# Patient Record
Sex: FEMALE | Race: WHITE | NOT HISPANIC OR LATINO | Employment: OTHER | URBAN - METROPOLITAN AREA
[De-identification: names, ages, dates, MRNs, and addresses within clinical notes are randomized per-mention and may not be internally consistent; named-entity substitution may affect disease eponyms.]

---

## 2016-11-23 LAB
CHOLEST SERPL-MSCNC: 163 MG/DL (ref 0–200)
HDLC SERPL-MCNC: 73 MG/DL
LDLC SERPL CALC-MCNC: 67 MG/DL (ref 0–160)
TRIGL SERPL-MCNC: 116 MG/DL (ref 40–160)

## 2018-12-14 LAB
CHOLEST SERPL-MSCNC: 150 MG/DL (ref 0–200)
HDLC SERPL-MCNC: 80 MG/DL
LDLC SERPL CALC-MCNC: 50 MG/DL (ref 0–160)
TRIGL SERPL-MCNC: 102 MG/DL

## 2019-01-17 ENCOUNTER — OFFICE VISIT (OUTPATIENT)
Dept: FAMILY MEDICINE | Facility: CLINIC | Age: 74
End: 2019-01-17
Payer: MEDICARE

## 2019-01-17 VITALS
HEART RATE: 88 BPM | BODY MASS INDEX: 26.1 KG/M2 | DIASTOLIC BLOOD PRESSURE: 56 MMHG | SYSTOLIC BLOOD PRESSURE: 120 MMHG | TEMPERATURE: 98 F | HEIGHT: 61 IN | OXYGEN SATURATION: 98 % | WEIGHT: 138.25 LBS | RESPIRATION RATE: 20 BRPM

## 2019-01-17 DIAGNOSIS — K21.9 GASTROESOPHAGEAL REFLUX DISEASE, ESOPHAGITIS PRESENCE NOT SPECIFIED: ICD-10-CM

## 2019-01-17 DIAGNOSIS — R10.9 FLANK PAIN: ICD-10-CM

## 2019-01-17 DIAGNOSIS — S39.012A STRAIN OF LUMBAR REGION, INITIAL ENCOUNTER: Primary | ICD-10-CM

## 2019-01-17 DIAGNOSIS — M54.5 ACUTE MIDLINE LOW BACK PAIN, WITH SCIATICA PRESENCE UNSPECIFIED: ICD-10-CM

## 2019-01-17 LAB
BACTERIA #/AREA URNS HPF: ABNORMAL /HPF
BILIRUB UR QL STRIP: NEGATIVE
CLARITY UR: CLEAR
COLOR UR: YELLOW
GLUCOSE UR QL STRIP: NEGATIVE
HGB UR QL STRIP: ABNORMAL
KETONES UR QL STRIP: NEGATIVE
LEUKOCYTE ESTERASE UR QL STRIP: NEGATIVE
MICROSCOPIC COMMENT: ABNORMAL
NITRITE UR QL STRIP: NEGATIVE
PH UR STRIP: 7 [PH] (ref 5–8)
PROT UR QL STRIP: NEGATIVE
RBC #/AREA URNS HPF: 5 /HPF (ref 0–4)
SP GR UR STRIP: 1.01 (ref 1–1.03)
URN SPEC COLLECT METH UR: ABNORMAL
WBC #/AREA URNS HPF: 2 /HPF (ref 0–5)

## 2019-01-17 PROCEDURE — 99999 PR PBB SHADOW E&M-NEW PATIENT-LVL IV: ICD-10-PCS | Mod: PBBFAC,,, | Performed by: PHYSICIAN ASSISTANT

## 2019-01-17 PROCEDURE — 99203 PR OFFICE/OUTPT VISIT, NEW, LEVL III, 30-44 MIN: ICD-10-PCS | Mod: S$PBB,,, | Performed by: PHYSICIAN ASSISTANT

## 2019-01-17 PROCEDURE — 99203 OFFICE O/P NEW LOW 30 MIN: CPT | Mod: S$PBB,,, | Performed by: PHYSICIAN ASSISTANT

## 2019-01-17 PROCEDURE — 87086 URINE CULTURE/COLONY COUNT: CPT

## 2019-01-17 PROCEDURE — 99204 OFFICE O/P NEW MOD 45 MIN: CPT | Mod: PBBFAC,PO | Performed by: PHYSICIAN ASSISTANT

## 2019-01-17 PROCEDURE — 99999 PR PBB SHADOW E&M-NEW PATIENT-LVL IV: CPT | Mod: PBBFAC,,, | Performed by: PHYSICIAN ASSISTANT

## 2019-01-17 PROCEDURE — 81000 URINALYSIS NONAUTO W/SCOPE: CPT | Mod: PO

## 2019-01-17 RX ORDER — ROSUVASTATIN CALCIUM 40 MG/1
1 TABLET, COATED ORAL NIGHTLY
Refills: 1 | COMMUNITY
Start: 2018-11-14 | End: 2019-05-08 | Stop reason: SDUPTHER

## 2019-01-17 RX ORDER — TIZANIDINE 4 MG/1
4 TABLET ORAL EVERY 6 HOURS PRN
Qty: 20 TABLET | Refills: 0 | Status: SHIPPED | OUTPATIENT
Start: 2019-01-17 | End: 2019-01-27

## 2019-01-17 RX ORDER — OMEPRAZOLE 20 MG/1
20 CAPSULE, DELAYED RELEASE ORAL DAILY
Qty: 30 CAPSULE | Refills: 3 | Status: SHIPPED | OUTPATIENT
Start: 2019-01-17 | End: 2019-05-16 | Stop reason: SDUPTHER

## 2019-01-17 RX ORDER — TOPIRAMATE 50 MG/1
1 TABLET, FILM COATED ORAL 2 TIMES DAILY
Refills: 11 | COMMUNITY
Start: 2018-12-31 | End: 2020-01-07

## 2019-01-17 RX ORDER — NAPROXEN 500 MG/1
500 TABLET ORAL 2 TIMES DAILY PRN
Qty: 30 TABLET | Refills: 0 | OUTPATIENT
Start: 2019-01-17 | End: 2019-01-17 | Stop reason: SDUPTHER

## 2019-01-17 RX ORDER — NAPROXEN 500 MG/1
500 TABLET ORAL 2 TIMES DAILY PRN
Qty: 30 TABLET | Refills: 0 | Status: SHIPPED | OUTPATIENT
Start: 2019-01-17 | End: 2019-01-27

## 2019-01-17 RX ORDER — LOSARTAN POTASSIUM 50 MG/1
50 TABLET ORAL EVERY MORNING
COMMUNITY
Start: 2016-05-04 | End: 2019-03-04 | Stop reason: SDUPTHER

## 2019-01-17 RX ORDER — ALPRAZOLAM 0.25 MG/1
0.25 TABLET ORAL
COMMUNITY
Start: 2017-04-03 | End: 2019-07-15 | Stop reason: SDUPTHER

## 2019-01-17 RX ORDER — FOLIC ACID/MULTIVIT,IRON,MINER 0.4MG-18MG
28 TABLET ORAL DAILY
COMMUNITY
End: 2019-02-05

## 2019-01-17 RX ORDER — TOPIRAMATE 25 MG/1
50 TABLET ORAL 2 TIMES DAILY
COMMUNITY
Start: 2016-04-19 | End: 2019-01-17 | Stop reason: DRUGHIGH

## 2019-01-17 RX ORDER — TIZANIDINE 4 MG/1
4 TABLET ORAL EVERY 6 HOURS PRN
Qty: 20 TABLET | Refills: 0 | OUTPATIENT
Start: 2019-01-17 | End: 2019-01-17 | Stop reason: SDUPTHER

## 2019-01-17 RX ORDER — ESOMEPRAZOLE MAGNESIUM 40 MG/1
1 CAPSULE, DELAYED RELEASE ORAL DAILY
Refills: 10 | COMMUNITY
Start: 2018-12-10 | End: 2019-01-17

## 2019-01-17 RX ORDER — VALACYCLOVIR HYDROCHLORIDE 1 G/1
1 TABLET, FILM COATED ORAL
COMMUNITY
Start: 2017-03-27 | End: 2021-08-25

## 2019-01-17 RX ORDER — ASPIRIN 81 MG/1
81 TABLET ORAL DAILY
COMMUNITY
End: 2019-03-19

## 2019-01-17 RX ORDER — OMEPRAZOLE 20 MG/1
20 CAPSULE, DELAYED RELEASE ORAL DAILY
Qty: 30 CAPSULE | Refills: 3 | OUTPATIENT
Start: 2019-01-17 | End: 2019-01-17 | Stop reason: SDUPTHER

## 2019-01-17 RX ORDER — FLUTICASONE PROPIONATE 50 MCG
2 SPRAY, SUSPENSION (ML) NASAL DAILY
COMMUNITY
Start: 2016-04-18 | End: 2019-02-05

## 2019-01-17 RX ORDER — SERTRALINE HYDROCHLORIDE 50 MG/1
50 TABLET, FILM COATED ORAL NIGHTLY
COMMUNITY
Start: 2017-04-03 | End: 2019-05-08 | Stop reason: SDUPTHER

## 2019-01-17 RX ORDER — HYOSCYAMINE SULFATE 0.125 MG
0.12 TABLET ORAL
COMMUNITY
End: 2019-02-05

## 2019-01-17 NOTE — PROGRESS NOTES
"Subjective:      Patient ID: Veronique Johnson is a 73 y.o. female.    Chief Complaint: Flank Pain    Patient is new to clinic, here today for flank pain  Patient reports she has had h/o pyelo and sepsis, this doesn't feel like infection but just tired with flank pain, "I don't feel right"    Patient looking to establish care, recently moved from [place]  Last wellness exam was in December, no problems at that time      Flank Pain   This is a new problem. The current episode started 1 to 4 weeks ago (1wk). The problem has been gradually worsening since onset. Associated symptoms include headaches. Pertinent negatives include no abdominal pain, dysuria, fever or pelvic pain.     Review of Systems   Constitutional: Positive for diaphoresis (hormonal). Negative for chills and fever.   Gastrointestinal: Positive for diarrhea (common for patient). Negative for abdominal pain, constipation, nausea and vomiting.   Genitourinary: Positive for difficulty urinating ("feel like I have to go but have a hard time going") and flank pain (bilateral). Negative for dysuria, frequency, hematuria, pelvic pain and urgency.        H/o recurrent UTIs  2007- pyelo and sepsis  Microscopic hematuria for "years", patient has been seen by Urology for and work up was unremarkable   Musculoskeletal: Positive for back pain.   Neurological: Positive for light-headedness and headaches. Negative for dizziness.   Psychiatric/Behavioral: Negative for confusion.       Objective:   BP (!) 120/56 (BP Location: Left arm, Patient Position: Sitting, BP Method: Large (Manual))   Pulse 88   Temp 98.1 °F (36.7 °C) (Oral)   Resp 20   Ht 5' 1" (1.549 m)   Wt 62.7 kg (138 lb 3.7 oz)   SpO2 98%   BMI 26.12 kg/m²   Physical Exam   Constitutional: She appears well-developed and well-nourished. She does not appear ill. No distress.   HENT:   Head: Normocephalic and atraumatic.   Cardiovascular: Normal rate, regular rhythm and normal heart sounds.   No murmur " heard.  Pulmonary/Chest: Effort normal and breath sounds normal. No respiratory distress. She has no decreased breath sounds.   Abdominal: Soft. Normal appearance and bowel sounds are normal. There is no tenderness. There is CVA tenderness (bilateral).   Musculoskeletal:        Lumbar back: She exhibits tenderness.   Skin: Skin is warm, dry and intact. No rash noted.   Psychiatric: She has a normal mood and affect. Her speech is normal and behavior is normal. Thought content normal.     Assessment:      1. Strain of lumbar region, initial encounter    2. Flank pain    3. Acute midline low back pain, with sciatica presence unspecified    4. Gastroesophageal reflux disease, esophagitis presence not specified       Plan:   Strain of lumbar region, initial encounter  -     tiZANidine (ZANAFLEX) 4 MG tablet; Take 1 tablet (4 mg total) by mouth every 6 (six) hours as needed (spasm).  Dispense: 20 tablet; Refill: 0    Flank pain  -     Urinalysis  -     Urine culture  -     Cancel: CT Renal Stone Study ABD Pelvis WO; Future; Expected date: 01/17/2019    Acute midline low back pain, with sciatica presence unspecified  -     naproxen (NAPROSYN) 500 MG tablet; Take 1 tablet (500 mg total) by mouth 2 (two) times daily as needed (pain).  Dispense: 30 tablet; Refill: 0    Gastroesophageal reflux disease, esophagitis presence not specified  -     omeprazole (PRILOSEC) 20 MG capsule; Take 1 capsule (20 mg total) by mouth once daily.  Dispense: 30 capsule; Refill: 3    Other orders  -     Urinalysis Microscopic    Discussed worsening signs/symptoms and when to return to clinic or go to ED.   Patient expresses understanding and agrees with treatment plan.

## 2019-01-17 NOTE — PROGRESS NOTES
"Subjective:      Patient ID: Veronique Johnson is a 73 y.o. female.    Chief Complaint: Flank Pain    HPI  Review of Systems    Objective:   BP (!) 120/56 (BP Location: Left arm, Patient Position: Sitting, BP Method: Large (Manual))   Pulse 88   Temp 98.1 °F (36.7 °C) (Oral)   Resp 20   Ht 5' 1" (1.549 m)   Wt 62.7 kg (138 lb 3.7 oz)   SpO2 98%   BMI 26.12 kg/m²   Physical Exam  Assessment:      1. Flank pain       Plan:   Flank pain  -     Urinalysis  -     Urine culture        "

## 2019-01-19 LAB — BACTERIA UR CULT: NORMAL

## 2019-02-04 ENCOUNTER — TELEPHONE (OUTPATIENT)
Dept: ADMINISTRATIVE | Facility: HOSPITAL | Age: 74
End: 2019-02-04

## 2019-02-05 ENCOUNTER — HOSPITAL ENCOUNTER (OUTPATIENT)
Dept: RADIOLOGY | Facility: HOSPITAL | Age: 74
Discharge: HOME | End: 2019-02-05
Attending: FAMILY MEDICINE
Payer: MEDICARE

## 2019-02-05 ENCOUNTER — OFFICE VISIT (OUTPATIENT)
Dept: FAMILY MEDICINE | Facility: CLINIC | Age: 74
End: 2019-02-05
Payer: MEDICARE

## 2019-02-05 ENCOUNTER — IMMUNIZATION (OUTPATIENT)
Dept: PHARMACY | Facility: CLINIC | Age: 74
End: 2019-02-05
Payer: MEDICARE

## 2019-02-05 VITALS
BODY MASS INDEX: 26.51 KG/M2 | DIASTOLIC BLOOD PRESSURE: 62 MMHG | HEIGHT: 61 IN | HEART RATE: 71 BPM | SYSTOLIC BLOOD PRESSURE: 110 MMHG | OXYGEN SATURATION: 97 % | WEIGHT: 140.44 LBS

## 2019-02-05 DIAGNOSIS — H04.129 DRY EYE: ICD-10-CM

## 2019-02-05 DIAGNOSIS — Z11.59 NEED FOR HEPATITIS C SCREENING TEST: ICD-10-CM

## 2019-02-05 DIAGNOSIS — M25.541 ARTHRALGIA OF BOTH HANDS: ICD-10-CM

## 2019-02-05 DIAGNOSIS — Z78.0 POSTMENOPAUSAL: ICD-10-CM

## 2019-02-05 DIAGNOSIS — D50.8 OTHER IRON DEFICIENCY ANEMIA: ICD-10-CM

## 2019-02-05 DIAGNOSIS — M25.542 ARTHRALGIA OF BOTH HANDS: ICD-10-CM

## 2019-02-05 DIAGNOSIS — Z23 NEED FOR VACCINATION AGAINST STREPTOCOCCUS PNEUMONIAE: ICD-10-CM

## 2019-02-05 DIAGNOSIS — F41.9 ANXIETY: ICD-10-CM

## 2019-02-05 DIAGNOSIS — I10 ESSENTIAL HYPERTENSION: Primary | ICD-10-CM

## 2019-02-05 PROCEDURE — 77080 DXA BONE DENSITY AXIAL: CPT | Mod: 26,,, | Performed by: RADIOLOGY

## 2019-02-05 PROCEDURE — 99999 PR PBB SHADOW E&M-EST. PATIENT-LVL IV: ICD-10-PCS | Mod: PBBFAC,,, | Performed by: FAMILY MEDICINE

## 2019-02-05 PROCEDURE — 77080 DEXA BONE DENSITY SPINE HIP: ICD-10-PCS | Mod: 26,,, | Performed by: RADIOLOGY

## 2019-02-05 PROCEDURE — 99999 PR PBB SHADOW E&M-EST. PATIENT-LVL IV: CPT | Mod: PBBFAC,,, | Performed by: FAMILY MEDICINE

## 2019-02-05 PROCEDURE — 99214 PR OFFICE/OUTPT VISIT, EST, LEVL IV, 30-39 MIN: ICD-10-PCS | Mod: S$PBB,,, | Performed by: FAMILY MEDICINE

## 2019-02-05 PROCEDURE — 77080 DXA BONE DENSITY AXIAL: CPT | Mod: TC,PO

## 2019-02-05 PROCEDURE — 99214 OFFICE O/P EST MOD 30 MIN: CPT | Mod: PBBFAC,25,PO | Performed by: FAMILY MEDICINE

## 2019-02-05 PROCEDURE — 99214 OFFICE O/P EST MOD 30 MIN: CPT | Mod: S$PBB,,, | Performed by: FAMILY MEDICINE

## 2019-02-05 PROCEDURE — G0009 ADMIN PNEUMOCOCCAL VACCINE: HCPCS | Mod: PBBFAC,PO

## 2019-02-05 RX ORDER — TIZANIDINE 4 MG/1
4 TABLET ORAL EVERY 6 HOURS PRN
COMMUNITY
End: 2019-03-06

## 2019-02-05 NOTE — PATIENT INSTRUCTIONS
Low-Salt Diet  This diet removes foods that are high in salt. It also limits the amount of salt you use when cooking. It is most often used for people with high blood pressure, edema (fluid retention), and kidney, liver, or heart disease.  Table salt contains the mineral sodium. Your body needs sodium to work normally. But too much sodium can make your health problems worse. Your healthcare provider is recommending a low-salt (also called low-sodium) diet for you. Your total daily allowance of salt is 1,500 to 2,300 milligrams (mg). It is less than 1 teaspoon of table salt. This means you can have only about 500 to 700 mg of sodium at each meal. People with certain health problems should limit salt intake to the lower end of the recommended range.    When you cook, dont add much salt. If you can cook without using salt, even better. Dont add salt to your food at the table.  When shopping, read food labels. Salt is often called sodium on the label. Choose foods that are salt-free, low salt, or very low salt. Note that foods with reduced salt may not lower your salt intake enough.    Beans, potatoes, and pasta  Ok: Dry beans, split peas, lentils, potatoes, rice, macaroni, pasta, spaghetti without added salt  Avoid: Potato chips, tortilla chips, and similar products  Breads and cereals  Ok: Low-sodium breads, rolls, cereals, and cakes; low-salt crackers, matzo crackers  Avoid: Salted crackers, pretzels, popcorn, Danish toast, pancakes, muffins  Dairy  Ok: Milk, chocolate milk, hot chocolate mix, low-salt cheeses, and yogurt  Avoid: Processed cheese and cheese spreads; Roquefort, Camembert, and cottage cheese; buttermilk, instant breakfast drink  Desserts  Ok: Ice cream, frozen yogurt, juice bars, gelatin, cookies and pies, sugar, honey, jelly, hard candy  Avoid: Most pies, cakes and cookies prepared or processed with salt; instant pudding  Drinks  Ok: Tea, coffee, fizzy (carbonated) drinks, juices  Avoid: Flavored  coffees, electrolyte replacement drinks, sports drinks  Meats  Ok: All fresh meat, fish, poultry, low-salt tuna, eggs, egg substitute  Avoid: Smoked, pickled, brine-cured, or salted meats and fish. This includes hays, chipped beef, corned beef, hot dogs, deli meats, ham, kosher meats, salt pork, sausage, canned tuna, salted codfish, smoked salmon, herring, sardines, or anchovies.  Seasonings and spices  Ok: Most seasonings are okay. Good substitutes for salt include: fresh herb blends, hot sauce, lemon, garlic, garrison, vinegar, dry mustard, parsley, cilantro, horseradish, tomato paste, regular margarine, mayonnaise, unsalted butter, cream cheese, vegetable oil, cream, low-salt salad dressing and gravy.  Avoid: Regular ketchup, relishes, pickles, soy sauce, teriyaki sauce, Worcestershire sauce, BBQ sauce, tartar sauce, meat tenderizer, chili sauce, regular gravy, regular salad dressing, salted butter  Soups  Ok: Low-salt soups and broths made with allowed foods  Avoid: Bouillon cubes, soups with smoked or salted meats, regular soup and broth  Vegetables  Ok: Most vegetables are okay; also low-salt tomato and vegetable juices  Avoid: Sauerkraut and other brine-soaked vegetables; pickles and other pickled vegetables; tomato juice, olives  Date Last Reviewed: 8/1/2016 © 2000-2017 [employer]. [address] [place] [id number]. All rights reserved. This information is not intended as a substitute for professional medical care. Always follow your healthcare professional's instructions.        Eating Heart-Healthy Foods  Eating has a big impact on your heart health. In fact, eating healthier can improve several of your heart risks at once. For instance, it helps you manage weight, cholesterol, and blood pressure. Here are ideas to help you make heart-healthy changes without giving up all the foods and flavors you love.  Getting started  · Talk with your health care provider about eating plans, such  as the DASH or Mediterranean diet. You may also be referred to a dietitian.  · Change a few things at a time. Give yourself time to get used to a few eating changes before adding more.  · Work to create a tasty, healthy eating plan that you can stick to for the rest of your life.    Goals for healthy eating  Below are some tips to improve your eating habits:  · Limit saturated fats and trans fats. Saturated fats raise your levels of cholesterol, so keep these fats to a minimum. They are found in foods such as fatty meats, whole milk, cheese, and palm and coconut oils. Avoid trans fats because they lower good cholesterol as well as raise bad cholesterol. Trans fats are most often found in processed foods.  · Reduce sodium (salt) intake. Eating too much salt may increase your blood pressure. Limit your sodium intake to 2,300 milligrams (mg) per day, or less if your health care provider recommends it. Dining out less often and eating fewer processed foods are two great ways to decrease the amount of salt you consume.  · Managing calories. A calorie is a unit of energy. Your body burns calories for fuel, but if you eat more calories than your body burns, the extras are stored as fat. Your health care provider can help you create a diet plan to manage your calories. This will likely include eating healthier foods as well as exercising regularly. To help you track your progress, keep a diary to record what you eat and how often you exercise.  Choose the right foods  Aim to make these foods staples of your diet. If you have diabetes, you may have different recommendations than what is listed here:  · Fruits and vegetable provide plenty of nutrients without a lot of calories. At meals, fill half your plate with these foods. Split the other half of your plate between whole grains and lean protein.  · Whole grains are high in fiber and rich in vitamins and nutrients. Good choices include whole-wheat bread, pasta, and brown  rice.  · Lean proteins give you nutrition with less fat. Good choices include fish, skinless chicken, and beans.  · Low-fat or nonfat dairy provides nutrients without a lot of fat. Try low-fat or nonfat milk, cheese, or yogurt.  · Healthy fats can be good for you in small amounts. These are unsaturated fats, such as olive oil, nuts, and fish. Try to have at least 2 servings per week of fatty fish such as salmon, sardines, mackerel, rainbow trout, and albacore tuna. These contain omega-3 fatty acids, which are good for your heart. Flaxseed is another source of a heart-healthy fat.  More on heart healthy eating    Read food labels  Healthy eating starts at the grocery store. Be sure to pay attention to food labels on packaged foods. Look for products that are high in fiber and protein, and low in saturated fat, cholesterol, and sodium. Avoid products that contain trans fat. And pay close attention to serving size. For instance, if you plan to eat two servings, double all the numbers on the label.  Prepare food right  A key part of healthy cooking is cutting down on added fat and salt. Look on the internet for lower-fat, lower-sodium recipes. Also, try these tips:  · Remove fat from meat and skin from poultry before cooking.  · Skim fat from the surface of soups and sauces.  · Broil, boil, bake, steam, grill, and microwave food without added fats.  · Choose ingredients that spice up your food without adding calories, fat, or sodium. Try these items: horseradish, hot sauce, lemon, mustard, nonfat salad dressings, and vinegar. For salt-free herbs and spices, try basil, cilantro, cinnamon, pepper, and rosemary.  Date Last Reviewed: 6/25/2015  © 6342-1058 [employer]. [address] [place] [address], [place] [address]. All rights reserved. This information is not intended as a substitute for professional medical care. Always follow your healthcare professional's instructions.

## 2019-02-06 ENCOUNTER — PATIENT MESSAGE (OUTPATIENT)
Dept: FAMILY MEDICINE | Facility: CLINIC | Age: 74
End: 2019-02-06

## 2019-02-07 ENCOUNTER — TELEPHONE (OUTPATIENT)
Dept: ADMINISTRATIVE | Facility: HOSPITAL | Age: 74
End: 2019-02-07

## 2019-02-07 ENCOUNTER — PATIENT MESSAGE (OUTPATIENT)
Dept: FAMILY MEDICINE | Facility: CLINIC | Age: 74
End: 2019-02-07

## 2019-02-07 DIAGNOSIS — R70.0 ESR RAISED: ICD-10-CM

## 2019-02-07 DIAGNOSIS — D50.8 OTHER IRON DEFICIENCY ANEMIA: Primary | ICD-10-CM

## 2019-02-07 RX ORDER — FERROUS SULFATE 325(65) MG
325 TABLET ORAL 2 TIMES DAILY
Qty: 60 TABLET | Refills: 0 | Status: SHIPPED | OUTPATIENT
Start: 2019-02-07 | End: 2019-03-12

## 2019-02-08 DIAGNOSIS — Z12.11 COLON CANCER SCREENING: ICD-10-CM

## 2019-02-08 PROBLEM — M25.541 ARTHRALGIA OF BOTH HANDS: Status: ACTIVE | Noted: 2019-02-08

## 2019-02-08 PROBLEM — F41.9 ANXIETY: Status: ACTIVE | Noted: 2019-02-08

## 2019-02-08 PROBLEM — M25.542 ARTHRALGIA OF BOTH HANDS: Status: ACTIVE | Noted: 2019-02-08

## 2019-02-08 PROBLEM — I10 ESSENTIAL HYPERTENSION: Status: ACTIVE | Noted: 2019-02-08

## 2019-02-08 PROBLEM — D50.9 IRON DEFICIENCY ANEMIA: Status: ACTIVE | Noted: 2019-02-08

## 2019-02-08 PROBLEM — H04.129 DRY EYE: Status: ACTIVE | Noted: 2019-02-08

## 2019-03-02 PROCEDURE — 82274 ASSAY TEST FOR BLOOD FECAL: CPT

## 2019-03-03 ENCOUNTER — PATIENT MESSAGE (OUTPATIENT)
Dept: FAMILY MEDICINE | Facility: CLINIC | Age: 74
End: 2019-03-03

## 2019-03-03 DIAGNOSIS — I10 ESSENTIAL HYPERTENSION: Primary | ICD-10-CM

## 2019-03-04 ENCOUNTER — PATIENT MESSAGE (OUTPATIENT)
Dept: FAMILY MEDICINE | Facility: CLINIC | Age: 74
End: 2019-03-04

## 2019-03-04 RX ORDER — LOSARTAN POTASSIUM 50 MG/1
50 TABLET ORAL EVERY MORNING
Qty: 90 TABLET | Refills: 0 | Status: SHIPPED | OUTPATIENT
Start: 2019-03-04 | End: 2019-05-24 | Stop reason: SDUPTHER

## 2019-03-06 ENCOUNTER — LAB VISIT (OUTPATIENT)
Dept: LAB | Facility: HOSPITAL | Age: 74
End: 2019-03-06
Attending: FAMILY MEDICINE
Payer: MEDICARE

## 2019-03-06 ENCOUNTER — OFFICE VISIT (OUTPATIENT)
Dept: PRIMARY CARE CLINIC | Facility: CLINIC | Age: 74
End: 2019-03-06
Payer: MEDICARE

## 2019-03-06 VITALS
HEIGHT: 61 IN | WEIGHT: 144.19 LBS | BODY MASS INDEX: 27.22 KG/M2 | HEART RATE: 70 BPM | OXYGEN SATURATION: 100 % | SYSTOLIC BLOOD PRESSURE: 130 MMHG | DIASTOLIC BLOOD PRESSURE: 72 MMHG

## 2019-03-06 DIAGNOSIS — R30.9 PAIN WITH URINATION: Primary | ICD-10-CM

## 2019-03-06 DIAGNOSIS — R10.2 SUPRAPUBIC PRESSURE: ICD-10-CM

## 2019-03-06 DIAGNOSIS — Z12.11 COLON CANCER SCREENING: ICD-10-CM

## 2019-03-06 LAB
BACTERIA #/AREA URNS HPF: NORMAL /HPF
BILIRUB UR QL STRIP: NEGATIVE
CLARITY UR: CLEAR
COLOR UR: YELLOW
GLUCOSE UR QL STRIP: NEGATIVE
HEMOCCULT STL QL IA: POSITIVE
HGB UR QL STRIP: ABNORMAL
KETONES UR QL STRIP: NEGATIVE
LEUKOCYTE ESTERASE UR QL STRIP: NEGATIVE
MICROSCOPIC COMMENT: NORMAL
NITRITE UR QL STRIP: NEGATIVE
PH UR STRIP: 6 [PH] (ref 5–8)
PROT UR QL STRIP: NEGATIVE
RBC #/AREA URNS HPF: 3 /HPF (ref 0–4)
SP GR UR STRIP: 1.02 (ref 1–1.03)
URN SPEC COLLECT METH UR: ABNORMAL

## 2019-03-06 PROCEDURE — 81000 URINALYSIS NONAUTO W/SCOPE: CPT | Mod: PO

## 2019-03-06 PROCEDURE — 99215 OFFICE O/P EST HI 40 MIN: CPT | Mod: PBBFAC,PO | Performed by: NURSE PRACTITIONER

## 2019-03-06 PROCEDURE — 99999 PR PBB SHADOW E&M-EST. PATIENT-LVL V: CPT | Mod: PBBFAC,,, | Performed by: NURSE PRACTITIONER

## 2019-03-06 PROCEDURE — 99214 OFFICE O/P EST MOD 30 MIN: CPT | Mod: S$PBB,,, | Performed by: NURSE PRACTITIONER

## 2019-03-06 PROCEDURE — 99214 PR OFFICE/OUTPT VISIT, EST, LEVL IV, 30-39 MIN: ICD-10-PCS | Mod: S$PBB,,, | Performed by: NURSE PRACTITIONER

## 2019-03-06 PROCEDURE — 99999 PR PBB SHADOW E&M-EST. PATIENT-LVL V: ICD-10-PCS | Mod: PBBFAC,,, | Performed by: NURSE PRACTITIONER

## 2019-03-06 RX ORDER — CLOTRIMAZOLE AND BETAMETHASONE DIPROPIONATE 10; .64 MG/G; MG/G
CREAM TOPICAL
Refills: 0 | COMMUNITY
Start: 2019-02-06 | End: 2019-03-12

## 2019-03-06 RX ORDER — VIT C/E/ZN/COPPR/LUTEIN/ZEAXAN 250MG-90MG
CAPSULE ORAL
COMMUNITY
Start: 2019-02-01 | End: 2019-07-15

## 2019-03-06 RX ORDER — PHENAZOPYRIDINE HYDROCHLORIDE 200 MG/1
200 TABLET, FILM COATED ORAL 3 TIMES DAILY PRN
Qty: 12 TABLET | Refills: 2 | Status: SHIPPED | OUTPATIENT
Start: 2019-03-06 | End: 2019-03-16

## 2019-03-06 NOTE — MEDICAL/APP STUDENT
Veronique Johnson is a 73 y.o. female patient of Becky Song MD who presents to the clinic today for   Chief Complaint   Patient presents with    Urinary Tract Infection   .    HPI    Pt, who is not known to me, reports a new problem to me:  Pain after emptying her bladder. Started 6 days ago. Constant pressure in pelvis that is relieved by holding the area. Pain feels like a spasm after urinating but pain stays constant (5/10)    These symptoms began 6 days  ago and status is continuing.     Pt denies the following symptoms:  Fever, n/v, chills, flank pain    Aggravating factors include urinating.    Relieving factors include ibuprofen helps a bit.    OTC Medications tried are ibuprofen.    Prescription medications taken for symptoms are none.    Pertinent history:  Hx of frequent UTI and treated by  Urology but hasnt had issues in about 5 years.    ROS    Constitutional: No fever, no fatigue, no change in appetite.    GI:  No stomach ache, no nausea, no vomiting, no diarrhea, no constipation, no heartburn.    Urinary:  + dysuria, no frequency, no urgency, no flank pain.     HEENT/Heart/Lung/MS/Skin:  No symptoms or no changes.      PAST MEDICAL HISTORY:  Past Medical History:   Diagnosis Date    Anemia     Anxiety     Chronic bronchitis with COPD (chronic obstructive pulmonary disease)     Esophageal spasm     Essential tremor     GERD (gastroesophageal reflux disease)     Headache     High cholesterol     Hypertension     IBS (irritable bowel syndrome)     Meniere disease     Multiple sclerosis     Muscle spasm        PAST SURGICAL HISTORY:  Past Surgical History:   Procedure Laterality Date    CATARACT EXTRACTION, BILATERAL  2006    DILATION AND CURETTAGE OF UTERUS  1966    HEMORRHOID SURGERY  1997    TONSILLECTOMY  1954    TUBAL LIGATION  1990       SOCIAL HISTORY:  Social History     Socioeconomic History    Marital status:      Spouse name: Not on file    Number of children: Not  on file    Years of education: Not on file    Highest education level: Not on file   Social Needs    Financial resource strain: Not on file    Food insecurity - worry: Not on file    Food insecurity - inability: Not on file    Transportation needs - medical: Not on file    Transportation needs - non-medical: Not on file   Occupational History    Not on file   Tobacco Use    Smoking status: Former Smoker     Last attempt to quit:      Years since quittin.1    Smokeless tobacco: Never Used   Substance and Sexual Activity    Alcohol use: Yes     Alcohol/week: 1.8 - 2.4 oz     Types: 3 - 4 Glasses of wine per week     Comment: on weekends    Drug use: No    Sexual activity: Yes   Other Topics Concern    Not on file   Social History Narrative    Not on file       FAMILY HISTORY:  Family History   Problem Relation Age of Onset    Coronary artery disease Mother     Heart disease Mother 60        CAD    Heart attack Father     Coronary artery disease Father     Heart disease Father 55        MI    Coronary artery disease Sister     Heart disease Sister 65        CAD       ALLERGIES AND MEDICATIONS: updated and reviewed.  Review of patient's allergies indicates:   Allergen Reactions    Cephalosporins Anaphylaxis    Penicillins Rash     Current Outpatient Medications   Medication Sig Dispense Refill    cholecalciferol, vitamin D3, (VITAMIN D3) 1,000 unit capsule       ALPRAZolam (XANAX) 0.25 MG tablet Take 0.25 mg by mouth as needed.      aspirin (ECOTRIN) 81 MG EC tablet Take 81 mg by mouth once daily.      clotrimazole-betamethasone 1-0.05% (LOTRISONE) cream   0    ferrous sulfate (FEOSOL) 325 mg (65 mg iron) Tab tablet Take 1 tablet (325 mg total) by mouth 2 (two) times daily. 60 tablet 0    losartan (COZAAR) 50 MG tablet Take 1 tablet (50 mg total) by mouth every morning. 90 tablet 0    multivitamin capsule Take 1 capsule by mouth.      omeprazole (PRILOSEC) 20 MG capsule Take 1  capsule (20 mg total) by mouth once daily. 30 capsule 3    rosuvastatin (CRESTOR) 40 MG Tab Take 1 tablet by mouth every evening.  1    sertraline (ZOLOFT) 50 MG tablet Take 50 mg by mouth every evening.      topiramate (TOPAMAX) 50 MG tablet Take 1 tablet by mouth 2 (two) times daily.  11    valACYclovir (VALTREX) 1000 MG tablet Take 1 tablet by mouth as needed.       No current facility-administered medications for this visit.          PHYSICAL EXAM    Alert, coop 73 y.o. female patient in no distress.    Vitals:    03/06/19 1036   BP: 130/72   Pulse: 70     VS reviewed.  VS stable.  CC, nursing note, medications & PMH all reviewed today.    Head:  Normocephalic, atraumatic.    EENT:  Ext nose/ears normal.               Eye lids normal, no discharge present.                       Resp:  Respirations even, unlabored             Lungs CTA bilat.    Heart:  Heart rate normal.  RRR, no MRG on ausculation.    ABD:  Soft, round, suprapubic tenderness to palpation.  Normal BS in all 4 quadrants.  No rebound or organomegaly.              No peritoneal signs.  negative CVAT    MS:  Ambulates steady.      NEURO:  Alert and oriented x 4.  Responds appropriately during interaction.    Skin:  Warm, dry, color good..    Psych:  Responds appropriately throughout the visit.               Relaxed.  Well-groomed.    Pain with urination  -     Urinalysis  -     phenazopyridine (PYRIDIUM) 200 MG tablet; Take 1 tablet (200 mg total) by mouth 3 (three) times daily as needed for Pain.  Dispense: 12 tablet; Refill: 2  -     Ambulatory consult to Urogynecology    Suprapubic pressure  -     Ambulatory consult to Urogynecology    Other orders  -     Urinalysis Microscopic    Pt today presents with 5 days of suprapubic pain that is worse after urination and described as a spasms. Pt concerned with her bladder or uterus dropping but wanted to be checked for a UTI as well. On exam, suprapubic tenderness to palpation noted, otherwise benign  exam. UA with 3 RBCs and occasional bacteria which is consistent with UA from 1/17/19 that culture was negative. Pt with longstanding hx of blood in urine. Will refer to Urogynecology for eval.    This is a new problem to me and the following work up is planned.    Lab & Radiological Tests Ordered: Urinalysis.  The results are normal for patient.      I have reviewed the following additional tests today: UA and culture from 1/17/2019      Pt advised to perform comfort measures recommended on patient instruction sheet .      If worse or concerns, the patient is advised to call us.  Explained exam findings, diagnosis and treatment plan to patient.  Questions answered and patient states understanding.

## 2019-03-06 NOTE — PATIENT INSTRUCTIONS
The urine test is normal today.  We recommend a specialist.    Use the pyridium for symptom relief.    If you have any questions, please call.  You can reach us at [phone] Monday through Thursday (except holidays) 10 a.m. to 5 p.m. or call Dr. Song/MILES Guerra     Note:  I do not work on Fridays.  So if you have concerns or questions, please contact your primary care provider team or [facility] or go to the Urgent Care on Friday for concerns.     Thank you for using the [facility]!    DULCE Govea, CNP, FNP-BC [facility]    To rate your experience with HARITHA Govea, click on the link below:        [email or web address]

## 2019-03-06 NOTE — Clinical Note
Becky Song MD,  I saw your patient today in the [facility].  If you have any questions, please do not hesitate to contact me.  Thank you!  HARITHA Govea

## 2019-03-06 NOTE — PROGRESS NOTES
Veronique Johnson is a 73 y.o. female patient of Becky Song MD who presents to the clinic today for       Chief Complaint   Patient presents with    Urinary Tract Infection   .     HPI     Pt, who is not known to me, reports a new problem to me:  Pain after emptying her bladder. Started 6 days ago. Constant pressure in pelvis that is relieved slightly by holding the area with her hand. Pain feels like a spasm after urinating but pain stays constant (5/10)     These symptoms began 6 days  ago and status is continuing.      Pt denies the following symptoms:  Fever, n/v, chills, flank pain     Aggravating factors include urinating.     Relieving factors include ibuprofen helps a bit.     OTC Medications tried are ibuprofen.     Prescription medications taken for symptoms are none.     Pertinent history:  Hx of frequent UTI and treated by Urology with some type of procedure so hasn't had issues in about 5 years.     ROS     Constitutional: No fever, no fatigue, no change in appetite.     GI:  No stomach ache, no nausea, no vomiting, no diarrhea, no constipation, no heartburn.     Urinary:  + dysuria, no frequency, no urgency, no flank pain.      HEENT/Heart/Lung/MS/Skin:  No symptoms or no changes.        PAST MEDICAL HISTORY:       Past Medical History:   Diagnosis Date    Anemia      Anxiety      Chronic bronchitis with COPD (chronic obstructive pulmonary disease)      Esophageal spasm      Essential tremor      GERD (gastroesophageal reflux disease)      Headache      High cholesterol      Hypertension      IBS (irritable bowel syndrome)      Meniere disease      Multiple sclerosis      Muscle spasm           PAST SURGICAL HISTORY:        Past Surgical History:   Procedure Laterality Date    CATARACT EXTRACTION, BILATERAL   2006    DILATION AND CURETTAGE OF UTERUS   1966    HEMORRHOID SURGERY   1997    TONSILLECTOMY   1954    TUBAL LIGATION   1990         SOCIAL HISTORY:  Social History                Socioeconomic History    Marital status:        Spouse name: Not on file    Number of children: Not on file    Years of education: Not on file    Highest education level: Not on file   Social Needs    Financial resource strain: Not on file    Food insecurity - worry: Not on file    Food insecurity - inability: Not on file    Transportation needs - medical: Not on file    Transportation needs - non-medical: Not on file   Occupational History    Not on file   Tobacco Use    Smoking status: Former Smoker       Last attempt to quit:        Years since quittin.1    Smokeless tobacco: Never Used   Substance and Sexual Activity    Alcohol use: Yes       Alcohol/week: 1.8 - 2.4 oz       Types: 3 - 4 Glasses of wine per week       Comment: on weekends    Drug use: No    Sexual activity: Yes   Other Topics Concern    Not on file   Social History Narrative    Not on file            FAMILY HISTORY:        Family History   Problem Relation Age of Onset    Coronary artery disease Mother      Heart disease Mother 60         CAD    Heart attack Father      Coronary artery disease Father      Heart disease Father 55         MI    Coronary artery disease Sister      Heart disease Sister 65         CAD         ALLERGIES AND MEDICATIONS: updated and reviewed.       Review of patient's allergies indicates:   Allergen Reactions    Cephalosporins Anaphylaxis    Penicillins Rash      Current Medications          Current Outpatient Medications   Medication Sig Dispense Refill    cholecalciferol, vitamin D3, (VITAMIN D3) 1,000 unit capsule          ALPRAZolam (XANAX) 0.25 MG tablet Take 0.25 mg by mouth as needed.        aspirin (ECOTRIN) 81 MG EC tablet Take 81 mg by mouth once daily.        clotrimazole-betamethasone 1-0.05% (LOTRISONE) cream     0    ferrous sulfate (FEOSOL) 325 mg (65 mg iron) Tab tablet Take 1 tablet (325 mg total) by mouth 2 (two) times daily. 60 tablet 0    losartan  (COZAAR) 50 MG tablet Take 1 tablet (50 mg total) by mouth every morning. 90 tablet 0    multivitamin capsule Take 1 capsule by mouth.        omeprazole (PRILOSEC) 20 MG capsule Take 1 capsule (20 mg total) by mouth once daily. 30 capsule 3    rosuvastatin (CRESTOR) 40 MG Tab Take 1 tablet by mouth every evening.   1    sertraline (ZOLOFT) 50 MG tablet Take 50 mg by mouth every evening.        topiramate (TOPAMAX) 50 MG tablet Take 1 tablet by mouth 2 (two) times daily.   11    valACYclovir (VALTREX) 1000 MG tablet Take 1 tablet by mouth as needed.          No current facility-administered medications for this visit.                PHYSICAL EXAM     Alert, coop 73 y.o. female patient in no distress.         Vitals:     03/06/19 1036   BP: 130/72   Pulse: 70      VS reviewed.  VS stable.  CC, nursing note, medications & PMH all reviewed today.     Head:  Normocephalic, atraumatic.     EENT:  Ext nose/ears normal.               Eye lids normal, no discharge present.                              Resp:  Respirations even, unlabored             Lungs CTA bilat.     Heart:  Heart rate normal.  RRR, no MRG on ausculation.     ABD:  Soft, round, suprapubic tenderness to palpation.  Normal BS in all 4 quadrants.  No rebound or organomegaly.              No peritoneal signs.  negative CVAT     MS:  Ambulates steady.       NEURO:  Alert and oriented x 4.  Responds appropriately during interaction.     Skin:  Warm, dry, color good..     Psych:  Responds appropriately throughout the visit.               Relaxed.  Well-groomed.     Pain with urination  -     Urinalysis  -     phenazopyridine (PYRIDIUM) 200 MG tablet; Take 1 tablet (200 mg total) by mouth 3 (three) times daily as needed for Pain.  Dispense: 12 tablet; Refill: 2  -     Ambulatory consult to Urogynecology     Suprapubic pressure  -     Ambulatory consult to Urogynecology     Other orders  -     Urinalysis Microscopic     Pt today presents with 5 days of  suprapubic pain that is worse after urination and described as a spasms. Pt concerned with her bladder or uterus dropping but wanted to be checked for a UTI as well.   On exam, suprapubic tenderness to palpation noted, otherwise benign exam.    This is a new problem to me and the following work up is planned.     Lab & Radiological Tests Ordered: Urinalysis.  The results are normal for patient.   UA with 3 RBCs and occasional bacteria which is consistent with UA from 1/17/19 that culture was negative. Pt with longstanding hx of blood in urine. Will refer to Urogynecology for eval.     I have reviewed the following additional tests today: UA and culture from 1/17/2019     Pt advised to perform comfort measures recommended on patient instruction sheet .     If worse or concerns, the patient is advised to call us.  Explained exam findings, diagnosis and treatment plan to patient.  Questions answered and patient states understanding.

## 2019-03-07 ENCOUNTER — LAB VISIT (OUTPATIENT)
Dept: LAB | Facility: HOSPITAL | Age: 74
End: 2019-03-07
Attending: FAMILY MEDICINE
Payer: MEDICARE

## 2019-03-07 DIAGNOSIS — D50.8 OTHER IRON DEFICIENCY ANEMIA: ICD-10-CM

## 2019-03-07 DIAGNOSIS — R70.0 ESR RAISED: ICD-10-CM

## 2019-03-07 LAB
BASOPHILS # BLD AUTO: 0.04 K/UL
BASOPHILS NFR BLD: 0.7 %
CRP SERPL-MCNC: 11.7 MG/L
DIFFERENTIAL METHOD: ABNORMAL
EOSINOPHIL # BLD AUTO: 0.4 K/UL
EOSINOPHIL NFR BLD: 6.1 %
ERYTHROCYTE [DISTWIDTH] IN BLOOD BY AUTOMATED COUNT: 14.6 %
ERYTHROCYTE [SEDIMENTATION RATE] IN BLOOD BY WESTERGREN METHOD: 35 MM/HR
HCT VFR BLD AUTO: 39.1 %
HGB BLD-MCNC: 12.2 G/DL
IMM GRANULOCYTES # BLD AUTO: 0.01 K/UL
IMM GRANULOCYTES NFR BLD AUTO: 0.2 %
LYMPHOCYTES # BLD AUTO: 1 K/UL
LYMPHOCYTES NFR BLD: 17.1 %
MCH RBC QN AUTO: 29.2 PG
MCHC RBC AUTO-ENTMCNC: 31.2 G/DL
MCV RBC AUTO: 94 FL
MONOCYTES # BLD AUTO: 0.4 K/UL
MONOCYTES NFR BLD: 7.2 %
NEUTROPHILS # BLD AUTO: 4.2 K/UL
NEUTROPHILS NFR BLD: 68.7 %
NRBC BLD-RTO: 0 /100 WBC
PLATELET # BLD AUTO: 215 K/UL
PMV BLD AUTO: 10.7 FL
RBC # BLD AUTO: 4.18 M/UL
WBC # BLD AUTO: 6.09 K/UL

## 2019-03-07 PROCEDURE — 36415 COLL VENOUS BLD VENIPUNCTURE: CPT | Mod: PO

## 2019-03-07 PROCEDURE — 85651 RBC SED RATE NONAUTOMATED: CPT | Mod: PO

## 2019-03-07 PROCEDURE — 85025 COMPLETE CBC W/AUTO DIFF WBC: CPT

## 2019-03-07 PROCEDURE — 86140 C-REACTIVE PROTEIN: CPT

## 2019-03-11 DIAGNOSIS — M25.542 ARTHRALGIA OF BOTH HANDS: Primary | ICD-10-CM

## 2019-03-11 DIAGNOSIS — R70.0 ESR RAISED: ICD-10-CM

## 2019-03-11 DIAGNOSIS — R79.82 CRP ELEVATED: ICD-10-CM

## 2019-03-11 DIAGNOSIS — M25.541 ARTHRALGIA OF BOTH HANDS: Primary | ICD-10-CM

## 2019-03-12 ENCOUNTER — LAB VISIT (OUTPATIENT)
Dept: LAB | Facility: HOSPITAL | Age: 74
End: 2019-03-12
Attending: FAMILY MEDICINE
Payer: MEDICARE

## 2019-03-12 ENCOUNTER — TELEPHONE (OUTPATIENT)
Dept: UROGYNECOLOGY | Facility: CLINIC | Age: 74
End: 2019-03-12

## 2019-03-12 ENCOUNTER — APPOINTMENT (OUTPATIENT)
Dept: LAB | Facility: HOSPITAL | Age: 74
End: 2019-03-12
Attending: NURSE PRACTITIONER
Payer: MEDICARE

## 2019-03-12 ENCOUNTER — INITIAL CONSULT (OUTPATIENT)
Dept: UROGYNECOLOGY | Facility: CLINIC | Age: 74
End: 2019-03-12
Payer: MEDICARE

## 2019-03-12 VITALS
WEIGHT: 141.13 LBS | SYSTOLIC BLOOD PRESSURE: 127 MMHG | HEART RATE: 76 BPM | BODY MASS INDEX: 26.65 KG/M2 | DIASTOLIC BLOOD PRESSURE: 68 MMHG | HEIGHT: 61 IN

## 2019-03-12 DIAGNOSIS — M25.541 ARTHRALGIA OF BOTH HANDS: ICD-10-CM

## 2019-03-12 DIAGNOSIS — N81.89 PELVIC RELAXATION: ICD-10-CM

## 2019-03-12 DIAGNOSIS — Z87.440 HISTORY OF RECURRENT UTI (URINARY TRACT INFECTION): ICD-10-CM

## 2019-03-12 DIAGNOSIS — R39.89 CYSTALGIA: ICD-10-CM

## 2019-03-12 DIAGNOSIS — R19.5 POSITIVE FECAL IMMUNOCHEMICAL TEST: Primary | ICD-10-CM

## 2019-03-12 DIAGNOSIS — M25.542 ARTHRALGIA OF BOTH HANDS: ICD-10-CM

## 2019-03-12 DIAGNOSIS — R35.0 URINARY FREQUENCY: Primary | ICD-10-CM

## 2019-03-12 DIAGNOSIS — R79.82 CRP ELEVATED: ICD-10-CM

## 2019-03-12 DIAGNOSIS — R10.2 PELVIC PRESSURE IN FEMALE: ICD-10-CM

## 2019-03-12 DIAGNOSIS — R70.0 ESR RAISED: ICD-10-CM

## 2019-03-12 DIAGNOSIS — R31.21 ASYMPTOMATIC MICROSCOPIC HEMATURIA: ICD-10-CM

## 2019-03-12 LAB
BILIRUB SERPL-MCNC: ABNORMAL MG/DL
BLOOD URINE, POC: ABNORMAL
CCP AB SER IA-ACNC: <0.5 U/ML
COLOR, POC UA: ABNORMAL
GLUCOSE UR QL STRIP: ABNORMAL
KETONES UR QL STRIP: ABNORMAL
LEUKOCYTE ESTERASE URINE, POC: ABNORMAL
NITRITE, POC UA: ABNORMAL
PH, POC UA: 7
PROTEIN, POC: ABNORMAL
RHEUMATOID FACT SERPL-ACNC: 12 IU/ML
SPECIFIC GRAVITY, POC UA: 1
UROBILINOGEN, POC UA: ABNORMAL

## 2019-03-12 PROCEDURE — 51700 PR IRRIGATION, BLADDER: ICD-10-PCS | Mod: S$PBB,,, | Performed by: NURSE PRACTITIONER

## 2019-03-12 PROCEDURE — 88112 CYTOPATH CELL ENHANCE TECH: CPT | Performed by: PATHOLOGY

## 2019-03-12 PROCEDURE — 99214 OFFICE O/P EST MOD 30 MIN: CPT | Mod: PBBFAC,PO | Performed by: NURSE PRACTITIONER

## 2019-03-12 PROCEDURE — 81002 URINALYSIS NONAUTO W/O SCOPE: CPT | Mod: PBBFAC,PO | Performed by: NURSE PRACTITIONER

## 2019-03-12 PROCEDURE — 86431 RHEUMATOID FACTOR QUANT: CPT

## 2019-03-12 PROCEDURE — 51700 IRRIGATION OF BLADDER: CPT | Mod: PBBFAC,PO | Performed by: NURSE PRACTITIONER

## 2019-03-12 PROCEDURE — 99999 PR PBB SHADOW E&M-EST. PATIENT-LVL IV: CPT | Mod: PBBFAC,,, | Performed by: NURSE PRACTITIONER

## 2019-03-12 PROCEDURE — 86038 ANTINUCLEAR ANTIBODIES: CPT

## 2019-03-12 PROCEDURE — 88112 CYTOLOGY SPECIMEN-URINE: ICD-10-PCS | Mod: 26,,, | Performed by: PATHOLOGY

## 2019-03-12 PROCEDURE — 87086 URINE CULTURE/COLONY COUNT: CPT

## 2019-03-12 PROCEDURE — 36415 COLL VENOUS BLD VENIPUNCTURE: CPT | Mod: PO

## 2019-03-12 PROCEDURE — 51700 IRRIGATION OF BLADDER: CPT | Mod: S$PBB,,, | Performed by: NURSE PRACTITIONER

## 2019-03-12 PROCEDURE — 99214 OFFICE O/P EST MOD 30 MIN: CPT | Mod: S$PBB,25,, | Performed by: NURSE PRACTITIONER

## 2019-03-12 PROCEDURE — 99999 PR PBB SHADOW E&M-EST. PATIENT-LVL IV: ICD-10-PCS | Mod: PBBFAC,,, | Performed by: NURSE PRACTITIONER

## 2019-03-12 PROCEDURE — 86200 CCP ANTIBODY: CPT

## 2019-03-12 PROCEDURE — 99214 PR OFFICE/OUTPT VISIT, EST, LEVL IV, 30-39 MIN: ICD-10-PCS | Mod: S$PBB,25,, | Performed by: NURSE PRACTITIONER

## 2019-03-12 RX ORDER — HYOSCYAMINE SULFATE 0.125 MG
125 TABLET ORAL EVERY 4 HOURS PRN
COMMUNITY
End: 2019-03-21

## 2019-03-12 RX ORDER — LIDOCAINE HYDROCHLORIDE 20 MG/ML
20 INJECTION, SOLUTION INFILTRATION; PERINEURAL
Status: COMPLETED | OUTPATIENT
Start: 2019-03-12 | End: 2019-03-12

## 2019-03-12 RX ADMIN — LIDOCAINE HYDROCHLORIDE 20 ML: 20 INJECTION, SOLUTION INFILTRATION; PERINEURAL at 01:03

## 2019-03-12 NOTE — LETTER
March 12, 2019      Mehreen Burr NP  [address] [facility] [address] [place] [address]           [facility]  [address], [address]  [facility] [address]  Phone: [phone]  Fax: [phone]          Patient: Veronique Johnson   MR Number: [id number]   YOB: 1945   Date of Visit: 3/12/2019       Dear Mehreen Burr:    Thank you for referring Veronique Johnson to me for evaluation. Attached you will find relevant portions of my assessment and plan of care.    If you have questions, please do not hesitate to call me. I look forward to following Veronique Johnson along with you.    Sincerely,    MARTHA Brandt, [facility]    Enclosure  CC:  No Recipients    If you would like to receive this communication electronically, please contact [email or web address] or [phone] to request more information on [facility] Link access.    For providers and/or their staff who would like to refer a patient to [facility], please contact us through our one-stop-shop provider referral line, [facility], at [phone].    If you feel you have received this communication in error or would no longer like to receive these types of communications, please e-mail [email or web address]

## 2019-03-12 NOTE — PROGRESS NOTES
Subjective:       Patient ID: Veronique Johnson is a 73 y.o. female.    Chief Complaint: Advice Only    HPI  Veronique Johnson is a 73 y.o. female who is new to me,  presents today for consult regarding pelvic pain.  She started noticing pelvic pain/pressure about 2 weeks ago.  She constantly has this pressure but notices it most towards the end of urination.  She went to see her PCP and her UA was negative except for trace blood which she has had worked up several years ago.  She was given AZO and this did not help her symptoms.  She feels as if her uterus or bladder may be falling, but denies any vaginal bulge.  She has been splinting her abdomen and this helps very minimally.  She has frequency about every 2-3 hours during the day and nocturia x 1-2.  She denies any real feeling of PVF.  She denies any incontinence.  She has a history of recurrent infections but her last UTI was several years ago.  She saw Dr. Estrada (urology) in [place] and he did some instillations and a urethral dilation.  This helped and she has not had problems until recently.  She had a cysto about 5-6 years ago but does not believe it was with hydrodistention.  She drinks mostly water with occasional tea.  She does not particularly eat spicy or acidic foods.  She denies any history of kidney stones.  She does not feel that there is any precipitating factor to her symptoms.  She did have a fall a week prior to her symptoms beginning.  She denies any vaginal bulge, discharge or bleeding.  She is sexually active and denies any dyspareunia.  She has not had intercourse since these symptoms began.  She is up to date on her WWE.   If she has prolapse, she does not wish to have a pessary.  She does not feel that it would be comfortable for her as she was never able to use tampons earlier in life.  The pt states that her her pelvic floor muscles would subconsciously push the tampon out.        Review of Systems   Constitutional: Negative for  activity change, fever and unexpected weight change.   HENT: Negative for hearing loss.    Eyes: Negative for visual disturbance.   Respiratory: Negative for shortness of breath and wheezing.    Cardiovascular: Negative for chest pain, palpitations and leg swelling.   Gastrointestinal: Negative for abdominal pain, constipation and diarrhea.   Genitourinary: Positive for frequency and pelvic pain. Negative for dyspareunia, dysuria, urgency, vaginal bleeding and vaginal discharge.        Pelvic pressure   Musculoskeletal: Negative for gait problem and neck pain.   Skin: Negative for rash and wound.   Allergic/Immunologic: Negative for immunocompromised state.   Neurological: Negative for tremors, speech difficulty and weakness.   Hematological: Does not bruise/bleed easily.   Psychiatric/Behavioral: Negative for agitation and confusion.       Objective:      Physical Exam   Constitutional: She is oriented to person, place, and time. She appears well-developed and well-nourished. No distress.   HENT:   Head: Normocephalic and atraumatic.   Neck: Neck supple. No thyromegaly present.   Cardiovascular:   No swelling in BLE   Pulmonary/Chest: Effort normal. No respiratory distress.   Abdominal: Soft. There is tenderness in the suprapubic area. No hernia.   Musculoskeletal: Normal range of motion.   Neurological: She is alert and oriented to person, place, and time.   Skin: Skin is warm and dry. No rash noted.   Psychiatric: She has a normal mood and affect. Her behavior is normal.     Pelvic Exam:  V: No lesions. No palpable nodes.   Va: no discharge or bleeding.  Good length.  Mild anterior relaxation with cough.  Very early uterine descent to -6  Meatus:No caruncle or stenosis  Urethra: Non tender. No suburethral masses.  Cx/Cuff: Normal   Uterus: small, non-tender  Ad: No mass or tenderness.  Levators :Symmetrical. Normal tone. Non tender. Contractions 2/5.  Able to sustain for 3 seconds.  BL: mildly tender  RV: No  hemorrhoids.      Assessment:       1. Urinary frequency    2. Cystalgia    3. History of recurrent UTI (urinary tract infection)    4. Asymptomatic microscopic hematuria    5. Pelvic pressure in female    6. Pelvic relaxation          Procedure note- After betadine irrigation of the urethra, lidocaine instilled via urojet.   A #14 Tajik red rubber tip catheter was inserted into the bladder.  50 mls of residual urine noted.  20 mls of 2% lidocaine instilled into bladder.  Pt instructed to hold mixture for at least 1 hour prior to voiding.  Pt verbalized understanding.      Plan:       Urinary frequency- we will send her urine for culture on a catheterized specimen as noted below  -     POCT URINE DIPSTICK WITHOUT MICROSCOPE  -     Urine culture    Cystalgia- lidocaine instillation as noted, consider cysto with hydro in the future.  -     lidocaine HCL 20 mg/ml (2%) injection 20 mL    History of recurrent UTI (urinary tract infection)- monitor at this time.    Asymptomatic microscopic hematuria- we will send her urine for cytology.    -     Cytology, urine    Pelvic pressure in female- we will see if the lidocaine instillation is helpful.    Pelvic relaxation- very mild at this time.  I don't believe that this is the entire source of her discomfort.  We will follow up with her tomorrow to see if she had any relief with the lidocaine instillation.    RTC PRN

## 2019-03-13 ENCOUNTER — TELEPHONE (OUTPATIENT)
Dept: UROGYNECOLOGY | Facility: CLINIC | Age: 74
End: 2019-03-13

## 2019-03-13 LAB — ANA SER QL IF: NORMAL

## 2019-03-13 NOTE — TELEPHONE ENCOUNTER
----- Message from Melissa Norris LPN sent at 3/12/2019  3:14 PM CDT -----  Please call the patient tomorrow 03/13/19 and see how she did with the lidocaine instillation.

## 2019-03-13 NOTE — TELEPHONE ENCOUNTER
Ok, we are still awaiting the culture results.  When I have more results, we will call her with the next step.

## 2019-03-14 ENCOUNTER — PATIENT MESSAGE (OUTPATIENT)
Dept: UROGYNECOLOGY | Facility: CLINIC | Age: 74
End: 2019-03-14

## 2019-03-14 ENCOUNTER — TELEPHONE (OUTPATIENT)
Dept: UROGYNECOLOGY | Facility: CLINIC | Age: 74
End: 2019-03-14

## 2019-03-14 LAB — BACTERIA UR CULT: NO GROWTH

## 2019-03-14 RX ORDER — GABAPENTIN 100 MG/1
100 CAPSULE ORAL NIGHTLY
Qty: 30 CAPSULE | Refills: 6 | Status: SHIPPED | OUTPATIENT
Start: 2019-03-14 | End: 2020-01-07

## 2019-03-14 NOTE — TELEPHONE ENCOUNTER
Called and spoke to pt and informed of recommendations and will try medication and get back with us.

## 2019-03-14 NOTE — TELEPHONE ENCOUNTER
Spoke to pt and informed that the urine culture was negative, and that Iza Brandt LINDEN was going to discuss her case with Dr Mullen.  States understanding and call was ended.

## 2019-03-14 NOTE — TELEPHONE ENCOUNTER
I did review the case with Dr. Mullen.  We are of the same opinion that we would like for her to try gabapentin.  I'm sending this in to her pharmacy.  If she has no response with this medication, then we would consider doing a CT.  I would like for her to take the medication for a few weeks and then come back to see me.

## 2019-03-14 NOTE — TELEPHONE ENCOUNTER
----- Message from Jerica Queen sent at 3/14/2019  2:39 PM CDT -----  [phone] / returning Melissa 's call

## 2019-03-14 NOTE — TELEPHONE ENCOUNTER
----- Message from Melissa Norris LPN sent at 3/13/2019  4:44 PM CDT -----  Ok, we are still awaiting the culture results.  When I have more results, we will call her with the next step.

## 2019-03-19 ENCOUNTER — OFFICE VISIT (OUTPATIENT)
Dept: UROGYNECOLOGY | Facility: CLINIC | Age: 74
End: 2019-03-19
Payer: MEDICARE

## 2019-03-19 VITALS
HEIGHT: 61 IN | WEIGHT: 141.31 LBS | SYSTOLIC BLOOD PRESSURE: 126 MMHG | BODY MASS INDEX: 26.68 KG/M2 | HEART RATE: 76 BPM | DIASTOLIC BLOOD PRESSURE: 77 MMHG

## 2019-03-19 DIAGNOSIS — R10.32 LEFT LOWER QUADRANT PAIN: ICD-10-CM

## 2019-03-19 DIAGNOSIS — R10.84 GENERALIZED ABDOMINAL PAIN: ICD-10-CM

## 2019-03-19 DIAGNOSIS — R35.0 URINARY FREQUENCY: Primary | ICD-10-CM

## 2019-03-19 DIAGNOSIS — N81.4 UTERINE PROLAPSE: ICD-10-CM

## 2019-03-19 DIAGNOSIS — R39.89 CYSTALGIA: ICD-10-CM

## 2019-03-19 DIAGNOSIS — N81.89 PELVIC RELAXATION: ICD-10-CM

## 2019-03-19 PROCEDURE — 99214 OFFICE O/P EST MOD 30 MIN: CPT | Mod: PBBFAC,PO | Performed by: NURSE PRACTITIONER

## 2019-03-19 PROCEDURE — 99999 PR PBB SHADOW E&M-EST. PATIENT-LVL IV: ICD-10-PCS | Mod: PBBFAC,,, | Performed by: NURSE PRACTITIONER

## 2019-03-19 PROCEDURE — 99213 OFFICE O/P EST LOW 20 MIN: CPT | Mod: S$PBB,,, | Performed by: NURSE PRACTITIONER

## 2019-03-19 PROCEDURE — 99999 PR PBB SHADOW E&M-EST. PATIENT-LVL IV: CPT | Mod: PBBFAC,,, | Performed by: NURSE PRACTITIONER

## 2019-03-19 PROCEDURE — 99213 PR OFFICE/OUTPT VISIT, EST, LEVL III, 20-29 MIN: ICD-10-PCS | Mod: S$PBB,,, | Performed by: NURSE PRACTITIONER

## 2019-03-20 ENCOUNTER — HOSPITAL ENCOUNTER (OUTPATIENT)
Dept: RADIOLOGY | Facility: HOSPITAL | Age: 74
Discharge: HOME | End: 2019-03-20
Attending: NURSE PRACTITIONER
Payer: MEDICARE

## 2019-03-20 DIAGNOSIS — R10.84 GENERALIZED ABDOMINAL PAIN: ICD-10-CM

## 2019-03-20 PROCEDURE — 74177 CT ABD & PELVIS W/CONTRAST: CPT | Mod: TC,PO

## 2019-03-20 PROCEDURE — 25500020 PHARM REV CODE 255: Mod: PO | Performed by: NURSE PRACTITIONER

## 2019-03-20 PROCEDURE — 74177 CT ABDOMEN PELVIS WITH CONTRAST: ICD-10-PCS | Mod: 26,,, | Performed by: RADIOLOGY

## 2019-03-20 PROCEDURE — 74177 CT ABD & PELVIS W/CONTRAST: CPT | Mod: 26,,, | Performed by: RADIOLOGY

## 2019-03-20 RX ADMIN — IOHEXOL 75 ML: 350 INJECTION, SOLUTION INTRAVENOUS at 03:03

## 2019-03-20 RX ADMIN — IOHEXOL 30 ML: 350 INJECTION, SOLUTION INTRAVENOUS at 03:03

## 2019-03-21 ENCOUNTER — TELEPHONE (OUTPATIENT)
Dept: UROGYNECOLOGY | Facility: CLINIC | Age: 74
End: 2019-03-21

## 2019-03-21 RX ORDER — HYOSCYAMINE SULFATE 0.38 MG/1
0.38 TABLET, EXTENDED RELEASE ORAL EVERY 12 HOURS
Qty: 30 TABLET | Refills: 1 | Status: SHIPPED | OUTPATIENT
Start: 2019-03-21 | End: 2019-04-08

## 2019-03-21 NOTE — PROGRESS NOTES
Subjective:       Patient ID: Veronique Johnson is a 73 y.o. female.    Chief Complaint: possible prolapse    HPI  Veronique Johnson is a 73 y.o. female who returns today for follow up on prolapse.  She was seen on 03/12/19 for possible prolapse/pelvic pressure.  At that time, the exam showed minimal relaxation.  A urine culture and cytology were done which both came back negative.  Lidocaine was instilled into her bladder to see if her symptoms were suggestive of bladder pain.  She did not notice any difference with the lidocaine.  Np did not feel that her pain was related to her pelvic relaxation which was minimal.  Her pain also never subsided with laying down and is a constant aching sensation.  The pt was offered a pessary, but did not think she would be able to stand a pessary and did not wish to try one.  She was started on gabapentin last week.  She has been taking this, but does not feel that it has helped her sensation at all either.  She did feel more of a pronounced bulge over the weekend and thinks that there might be more of a prolapse at this time.      Review of Systems   Constitutional: Negative for activity change, fever and unexpected weight change.   HENT: Negative for hearing loss.    Eyes: Negative for visual disturbance.   Respiratory: Negative for shortness of breath and wheezing.    Cardiovascular: Negative for chest pain, palpitations and leg swelling.   Gastrointestinal: Positive for abdominal pain. Negative for constipation and diarrhea.   Genitourinary: Positive for frequency and pelvic pain. Negative for dyspareunia, dysuria, urgency, vaginal bleeding and vaginal discharge.        Vaginal bulge   Musculoskeletal: Negative for gait problem and neck pain.   Skin: Negative for rash and wound.   Allergic/Immunologic: Negative for immunocompromised state.   Neurological: Negative for tremors, speech difficulty and weakness.   Hematological: Does not bruise/bleed easily.    Psychiatric/Behavioral: Negative for agitation and confusion.       Objective:      Physical Exam   Constitutional: She is oriented to person, place, and time. She appears well-developed and well-nourished. No distress.   HENT:   Head: Normocephalic and atraumatic.   Neck: Neck supple. No thyromegaly present.   Pulmonary/Chest: Effort normal. No respiratory distress.   Abdominal: Soft. There is tenderness in the right lower quadrant, suprapubic area and left lower quadrant. No hernia.   She has more tenderness on the LLQ at this time.   Musculoskeletal: Normal range of motion.   Neurological: She is alert and oriented to person, place, and time.   Skin: Skin is warm and dry. No rash noted.   Psychiatric: She has a normal mood and affect. Her behavior is normal.     Pelvic Exam:  V: No lesions. No palpable nodes.   Va: no discharge or bleeding.  Mild anterior relaxation.  Early uterine prolapse to -6.  When pt stands more uterine descent is noted to a -2  Meatus:No caruncle or stenosis  Urethra: Non tender. No suburethral masses.  Cx/Cuff: Normal   Uterus: descends to -2 position with standing.  Ad: No mass or tenderness.  Levators :Symmetrical. Normal tone. Non tender.  BL: Non tender  RV: No hemorrhoids.      Assessment:       1. Urinary frequency    2. Cystalgia    3. Left lower quadrant pain    4. Generalized abdominal pain    5. Uterine prolapse    6. Pelvic relaxation        Plan:       Urinary frequency- monitor    Cystalgia- monitor    Left lower quadrant pain- we will send for CT scan as noted below    Generalized abdominal pain- as noted below  -     Cancel: CT Abdomen Pelvis W Wo Contrast; Future; Expected date: 03/19/2019  -     Creatinine, serum; Future; Expected date: 03/19/2019    Uterine prolapse- pt does not feel that she would be able to tolerate a pessary and does not wish to try a pessary    Pelvic relaxation- monitor.  I don't believe that her relaxation is the complete cause of her discomfort.   Continue with the gabapentin for now.    RTC 2 weeks

## 2019-03-21 NOTE — TELEPHONE ENCOUNTER
Spoke with pt. She had test done yesterday evening and wanted results today. I informed her that as soon as they came back and were reviewed that we would call her.

## 2019-03-21 NOTE — TELEPHONE ENCOUNTER
----- Message from Eren Blanco sent at 3/21/2019  2:19 PM CDT -----  Contact: pt  Type:  Test Results    Who Called:  pt  Name of Test (Lab/Mammo/Etc):  CT of lower abdomen  Date of Test:  3.20.19  Ordering Provider:  Dr Brandt  Where the test was performed:  [facility]  Best Call Back Number:  [phone]  Additional Information:

## 2019-03-26 ENCOUNTER — TELEPHONE (OUTPATIENT)
Dept: UROGYNECOLOGY | Facility: CLINIC | Age: 74
End: 2019-03-26

## 2019-03-26 NOTE — TELEPHONE ENCOUNTER
I'm glad she is feeling better.  We need to have her see Dr. Mullen to discuss her surgical options for the prolapse.

## 2019-03-26 NOTE — TELEPHONE ENCOUNTER
"Patient called back and she feels a lot better her pain is a bout a "2" now , still having the urine go everywhere when she urinates but she states nothing like the pain she was having.  "

## 2019-03-27 ENCOUNTER — PATIENT MESSAGE (OUTPATIENT)
Dept: UROGYNECOLOGY | Facility: CLINIC | Age: 74
End: 2019-03-27

## 2019-03-28 ENCOUNTER — OFFICE VISIT (OUTPATIENT)
Dept: FAMILY MEDICINE | Facility: CLINIC | Age: 74
End: 2019-03-28
Payer: MEDICARE

## 2019-03-28 VITALS
DIASTOLIC BLOOD PRESSURE: 68 MMHG | SYSTOLIC BLOOD PRESSURE: 110 MMHG | WEIGHT: 141.13 LBS | OXYGEN SATURATION: 97 % | BODY MASS INDEX: 26.66 KG/M2 | HEART RATE: 69 BPM

## 2019-03-28 DIAGNOSIS — M79.642 PAIN IN BOTH HANDS: ICD-10-CM

## 2019-03-28 DIAGNOSIS — D50.8 OTHER IRON DEFICIENCY ANEMIA: ICD-10-CM

## 2019-03-28 DIAGNOSIS — H04.129 EYE DRYNESS: ICD-10-CM

## 2019-03-28 DIAGNOSIS — R19.5 OCCULT BLOOD IN STOOLS: ICD-10-CM

## 2019-03-28 DIAGNOSIS — M79.641 PAIN IN BOTH HANDS: ICD-10-CM

## 2019-03-28 DIAGNOSIS — R10.32 LEFT INGUINAL PAIN: ICD-10-CM

## 2019-03-28 DIAGNOSIS — I70.0 ATHEROSCLEROSIS OF AORTA: Primary | ICD-10-CM

## 2019-03-28 DIAGNOSIS — R68.2 DRY MOUTH: ICD-10-CM

## 2019-03-28 PROCEDURE — 99214 OFFICE O/P EST MOD 30 MIN: CPT | Mod: S$PBB,,, | Performed by: FAMILY MEDICINE

## 2019-03-28 PROCEDURE — 99999 PR PBB SHADOW E&M-EST. PATIENT-LVL V: ICD-10-PCS | Mod: PBBFAC,,, | Performed by: FAMILY MEDICINE

## 2019-03-28 PROCEDURE — 99215 OFFICE O/P EST HI 40 MIN: CPT | Mod: PBBFAC,PO | Performed by: FAMILY MEDICINE

## 2019-03-28 PROCEDURE — 99999 PR PBB SHADOW E&M-EST. PATIENT-LVL V: CPT | Mod: PBBFAC,,, | Performed by: FAMILY MEDICINE

## 2019-03-28 PROCEDURE — 99214 PR OFFICE/OUTPT VISIT, EST, LEVL IV, 30-39 MIN: ICD-10-PCS | Mod: S$PBB,,, | Performed by: FAMILY MEDICINE

## 2019-04-08 ENCOUNTER — OFFICE VISIT (OUTPATIENT)
Dept: UROGYNECOLOGY | Facility: CLINIC | Age: 74
End: 2019-04-08
Payer: MEDICARE

## 2019-04-08 VITALS
WEIGHT: 141.56 LBS | BODY MASS INDEX: 26.73 KG/M2 | SYSTOLIC BLOOD PRESSURE: 147 MMHG | HEIGHT: 61 IN | HEART RATE: 86 BPM | DIASTOLIC BLOOD PRESSURE: 70 MMHG

## 2019-04-08 DIAGNOSIS — M62.89 HIGH-TONE PELVIC FLOOR DYSFUNCTION: ICD-10-CM

## 2019-04-08 DIAGNOSIS — R10.2 PELVIC PAIN: ICD-10-CM

## 2019-04-08 DIAGNOSIS — R35.0 URINARY FREQUENCY: Primary | ICD-10-CM

## 2019-04-08 LAB
BILIRUB SERPL-MCNC: ABNORMAL MG/DL
BLOOD URINE, POC: ABNORMAL
COLOR, POC UA: YELLOW
GLUCOSE UR QL STRIP: ABNORMAL
KETONES UR QL STRIP: ABNORMAL
LEUKOCYTE ESTERASE URINE, POC: ABNORMAL
NITRITE, POC UA: ABNORMAL
PH, POC UA: 7
PROTEIN, POC: ABNORMAL
SPECIFIC GRAVITY, POC UA: 1
UROBILINOGEN, POC UA: ABNORMAL

## 2019-04-08 PROCEDURE — 99213 PR OFFICE/OUTPT VISIT, EST, LEVL III, 20-29 MIN: ICD-10-PCS | Mod: S$PBB,,, | Performed by: OBSTETRICS & GYNECOLOGY

## 2019-04-08 PROCEDURE — 99999 PR PBB SHADOW E&M-EST. PATIENT-LVL III: CPT | Mod: PBBFAC,,, | Performed by: OBSTETRICS & GYNECOLOGY

## 2019-04-08 PROCEDURE — 99213 OFFICE O/P EST LOW 20 MIN: CPT | Mod: S$PBB,,, | Performed by: OBSTETRICS & GYNECOLOGY

## 2019-04-08 PROCEDURE — 81002 URINALYSIS NONAUTO W/O SCOPE: CPT | Mod: PBBFAC,PO | Performed by: OBSTETRICS & GYNECOLOGY

## 2019-04-08 PROCEDURE — 99999 PR PBB SHADOW E&M-EST. PATIENT-LVL III: ICD-10-PCS | Mod: PBBFAC,,, | Performed by: OBSTETRICS & GYNECOLOGY

## 2019-04-08 PROCEDURE — 99213 OFFICE O/P EST LOW 20 MIN: CPT | Mod: PBBFAC,PO | Performed by: OBSTETRICS & GYNECOLOGY

## 2019-04-08 NOTE — PROGRESS NOTES
Subjective:     Chief Complaint:  Pelvic pain  History of Present Illness: Veronique Johnson is a 73 y.o. female who presents for suprapubic and left lower quadrant pain.  She points to the left lower quadrant groin area as 1 pain in the suprapubic area is a different pain.  She is it noticed is some improvement with gabapentin and increased improvement by adding hyoscyamine.  She had originally presented thinking that she had pelvic relaxation but no significant relaxation was noted.  She says at least 1 she thought she felt something protruding through the introitus.  Recent CT showed diverticulosis but not diverticulitis.  She was diagnosed many years ago with MS but is on no treatment and has essentially no known symptoms.  She has been evaluated for autoimmune diseases and her evaluation was negative.  She has no dyspareunia.  Urinalysis is negative      Review of Systems    Constitutional:IBS  Eyes:  Cataracts  ENT:  headaches.   Respiratory: No SOB.  Cardiovascular: No chest pain. No edema.   Gastrointestinal:  GERD   Genitourinary: No vaginal bleeding or discharge.  Integument/Breast: Negative  Hematologic/Lymphatic:  Iron deficiency anemia.  Musculoskeletal:  Muscle spasms  Neurological:  Tremor  Behavioral/Psych: No history of depression.   Endocrine: No hormonal replacement.  Allergy/Immune: no recent reactions     Objective:   General Exam:  General appearance: WDNF. NAD.   HEENT: Yazmin. EOM's intact.  Neck: Normal thyroid.   Back: No CVA tenderness.  RESP: No SOB.  Breasts: deferred  Abdomen:  No palpable hernia  Extremities: No edema. No varices.  Lymphatic: noncontributory  Skin: No rashes. No lesions.  Neurologic: Intact.   Psych: Oriented.   Pelvic Exam:  V:  No lesions. No palpable nodes.   Va:No d/c bleeding or lesions.  Minimal relaxation   .Meatus:No caruncle or stenosis  Urethra: Non tender. No suburethral masses.  Cx/Cuff:  Small cervix  Uterus:  Mobile and nontender.  No significant  relaxation noted with traction with a single tooth tenaculum  Ad: No mass or tenderness.  Levators :aymmetrical.  Increased tone on the left with band like configuration and significant tenderness.   BL: Non tender  RV: No hemorrhoids.  Assessment:   Left-sided hypertonic pelvic floor dysfunction-no obvious etiology but she does have documented diverticular disease and IBS       Plan:    She has seen her GI doctor this week and was see if this helps identify any cause of her pelvic floor dysfunction.  She will add low-dose vaginal Valium.  If this is not effective we will recommend Kenalog injection

## 2019-04-08 NOTE — PROGRESS NOTES
Subjective:       Patient ID: Veronique Johnson is a 73 y.o. female.    Chief Complaint: Follow-up    The patient is coming here today for a follow-up visit, the patient fit kit was positive for blood in the stools.  The patient also had a recent colonoscopy which did not show any abnormalities.  The patient is coming here for assessment.  She complains of intermittent diarrhea.  She denies any abdominal pain, chest pain, nausea vomiting at this visit.    The patient also complains of pain on bilateral hands, the symptoms are getting worse, she complains of dryness of the mouth and dryness in the eyes.  She currently is not seeing a rheumatologist for this problem.    Upon review of the x-rays, the patient has presence of the sclerosis of the aorta, has hyperlipidemia which he takes cholesterol medication every night.  She denies any problems taking the medication.    Patient also has anxiety which she takes alprazolam as needed.         Past medical history, past social history was reviewed discussed with the patient.    Review of Systems   Constitutional: Positive for activity change. Negative for unexpected weight change.   HENT: Negative for hearing loss, rhinorrhea and trouble swallowing.    Eyes: Negative for discharge and visual disturbance.   Respiratory: Negative for chest tightness and wheezing.    Cardiovascular: Negative for palpitations.   Gastrointestinal: Positive for blood in stool and diarrhea. Negative for constipation.   Endocrine: Negative for polydipsia and polyuria.   Genitourinary: Negative for difficulty urinating, dysuria, hematuria and menstrual problem.   Psychiatric/Behavioral: Negative for confusion and dysphoric mood.       Objective:      Physical Exam   Constitutional: She appears well-developed and well-nourished. No distress.   HENT:   Head: Normocephalic and atraumatic.   Right Ear: External ear normal.   Left Ear: External ear normal.   Nose: Nose normal.   Mouth/Throat:  Oropharynx is clear and moist. No oropharyngeal exudate.   Eyes: Pupils are equal, round, and reactive to light. Conjunctivae are normal. Right eye exhibits no discharge. Left eye exhibits no discharge.   Neck: Neck supple.   Cardiovascular: Normal rate, regular rhythm and normal heart sounds.   No murmur heard.  Pulmonary/Chest: Effort normal and breath sounds normal. No respiratory distress. She has no wheezes.   Abdominal: She exhibits no distension.   Musculoskeletal: She exhibits no tenderness or deformity.   Neurological: No cranial nerve deficit. Coordination normal.   Skin: No rash noted. She is not diaphoretic. No erythema. No pallor.   Psychiatric: She has a normal mood and affect. Her behavior is normal. Judgment and thought content normal.   Nursing note and vitals reviewed.      Assessment:       1. Atherosclerosis of aorta    2. Occult blood in stools    3. Left inguinal pain    4. Other iron deficiency anemia    5. Pain in both hands    6. Eye dryness    7. Dry mouth        Plan:       Atherosclerosis of aorta:  Stable.  The patient is currently taking Crestor.    Occult blood in stools:  New problem, will refer the patient to the gastroenterologist.  -     Ambulatory referral to Gastroenterology    Left inguinal pain:  New problem, next visit workup.  The patient will follow up with a gynecologist as directed.    Other iron deficiency anemia:  Stable.  Will refer the patient to the gastroenterologist.  -     Ambulatory referral to Gastroenterology    Pain in both hands:  Worsening.  Will refer the patient to the rheumatologist-     Ambulatory referral to Rheumatology    Eye dryness:  Worsening.  Over-the-counter drops were recommended for the patient.  -     Ambulatory referral to Rheumatology    Dry mouth: Worsening  -     Ambulatory referral to Rheumatology    Drink plenty water, healthy eating habits.  Will refer the patient to the rheumatologist and to gastroenterologist as directed.  I spent 30  min in this encounter, from this time more than 50% of the time was spent in counseling and plan of care for this patient.Patient agreed with assessment and plan. Patient verbalized understanding.

## 2019-04-29 ENCOUNTER — OFFICE VISIT (OUTPATIENT)
Dept: UROGYNECOLOGY | Facility: CLINIC | Age: 74
End: 2019-04-29
Payer: MEDICARE

## 2019-04-29 VITALS
HEIGHT: 61 IN | HEART RATE: 79 BPM | DIASTOLIC BLOOD PRESSURE: 80 MMHG | BODY MASS INDEX: 26.85 KG/M2 | SYSTOLIC BLOOD PRESSURE: 150 MMHG | WEIGHT: 142.19 LBS

## 2019-04-29 DIAGNOSIS — R39.89 CYSTALGIA: ICD-10-CM

## 2019-04-29 DIAGNOSIS — R10.2 PELVIC PAIN: ICD-10-CM

## 2019-04-29 DIAGNOSIS — N39.8 VOIDING DYSFUNCTION: ICD-10-CM

## 2019-04-29 DIAGNOSIS — M62.89 HIGH-TONE PELVIC FLOOR DYSFUNCTION: ICD-10-CM

## 2019-04-29 DIAGNOSIS — R35.0 URINARY FREQUENCY: Primary | ICD-10-CM

## 2019-04-29 LAB
BILIRUB SERPL-MCNC: ABNORMAL MG/DL
BLOOD URINE, POC: ABNORMAL
COLOR, POC UA: CLEAR
GLUCOSE UR QL STRIP: ABNORMAL
KETONES UR QL STRIP: ABNORMAL
LEUKOCYTE ESTERASE URINE, POC: ABNORMAL
NITRITE, POC UA: ABNORMAL
PH, POC UA: 6
PROTEIN, POC: ABNORMAL
SPECIFIC GRAVITY, POC UA: 1
UROBILINOGEN, POC UA: ABNORMAL

## 2019-04-29 PROCEDURE — 99213 PR OFFICE/OUTPT VISIT, EST, LEVL III, 20-29 MIN: ICD-10-PCS | Mod: S$PBB,,, | Performed by: OBSTETRICS & GYNECOLOGY

## 2019-04-29 PROCEDURE — 81002 URINALYSIS NONAUTO W/O SCOPE: CPT | Mod: PBBFAC,PO | Performed by: OBSTETRICS & GYNECOLOGY

## 2019-04-29 PROCEDURE — 99999 PR PBB SHADOW E&M-EST. PATIENT-LVL III: ICD-10-PCS | Mod: PBBFAC,,, | Performed by: OBSTETRICS & GYNECOLOGY

## 2019-04-29 PROCEDURE — 99213 OFFICE O/P EST LOW 20 MIN: CPT | Mod: PBBFAC,PO | Performed by: OBSTETRICS & GYNECOLOGY

## 2019-04-29 PROCEDURE — 99999 PR PBB SHADOW E&M-EST. PATIENT-LVL III: CPT | Mod: PBBFAC,,, | Performed by: OBSTETRICS & GYNECOLOGY

## 2019-04-29 PROCEDURE — 99213 OFFICE O/P EST LOW 20 MIN: CPT | Mod: S$PBB,,, | Performed by: OBSTETRICS & GYNECOLOGY

## 2019-04-29 RX ORDER — DIAZEPAM 2 MG/1
TABLET ORAL
Refills: 0 | COMMUNITY
Start: 2019-04-09 | End: 2019-05-08

## 2019-04-29 NOTE — PROGRESS NOTES
Subjective:     Chief Complaint:  Suprapubic and left lower quadrant pain  History of Present Illness: Veronique Johnson is a 73 y.o. female who presents for two-month history suprapubic and left-sided pelvic pain.  She has a history of urethral dilations and instillations for chronic cystitis in the past.  This was last done as was her last cystoscopy about fiber 6 years ago.  She has never been diagnosed with interstitial cystitis.  About 3 months ago she fell backwards and landed on her buttocks.  She says she had immediate significant pain and told her  that she had severe pain in her female organs.  While this seemed to resolve about a month later she had relatively sudden onset of multiple symptoms including suprapubic and left lower quadrant pain.  She says she has dysuria and pain after urinating.  She says her urine tends to spray and spread around and she loses urine when she wipes.  Her urinalysis is negative.  She has a history of multiple sclerosis and says that MRIs have shown both brain and spinal cord lesions.  She denies having any significant symptoms.  She has a history of IBS and on CT she had diverticulosis but not diverticulitis.  She has had low-grade iron deficiency anemia that her GI doctor says is due to her GI tract, but also she was told that the GI tract was not causing discomfort.  She had minimal improvement with gabapentin and some improvement with vaginal diazepam.  She reports something prolapsing through the introitus that she thinks is her uterus.  She says she sees something white and fleshy prolapsing through the introitus.  It comes and goes and there are no triggers and it is not more likely to occur at any specific time of day  On multiple exams, no significant prolapse was demonstrated including with traction using a tenaculum.    Review of Systems    Constitutional: No acute distress. No weight gain/loss.  Eyes:  Cataracts  ENT:  headaches.   Respiratory:   COPD.  Cardiovascular: No chest pain. No edema.   Gastrointestinal: IBS   Genitourinary:  BTL  Integument/Breast:  Takes Valtrex  Hematologic/Lymphatic:  Iron deficiency anemia.  Musculoskeletal:  Arthralgias  Neurological:  Tremor  Behavioral/Psych:  Anxiety   Endocrine: No hormonal replacement.  Allergy/Immune: no recent reactions     Objective:   General Exam:  General appearance: WDNF. NAD.   HEENT: Yazmin. EOM's intact.  Neck: Normal thyroid.   Back: No CVA tenderness.  RESP: No SOB.  Breasts: deferred  Abdomen: Benign without localizing signs.  Extremities: No edema. No varices.  Lymphatic: noncontributory  Skin: No rashes. No lesions.  Neurologic:  Grossly intact.   Psych: Oriented.  Anxious   Pelvic Exam:  V:  No lesions. No palpable nodes.   Va:No d/c bleeding or lesions.  Minimal relaxation .  She stood and strained and looked with a mere but said she could not see the prolapsing tissue that she sees at home sometimes  .Meatus:No caruncle or stenosis  Urethra: Non tender. No suburethral masses.  No significant hypermobility  Cx/Cuff:  Nontender, non friable.  Uterus:  Small.  Only minimal descent even in the standing position.  Ad: No mass or tenderness.  Levators :  Asymmetrical with slightly more tone on the left.  Both levators somewhat bandlike.  There is significant improvement and the tenderness and rigidity compared to previous exam  BL:  tender  RV:  Anterior mucosal tag  Assessment:   Multiple sclerosis-could be contributing factor  IBS-she states her GI doctor said this is not contributing to her symptoms but we have to consider whether it may be contributing to her suprapubic pain and to the pelvic floor dysfunction  History chronic bladder problems which have responded in the past to instillations and urethral dilation.  We have to consider the possibility of interstitial cystitis  Cannot demonstrate any prolapse and have not been able to on multiple exams despite her report of something  prolapsing through the introitus at home  Pelvic floor tenderness  tone have improved but not resolved       Plan:    We need to get input from her neurologist as to whether her MS is contributing to the symptoms.  She has an appointment there in about 2 weeks  Based upon her symptoms and history of bladder pain and urethral dilations in the past, we need to rule out interstitial cystitis so will request  to do cystoscopy and hydrodistention.  He can inject the levator at the same time if there is still persistent hypertonicity  She is going to take a picture when she notices something prolapsing so that we can see what she is seeing

## 2019-05-07 ENCOUNTER — PATIENT MESSAGE (OUTPATIENT)
Dept: UROGYNECOLOGY | Facility: CLINIC | Age: 74
End: 2019-05-07

## 2019-05-08 ENCOUNTER — OFFICE VISIT (OUTPATIENT)
Dept: UROGYNECOLOGY | Facility: CLINIC | Age: 74
End: 2019-05-08
Payer: MEDICARE

## 2019-05-08 ENCOUNTER — PATIENT MESSAGE (OUTPATIENT)
Dept: FAMILY MEDICINE | Facility: CLINIC | Age: 74
End: 2019-05-08

## 2019-05-08 VITALS
BODY MASS INDEX: 26.03 KG/M2 | SYSTOLIC BLOOD PRESSURE: 139 MMHG | WEIGHT: 137.81 LBS | DIASTOLIC BLOOD PRESSURE: 73 MMHG | HEART RATE: 84 BPM

## 2019-05-08 DIAGNOSIS — R35.0 URINARY FREQUENCY: Primary | ICD-10-CM

## 2019-05-08 DIAGNOSIS — R10.2 PELVIC PRESSURE IN FEMALE: ICD-10-CM

## 2019-05-08 DIAGNOSIS — M62.89 HIGH-TONE PELVIC FLOOR DYSFUNCTION: ICD-10-CM

## 2019-05-08 DIAGNOSIS — R10.2 PELVIC PAIN: ICD-10-CM

## 2019-05-08 DIAGNOSIS — R10.2 PELVIC PAIN: Primary | ICD-10-CM

## 2019-05-08 DIAGNOSIS — N39.8 VOIDING DYSFUNCTION: ICD-10-CM

## 2019-05-08 LAB
BILIRUB SERPL-MCNC: ABNORMAL MG/DL
BLOOD URINE, POC: 50
COLOR, POC UA: ABNORMAL
GLUCOSE UR QL STRIP: ABNORMAL
KETONES UR QL STRIP: ABNORMAL
LEUKOCYTE ESTERASE URINE, POC: ABNORMAL
NITRITE, POC UA: ABNORMAL
PH, POC UA: 5
PROTEIN, POC: ABNORMAL
SPECIFIC GRAVITY, POC UA: 1
UROBILINOGEN, POC UA: ABNORMAL

## 2019-05-08 PROCEDURE — 99999 PR PBB SHADOW E&M-EST. PATIENT-LVL IV: CPT | Mod: PBBFAC,,, | Performed by: OBSTETRICS & GYNECOLOGY

## 2019-05-08 PROCEDURE — 99214 PR OFFICE/OUTPT VISIT, EST, LEVL IV, 30-39 MIN: ICD-10-PCS | Mod: S$PBB,,, | Performed by: OBSTETRICS & GYNECOLOGY

## 2019-05-08 PROCEDURE — 99999 PR PBB SHADOW E&M-EST. PATIENT-LVL IV: ICD-10-PCS | Mod: PBBFAC,,, | Performed by: OBSTETRICS & GYNECOLOGY

## 2019-05-08 PROCEDURE — 99214 OFFICE O/P EST MOD 30 MIN: CPT | Mod: S$PBB,,, | Performed by: OBSTETRICS & GYNECOLOGY

## 2019-05-08 PROCEDURE — 99214 OFFICE O/P EST MOD 30 MIN: CPT | Mod: PBBFAC,PO | Performed by: OBSTETRICS & GYNECOLOGY

## 2019-05-08 PROCEDURE — 81002 URINALYSIS NONAUTO W/O SCOPE: CPT | Mod: PBBFAC,PO | Performed by: OBSTETRICS & GYNECOLOGY

## 2019-05-08 RX ORDER — SERTRALINE HYDROCHLORIDE 50 MG/1
50 TABLET, FILM COATED ORAL DAILY
Qty: 90 TABLET | Refills: 3 | Status: SHIPPED | OUTPATIENT
Start: 2019-05-08 | End: 2019-06-03

## 2019-05-08 RX ORDER — ROSUVASTATIN CALCIUM 40 MG/1
40 TABLET, COATED ORAL NIGHTLY
Qty: 90 TABLET | Refills: 3 | Status: SHIPPED | OUTPATIENT
Start: 2019-05-08 | End: 2020-05-01

## 2019-05-08 RX ORDER — DIAZEPAM 5 MG/1
5 TABLET ORAL NIGHTLY
COMMUNITY
End: 2019-05-28 | Stop reason: SDUPTHER

## 2019-05-08 RX ORDER — FLAVOXATE HYDROCHLORIDE 100 MG/1
100 TABLET ORAL 4 TIMES DAILY PRN
Qty: 120 TABLET | Refills: 3 | Status: SHIPPED | OUTPATIENT
Start: 2019-05-08 | End: 2019-05-22 | Stop reason: SDUPTHER

## 2019-05-08 RX ORDER — DIAZEPAM 5 MG/1
5 TABLET ORAL NIGHTLY
Qty: 30 TABLET | Refills: 3 | Status: SHIPPED | OUTPATIENT
Start: 2019-05-08 | End: 2019-06-07

## 2019-05-08 NOTE — TELEPHONE ENCOUNTER
Spoke to pt and she is looking for the valium to be called in to the pharmacy . Also was told to  Follow up in 4 weeks by the dr , had scheduled the appointment on 6/5 and saw on phone that procedure was scheduled for June 5th, thought he wanted  Her to stay on medication for 4 weeks before scheduling the procedure.

## 2019-05-08 NOTE — TELEPHONE ENCOUNTER
----- Message from Angella Andino sent at 5/8/2019 10:58 AM CDT -----  Contact: Sarah Nelson  Type:  RX Refill Request    Who Called: Sarah  Refill or New Rx:  refill  RX Name and Strength:  diazePAM (VALIUM) 2 MG tablet  How is the patient currently taking it? (ex. 1XDay):  1XDay  Is this a 30 day or 90 day RX: 30  Preferred Pharmacy with phone number:    [facility] [address] [address]  Phone: [phone] Fax: [phone]  Local or Mail Order:  Local  Ordering Provider:  Dr Burton  [facility] Call Back Number: [phone] (home)   Additional Information:  taisha

## 2019-05-08 NOTE — PROGRESS NOTES
Subjective:      Patient ID: Veronique Johnson is a 73 y.o. female.    Chief Complaint:  discuss cysto with hydro      History of Present Illness  73-year-old  3 para 200-2 times .  Patient is long history of chronic cystitis, chronic pelvic pain.  She has been under treatment the various urologists with conducted dilatation of urethra as well as previous cystoscopies and hydro distentions.  She is under the care of urogynecology at this institution for diagnosis of pelvic floor dysfunction is being managed with a combination of vaginal Valium.  Patient is here consultation regarding possible units occasion services.  Patient with urinary urgency and frequency at times patient with discomfort pain with intercourse.    Patient states that there is some type of anatomical tissue prolapsing out of her introitus however this has never been reproducible under multiple occasions.      Past Medical History:   Diagnosis Date    Anemia     Anxiety     Chronic bronchitis with COPD (chronic obstructive pulmonary disease)     Esophageal spasm     Essential tremor     GERD (gastroesophageal reflux disease)     Headache     High cholesterol     Hypertension     IBS (irritable bowel syndrome)     Meniere disease     Multiple sclerosis     Muscle spasm        Past Surgical History:   Procedure Laterality Date    CATARACT EXTRACTION, BILATERAL  2006    DILATION AND CURETTAGE OF UTERUS      HEMORRHOID SURGERY      TONSILLECTOMY      TUBAL LIGATION         GYN & OB History  No LMP recorded. Patient is postmenopausal.   Date of Last Pap: No result found    OB History   No data available       Health Maintenance       Date Due Completion Date    Shingles Vaccine (2 of 3) 2015 10/23/2015    Influenza Vaccine 2019 10/30/2018 (Done)    Override on 10/30/2018: Done    Mammogram 2020 5/3/2018    High Dose Statin 2020    DEXA SCAN 2022    Lipid Panel  2023    Colonoscopy 2028    TETANUS VACCINE 2029          Family History   Problem Relation Age of Onset    Coronary artery disease Mother     Heart disease Mother 60        CAD    Heart attack Father     Coronary artery disease Father     Heart disease Father 55        MI    Coronary artery disease Sister     Heart disease Sister 65        CAD       Social History     Socioeconomic History    Marital status:      Spouse name: Not on file    Number of children: Not on file    Years of education: Not on file    Highest education level: Not on file   Occupational History    Not on file   Social Needs    Financial resource strain: Not on file    Food insecurity:     Worry: Not on file     Inability: Not on file    Transportation needs:     Medical: Not on file     Non-medical: Not on file   Tobacco Use    Smoking status: Former Smoker     Last attempt to quit:      Years since quittin.3    Smokeless tobacco: Never Used   Substance and Sexual Activity    Alcohol use: Yes     Alcohol/week: 1.8 - 2.4 oz     Types: 3 - 4 Glasses of wine per week     Comment: on weekends    Drug use: No    Sexual activity: Yes   Lifestyle    Physical activity:     Days per week: Not on file     Minutes per session: Not on file    Stress: Not on file   Relationships    Social connections:     Talks on phone: Not on file     Gets together: Not on file     Attends [employer] service: Not on file     Active member of club or organization: Not on file     Attends meetings of clubs or organizations: Not on file     Relationship status: Not on file   Other Topics Concern    Not on file   Social History Narrative    Not on file       Review of Systems  Review of Systems   Genitourinary: Positive for dyspareunia, frequency and vaginal pain.   All other systems reviewed and are negative.         Objective:   /73   Pulse 84   Wt 62.5 kg (137 lb 12.6 oz)    BMI 26.03 kg/m²     Physical Exam   Genitourinary: Pelvic exam was performed with patient supine. Uterus normal, cervix normal, skenes normal and bartholins normal. Right labia normal and left labia normal. Urethra exhibits no urethral caruncle, no urethral diverticulum, no urethral mass and no hypermobility. There is a cystocele, unspecified prolapse of vaginal walls and atrophy in the vagina. Right adnexum displays no mass, no tenderness and no fullness. Left adnexum displays no mass, no tenderness and no fullness. Kegel: 2/5    POP-Q  Aa: -2 Ba:  C: -8   GH: 2 PB: 3 TVL: 10   Ap: -2 Bp:  D: -9                      Minimal amount of prolapse to I do not suspect this be clinically significant.  Patient with significant tenderness along the right  additionally significant tensor tenderness along left coccygeal is     Assessment:     1. Urinary frequency    2. Pelvic pain    3. Voiding dysfunction    4. High-tone pelvic floor dysfunction    5. Pelvic pressure in female            Plan:     1. Urinary frequency of utilizing Myrbetriq at 25 mg as well as utilizing Uribel  in a prescription for Urispas   2. Pelvic pain I suspect that the pelvic pain is partially secondary to the level of trigonitis that reason I am going to utilize the above to medications in conjunction with continued Valium per vagina I will increase to 5 mg   3. Voiding dysfunction I suspect this is secondary to pelvic dysfunction as well as urinary urgency and hypotonic bladder with the Myrbetriq as well as responsible cyst   4. High-tone pelvic floor dysfunction continue with the vaginal Valium and I see how she does with this particular regimen and we will set her up for bupivacaine with triamcinolone injection trigger point into  on right as well as coccygeal some left   5. Pelvic pressure in female management of above functions in the [place] specified should assist with pressure       Please note after the examination we then  discussed pelvic anatomy using images from the Internet within discussed injections as well as hydrodistention cystoscopy in the role of medications.  Total time of this consultation 40 min greater than 50% of the time 20 min in direct face-to-face discussion patient answering questions    There are no Patient Instructions on file for this visit.

## 2019-05-13 ENCOUNTER — PATIENT MESSAGE (OUTPATIENT)
Dept: UROGYNECOLOGY | Facility: CLINIC | Age: 74
End: 2019-05-13

## 2019-05-15 ENCOUNTER — TELEPHONE (OUTPATIENT)
Dept: UROGYNECOLOGY | Facility: CLINIC | Age: 74
End: 2019-05-15

## 2019-05-16 ENCOUNTER — TELEPHONE (OUTPATIENT)
Dept: UROGYNECOLOGY | Facility: CLINIC | Age: 74
End: 2019-05-16

## 2019-05-16 ENCOUNTER — PATIENT MESSAGE (OUTPATIENT)
Dept: UROGYNECOLOGY | Facility: CLINIC | Age: 74
End: 2019-05-16

## 2019-05-16 DIAGNOSIS — K21.9 GASTROESOPHAGEAL REFLUX DISEASE, ESOPHAGITIS PRESENCE NOT SPECIFIED: ICD-10-CM

## 2019-05-16 RX ORDER — OMEPRAZOLE 20 MG/1
20 CAPSULE, DELAYED RELEASE ORAL DAILY
Qty: 90 CAPSULE | Refills: 3 | Status: SHIPPED | OUTPATIENT
Start: 2019-05-16 | End: 2019-05-17 | Stop reason: SDUPTHER

## 2019-05-16 NOTE — TELEPHONE ENCOUNTER
"----- Message from Melissa Norris LPN sent at 5/14/2019  5:02 PM CDT -----  Dear Dr. Burton,     You gave me samples of Uribel and then sent a prescription for something else that would be cheaper to [facility].  My insurance did not approve it and labeled it for '"pre-approval" and sent a request to you for an explanation.  Do you know the status of that request?  I only have enough of the sample to last thur tomorrow(Tuesday).  If it is not approved, is there another medication that may be substituted?  If not and you want me to use what you called in I can get it filled for the cash price which is $90.  Please advise.   "

## 2019-05-17 DIAGNOSIS — K21.9 GASTROESOPHAGEAL REFLUX DISEASE, ESOPHAGITIS PRESENCE NOT SPECIFIED: ICD-10-CM

## 2019-05-17 RX ORDER — OMEPRAZOLE 20 MG/1
20 CAPSULE, DELAYED RELEASE ORAL DAILY
Qty: 90 CAPSULE | Refills: 3 | Status: SHIPPED | OUTPATIENT
Start: 2019-05-17 | End: 2020-06-02 | Stop reason: SDUPTHER

## 2019-05-22 ENCOUNTER — OFFICE VISIT (OUTPATIENT)
Dept: UROGYNECOLOGY | Facility: CLINIC | Age: 74
End: 2019-05-22
Payer: MEDICARE

## 2019-05-22 ENCOUNTER — HOSPITAL ENCOUNTER (OUTPATIENT)
Dept: CARDIOLOGY | Facility: HOSPITAL | Age: 74
Discharge: HOME | End: 2019-05-22
Attending: ANESTHESIOLOGY
Payer: MEDICARE

## 2019-05-22 VITALS
HEART RATE: 80 BPM | DIASTOLIC BLOOD PRESSURE: 70 MMHG | SYSTOLIC BLOOD PRESSURE: 125 MMHG | BODY MASS INDEX: 26.22 KG/M2 | HEIGHT: 61 IN | WEIGHT: 138.88 LBS

## 2019-05-22 DIAGNOSIS — I10 ESSENTIAL HYPERTENSION: ICD-10-CM

## 2019-05-22 DIAGNOSIS — N81.2 CYSTOCELE AND RECTOCELE WITH INCOMPLETE UTEROVAGINAL PROLAPSE: ICD-10-CM

## 2019-05-22 DIAGNOSIS — I10 ESSENTIAL HYPERTENSION: Primary | ICD-10-CM

## 2019-05-22 DIAGNOSIS — N30.10 IC (INTERSTITIAL CYSTITIS): ICD-10-CM

## 2019-05-22 DIAGNOSIS — R10.2 PELVIC PAIN: ICD-10-CM

## 2019-05-22 DIAGNOSIS — R35.0 URINARY FREQUENCY: Primary | ICD-10-CM

## 2019-05-22 LAB
BILIRUB SERPL-MCNC: ABNORMAL MG/DL
BLOOD URINE, POC: ABNORMAL
COLOR, POC UA: ABNORMAL
GLUCOSE UR QL STRIP: ABNORMAL
KETONES UR QL STRIP: ABNORMAL
LEUKOCYTE ESTERASE URINE, POC: ABNORMAL
NITRITE, POC UA: ABNORMAL
PH, POC UA: 6
PROTEIN, POC: ABNORMAL
SPECIFIC GRAVITY, POC UA: 1
UROBILINOGEN, POC UA: ABNORMAL

## 2019-05-22 PROCEDURE — 93010 ELECTROCARDIOGRAM REPORT: CPT | Mod: ,,, | Performed by: INTERNAL MEDICINE

## 2019-05-22 PROCEDURE — 99214 OFFICE O/P EST MOD 30 MIN: CPT | Mod: S$PBB,,, | Performed by: OBSTETRICS & GYNECOLOGY

## 2019-05-22 PROCEDURE — 99214 PR OFFICE/OUTPT VISIT, EST, LEVL IV, 30-39 MIN: ICD-10-PCS | Mod: S$PBB,,, | Performed by: OBSTETRICS & GYNECOLOGY

## 2019-05-22 PROCEDURE — 99214 OFFICE O/P EST MOD 30 MIN: CPT | Mod: PBBFAC,PO,25 | Performed by: OBSTETRICS & GYNECOLOGY

## 2019-05-22 PROCEDURE — 99999 PR PBB SHADOW E&M-EST. PATIENT-LVL IV: ICD-10-PCS | Mod: PBBFAC,,, | Performed by: OBSTETRICS & GYNECOLOGY

## 2019-05-22 PROCEDURE — 81002 URINALYSIS NONAUTO W/O SCOPE: CPT | Mod: PBBFAC,PO | Performed by: OBSTETRICS & GYNECOLOGY

## 2019-05-22 PROCEDURE — 93010 EKG 12-LEAD: ICD-10-PCS | Mod: ,,, | Performed by: INTERNAL MEDICINE

## 2019-05-22 PROCEDURE — 93005 ELECTROCARDIOGRAM TRACING: CPT

## 2019-05-22 PROCEDURE — 99999 PR PBB SHADOW E&M-EST. PATIENT-LVL IV: CPT | Mod: PBBFAC,,, | Performed by: OBSTETRICS & GYNECOLOGY

## 2019-05-22 RX ORDER — FLAVOXATE HYDROCHLORIDE 100 MG/1
100 TABLET ORAL 3 TIMES DAILY PRN
Qty: 90 TABLET | Refills: 3 | Status: SHIPPED | OUTPATIENT
Start: 2019-05-22 | End: 2019-05-28 | Stop reason: SDUPTHER

## 2019-05-22 RX ORDER — FLAVOXATE HYDROCHLORIDE 100 MG/1
100 TABLET ORAL 4 TIMES DAILY PRN
Qty: 120 TABLET | Refills: 3 | Status: SHIPPED | OUTPATIENT
Start: 2019-05-22 | End: 2019-07-15

## 2019-05-22 NOTE — H&P (VIEW-ONLY)
Subjective:      Patient ID: Veronique Johnson is a 73 y.o. female.    Chief Complaint:  Pre-op Exam      History of Present Illness  73-year-old the under management for chronic pelvic pain returns today after my initial consultation.  She has 4 images I waited for my nurse to be in the room we then reviewed the images.  Patient is indeed showing an element of prolapse.  She states that the level discomfort is much improved.     Note below essentially summarize my previous note  73-year-old  3 para 200-2 times .  Patient is long history of chronic cystitis, chronic pelvic pain.  She has been under treatment the various urologists with conducted dilatation of urethra as well as previous cystoscopies and hydro distentions.  She is under the care of urogynecology at this institution for diagnosis of pelvic floor dysfunction is being managed with a combination of vaginal Valium.  Patient is here consultation regarding possible units occasion services.  Patient with urinary urgency and frequency at times patient with discomfort pain with intercourse.     Patient states that there is some type of anatomical tissue prolapsing out of her introitus however this has never been reproducible under multiple occasions    My exam in that encounter was below    Physical Exam   Genitourinary: Pelvic exam was performed with patient supine. Uterus normal, cervix normal, skenes normal and bartholins normal. Right labia normal and left labia normal. Urethra exhibits no urethral caruncle, no urethral diverticulum, no urethral mass and no hypermobility. There is a cystocele, unspecified prolapse of vaginal walls and atrophy in the vagina. Right adnexum displays no mass, no tenderness and no fullness. Left adnexum displays no mass, no tenderness and no fullness. Kegel: 2/5   POP-Q  Aa: -2 Ba:  C: -8   GH: 2 PB: 3 TVL: 10   Ap: -2 Bp:  D: -9      My plan at the time was  1. Urinary frequency of utilizing Myrbetriq at 25 mg as  well as utilizing Uribel  in a prescription for Urispas   2. Pelvic pain I suspect that the pelvic pain is partially secondary to the level of trigonitis that reason I am going to utilize the above to medications in conjunction with continued Valium per vagina I will increase to 5 mg   3. Voiding dysfunction I suspect this is secondary to pelvic dysfunction as well as urinary urgency and hypotonic bladder with the Myrbetriq as well as responsible cyst   4. High-tone pelvic floor dysfunction continue with the vaginal Valium and I see how she does with this particular regimen and we will set her up for bupivacaine with triamcinolone injection trigger point into  on right as well as coccygeal some left   5. Pelvic pressure in female management of above functions in the [place] specified should assist with pressure           Past Medical History:   Diagnosis Date    Anemia     Anxiety     Chronic bronchitis with COPD (chronic obstructive pulmonary disease)     Esophageal spasm     Essential tremor     GERD (gastroesophageal reflux disease)     Headache     High cholesterol     Hypertension     IBS (irritable bowel syndrome)     Meniere disease     Multiple sclerosis     Muscle spasm        Past Surgical History:   Procedure Laterality Date    CATARACT EXTRACTION, BILATERAL  2006    DILATION AND CURETTAGE OF UTERUS  1966    HEMORRHOID SURGERY  1997    TONSILLECTOMY  1954    TUBAL LIGATION  1990       GYN & OB History  No LMP recorded. Patient is postmenopausal.   Date of Last Pap: No result found    OB History   No data available       Health Maintenance       Date Due Completion Date    Shingles Vaccine (2 of 3) 12/18/2015 10/23/2015    Influenza Vaccine 08/01/2019 10/30/2018 (Done)    Override on 10/30/2018: Done    Mammogram 05/03/2020 5/3/2018    High Dose Statin 05/22/2020 5/22/2019    DEXA SCAN 02/05/2022 2/5/2019    Lipid Panel 12/14/2023 12/14/2018    Colonoscopy 09/05/2028 9/5/2018     "TETANUS VACCINE 2029          Family History   Problem Relation Age of Onset    Coronary artery disease Mother     Heart disease Mother 60        CAD    Heart attack Father     Coronary artery disease Father     Heart disease Father 55        MI    Coronary artery disease Sister     Heart disease Sister 65        CAD       Social History     Socioeconomic History    Marital status:      Spouse name: Not on file    Number of children: Not on file    Years of education: Not on file    Highest education level: Not on file   Occupational History    Not on file   Social Needs    Financial resource strain: Not on file    Food insecurity:     Worry: Not on file     Inability: Not on file    Transportation needs:     Medical: Not on file     Non-medical: Not on file   Tobacco Use    Smoking status: Former Smoker     Last attempt to quit:      Years since quittin.3    Smokeless tobacco: Never Used   Substance and Sexual Activity    Alcohol use: Yes     Alcohol/week: 1.8 - 2.4 oz     Types: 3 - 4 Glasses of wine per week     Comment: on weekends    Drug use: No    Sexual activity: Yes   Lifestyle    Physical activity:     Days per week: Not on file     Minutes per session: Not on file    Stress: Not on file   Relationships    Social connections:     Talks on phone: Not on file     Gets together: Not on file     Attends [employer] service: Not on file     Active member of club or organization: Not on file     Attends meetings of clubs or organizations: Not on file     Relationship status: Not on file   Other Topics Concern    Not on file   Social History Narrative    Not on file       Review of Systems  Review of Systems   Genitourinary: Positive for vaginal pain.   All other systems reviewed and are negative.    Given the pain is still present his improved     Objective:   /70   Pulse 80   Ht 5' 1" (1.549 m)   Wt 63 kg (138 lb 14.2 oz)   BMI 26.24 kg/m² "     Physical Exam   Genitourinary: Pelvic exam was performed with patient supine. Cervix normal, skenes normal and bartholins normal. Right labia normal and left labia normal. Urethra exhibits hypermobility. Urethra exhibits no urethral caruncle, no urethral diverticulum and no urethral mass. There is a rectocele, a cystocele and unspecified prolapse of vaginal walls in the vagina. Uterus is experiencing uterine prolapse.   Empty cough stress test: Negative.  Kegel: 2/5    POP-Q  Aa: -2 Ba:  C: -4   GH: 2 PB: 3 TVL: 10   Ap: -2 Bp:  D: -5                          Assessment:     1. Urinary frequency    2. IC (interstitial cystitis)    3. Cystocele and rectocele with incomplete uterovaginal prolapse    4. Pelvic pain            Plan:     1. Urinary frequency    2. IC (interstitial cystitis)    3. Cystocele and rectocele with incomplete uterovaginal prolapse    4. Pelvic pain      All of the above are much improved.  The vaginal Valium has had substantial positive affect.  I was able to actually examined the patient there is I would have to say nearly 100% improvement.  Patient is having discomfort still there is incomplete uterovaginal prolapse with concomitant cystocele rectocele rectocele being more prominent.  As patient is done so well with conservative therapy I will move forward with hydrodistention to see if this will remedy the rest of her complaints.  As stage II incomplete uterovaginal prolapse I do not think that simple reconstruction will completely improved patient's symptomatology patient noted understanding we had were going to move forward with just hydro distension further exam under anesthesia patient appreciative.  Informed consents were obtained however please note about 30 min of time were spent with the patient today discussing all aspects for care and substantial information from the [employer] interactive web site was given on the medical management as well as a surgical approach  to pelvic organ prolapse.  Patient noted a appreciation    There are no Patient Instructions on file for this visit.

## 2019-05-22 NOTE — PROGRESS NOTES
Subjective:      Patient ID: Veronique Johnson is a 73 y.o. female.    Chief Complaint:  Pre-op Exam      History of Present Illness  73-year-old the under management for chronic pelvic pain returns today after my initial consultation.  She has 4 images I waited for my nurse to be in the room we then reviewed the images.  Patient is indeed showing an element of prolapse.  She states that the level discomfort is much improved.     Note below essentially summarize my previous note  73-year-old  3 para 200-2 times .  Patient is long history of chronic cystitis, chronic pelvic pain.  She has been under treatment the various urologists with conducted dilatation of urethra as well as previous cystoscopies and hydro distentions.  She is under the care of urogynecology at this institution for diagnosis of pelvic floor dysfunction is being managed with a combination of vaginal Valium.  Patient is here consultation regarding possible units occasion services.  Patient with urinary urgency and frequency at times patient with discomfort pain with intercourse.     Patient states that there is some type of anatomical tissue prolapsing out of her introitus however this has never been reproducible under multiple occasions    My exam in that encounter was below    Physical Exam   Genitourinary: Pelvic exam was performed with patient supine. Uterus normal, cervix normal, skenes normal and bartholins normal. Right labia normal and left labia normal. Urethra exhibits no urethral caruncle, no urethral diverticulum, no urethral mass and no hypermobility. There is a cystocele, unspecified prolapse of vaginal walls and atrophy in the vagina. Right adnexum displays no mass, no tenderness and no fullness. Left adnexum displays no mass, no tenderness and no fullness. Kegel: 2/5   POP-Q  Aa: -2 Ba:  C: -8   GH: 2 PB: 3 TVL: 10   Ap: -2 Bp:  D: -9      My plan at the time was  1. Urinary frequency of utilizing Myrbetriq at 25 mg as  well as utilizing Uribel  in a prescription for Urispas   2. Pelvic pain I suspect that the pelvic pain is partially secondary to the level of trigonitis that reason I am going to utilize the above to medications in conjunction with continued Valium per vagina I will increase to 5 mg   3. Voiding dysfunction I suspect this is secondary to pelvic dysfunction as well as urinary urgency and hypotonic bladder with the Myrbetriq as well as responsible cyst   4. High-tone pelvic floor dysfunction continue with the vaginal Valium and I see how she does with this particular regimen and we will set her up for bupivacaine with triamcinolone injection trigger point into  on right as well as coccygeal some left   5. Pelvic pressure in female management of above functions in the [place] specified should assist with pressure           Past Medical History:   Diagnosis Date    Anemia     Anxiety     Chronic bronchitis with COPD (chronic obstructive pulmonary disease)     Esophageal spasm     Essential tremor     GERD (gastroesophageal reflux disease)     Headache     High cholesterol     Hypertension     IBS (irritable bowel syndrome)     Meniere disease     Multiple sclerosis     Muscle spasm        Past Surgical History:   Procedure Laterality Date    CATARACT EXTRACTION, BILATERAL  2006    DILATION AND CURETTAGE OF UTERUS  1966    HEMORRHOID SURGERY  1997    TONSILLECTOMY  1954    TUBAL LIGATION  1990       GYN & OB History  No LMP recorded. Patient is postmenopausal.   Date of Last Pap: No result found    OB History   No data available       Health Maintenance       Date Due Completion Date    Shingles Vaccine (2 of 3) 12/18/2015 10/23/2015    Influenza Vaccine 08/01/2019 10/30/2018 (Done)    Override on 10/30/2018: Done    Mammogram 05/03/2020 5/3/2018    High Dose Statin 05/22/2020 5/22/2019    DEXA SCAN 02/05/2022 2/5/2019    Lipid Panel 12/14/2023 12/14/2018    Colonoscopy 09/05/2028 9/5/2018     "TETANUS VACCINE 2029          Family History   Problem Relation Age of Onset    Coronary artery disease Mother     Heart disease Mother 60        CAD    Heart attack Father     Coronary artery disease Father     Heart disease Father 55        MI    Coronary artery disease Sister     Heart disease Sister 65        CAD       Social History     Socioeconomic History    Marital status:      Spouse name: Not on file    Number of children: Not on file    Years of education: Not on file    Highest education level: Not on file   Occupational History    Not on file   Social Needs    Financial resource strain: Not on file    Food insecurity:     Worry: Not on file     Inability: Not on file    Transportation needs:     Medical: Not on file     Non-medical: Not on file   Tobacco Use    Smoking status: Former Smoker     Last attempt to quit:      Years since quittin.3    Smokeless tobacco: Never Used   Substance and Sexual Activity    Alcohol use: Yes     Alcohol/week: 1.8 - 2.4 oz     Types: 3 - 4 Glasses of wine per week     Comment: on weekends    Drug use: No    Sexual activity: Yes   Lifestyle    Physical activity:     Days per week: Not on file     Minutes per session: Not on file    Stress: Not on file   Relationships    Social connections:     Talks on phone: Not on file     Gets together: Not on file     Attends L[employer] service: Not on file     Active member of club or organization: Not on file     Attends meetings of clubs or organizations: Not on file     Relationship status: Not on file   Other Topics Concern    Not on file   Social History Narrative    Not on file       Review of Systems  Review of Systems   Genitourinary: Positive for vaginal pain.   All other systems reviewed and are negative.    Given the pain is still present his improved     Objective:   /70   Pulse 80   Ht 5' 1" (1.549 m)   Wt 63 kg (138 lb 14.2 oz)   BMI 26.24 kg/m² "     Physical Exam   Genitourinary: Pelvic exam was performed with patient supine. Cervix normal, skenes normal and bartholins normal. Right labia normal and left labia normal. Urethra exhibits hypermobility. Urethra exhibits no urethral caruncle, no urethral diverticulum and no urethral mass. There is a rectocele, a cystocele and unspecified prolapse of vaginal walls in the vagina. Uterus is experiencing uterine prolapse.   Empty cough stress test: Negative.  Kegel: 2/5    POP-Q  Aa: -2 Ba:  C: -4   GH: 2 PB: 3 TVL: 10   Ap: -2 Bp:  D: -5                          Assessment:     1. Urinary frequency    2. IC (interstitial cystitis)    3. Cystocele and rectocele with incomplete uterovaginal prolapse    4. Pelvic pain            Plan:     1. Urinary frequency    2. IC (interstitial cystitis)    3. Cystocele and rectocele with incomplete uterovaginal prolapse    4. Pelvic pain      All of the above are much improved.  The vaginal Valium has had substantial positive affect.  I was able to actually examined the patient there is I would have to say nearly 100% improvement.  Patient is having discomfort still there is incomplete uterovaginal prolapse with concomitant cystocele rectocele rectocele being more prominent.  As patient is done so well with conservative therapy I will move forward with hydrodistention to see if this will remedy the rest of her complaints.  As stage II incomplete uterovaginal prolapse I do not think that simple reconstruction will completely improved patient's symptomatology patient noted understanding we had were going to move forward with just hydro distension further exam under anesthesia patient appreciative.  Informed consents were obtained however please note about 30 min of time were spent with the patient today discussing all aspects for care and substantial information from the [employer] interactive web site was given on the medical management as well as a surgical approach  to pelvic organ prolapse.  Patient noted a appreciation    There are no Patient Instructions on file for this visit.

## 2019-05-24 DIAGNOSIS — I10 ESSENTIAL HYPERTENSION: ICD-10-CM

## 2019-05-28 ENCOUNTER — HOSPITAL ENCOUNTER (OUTPATIENT)
Dept: RADIOLOGY | Facility: HOSPITAL | Age: 74
Discharge: HOME | End: 2019-05-28
Attending: PHYSICIAN ASSISTANT
Payer: MEDICARE

## 2019-05-28 ENCOUNTER — OFFICE VISIT (OUTPATIENT)
Dept: FAMILY MEDICINE | Facility: CLINIC | Age: 74
End: 2019-05-28
Payer: MEDICARE

## 2019-05-28 VITALS
OXYGEN SATURATION: 98 % | TEMPERATURE: 98 F | SYSTOLIC BLOOD PRESSURE: 160 MMHG | BODY MASS INDEX: 25.84 KG/M2 | HEART RATE: 84 BPM | HEIGHT: 61 IN | DIASTOLIC BLOOD PRESSURE: 68 MMHG | WEIGHT: 136.88 LBS

## 2019-05-28 DIAGNOSIS — J40 BRONCHITIS: Primary | ICD-10-CM

## 2019-05-28 DIAGNOSIS — J40 BRONCHITIS: ICD-10-CM

## 2019-05-28 PROCEDURE — 99999 PR PBB SHADOW E&M-EST. PATIENT-LVL IV: CPT | Mod: PBBFAC,,, | Performed by: PHYSICIAN ASSISTANT

## 2019-05-28 PROCEDURE — 71046 XR CHEST PA AND LATERAL: ICD-10-PCS | Mod: 26,,, | Performed by: RADIOLOGY

## 2019-05-28 PROCEDURE — 99999 PR PBB SHADOW E&M-EST. PATIENT-LVL IV: ICD-10-PCS | Mod: PBBFAC,,, | Performed by: PHYSICIAN ASSISTANT

## 2019-05-28 PROCEDURE — 99214 PR OFFICE/OUTPT VISIT, EST, LEVL IV, 30-39 MIN: ICD-10-PCS | Mod: S$PBB,,, | Performed by: PHYSICIAN ASSISTANT

## 2019-05-28 PROCEDURE — 71046 X-RAY EXAM CHEST 2 VIEWS: CPT | Mod: TC,FY,PO

## 2019-05-28 PROCEDURE — 71046 X-RAY EXAM CHEST 2 VIEWS: CPT | Mod: 26,,, | Performed by: RADIOLOGY

## 2019-05-28 PROCEDURE — 99214 OFFICE O/P EST MOD 30 MIN: CPT | Mod: S$PBB,,, | Performed by: PHYSICIAN ASSISTANT

## 2019-05-28 PROCEDURE — 99214 OFFICE O/P EST MOD 30 MIN: CPT | Mod: PBBFAC,PO,25 | Performed by: PHYSICIAN ASSISTANT

## 2019-05-28 RX ORDER — PROMETHAZINE HYDROCHLORIDE AND DEXTROMETHORPHAN HYDROBROMIDE 6.25; 15 MG/5ML; MG/5ML
5 SYRUP ORAL NIGHTLY PRN
Qty: 118 ML | Refills: 0 | Status: SHIPPED | OUTPATIENT
Start: 2019-05-28 | End: 2019-06-04

## 2019-05-28 RX ORDER — DOXYCYCLINE 100 MG/1
100 CAPSULE ORAL 2 TIMES DAILY
Qty: 20 CAPSULE | Refills: 0 | Status: SHIPPED | OUTPATIENT
Start: 2019-05-28 | End: 2019-06-07

## 2019-05-28 RX ORDER — ALBUTEROL SULFATE 90 UG/1
4 AEROSOL, METERED RESPIRATORY (INHALATION) EVERY 6 HOURS PRN
Qty: 1 EACH | Refills: 1 | Status: SHIPPED | OUTPATIENT
Start: 2019-05-28 | End: 2020-07-15

## 2019-05-28 RX ORDER — PREDNISONE 10 MG/1
TABLET ORAL
Qty: 18 TABLET | Refills: 0 | Status: SHIPPED | OUTPATIENT
Start: 2019-05-28 | End: 2019-06-25

## 2019-05-28 NOTE — PROGRESS NOTES
Subjective:       Patient ID: Veronique Johnson is a 73 y.o. female    Chief Complaint: Cough    HPI  The patient is new to me, PCP is Dr. Song.  She presents today complaining of nonproductive cough for the past 11 days.  She has a history of COPD and was previously a smoker until 19 years ago when she quit.  She is using an  from albuterol inhaler for wheezing.  She denies any chest her back pain, no fever, no shortness of breath.  Her cough is worse at nighttime and is keeping her up.    Review of Systems   Constitutional: Negative for chills and fever.   HENT: Negative for congestion, postnasal drip and sore throat.    Eyes: Negative for visual disturbance.   Respiratory: Positive for cough. Negative for chest tightness and shortness of breath.    Cardiovascular: Negative for chest pain.   Gastrointestinal: Negative for abdominal pain, diarrhea, nausea and vomiting.   Musculoskeletal: Negative for back pain.   Skin: Negative for rash.   Neurological: Positive for headaches (Began today, across forehead).        Objective:   Physical Exam   Constitutional: She is oriented to person, place, and time. She appears well-developed and well-nourished. No distress.   HENT:   Head: Normocephalic and atraumatic.   Eyes: Pupils are equal, round, and reactive to light. EOM are normal.   Neck: Normal range of motion. Neck supple.   Cardiovascular: Normal rate, regular rhythm, normal heart sounds and intact distal pulses. Exam reveals no gallop and no friction rub.   No murmur heard.  Pulmonary/Chest: Effort normal and breath sounds normal. No respiratory distress. She has no wheezes. She has no rales. She exhibits no tenderness.   Musculoskeletal: Normal range of motion.   Neurological: She is alert and oriented to person, place, and time.   Skin: Skin is warm and dry. No rash noted. She is not diaphoretic.   Psychiatric: She has a normal mood and affect. Her behavior is normal. Judgment and thought content normal.         Assessment:       1. Bronchitis  X-Ray Chest PA And Lateral    doxycycline (MONODOX) 100 MG capsule    predniSONE (DELTASONE) 10 MG tablet    albuterol (PROVENTIL/VENTOLIN HFA) 90 mcg/actuation inhaler    promethazine-dextromethorphan (PROMETHAZINE-DM) 6.25-15 mg/5 mL Syrp        Plan:       Bronchitis  -     X-Ray Chest PA And Lateral; Future; Expected date: 05/28/2019  -     doxycycline (MONODOX) 100 MG capsule; Take 1 capsule (100 mg total) by mouth 2 (two) times daily. for 10 days  Dispense: 20 capsule; Refill: 0  -     predniSONE (DELTASONE) 10 MG tablet; Take 30 mg daily X 3d, then 20 mg daily X 3d, then 10 mg daily X 3d  Dispense: 18 tablet; Refill: 0  -     albuterol (PROVENTIL/VENTOLIN HFA) 90 mcg/actuation inhaler; Inhale 4 puffs into the lungs every 6 (six) hours as needed for Wheezing.  Dispense: 1 each; Refill: 1  -     promethazine-dextromethorphan (PROMETHAZINE-DM) 6.25-15 mg/5 mL Syrp; Take 5 mLs by mouth nightly as needed.  Dispense: 118 mL; Refill: 0

## 2019-05-29 RX ORDER — LOSARTAN POTASSIUM 50 MG/1
50 TABLET ORAL EVERY MORNING
Qty: 90 TABLET | Refills: 0 | Status: SHIPPED | OUTPATIENT
Start: 2019-05-29 | End: 2019-06-03

## 2019-05-30 ENCOUNTER — LAB VISIT (OUTPATIENT)
Dept: LAB | Facility: HOSPITAL | Age: 74
End: 2019-05-30
Payer: MEDICARE

## 2019-05-30 ENCOUNTER — OFFICE VISIT (OUTPATIENT)
Dept: RHEUMATOLOGY | Facility: CLINIC | Age: 74
End: 2019-05-30
Payer: MEDICARE

## 2019-05-30 VITALS
WEIGHT: 137.19 LBS | BODY MASS INDEX: 25.92 KG/M2 | DIASTOLIC BLOOD PRESSURE: 80 MMHG | SYSTOLIC BLOOD PRESSURE: 142 MMHG

## 2019-05-30 DIAGNOSIS — R26.9 ABNORMALITY OF GAIT: Primary | ICD-10-CM

## 2019-05-30 DIAGNOSIS — R05.9 COUGH: ICD-10-CM

## 2019-05-30 DIAGNOSIS — R70.0 ELEVATED SEDIMENTATION RATE: ICD-10-CM

## 2019-05-30 DIAGNOSIS — F41.9 ANXIETY: ICD-10-CM

## 2019-05-30 DIAGNOSIS — G25.81 RESTLESS LEGS: ICD-10-CM

## 2019-05-30 DIAGNOSIS — R51.9 FACIAL PAIN: ICD-10-CM

## 2019-05-30 DIAGNOSIS — M19.90 OSTEOARTHRITIS, UNSPECIFIED OSTEOARTHRITIS TYPE, UNSPECIFIED SITE: Primary | ICD-10-CM

## 2019-05-30 DIAGNOSIS — G35 MULTIPLE SCLEROSIS: ICD-10-CM

## 2019-05-30 LAB
ALBUMIN SERPL-MCNC: 4.1 G/DL (ref 3.1–4.7)
ALP SERPL-CCNC: 78 IU/L (ref 40–104)
ALT (SGPT): 21 IU/L (ref 3–33)
AST SERPL-CCNC: 25 IU/L (ref 10–40)
BILIRUB SERPL-MCNC: 0.6 MG/DL (ref 0.3–1)
BUN SERPL-MCNC: 20 MG/DL (ref 8–20)
CALCIUM SERPL-MCNC: 9.8 MG/DL (ref 7.7–10.4)
CHLORIDE: 110 MMOL/L (ref 98–110)
CK SERPL-CCNC: 54 IU/L (ref 13–135)
CO2 SERPL-SCNC: 23.7 MMOL/L (ref 22.8–31.6)
CREAT SERPL-MCNC: 0.8 MG/DL (ref 0.5–1.4)
CREATININE: 0.74 MG/DL (ref 0.6–1.4)
CRP SERPL-MCNC: 0.11 MG/DL (ref 0–1.4)
EST. GFR  (AFRICAN AMERICAN): >60 ML/MIN/1.73 M^2
EST. GFR  (NON AFRICAN AMERICAN): >60 ML/MIN/1.73 M^2
FOLATE SERPL-MCNC: 10.4 NG/ML (ref 4–24)
GLUCOSE: 123 MG/DL (ref 70–99)
POTASSIUM SERPL-SCNC: 3.6 MMOL/L (ref 3.5–5)
PROT SERPL-MCNC: 7.7 G/DL (ref 6–8.2)
SODIUM: 144 MMOL/L (ref 134–144)
TSH SERPL DL<=0.005 MIU/L-ACNC: 1.11 UIU/ML (ref 0.4–4)
VIT B12 SERPL-MCNC: 401 PG/ML (ref 210–950)

## 2019-05-30 PROCEDURE — 99204 PR OFFICE/OUTPT VISIT, NEW, LEVL IV, 45-59 MIN: ICD-10-PCS | Mod: ,,, | Performed by: INTERNAL MEDICINE

## 2019-05-30 PROCEDURE — 82746 ASSAY OF FOLIC ACID SERUM: CPT

## 2019-05-30 PROCEDURE — 99204 OFFICE O/P NEW MOD 45 MIN: CPT | Mod: ,,, | Performed by: INTERNAL MEDICINE

## 2019-05-30 PROCEDURE — 82607 VITAMIN B-12: CPT

## 2019-05-30 PROCEDURE — 84425 ASSAY OF VITAMIN B-1: CPT

## 2019-05-30 PROCEDURE — 84443 ASSAY THYROID STIM HORMONE: CPT

## 2019-05-30 PROCEDURE — 82565 ASSAY OF CREATININE: CPT

## 2019-05-30 NOTE — PATIENT INSTRUCTIONS
Erythrocyte Sedimentation Rate  Does this test have other names?  ESR, sed rate  What is this test?  Erythrocyte sedimentation rate (ESR) is a blood test. It measures how quickly erythrocytes, or red blood cells, separate from a blood sample that has been treated so the blood will not clot. During this test, a small amount of your blood will be put in an upright tube. A  will measure the rate that your red blood cells settle toward the bottom of the tube afte  Osteoarthritis: Common Sites  Osteoarthritis (OA) is sometimes called degenerative joint disease or wear-and-tear arthritis. It's the most common type of arthritis. In OA, the cartilage wears away. Cartilage covers the ends of bones and acts as a cushion. If enough cartilage wears away, bone rubs against bone. The joint changes in OA cause pain, stiffness, and trouble moving. OA may occur in any joint. Some joints that are commonly affected are the spine, neck,  hips, knees, fingers, and toes.     Neck  Joints between small bones in the neck may wear out. Pain may travel to the shoulder or the base of the skull.  Lumbar Spine  Bony spurs may form on the joints between the vertebrae (spinal bones). And disks (cushions of cartilage between vertebrae) may wear down. Pain may affect the lower back or leg.  Hip  Cartilage damage can occur in the large ball and socket joint that connects the pelvis and thighbone (femur). Pain may travel to the groin, buttocks, or knee.  Knee  The cartilage in the knee joint may wear down. Weakness or instability in the knee joint may make walking or climbing stairs difficult.  Fingers  Finger joints may become enlarged and knobby. Grasping objects may be hard, especially if the joint at the base of the thumb is affected.  Toes  Toes may be affected. Arthritis may cause a bunion, a bump at the base of the big toe. Standing or walking may be painful.  Date Last Reviewed: 2/14/2016  © 4876-5325 [employer] [address] [place] [address]. All rights reserved. This information is not intended as a substitute for professional medical care. Always follow your healthcare professional's instructions.      r 1 hour.  If you have a condition that causes inflammation or cell damage, your red blood cells tend to clump together. This makes them heavier, so they settle faster. The faster your red blood cells settle and fall, the higher your ESR. A high ESR tells your healthcare provider that you may have an active disease process in your body.   Why do I need this test?  You may need this blood test if you have symptoms of one of the diseases that may cause ESR to go up.  You may also need this test if you have already been diagnosed with a disease that causes a high ESR. The test can allow your healthcare provider to see how well you are responding to treatment.  The ESR blood test is most useful for diagnosing or monitoring diseases that cause pain and swelling from inflammation. Other symptoms may include fever and weight loss. These diseases include:  · Temporal arteritis  · Rheumatoid arthritis  · Polymyalgia rheumatica  ESR is not used as a screening test in people who do not have symptoms or to diagnose disease because many conditions can cause it to increase. It might also go up in many normal cases. ESR doesn't tell your healthcare provider whether you have a specific disease. It only suggests that you may have an active disease process in your body.   What other tests might I have along with this test?  You may have other tests if your healthcare provider is doing this test to diagnose a disease.  Your provider may do an ESR alone if he or she is monitoring a disease you already have.  Because ESR tells your provider only what is happening right now, you may need to have the test repeated over time.   What do my test results mean?  A result for a lab test may be affected by many things, including the  method the laboratory uses to do the test. If your test results are different from the normal value, you may not have a problem. To learn what the results mean for you, talk with your healthcare provider.  ESR is measured in millimeters per hour (mm/h). The normal values are:  · 0 to 10 mm/h in children  · 0 to 15 mm/h in men younger than 50  · 0 to 20 mm/h in men older than 50  · 0 to 20 mm/h in women younger than 50  · 0 to 30 mm/h in women older than 50  ESR above 100 mm/h is most likely caused by an active disease. For instance, you may have:  · A disease that causes inflammation in your body  · Active infection  · Cancer  · Heart disease  · Kidney disease  · Blood disease  · Diabetes  · Collagen vascular disease  How is this test done?  The test requires a blood sample, which is drawn through a needle from a vein in your arm.  Does this test pose any risks?  Taking a blood sample with a needle carries risks that include bleeding, infection, bruising, or feeling dizzy. When the needle pricks your arm, you may feel a slight stinging sensation or pain. Afterward, the site may be slightly sore.   What might affect my test results?  Many things that are not active diseases can increase your ESR. These include:  · Pregnancy  · Old age  · Being female  · Having a menstrual period  · Having recently eaten a fatty meal  · Being obese  · Taking certain medicines  How do I get ready for this test?  You don't need to prepare for this test. Be sure your healthcare provider knows about all medicines, herbs, vitamins, and supplements you are taking. This includes medicines that don't need a prescription and any illicit drugs you may use. Tell your provider if you ate a fatty meal recently, if you are having your period, or if you may be pregnant.   © 2188-4692 [employer]. [address] [place] [address], [place] [address]. All rights reserved. This information is not intended as a substitute for professional medical  care. Always follow your healthcare professional's instructions.

## 2019-05-30 NOTE — LETTER
May 30, 2019      Becky Song MD  [address] [facility] [address] [place] [address]           [facility] - Rheumatology  [address]  [address]  [facility] [address]  Phone: [phone]  Fax: [phone]          Patient: Veronique Johnson   MR Number: [id number]   YOB: 1945   Date of Visit: 5/30/2019       Dear Dr. Becky Song:    Thank you for referring Veronique Johnson to me for evaluation. Attached you will find relevant portions of my assessment and plan of care.    If you have questions, please do not hesitate to call me. I look forward to following Veronique Johnson along with you.    Sincerely,    Villa Guerra MD    Enclosure  CC:  No Recipients    If you would like to receive this communication electronically, please contact [email or web address] or [phone] to request more information on [employer] access.    For providers and/or their staff who would like to refer a patient to [facility], please contact us through our one-stop-shop provider referral line, [facility], at [phone].    If you feel you have received this communication in error or would no longer like to receive these types of communications, please e-mail [email or web address]

## 2019-05-30 NOTE — PROGRESS NOTES
"      [facility] RHEUMATOLOGY           New patient visit    Notes dictated via Dragon to EPIC. Please forgive any unintentional errors.  Subjective:       Patient ID:   NAME: Veronique Johnson : 1945     73 y.o. female    Referring Doc: Becky Song MD  Other Physicians:    Chief Complaint:  Elevated lab results        HPI:  This very pleasant lady was referred by her primary care provider for the evaluation of elevated acute phase reactants. The patient was complaining of muscle and joint pain that has been present over at least one year. It involves both small joints of the hands and large joints shoulders and hips. There have been no complaints of red, hot, and/or swollen joints. She has no true morning stiffness but does experience gelling. She has not been taking over-the-counter medications nor has she had a prescription for an NSAID. She has however been on steroids over the last week or so for what has been termed "bronchitis." She has not noted any particular change in her musculoskeletal symptoms while on steroids.    Her past medical history is significant for reactive airway disease and possibly a degree of COPD, for hypertension, hyperlipidemia, chronic gastroesophageal reflux with recurrent esophageal strictures requiring dilatation, for bladder pain, and for chronic anxiety.    She has been evaluated in the past () for diffuse symptoms that were suggestive of possible multiple sclerosis. She was advised at that time that she did have early brain lesions insistent with MS as well as other tests including a lumbar puncture. She did not take medication at that time and does not feel that the symptoms then attributed to her possible MS have significantly worsened.    She was also evaluated for left-sided temporal headaches in . At that time,she was also found to have an elevated sedimentation rate (39 mm/h) and an elevated CRP. She was placed on high-dose steroids immediately and underwent a " left-sided temporal artery biopsy. She did not note that the steroids made her feel better in any particular way. The pathology was entirely negative and the patient was taken off steroids without exacerbation of symptoms.        ROS:   GEN:      no fever, night sweats or weight loss  SKIN:     no rashes , erythema, bruising, or swelling, no Raynauds, no photosensitivity  HEENT:  no acute changes in vision, no mouth ulcers, + sicca symptoms, no scalp tenderness, jaw claudication.  CV:        no CP, PND, SIMMONS or orthopnea, no palpitations  PULM:   no SOB, ++ cough, no hemoptysis, sputum or pleuritic pain  GI:          ++ dysphagia, + GERD, no hematemesis; no abdominal pain, nausea, vomiting, constipation, diarrhea, melanotic stools, + BRBPR (w/u negative).  :        + microscopic hematuria, + dysuria; no incontinence  NEURO: no paresthesias, + headaches, no acute visual disturbances  MUSCULOSKELETAL: joint pain and stiffness without complaints of red, hot, and/or swollen joints   PSYCH: + insomnia, + anxiety, no depression    Past Medical/Surgical History:  Past Medical History:   Diagnosis Date    Anemia     Anxiety     Chronic bronchitis with COPD (chronic obstructive pulmonary disease)     Esophageal spasm     Essential tremor     GERD (gastroesophageal reflux disease)     Headache     High cholesterol     Hypertension     IBS (irritable bowel syndrome)     Meniere disease     Multiple sclerosis     Muscle spasm      Past Surgical History:   Procedure Laterality Date    CATARACT EXTRACTION, BILATERAL  2006    DILATION AND CURETTAGE OF UTERUS  1966    HEMORRHOID SURGERY  1997    TONSILLECTOMY  1954    TUBAL LIGATION  1990       Allergies:  Review of patient's allergies indicates:   Allergen Reactions    Cephalosporins Anaphylaxis    Penicillins Rash       Social/Family History:  Social History     Socioeconomic History    Marital status:      Spouse name: Not on file    Number of  children: Not on file    Years of education: Not on file    Highest education level: Not on file   Occupational History    Not on file   Social Needs    Financial resource strain: Not on file    Food insecurity:     Worry: Not on file     Inability: Not on file    Transportation needs:     Medical: Not on file     Non-medical: Not on file   Tobacco Use    Smoking status: Former Smoker     Last attempt to quit:      Years since quittin.4    Smokeless tobacco: Never Used   Substance and Sexual Activity    Alcohol use: Yes     Alcohol/week: 1.8 - 2.4 oz     Types: 3 - 4 Glasses of wine per week     Comment: on weekends    Drug use: No    Sexual activity: Yes   Lifestyle    Physical activity:     Days per week: Not on file     Minutes per session: Not on file    Stress: Not on file   Relationships    Social connections:     Talks on phone: Not on file     Gets together: Not on file     Attends Rastafari service: Not on file     Active member of club or organization: Not on file     Attends meetings of clubs or organizations: Not on file     Relationship status: Not on file   Other Topics Concern    Not on file   Social History Narrative    Not on file     Family History   Problem Relation Age of Onset    Coronary artery disease Mother     Heart disease Mother 60        CAD    Heart attack Father     Coronary artery disease Father     Heart disease Father 55        MI    Coronary artery disease Sister     Heart disease Sister 65        CAD     FAMILY HISTORY: negative for Connective Tissue Disease  There are no end exam questions for this visit.    Medications:    Current Outpatient Medications:     albuterol (PROVENTIL/VENTOLIN HFA) 90 mcg/actuation inhaler, Inhale 4 puffs into the lungs every 6 (six) hours as needed for Wheezing., Disp: 1 each, Rfl: 1    ALPRAZolam (XANAX) 0.25 MG tablet, Take 0.25 mg by mouth as needed., Disp: , Rfl:     cholecalciferol, vitamin D3, (VITAMIN D3)  1,000 unit capsule, , Disp: , Rfl:     diazePAM (VALIUM) 5 MG tablet, Take 1 tablet (5 mg total) by mouth nightly. Do not swallow place in vagina, Disp: 30 tablet, Rfl: 3    doxycycline (MONODOX) 100 MG capsule, Take 1 capsule (100 mg total) by mouth 2 (two) times daily. for 10 days, Disp: 20 capsule, Rfl: 0    flavoxATE (URISPAS) 100 mg Tab, Take 1 tablet (100 mg total) by mouth 4 (four) times daily as needed., Disp: 120 tablet, Rfl: 3    gabapentin (NEURONTIN) 100 MG capsule, Take 1 capsule (100 mg total) by mouth every evening., Disp: 30 capsule, Rfl: 6    losartan (COZAAR) 50 MG tablet, TAKE 1 TABLET (50 MG TOTAL) BY MOUTH EVERY MORNING., Disp: 90 tablet, Rfl: 0    mirabegron (MYRBETRIQ) 50 mg Tb24, Take 1 tablet (50 mg total) by mouth once daily., Disp: 30 tablet, Rfl: 11    multivitamin capsule, Take 1 capsule by mouth., Disp: , Rfl:     omeprazole (PRILOSEC) 20 MG capsule, Take 1 capsule (20 mg total) by mouth once daily., Disp: 90 capsule, Rfl: 3    predniSONE (DELTASONE) 10 MG tablet, Take 30 mg daily X 3d, then 20 mg daily X 3d, then 10 mg daily X 3d, Disp: 18 tablet, Rfl: 0    promethazine-dextromethorphan (PROMETHAZINE-DM) 6.25-15 mg/5 mL Syrp, Take 5 mLs by mouth nightly as needed., Disp: 118 mL, Rfl: 0    rosuvastatin (CRESTOR) 40 MG Tab, Take 1 tablet (40 mg total) by mouth every evening., Disp: 90 tablet, Rfl: 3    sertraline (ZOLOFT) 50 MG tablet, Take 1 tablet (50 mg total) by mouth once daily., Disp: 90 tablet, Rfl: 3    topiramate (TOPAMAX) 50 MG tablet, Take 1 tablet by mouth 2 (two) times daily., Disp: , Rfl: 11    valACYclovir (VALTREX) 1000 MG tablet, Take 1 tablet by mouth as needed., Disp: , Rfl:         Objective:     Vitals:  Blood pressure (!) 142/80, weight 62.2 kg (137 lb 3.2 oz).    Physical Examination:   GEN:     wn/wd female in no apparent distress  SKIN:    no rashes, no sclerodactyly, no Raynaud's, no periungual erythema, no digital tip tapering, no nailbed  pitting  HEAD:   no alopecia, no scalp tenderness, no temporal artery tenderness or induration.  EYES:   no conjunctival pallor, no icterus  ENT:     no thrush, no mucosal dryness or ulcerations, adequate oral hygiene & dentition.  NECK:  supple x 6, no masses, no thyromegaly, no lymphadenopathy.  CV:        S1 and S2, RRR, no murmurs, gallop or rubs  CHEST: Normal respiratory effort;  normal breath sounds/no adventitious sounds. No signs of consolidation.  ABD:      non-tender and non-distended; soft; normal bowel sounds; no rebound, guarding, or tenderness. No hepatosplenomegaly.  Musculoskeletal:  Small Heberden's and Jessica's nodes are found bilaterally. No evidence of inflammatory arthritis is noted. The MCP joints are unaffected. There is full range of motion of all large joints without evidence of significant degenerative change. Muscle strength is 5/5×4 without atrophy. Range of motion of the back is full. Posture is normal. Gait is brisk and stable  EXTREM: no clubbing, cyanosis or edema. normal pulses. Alanna's - x2. Trigger-points reactive in 2 of 8 tested locations  NEURO:  grossly intact; motor/sensory WNL; no tremors  PSYCH:  normal mood, affect and behavior            Labs:   Lab Results   Component Value Date    WBC 6.09 03/07/2019    HGB 12.2 03/07/2019    HCT 39.1 03/07/2019    MCV 94 03/07/2019     03/07/2019   CMP@  Creatinine   Date Value Ref Range Status   03/20/2019 0.8 0.5 - 1.4 mg/dL Final     CRP   Date Value Ref Range Status   03/07/2019 11.7 (H) 0.0 - 8.2 mg/L Final     Rheumatoid Factor   Date Value Ref Range Status   03/12/2019 12.0 0.0 - 15.0 IU/mL Final         Radiology/Diagnostic Studies:    None    Assessment/Discussion/Plan:   73 y.o. female with mild, diffuse musculoskeletal pain without evidence of any inflammatory arthropathy or myopathy, in the presence of elevated acute phase reactants- not consistent with acute or chronic rheumatologic disease      PLAN:  She does  "have a bit of osteoarthritis and myofascial pain though I would hesitate to call this myofascial pain syndrome or fibromyalgia. The elevated acute phase reactants seem to be something quite chronic as I note from reviewing the records from 2014. The symptoms did not respond to high-dose steroids 5 years ago and are not responding to low-dose steroids now.  I'm going to get a SPEP for a better angle on inflammation, both acute and chronic. I will also repeat a CRP not an ESR given that she is currently on steroids.  I also note that the WAI and a RF/CCP were reported as negative. I have added an anti-SSA for completeness.    She does have a degree of clinical anxiety. I note that she is on Zoloft 50 mg nightly. I have generally found that dose is under 100 mg are suboptimal. I have asked her to discuss titrating upward with her primary provider. Additionally, the patient is concerned about her "bronchitis" Her lungs sound clear today though she is certainly coughing. I wonder if this could be related to her ARB medication as has sometimes been reported. This 2 by and asked her to discuss with Dr. Song.    RTC:  I will see her back again if blood testing suggests a rheumatologic process or if she is re-referred.            Electronically signed by Villa Guerra MD              "

## 2019-06-03 ENCOUNTER — PATIENT MESSAGE (OUTPATIENT)
Dept: FAMILY MEDICINE | Facility: CLINIC | Age: 74
End: 2019-06-03

## 2019-06-03 RX ORDER — AMLODIPINE BESYLATE 5 MG/1
5 TABLET ORAL DAILY
Qty: 30 TABLET | Refills: 11 | Status: SHIPPED | OUTPATIENT
Start: 2019-06-03 | End: 2020-06-02 | Stop reason: SDUPTHER

## 2019-06-03 RX ORDER — SERTRALINE HYDROCHLORIDE 100 MG/1
100 TABLET, FILM COATED ORAL DAILY
Qty: 30 TABLET | Refills: 11 | Status: SHIPPED | OUTPATIENT
Start: 2019-06-03 | End: 2020-06-02 | Stop reason: SDUPTHER

## 2019-06-04 ENCOUNTER — PATIENT MESSAGE (OUTPATIENT)
Dept: RHEUMATOLOGY | Facility: CLINIC | Age: 74
End: 2019-06-04

## 2019-06-04 ENCOUNTER — ANESTHESIA EVENT (OUTPATIENT)
Dept: SURGERY | Facility: AMBULARY SURGERY CENTER | Age: 74
End: 2019-06-04
Payer: MEDICARE

## 2019-06-04 LAB
ALBUMIN SERPL-MCNC: 3.8 G/DL (ref 2.9–4.4)
ALBUMIN/GLOB SERPL ELPH: 1.2 {RATIO} (ref 0.7–1.7)
ALPHA 1 GLOBULIN/PROTEIN TOTAL: 0.2 G/DL (ref 0–0.4)
ALPHA 2 GLOBULIN/PROTEIN TOTAL: 0.9 G/DL (ref 0.4–1)
B-GLOBULIN FLD ELPH-MCNC: 1.1 G/DL (ref 0.7–1.3)
GAMMA GLOB FLD ELPH-MCNC: 1 G/DL (ref 0.4–1.8)
GLOBULIN SER CALC-MCNC: 3.2 G/DL (ref 2.2–3.9)
Lab: NORMAL
M PROTEIN MFR UR ELPH: NORMAL G/DL
PROT SERPL-MCNC: 7 G/DL (ref 6–8.5)
VIT B1 BLD-MCNC: 69 UG/L (ref 38–122)

## 2019-06-04 NOTE — PROGRESS NOTES
Notify the patient that with the exception of a slightly elevated glucose, her labs were entirely normal.   Thanks

## 2019-06-05 ENCOUNTER — ANESTHESIA (OUTPATIENT)
Dept: SURGERY | Facility: AMBULARY SURGERY CENTER | Age: 74
End: 2019-06-05
Payer: MEDICARE

## 2019-06-05 ENCOUNTER — HOSPITAL ENCOUNTER (OUTPATIENT)
Facility: AMBULARY SURGERY CENTER | Age: 74
Discharge: HOME | End: 2019-06-05
Attending: OBSTETRICS & GYNECOLOGY | Admitting: OBSTETRICS & GYNECOLOGY
Payer: MEDICARE

## 2019-06-05 DIAGNOSIS — R35.0 URINARY FREQUENCY: ICD-10-CM

## 2019-06-05 PROCEDURE — 52260 PR CYSTOSCOPY,DIL BLADDER,GEN ANESTH: ICD-10-PCS | Mod: ,,, | Performed by: OBSTETRICS & GYNECOLOGY

## 2019-06-05 PROCEDURE — D9220A PRA ANESTHESIA: ICD-10-PCS | Mod: CRNA,,, | Performed by: NURSE ANESTHETIST, CERTIFIED REGISTERED

## 2019-06-05 PROCEDURE — 52260 CYSTOSCOPY AND TREATMENT: CPT | Mod: ,,, | Performed by: OBSTETRICS & GYNECOLOGY

## 2019-06-05 PROCEDURE — D9220A PRA ANESTHESIA: ICD-10-PCS | Mod: ANES,,, | Performed by: ANESTHESIOLOGY

## 2019-06-05 PROCEDURE — 52260 CYSTOSCOPY AND TREATMENT: CPT | Performed by: OBSTETRICS & GYNECOLOGY

## 2019-06-05 PROCEDURE — D9220A PRA ANESTHESIA: Mod: CRNA,,, | Performed by: NURSE ANESTHETIST, CERTIFIED REGISTERED

## 2019-06-05 PROCEDURE — D9220A PRA ANESTHESIA: Mod: ANES,,, | Performed by: ANESTHESIOLOGY

## 2019-06-05 RX ORDER — CIPROFLOXACIN 2 MG/ML
INJECTION, SOLUTION INTRAVENOUS
Status: DISCONTINUED
Start: 2019-06-05 | End: 2019-06-05 | Stop reason: HOSPADM

## 2019-06-05 RX ORDER — FENTANYL CITRATE 50 UG/ML
INJECTION, SOLUTION INTRAMUSCULAR; INTRAVENOUS
Status: DISCONTINUED | OUTPATIENT
Start: 2019-06-05 | End: 2019-06-05

## 2019-06-05 RX ORDER — LIDOCAINE HYDROCHLORIDE 10 MG/ML
0.5 INJECTION, SOLUTION EPIDURAL; INFILTRATION; INTRACAUDAL; PERINEURAL ONCE
Status: COMPLETED | OUTPATIENT
Start: 2019-06-05 | End: 2019-06-05

## 2019-06-05 RX ORDER — OXYCODONE HYDROCHLORIDE 5 MG/1
5 TABLET ORAL ONCE AS NEEDED
Status: DISCONTINUED | OUTPATIENT
Start: 2019-06-05 | End: 2019-06-05 | Stop reason: HOSPADM

## 2019-06-05 RX ORDER — PROPOFOL 10 MG/ML
VIAL (ML) INTRAVENOUS
Status: DISCONTINUED | OUTPATIENT
Start: 2019-06-05 | End: 2019-06-05

## 2019-06-05 RX ORDER — LIDOCAINE HYDROCHLORIDE 20 MG/ML
JELLY TOPICAL ONCE
Status: COMPLETED | OUTPATIENT
Start: 2019-06-05 | End: 2019-06-05

## 2019-06-05 RX ORDER — FENTANYL CITRATE 50 UG/ML
25 INJECTION, SOLUTION INTRAMUSCULAR; INTRAVENOUS EVERY 5 MIN PRN
Status: DISCONTINUED | OUTPATIENT
Start: 2019-06-05 | End: 2019-06-05 | Stop reason: HOSPADM

## 2019-06-05 RX ORDER — SODIUM CHLORIDE, SODIUM LACTATE, POTASSIUM CHLORIDE, CALCIUM CHLORIDE 600; 310; 30; 20 MG/100ML; MG/100ML; MG/100ML; MG/100ML
125 INJECTION, SOLUTION INTRAVENOUS CONTINUOUS
Status: DISCONTINUED | OUTPATIENT
Start: 2019-06-05 | End: 2019-06-05 | Stop reason: HOSPADM

## 2019-06-05 RX ORDER — SYRING-NEEDL,DISP,INSUL,0.3 ML 29 G X1/2"
296 SYRINGE, EMPTY DISPOSABLE MISCELLANEOUS ONCE
COMMUNITY
End: 2019-06-25

## 2019-06-05 RX ORDER — MIDAZOLAM HYDROCHLORIDE 1 MG/ML
INJECTION, SOLUTION INTRAMUSCULAR; INTRAVENOUS
Status: DISCONTINUED | OUTPATIENT
Start: 2019-06-05 | End: 2019-06-05

## 2019-06-05 RX ORDER — CIPROFLOXACIN 2 MG/ML
200 INJECTION, SOLUTION INTRAVENOUS
Status: COMPLETED | OUTPATIENT
Start: 2019-06-05 | End: 2019-06-05

## 2019-06-05 RX ORDER — WATER 1 ML/ML
IRRIGANT IRRIGATION
Status: DISCONTINUED | OUTPATIENT
Start: 2019-06-05 | End: 2019-06-05 | Stop reason: HOSPADM

## 2019-06-05 RX ORDER — LIDOCAINE HCL/PF 100 MG/5ML
SYRINGE (ML) INTRAVENOUS
Status: DISCONTINUED | OUTPATIENT
Start: 2019-06-05 | End: 2019-06-05

## 2019-06-05 RX ORDER — LOPERAMIDE HYDROCHLORIDE 2 MG/1
2 CAPSULE ORAL 4 TIMES DAILY PRN
COMMUNITY
End: 2019-06-25

## 2019-06-05 RX ORDER — SODIUM CHLORIDE 0.9 G/100ML
IRRIGANT IRRIGATION
Status: DISCONTINUED | OUTPATIENT
Start: 2019-06-05 | End: 2019-06-05 | Stop reason: HOSPADM

## 2019-06-05 RX ORDER — ONDANSETRON 2 MG/ML
4 INJECTION INTRAMUSCULAR; INTRAVENOUS ONCE AS NEEDED
Status: DISCONTINUED | OUTPATIENT
Start: 2019-06-05 | End: 2019-06-05 | Stop reason: HOSPADM

## 2019-06-05 RX ORDER — DEXAMETHASONE SODIUM PHOSPHATE 4 MG/ML
INJECTION, SOLUTION INTRA-ARTICULAR; INTRALESIONAL; INTRAMUSCULAR; INTRAVENOUS; SOFT TISSUE
Status: DISCONTINUED | OUTPATIENT
Start: 2019-06-05 | End: 2019-06-05

## 2019-06-05 RX ORDER — HYDROCODONE BITARTRATE AND ACETAMINOPHEN 5; 325 MG/1; MG/1
1 TABLET ORAL EVERY 4 HOURS PRN
Status: CANCELLED | OUTPATIENT
Start: 2019-06-05

## 2019-06-05 RX ORDER — SODIUM CHLORIDE, SODIUM LACTATE, POTASSIUM CHLORIDE, CALCIUM CHLORIDE 600; 310; 30; 20 MG/100ML; MG/100ML; MG/100ML; MG/100ML
INJECTION, SOLUTION INTRAVENOUS CONTINUOUS
Status: DISCONTINUED | OUTPATIENT
Start: 2019-06-05 | End: 2019-06-05 | Stop reason: HOSPADM

## 2019-06-05 RX ORDER — LIDOCAINE HYDROCHLORIDE 10 MG/ML
1 INJECTION, SOLUTION EPIDURAL; INFILTRATION; INTRACAUDAL; PERINEURAL ONCE
Status: DISCONTINUED | OUTPATIENT
Start: 2019-06-05 | End: 2019-06-05 | Stop reason: HOSPADM

## 2019-06-05 RX ORDER — ONDANSETRON 2 MG/ML
INJECTION INTRAMUSCULAR; INTRAVENOUS
Status: DISCONTINUED | OUTPATIENT
Start: 2019-06-05 | End: 2019-06-05

## 2019-06-05 RX ADMIN — DEXAMETHASONE SODIUM PHOSPHATE 4 MG: 4 INJECTION, SOLUTION INTRA-ARTICULAR; INTRALESIONAL; INTRAMUSCULAR; INTRAVENOUS; SOFT TISSUE at 07:06

## 2019-06-05 RX ADMIN — LIDOCAINE HYDROCHLORIDE 5 ML: 20 JELLY TOPICAL at 07:06

## 2019-06-05 RX ADMIN — LIDOCAINE HYDROCHLORIDE 0.2 ML: 10 INJECTION, SOLUTION EPIDURAL; INFILTRATION; INTRACAUDAL; PERINEURAL at 06:06

## 2019-06-05 RX ADMIN — Medication 75 MG: at 06:06

## 2019-06-05 RX ADMIN — ONDANSETRON 4 MG: 2 INJECTION INTRAMUSCULAR; INTRAVENOUS at 07:06

## 2019-06-05 RX ADMIN — Medication 120 MG: at 06:06

## 2019-06-05 RX ADMIN — SODIUM CHLORIDE, SODIUM LACTATE, POTASSIUM CHLORIDE, CALCIUM CHLORIDE: 600; 310; 30; 20 INJECTION, SOLUTION INTRAVENOUS at 06:06

## 2019-06-05 RX ADMIN — MIDAZOLAM HYDROCHLORIDE 2 MG: 1 INJECTION, SOLUTION INTRAMUSCULAR; INTRAVENOUS at 06:06

## 2019-06-05 RX ADMIN — FENTANYL CITRATE 25 MCG: 50 INJECTION, SOLUTION INTRAMUSCULAR; INTRAVENOUS at 06:06

## 2019-06-05 RX ADMIN — CIPROFLOXACIN 400 MG: 2 INJECTION, SOLUTION INTRAVENOUS at 07:06

## 2019-06-05 NOTE — DISCHARGE INSTRUCTIONS
Before leaving, please make sure you have all your personal belongings such as glasses, purses, wallets, keys, cell phones, jewelry, jackets etc      Anesthesia information    Anesthesia Safety      You have been given medicine  to sedate you during your procedure today. This may have included both a pain medicine and sleeping medicine. Most of the effects have worn off; however, you may continue to have some drowsiness for the next  24 hours. Anesthesia and pain medicines can cause nausea, sleepiness, dizziness and  constipation.    HOME CARE:  1) For the next EIGHT HOURS, you should be watched by a responsible adult to look for any worsening of your condition.  2) DO NOT DRINK any ALCOHOL for the next 24 HOURS.  3) DO NOT DRIVE or operate dangerous machinery during the next 24 HOURS.  FOLLOW UP with your doctor or this facility if you are not alert and back to your usual level of activity within 24 hrs.  GET PROMPT MEDICAL ATTENTION if any of the following occur:  -- Increased drowsiness  -- Increased weakness or dizziness  -- Repeated vomiting  -- If you cannot be awakened         After the procedure    · Drink plenty of fluids.  · You may have burning or light bleeding when you urinate--this is normal.  · Medications may be prescribed to ease any discomfort or prevent infection. Take these as directed.  · Call your doctor if you have heavy bleeding or blood clots, burning that lasts more than a day, a fever over 100°F  (38° C), or trouble urinating.

## 2019-06-05 NOTE — DISCHARGE SUMMARY
Patient tolerated the procedure well and had uncomplicated post op criteria and met criteria for discharge

## 2019-06-05 NOTE — ANESTHESIA POSTPROCEDURE EVALUATION
Anesthesia Post Evaluation    Patient: Veronique Johnson    Procedure(s) Performed: Procedure(s) (LRB):  CYSTOSCOPY, WITH BLADDER HYDRODISTENSION (N/A)    Final Anesthesia Type: general  Patient location during evaluation: PACU  Patient participation: Yes- Able to Participate  Level of consciousness: awake and alert  Post-procedure vital signs: reviewed and stable  Pain management: adequate  Airway patency: patent  PONV status at discharge: No PONV  Anesthetic complications: no      Cardiovascular status: hemodynamically stable  Respiratory status: unassisted and room air  Hydration status: euvolemic  Follow-up not needed.          Vitals Value Taken Time   /63 6/5/2019  8:15 AM   Temp 36.7 °C (98 °F) 6/5/2019  8:30 AM   Pulse 72 6/5/2019  8:30 AM   Resp 20 6/5/2019  8:30 AM   SpO2 96 % 6/5/2019  8:15 AM         Event Time     Out of Recovery 08:30:00          Pain/Haylie Score: Haylie Score: 10 (6/5/2019  8:30 AM)

## 2019-06-05 NOTE — OP NOTE
Date of Surgery 6/5/19       Title of Operation:  1) cystourethroscopy  2) hydrodistension of bladder      Indications for Surgery:  Chronic pelvic pain possible IC  Preoperative Diagnosis:  Interstial Cystitis    Postoperative Diagnosis:  same    Anesthesia:  General endotracheal anesthesia.  Additionally, 10 mL of 2% viscous lidocaine were injected transurethrally into the bladder for local effect at the close of the case.    Specimen (Bacteriological, Pathological or other):  None.    Prosthetic Device/Implant:  None.    Surgeons Narrative:  Surgeon:  MD Micheal    Assistants: none    IV Fluids:  crystalloid mL.    Urine Output:  100 mL per in/out cath at beginning of case.    Estimated Blood Loss:  minimal    Complications:  None.    Sponge, Lap Count:  Verified correct x 2.    Findings:    1)  Exam under anesthesia:  Normal external female genitalia. There were no lesions or lacerations.  The uterus is normal size, anteflexed.  There is minimal decensus of the cervix.  No obvious masses palpated.    2)  On cystoscopy:   Before start of the case, 100 ml of urine were drained from the bladder with a straight catheter.  Using a 70 degree cystoscope, the bladder was then instilled with 300 ml normal saline, and a systematic survey of the bladder was completed from the dome to the base to the urethrovesical junction.  During this survey, most of the bladder mucosa was normal.   Both ureteral orfices were visualized and noted to have good efflux bilaterally.   We then continued to fill the bladder to 250 mL.  After sustaining this 250 mL fill for 5 minutes, we emptied the entire contents of the bladder (300 mL), at which point there was no abnormality seen.  The same bladder survey was repeated after filling again to 150 mL.  quadrants of the  bladder x 360 degrees.  There were not no other obvious lesions of masses noted.   After completion of this final survey, a vaginal finger was used hold pressure against the  proximal urethra, and urethroscopy was performed during extraction of the cystoscope.  The urethra appeared Normal, without obvious lesion, mass, or dicerticulum.  The bladder was then drained, with the volume of this contents measuring 300 mL.    Description of the Procedure:    The patient was identified in the preoperative area where consent was confirmed, and she was taken into the operating room where general endotracheal anesthesia was obtained.  She was positioned in candy cane stirrups in high lithotomy position.  Care was taken to avoid joint hyperflexion or hyperextension, and all extremity surfaces were carefully padded so as to minimize the risk of neurologic injury. Intravenous antibiotics were administered preoperatively.  Sequential compression devices as well as VINCENT hose were applied to the patient's lower extremities preoperatively.  A surgical time-out was performed where the patient was identified and procedures confirmed.  An exam under anesthesia was performed with findings as described above.  The patient's perineum and vagina were sterilely prepped and draped.    The procedure commenced with a cystoscopic survey using a 70 degree cystoscope.  The bladder was instilled with 200 mL of normal saline, after which a systematic survey of the bladder was completed.  Please see above findings for details regarding this survey.  At this point, we continued to fill the bladder slowly to 250 mL, when pressure equivalent to approximately 80 cm/H20 had been reached.  We stopped filling, removed the cystoscope, and we allowed the 250 mL of cystoscopic fluid to remain in the bladder for 5 minutes.  We then performed straight catheterization of the bladder, draining 300 ml total.  At this point, we refilled the bladder to 200 mL using the cystoscope, and repeated a systematic survey of the bladder with findings as noted above.  Urethroscopy was also performed, on extraction of the cystoscope, with findings as  noted above. On completion of the second suvey, we drained the patient's bladder, removing 250 mL.  Finally, we injected 10 mL of 2% lidocaine jelly transurethrally for anesthetic effect.    The patient's legs were taken out of lithotomy, and she was returned to supine position.  All drapes were removed, and she was cleaned.  At the end of the case all counts were correct x 2.  She was awakened from anesthesia, extubated, and taken to the PACU in stable condition.  She tolerated the procedure well.    I was scrubbed and present for the entire procedure. I have reviewed the dictation and agree with the report.

## 2019-06-05 NOTE — PLAN OF CARE
Stable, states ready to go home, foster po fluids, denies pain, voided, ambulated to car with RN to

## 2019-06-05 NOTE — ANESTHESIA PREPROCEDURE EVALUATION
06/05/2019  Veronique Johnson is a 73 y.o., female.    Anesthesia Evaluation    I have reviewed the Patient Summary Reports.    I have reviewed the Nursing Notes.   I have reviewed the Medications.     Review of Systems  Anesthesia Hx:  No problems with previous Anesthesia    Social:  Former Smoker    Hematology/Oncology:         -- Anemia:   Cardiovascular:   Hypertension    Pulmonary:   COPD    Hepatic/GI:   GERD    Neurological:   Headaches    Psych:   anxiety          Physical Exam  General:  Well nourished    Airway/Jaw/Neck:  Airway Findings: Mouth Opening: Normal Tongue: Normal  General Airway Assessment: Adult  Mallampati: I  TM Distance: Normal, at least 6 cm        Eyes/Ears/Nose:  EYES/EARS/NOSE FINDINGS: Normal   Dental:  Dental Findings: In tact   Chest/Lungs:  Chest/Lungs Findings: Clear to auscultation, Normal Respiratory Rate     Heart/Vascular:  Heart Findings: Rate: Normal  Rhythm: Regular Rhythm        Mental Status:  Mental Status Findings:  Cooperative, Alert and Oriented         Anesthesia Plan  Type of Anesthesia, risks & benefits discussed:  Anesthesia Type:  general  Patient's Preference:   Intra-op Monitoring Plan: standard ASA monitors  Intra-op Monitoring Plan Comments:   Post Op Pain Control Plan: IV/PO Opioids PRN  Post Op Pain Control Plan Comments:   Induction:   IV  Beta Blocker:  Patient is not currently on a Beta-Blocker (No further documentation required).       Informed Consent: Patient understands risks and agrees with Anesthesia plan.  Questions answered. Anesthesia consent signed with patient.  ASA Score: 2     Day of Surgery Review of History & Physical: I have interviewed and examined the patient. I have reviewed the patient's H&P dated:    H&P update referred to the surgeon.         Ready For Surgery From Anesthesia Perspective.

## 2019-06-05 NOTE — BRIEF OP NOTE
[facility]  Brief Operative Note     SUMMARY     Surgery Date: 6/5/2019     Surgeon(s) and Role:     * Marko Burton MD - Primary    Assisting Surgeon: None    Pre-op Diagnosis:  Urinary frequency [R35.0]  Pelvic pain [R10.2]  High-tone pelvic floor dysfunction [N94.89]  Pelvic pressure in female [R10.2]    Post-op Diagnosis:  Post-Op Diagnosis Codes:     * Urinary frequency [R35.0]     * Pelvic pain [R10.2]     * High-tone pelvic floor dysfunction [N94.89]     * Pelvic pressure in female [R10.2]    Procedure(s) (LRB):  CYSTOSCOPY, WITH BLADDER HYDRODISTENSION (N/A)    Anesthesia: General/MAC    Description of the findings of the procedure:distention to 250 cc revealed trabeculation.  Normal urothelium bilateral equal efflux from both ureteral orifices no ulcers seen  Findings/Key Components: as above    Estimated Blood Loss: * No values recorded between 6/5/2019  7:16 AM and 6/5/2019  7:30 AM *         Specimens:   Specimen (12h ago, onward)    None          Discharge Note    SUMMARY     Admit Date: 6/5/2019    Discharge Date and Time:  06/05/2019 7:34 AM    Hospital Course (synopsis of major diagnoses, care, treatment, and services provided during the course of the hospital stay): uncomplicated procedure which patient tolerated well     Final Diagnosis: Post-Op Diagnosis Codes:     * Urinary frequency [R35.0]     * Pelvic pain [R10.2]     * High-tone pelvic floor dysfunction [N94.89]     * Pelvic pressure in female [R10.2]    Disposition: Home or Self Care    Follow Up/Patient Instructions:     Medications:  Reconciled Home Medications:      Medication List      CONTINUE taking these medications    albuterol 90 mcg/actuation inhaler  Commonly known as:  PROVENTIL/VENTOLIN HFA  Inhale 4 puffs into the lungs every 6 (six) hours as needed for Wheezing.     ALPRAZolam 0.25 MG tablet  Commonly known as:  XANAX  Take 0.25 mg by mouth as needed.     amLODIPine 5 MG tablet  Commonly known as:   NORVASC  Take 1 tablet (5 mg total) by mouth once daily.     diazePAM 5 MG tablet  Commonly known as:  VALIUM  Take 1 tablet (5 mg total) by mouth nightly. Do not swallow place in vagina     doxycycline 100 MG capsule  Commonly known as:  MONODOX  Take 1 capsule (100 mg total) by mouth 2 (two) times daily. for 10 days     flavoxATE 100 mg Tab  Commonly known as:  URISPAS  Take 1 tablet (100 mg total) by mouth 4 (four) times daily as needed.     gabapentin 100 MG capsule  Commonly known as:  NEURONTIN  Take 1 capsule (100 mg total) by mouth every evening.     loperamide 2 mg capsule  Commonly known as:  IMODIUM  Take 2 mg by mouth 4 (four) times daily as needed for Diarrhea.     magnesium citrate solution  Take 296 mLs by mouth once.     mirabegron 50 mg Tb24  Commonly known as:  MYRBETRIQ  Take 1 tablet (50 mg total) by mouth once daily.     multivitamin capsule  Take 1 capsule by mouth.     omeprazole 20 MG capsule  Commonly known as:  PRILOSEC  Take 1 capsule (20 mg total) by mouth once daily.     predniSONE 10 MG tablet  Commonly known as:  DELTASONE  Take 30 mg daily X 3d, then 20 mg daily X 3d, then 10 mg daily X 3d     rosuvastatin 40 MG Tab  Commonly known as:  CRESTOR  Take 1 tablet (40 mg total) by mouth every evening.     sertraline 100 MG tablet  Commonly known as:  ZOLOFT  Take 1 tablet (100 mg total) by mouth once daily.     topiramate 50 MG tablet  Commonly known as:  TOPAMAX  Take 1 tablet by mouth 2 (two) times daily.     valACYclovir 1000 MG tablet  Commonly known as:  VALTREX  Take 1 tablet by mouth as needed.     VITAMIN D3 1,000 unit capsule  Generic drug:  cholecalciferol (vitamin D3)          No discharge procedures on file.

## 2019-06-05 NOTE — INTERVAL H&P NOTE
The patient has been examined and the H&P has been reviewed:    I concur with the findings and no changes have occurred since H&P was written.    Anesthesia/Surgery risks, benefits and alternative options discussed and understood by patient/family.          Active Hospital Problems    Diagnosis  POA    Urinary frequency [R35.0]  Yes      Resolved Hospital Problems   No resolved problems to display.

## 2019-06-05 NOTE — TRANSFER OF CARE
"Anesthesia Transfer of Care Note    Patient: Veronique Johnson    Procedure(s) Performed: Procedure(s) (LRB):  CYSTOSCOPY, WITH BLADDER HYDRODISTENSION (N/A)    Patient location: PACU    Anesthesia Type: general    Transport from OR: Transported from OR on 2-3 L/min O2 by NC with adequate spontaneous ventilation    Post pain: adequate analgesia    Post assessment: no apparent anesthetic complications    Post vital signs: stable    Level of consciousness: awake    Nausea/Vomiting: no nausea/vomiting    Complications: none    Transfer of care protocol was followed      Last vitals:   Visit Vitals  BP (!) 156/71   Pulse 68   Temp 36.9 °C (98.4 °F) (Skin)   Resp 20   Ht 5' 1" (1.549 m)   Wt 62.1 kg (137 lb)   SpO2 96%   Breastfeeding? No   BMI 25.89 kg/m²     "

## 2019-06-07 VITALS
HEART RATE: 72 BPM | TEMPERATURE: 98 F | OXYGEN SATURATION: 96 % | BODY MASS INDEX: 25.86 KG/M2 | SYSTOLIC BLOOD PRESSURE: 144 MMHG | HEIGHT: 61 IN | RESPIRATION RATE: 20 BRPM | WEIGHT: 137 LBS | DIASTOLIC BLOOD PRESSURE: 63 MMHG

## 2019-06-25 ENCOUNTER — OFFICE VISIT (OUTPATIENT)
Dept: UROGYNECOLOGY | Facility: CLINIC | Age: 74
End: 2019-06-25
Payer: MEDICARE

## 2019-06-25 VITALS
HEART RATE: 76 BPM | HEIGHT: 61 IN | SYSTOLIC BLOOD PRESSURE: 131 MMHG | BODY MASS INDEX: 26.39 KG/M2 | DIASTOLIC BLOOD PRESSURE: 71 MMHG | WEIGHT: 139.75 LBS

## 2019-06-25 DIAGNOSIS — N81.2 CYSTOCELE AND RECTOCELE WITH INCOMPLETE UTEROVAGINAL PROLAPSE: ICD-10-CM

## 2019-06-25 DIAGNOSIS — R10.2 PELVIC PAIN: ICD-10-CM

## 2019-06-25 DIAGNOSIS — R35.0 URINARY FREQUENCY: Primary | ICD-10-CM

## 2019-06-25 LAB
BILIRUB SERPL-MCNC: NORMAL MG/DL
BLOOD URINE, POC: NORMAL
COLOR, POC UA: YELLOW
GLUCOSE UR QL STRIP: NORMAL
KETONES UR QL STRIP: NORMAL
LEUKOCYTE ESTERASE URINE, POC: NORMAL
NITRITE, POC UA: NORMAL
PH, POC UA: 7
PROTEIN, POC: NORMAL
SPECIFIC GRAVITY, POC UA: 1.01
UROBILINOGEN, POC UA: NORMAL

## 2019-06-25 PROCEDURE — 81002 URINALYSIS NONAUTO W/O SCOPE: CPT | Mod: PBBFAC,PO | Performed by: NURSE PRACTITIONER

## 2019-06-25 PROCEDURE — 99999 PR PBB SHADOW E&M-EST. PATIENT-LVL IV: ICD-10-PCS | Mod: PBBFAC,,, | Performed by: NURSE PRACTITIONER

## 2019-06-25 PROCEDURE — 99024 PR POST-OP FOLLOW-UP VISIT: ICD-10-PCS | Mod: POP,,, | Performed by: NURSE PRACTITIONER

## 2019-06-25 PROCEDURE — 99214 OFFICE O/P EST MOD 30 MIN: CPT | Mod: PBBFAC,PO | Performed by: NURSE PRACTITIONER

## 2019-06-25 PROCEDURE — 99024 POSTOP FOLLOW-UP VISIT: CPT | Mod: POP,,, | Performed by: NURSE PRACTITIONER

## 2019-06-25 PROCEDURE — 99999 PR PBB SHADOW E&M-EST. PATIENT-LVL IV: CPT | Mod: PBBFAC,,, | Performed by: NURSE PRACTITIONER

## 2019-06-25 RX ORDER — DIAZEPAM 5 MG/1
TABLET ORAL
Refills: 3 | COMMUNITY
Start: 2019-06-08 | End: 2019-06-26 | Stop reason: DRUGHIGH

## 2019-06-25 NOTE — PROGRESS NOTES
"Subjective:       Patient ID: Veronique Johnson is a 73 y.o. female.    Chief Complaint: 2 week post op      Veronique Johnson is a 73 y.o. female who is approx. 2 weeks post cystourethroscopy and hydrodistension of the bladder on 06/05/19 with Dr. Burton.  She states that Dr. Burton told her that her bladder looked good.  She is not having any bladder pain, but she continues to have pelvic pain especially on the left side.  She was using the vaginal valium and it was helping, but for the past week or so it is no longer helping her.  The pain is worse in the morning when she gets up.  She feels that the gabapentin has helped some in regards to the bladder pain, but the "tendon" pain is getting worse.  She is wondering if she needs to continue with the  myrbetriq and urispas at this time if her bladder looks ok.  She denies any other acute complaints/concerns and is wanting to know what the next step will be.      Review of Systems   Constitutional: Negative for activity change, fever and unexpected weight change.   HENT: Negative for hearing loss.    Eyes: Negative for visual disturbance.   Respiratory: Negative for shortness of breath and wheezing.    Cardiovascular: Negative for chest pain, palpitations and leg swelling.   Gastrointestinal: Negative for abdominal pain, constipation and diarrhea.   Genitourinary: Positive for frequency. Negative for dyspareunia, dysuria, urgency, vaginal bleeding and vaginal discharge.   Musculoskeletal: Negative for gait problem and neck pain.   Skin: Negative for rash and wound.   Allergic/Immunologic: Negative for immunocompromised state.   Neurological: Negative for tremors, speech difficulty and weakness.   Hematological: Does not bruise/bleed easily.   Psychiatric/Behavioral: Negative for agitation and confusion.       Objective:      Physical Exam   Constitutional: She is oriented to person, place, and time. She appears well-developed and well-nourished. No distress. "   HENT:   Head: Normocephalic and atraumatic.   Neck: Neck supple. No thyromegaly present.   Pulmonary/Chest: Effort normal. No respiratory distress.   Abdominal: Soft. There is no tenderness. No hernia.   Musculoskeletal: Normal range of motion.   Neurological: She is alert and oriented to person, place, and time.   Skin: Skin is warm and dry. No rash noted.   Psychiatric: She has a normal mood and affect. Her behavior is normal.       Assessment:       1. Urinary frequency    2. Cystocele and rectocele with incomplete uterovaginal prolapse    3. Pelvic pain        Plan:       Urinary frequency- monitor  -     POCT URINE DIPSTICK WITHOUT MICROSCOPE    Cystocele and rectocele with incomplete uterovaginal prolapse- NP will review with Dr. Burton for further treatment options    Pelvic pain- as noted above    RTC reg. Sched. appt with Dr. Burton

## 2019-06-26 ENCOUNTER — PATIENT MESSAGE (OUTPATIENT)
Dept: UROGYNECOLOGY | Facility: CLINIC | Age: 74
End: 2019-06-26

## 2019-06-26 ENCOUNTER — TELEPHONE (OUTPATIENT)
Dept: UROGYNECOLOGY | Facility: CLINIC | Age: 74
End: 2019-06-26

## 2019-06-26 DIAGNOSIS — R10.2 PELVIC PAIN: Primary | ICD-10-CM

## 2019-06-26 RX ORDER — DIAZEPAM 10 MG/1
TABLET ORAL
Qty: 30 TABLET | Refills: 0 | Status: SHIPPED | OUTPATIENT
Start: 2019-06-26 | End: 2019-07-30 | Stop reason: SDUPTHER

## 2019-06-26 RX ORDER — HYDROXYZINE PAMOATE 25 MG/1
25 CAPSULE ORAL NIGHTLY
Qty: 30 CAPSULE | Refills: 2 | Status: SHIPPED | OUTPATIENT
Start: 2019-06-26 | End: 2019-07-26

## 2019-06-26 NOTE — TELEPHONE ENCOUNTER
Please let the pt know that I spoke with Dr. Burton.  He would like to do the following: continue with the myrbetriq 50 mg.  She can stop the urispas, but he would like her to start Uribel TID.  I have sent this in.  He also wants to add hydroxyzine 25 mg at night.  He is increasing the vaginal valium to 10 mg.  He would also like her to do pelvic floor PT with dynamic.  He would like to see her back around mid august instead of the July appointment to give this all time to work.  I have sent in the medications.  If she has any further questions/concerns, please don't hesitate to ask.

## 2019-06-26 NOTE — TELEPHONE ENCOUNTER
----- Message from Sherri Stroud sent at 6/26/2019 12:39 PM CDT -----  ..Type:  Pharmacy Calling to Clarify an RX    Name of Caller:  Sarah  Pharmacy Name: [facility]   Prescription Name:  diazePAM (VALIUM) 10 MG Tab  What do they need to clarify?:  Dosage instruction  Best Call Back Number:    Additional Information:  Pharmacy needs clarification on dosage instructions, Need to know if medication should be inserted vaginally  Please advise  Thanks

## 2019-07-01 ENCOUNTER — PATIENT OUTREACH (OUTPATIENT)
Dept: ADMINISTRATIVE | Facility: HOSPITAL | Age: 74
End: 2019-07-01

## 2019-07-15 ENCOUNTER — OFFICE VISIT (OUTPATIENT)
Dept: FAMILY MEDICINE | Facility: CLINIC | Age: 74
End: 2019-07-15
Payer: MEDICARE

## 2019-07-15 VITALS
WEIGHT: 133.19 LBS | HEART RATE: 95 BPM | SYSTOLIC BLOOD PRESSURE: 136 MMHG | BODY MASS INDEX: 25.14 KG/M2 | HEIGHT: 61 IN | OXYGEN SATURATION: 98 % | DIASTOLIC BLOOD PRESSURE: 66 MMHG

## 2019-07-15 DIAGNOSIS — N81.2 CYSTOCELE AND RECTOCELE WITH INCOMPLETE UTEROVAGINAL PROLAPSE: ICD-10-CM

## 2019-07-15 DIAGNOSIS — F41.9 ANXIETY: ICD-10-CM

## 2019-07-15 DIAGNOSIS — G35 MS (MULTIPLE SCLEROSIS): ICD-10-CM

## 2019-07-15 DIAGNOSIS — I10 ESSENTIAL HYPERTENSION: Primary | ICD-10-CM

## 2019-07-15 PROCEDURE — 99213 PR OFFICE/OUTPT VISIT, EST, LEVL III, 20-29 MIN: ICD-10-PCS | Mod: S$PBB,,, | Performed by: FAMILY MEDICINE

## 2019-07-15 PROCEDURE — 99214 OFFICE O/P EST MOD 30 MIN: CPT | Mod: PBBFAC,PO | Performed by: FAMILY MEDICINE

## 2019-07-15 PROCEDURE — 99213 OFFICE O/P EST LOW 20 MIN: CPT | Mod: S$PBB,,, | Performed by: FAMILY MEDICINE

## 2019-07-15 PROCEDURE — 99999 PR PBB SHADOW E&M-EST. PATIENT-LVL IV: ICD-10-PCS | Mod: PBBFAC,,, | Performed by: FAMILY MEDICINE

## 2019-07-15 PROCEDURE — 99999 PR PBB SHADOW E&M-EST. PATIENT-LVL IV: CPT | Mod: PBBFAC,,, | Performed by: FAMILY MEDICINE

## 2019-07-15 RX ORDER — ALPRAZOLAM 0.25 MG/1
0.25 TABLET ORAL NIGHTLY PRN
Qty: 30 TABLET | Refills: 2 | Status: SHIPPED | OUTPATIENT
Start: 2019-07-15 | End: 2019-12-11 | Stop reason: SDUPTHER

## 2019-07-15 RX ORDER — HYDROXYZINE HYDROCHLORIDE 25 MG/1
TABLET, FILM COATED ORAL
Refills: 2 | COMMUNITY
Start: 2019-06-26 | End: 2019-07-15

## 2019-07-18 NOTE — PROGRESS NOTES
Subjective:       Patient ID: Veronique Johnson is a 73 y.o. female.    Chief Complaint: Follow-up    HPI     The patient is coming here today for a follow-up visit, she start treatment for cystocele, the patient is currently seen the urogynecologist.  She is doing well in that regard.  She also complains of anxiety and needs a prescription for alprazolam, she does not take the medication every day and only as needed.  The patient has multiple sclerosis, she has not been seen by neurologist for a while, she wants to get a referral to see a neurologist now.  The patient is coming here today also follow-up on hypertension, she is doing well in that regard, she is taking her medications as directed, denies any side effects of the medication.    Past medical history, past social history was reviewed discussed with the patient.    Review of Systems   Constitutional: Negative for activity change and unexpected weight change.   HENT: Negative for hearing loss and rhinorrhea.    Eyes: Negative for discharge and visual disturbance.   Respiratory: Negative for chest tightness and wheezing.    Cardiovascular: Negative for chest pain and palpitations.   Gastrointestinal: Negative for blood in stool, constipation and diarrhea.   Endocrine: Negative for polydipsia and polyuria.   Genitourinary: Negative for difficulty urinating, dysuria, hematuria and menstrual problem.   Musculoskeletal: Negative for arthralgias, joint swelling and neck pain.   Neurological: Negative for weakness and headaches.   Psychiatric/Behavioral: Negative for confusion and dysphoric mood. The patient is nervous/anxious.        Objective:      Physical Exam   Constitutional: She appears well-developed and well-nourished. No distress.   HENT:   Head: Normocephalic and atraumatic.   Right Ear: External ear normal.   Left Ear: External ear normal.   Mouth/Throat: Oropharynx is clear and moist. No oropharyngeal exudate.   Neck: Neck supple.   Cardiovascular:  Normal rate, regular rhythm and normal heart sounds.   No murmur heard.  Pulmonary/Chest: Effort normal and breath sounds normal. No respiratory distress. She has no wheezes.   Musculoskeletal: She exhibits no tenderness or deformity.   Neurological: No cranial nerve deficit.   Skin: She is not diaphoretic. No erythema. No pallor.   Psychiatric: She has a normal mood and affect. Her behavior is normal. Judgment and thought content normal.   Nursing note and vitals reviewed.      Assessment:       1. Essential hypertension    2. Anxiety    3. Cystocele and rectocele with incomplete uterovaginal prolapse    4. MS (multiple sclerosis)        Plan:       Essential hypertension:  Stable    Anxiety:  Worsening  -     ALPRAZolam (XANAX) 0.25 MG tablet; Take 1 tablet (0.25 mg total) by mouth nightly as needed.  Dispense: 30 tablet; Refill: 2    Cystocele and rectocele with incomplete uterovaginal prolapse:  Stable    MS (multiple sclerosis):  Stable  -     Cancel: Ambulatory referral to Neurology  -     Ambulatory referral to Neurology    Will refer the patient to the neurologist secondary to multiple sclerosis, symptoms are stable but the patient is to establish with a neurologist secondary to the diagnosis.  [place] prescription monitoring program was checked and okay Xanax 0.25 mg 1 tablet as needed was prescribed.Patient agreed with assessment and plan. Patient verbalized understanding.

## 2019-07-29 ENCOUNTER — PATIENT MESSAGE (OUTPATIENT)
Dept: UROGYNECOLOGY | Facility: CLINIC | Age: 74
End: 2019-07-29

## 2019-07-30 RX ORDER — DIAZEPAM 10 MG/1
TABLET ORAL
Qty: 30 TABLET | Refills: 0 | Status: SHIPPED | OUTPATIENT
Start: 2019-07-30 | End: 2019-08-29 | Stop reason: SDUPTHER

## 2019-07-30 NOTE — TELEPHONE ENCOUNTER
Please advise valium 10 mg nightly was given to pt only had 30 no refills from Dr bermudez (phone note 6/26)

## 2019-08-07 ENCOUNTER — OFFICE VISIT (OUTPATIENT)
Dept: UROGYNECOLOGY | Facility: CLINIC | Age: 74
End: 2019-08-07
Payer: MEDICARE

## 2019-08-07 VITALS
BODY MASS INDEX: 23.85 KG/M2 | HEART RATE: 94 BPM | WEIGHT: 126.31 LBS | SYSTOLIC BLOOD PRESSURE: 130 MMHG | DIASTOLIC BLOOD PRESSURE: 81 MMHG | HEIGHT: 61 IN

## 2019-08-07 DIAGNOSIS — R10.2 PELVIC PAIN: ICD-10-CM

## 2019-08-07 DIAGNOSIS — N30.30 TRIGONITIS WITHOUT HEMATURIA: Primary | ICD-10-CM

## 2019-08-07 PROCEDURE — 99999 PR PBB SHADOW E&M-EST. PATIENT-LVL IV: CPT | Mod: PBBFAC,,, | Performed by: OBSTETRICS & GYNECOLOGY

## 2019-08-07 PROCEDURE — 99213 OFFICE O/P EST LOW 20 MIN: CPT | Mod: S$PBB,,, | Performed by: OBSTETRICS & GYNECOLOGY

## 2019-08-07 PROCEDURE — 99999 PR PBB SHADOW E&M-EST. PATIENT-LVL IV: ICD-10-PCS | Mod: PBBFAC,,, | Performed by: OBSTETRICS & GYNECOLOGY

## 2019-08-07 PROCEDURE — 99213 PR OFFICE/OUTPT VISIT, EST, LEVL III, 20-29 MIN: ICD-10-PCS | Mod: S$PBB,,, | Performed by: OBSTETRICS & GYNECOLOGY

## 2019-08-07 PROCEDURE — 99214 OFFICE O/P EST MOD 30 MIN: CPT | Mod: PBBFAC,PO | Performed by: OBSTETRICS & GYNECOLOGY

## 2019-08-07 RX ORDER — HYOSCYAMINE SULFATE 0.12 MG/1
TABLET SUBLINGUAL
Refills: 5 | COMMUNITY
Start: 2019-07-29 | End: 2020-01-07

## 2019-08-07 RX ORDER — HYDROXYZINE HYDROCHLORIDE 25 MG/1
TABLET, FILM COATED ORAL
COMMUNITY
Start: 2019-07-29 | End: 2020-01-07

## 2019-08-07 NOTE — PROGRESS NOTES
Subjective:      Patient ID: Veronique Johnson is a 73 y.o. female.    Chief Complaint:  6 week post op      History of Present Illness  Patient returns stating she is feeling much better has only had 2 occasions of discomfort 1 during physical therapy and then another during intercourse otherwise patient states all issues resolved patient questions regarding medication at this point I recommended that we should answer that question after examination          Past Medical History:   Diagnosis Date    Anemia     Anxiety     Chronic bronchitis with COPD (chronic obstructive pulmonary disease)     Esophageal spasm     Essential tremor     GERD (gastroesophageal reflux disease)     Headache     High cholesterol     Hypertension     IBS (irritable bowel syndrome)     Meniere disease     Multiple sclerosis     Muscle spasm        Past Surgical History:   Procedure Laterality Date    CATARACT EXTRACTION, BILATERAL  2006    CYSTOSCOPY, WITH BLADDER HYDRODISTENSION N/A 6/5/2019    Performed by [facility]o Burton [facility] at [facility]    DILATION AND CURETTAGE OF UTERUS  1966    HEMORRHOID SURGERY  1997    TONSILLECTOMY  1954    TUBAL LIGATION  1990       GYN & OB History  No LMP recorded. Patient is postmenopausal.   Date of Last Pap: No result found    OB History   No data available       Health Maintenance       Date Due Completion Date    Shingles Vaccine (2 of 3) 12/18/2015 10/23/2015    Influenza Vaccine (1) 08/01/2019 10/30/2018    Mammogram 05/03/2020 5/3/2018    High Dose Statin 07/15/2020 7/15/2019    DEXA SCAN 02/05/2022 2/5/2019    Lipid Panel 12/14/2023 12/14/2018    Colonoscopy 09/05/2028 9/5/2018    TETANUS VACCINE 02/05/2029 2/5/2019          Family History   Problem Relation Age of Onset    Coronary artery disease Mother     Heart disease Mother 60        CAD    Heart attack Father     Coronary artery disease Father     Heart disease Father 55        MI    Coronary artery disease Sister      "Heart disease Sister 65        CAD       Social History     Socioeconomic History    Marital status:      Spouse name: Not on file    Number of children: Not on file    Years of education: Not on file    Highest education level: Not on file   Occupational History    Not on file   Social Needs    Financial resource strain: Not hard at all    Food insecurity:     Worry: Never true     Inability: Never true    Transportation needs:     Medical: No     Non-medical: No   Tobacco Use    Smoking status: Former Smoker     Last attempt to quit:      Years since quittin.6    Smokeless tobacco: Never Used   Substance and Sexual Activity    Alcohol use: Yes     Alcohol/week: 1.8 - 2.4 oz     Types: 3 - 4 Glasses of wine per week     Frequency: 2-3 times a week     Drinks per session: 3 or 4     Binge frequency: Never     Comment: on weekends    Drug use: No    Sexual activity: Yes   Lifestyle    Physical activity:     Days per week: Not on file     Minutes per session: 70 min    Stress: Only a little   Relationships    Social connections:     Talks on phone: More than three times a week     Gets together: Three times a week     Attends [employer] service: Not on file     Active member of club or organization: Yes     Attends meetings of clubs or organizations: More than 4 times per year     Relationship status:    Other Topics Concern    Not on file   Social History Narrative    Not on file       Review of Systems  Review of Systems   Genitourinary: Positive for dyspareunia and pelvic pain.          Objective:   /81   Pulse 94   Ht 5' 1" (1.549 m)   Wt 57.3 kg (126 lb 5.2 oz)   BMI 23.87 kg/m²     Physical Exam   There is still a tender trigone otherwise no other issues noted levator tenderness still CIS atrophic tissue  Assessment:     1. Trigonitis without hematuria    2. Pelvic pain            Plan:     1. Trigonitis without hematuria    2. Pelvic pain      Patient continue " on medication.  I would not do this is been anything given the tushar [place] patient follow up with me in 3-6 months sooner if needed.  Patient many questions all answered in direct fashion.  Total time this consultation 15 min and greater than 50% of a 10 min in direct discussion with patient answering questions and explaining need for medication      There are no Patient Instructions on file for this visit.

## 2019-08-12 ENCOUNTER — PATIENT MESSAGE (OUTPATIENT)
Dept: UROGYNECOLOGY | Facility: CLINIC | Age: 74
End: 2019-08-12

## 2019-08-12 ENCOUNTER — PATIENT MESSAGE (OUTPATIENT)
Dept: FAMILY MEDICINE | Facility: CLINIC | Age: 74
End: 2019-08-12

## 2019-08-12 NOTE — TELEPHONE ENCOUNTER
Dr. Song did you received the release medical records and MRI results from Dr. Tamayo? Also patient asking why does she has a referral to  neuro.Please advise

## 2019-08-13 ENCOUNTER — TELEPHONE (OUTPATIENT)
Dept: FAMILY MEDICINE | Facility: CLINIC | Age: 74
End: 2019-08-13

## 2019-08-13 NOTE — TELEPHONE ENCOUNTER
Please can you fax the release of the records again to the [facility] in [place] for this patient, I reviewed the chart and I did not have any records yet, so the neurologist can have the records before she going to see him.  Thank you

## 2019-08-14 ENCOUNTER — TELEPHONE (OUTPATIENT)
Dept: FAMILY MEDICINE | Facility: CLINIC | Age: 74
End: 2019-08-14

## 2019-08-14 ENCOUNTER — PATIENT MESSAGE (OUTPATIENT)
Dept: FAMILY MEDICINE | Facility: CLINIC | Age: 74
End: 2019-08-14

## 2019-08-14 ENCOUNTER — TELEPHONE (OUTPATIENT)
Dept: UROGYNECOLOGY | Facility: CLINIC | Age: 74
End: 2019-08-14

## 2019-08-14 NOTE — TELEPHONE ENCOUNTER
Please notify patient that we receive the records from the [facility] and the MRI results.  Will place in the system so the neurologist can have the records at the time of the appointment.  Thank you

## 2019-08-14 NOTE — TELEPHONE ENCOUNTER
[facility] office. Need to set up 6 mo follow up appt with Iza Brandt for sometime in 2/2020

## 2019-08-15 ENCOUNTER — TELEPHONE (OUTPATIENT)
Dept: FAMILY MEDICINE | Facility: CLINIC | Age: 74
End: 2019-08-15

## 2019-08-15 NOTE — TELEPHONE ENCOUNTER
----- Message from Kiana Courtney sent at 8/15/2019  9:28 AM CDT -----  Contact: Patient  Type:  Patient Returning Call    Who Called:  Patient  Who Left Message for Patient:  Judi  Does the patient know what this is regarding?:  no  Best Call Back Number: [phone]

## 2019-08-29 ENCOUNTER — TELEPHONE (OUTPATIENT)
Dept: ADMINISTRATIVE | Facility: HOSPITAL | Age: 74
End: 2019-08-29

## 2019-08-29 ENCOUNTER — PATIENT MESSAGE (OUTPATIENT)
Dept: UROGYNECOLOGY | Facility: CLINIC | Age: 74
End: 2019-08-29

## 2019-08-29 RX ORDER — DIAZEPAM 10 MG/1
10 TABLET ORAL NIGHTLY
Qty: 30 TABLET | Refills: 3 | Status: SHIPPED | OUTPATIENT
Start: 2019-08-29 | End: 2019-09-18

## 2019-08-29 RX ORDER — DIAZEPAM 10 MG/1
TABLET ORAL
Qty: 30 TABLET | Refills: 0 | Status: SHIPPED | OUTPATIENT
Start: 2019-08-29 | End: 2019-09-18

## 2019-09-03 NOTE — PROGRESS NOTES
Subjective:       Patient ID: Veronique Johnson is a 73 y.o. female.    Chief Complaint: Establish Care, hypertension, anemia, anxiety    The patient is also here today for a follow-up on anxiety, she takes sertraline and also process them as needed, the patient is stable on current medications, denies any side effects of the medication.      Hypertension   This is a chronic problem. The current episode started more than 1 year ago. The problem is unchanged. The problem is controlled. Associated symptoms include anxiety. Pertinent negatives include no blurred vision, chest pain, headaches, malaise/fatigue, neck pain, orthopnea, palpitations, peripheral edema, PND, shortness of breath or sweats. There are no associated agents to hypertension. Risk factors for coronary artery disease include post-menopausal state. Past treatments include angiotensin blockers. There are no compliance problems.  There is no history of angina, kidney disease, CVA, heart failure, PVD or retinopathy. There is no history of chronic renal disease or a hypertension causing med.   Hand Pain    There was no injury mechanism. The pain is present in the right hand and left hand. The quality of the pain is described as aching. The pain does not radiate. The pain is moderate. The pain has been intermittent since the incident. Pertinent negatives include no chest pain, muscle weakness, numbness or tingling. Nothing aggravates the symptoms. She has tried nothing for the symptoms. The treatment provided no relief.   Anemia   Presents for initial visit. There has been no abdominal pain, anorexia, bruising/bleeding easily, fever, leg swelling, malaise/fatigue, palpitations, paresthesias or pica. Signs of blood loss that are not present include hematemesis. Past treatments include nothing. There is no history of alcohol abuse, cancer, chronic renal disease, clotting disorder, dementia, heart failure, hemoglobinopathy, HIV/AIDS, hypothyroidism,  malnutrition, neuropathy, recent illness or recent surgery. There is no past history of bone marrow exam. There are no compliance problems.           Past medical history, past social history was reviewed discussed with the patient.  Review of Systems   Constitutional: Negative for activity change, appetite change, fever and malaise/fatigue.   HENT: Negative for congestion and drooling.    Eyes: Negative for blurred vision, photophobia and visual disturbance.        Dry eyes   Respiratory: Negative for shortness of breath.    Cardiovascular: Negative for chest pain, palpitations, orthopnea and PND.   Gastrointestinal: Negative for abdominal pain, anorexia and hematemesis.   Genitourinary: Negative for hematuria.   Musculoskeletal: Positive for arthralgias (Bilateral hands). Negative for joint swelling and neck pain.   Neurological: Negative for tingling, numbness, headaches and paresthesias.   Hematological: Does not bruise/bleed easily.       Objective:      Physical Exam   Constitutional: She appears well-developed and well-nourished. No distress.   HENT:   Head: Normocephalic and atraumatic.   Right Ear: External ear normal.   Left Ear: External ear normal.   Nose: Nose normal.   Mouth/Throat: Oropharynx is clear and moist. No oropharyngeal exudate.   Eyes: Conjunctivae are normal. Pupils are equal, round, and reactive to light. Right eye exhibits no discharge. Left eye exhibits no discharge.   Neck: Neck supple. No thyromegaly present.   Cardiovascular: Normal rate, regular rhythm and normal heart sounds.   No murmur heard.  Pulmonary/Chest: Effort normal and breath sounds normal. No respiratory distress. She has no wheezes.   Musculoskeletal: She exhibits no tenderness or deformity.   Neurological: She displays normal reflexes. No cranial nerve deficit. Coordination normal.   Skin: No rash noted. She is not diaphoretic. No erythema. No pallor.   Psychiatric: She has a normal mood and affect. Her behavior is  normal. Judgment and thought content normal.   Nursing note and vitals reviewed.      Assessment:       1. Essential hypertension    2. Need for hepatitis C screening test    3. Postmenopausal    4. Anxiety    5. Other iron deficiency anemia    6. Dry eye    7. Arthralgia of both hands        Plan:       Essential hypertension:  Stable    Need for hepatitis C screening test  -     Hepatitis C antibody; Future; Expected date: 02/05/2019    Postmenopausal  -     DXA Bone Density Spine And Hip; Future; Expected date: 02/05/2019    Anxiety:  Stable    Other iron deficiency anemia:  Stable  -     CBC auto differential; Future; Expected date: 02/05/2019  -     Iron and TIBC; Future; Expected date: 02/05/2019  -     Ferritin; Future; Expected date: 02/05/2019    Dry eye:  New problem, workup needed  -     Sedimentation rate; Future; Expected date: 02/05/2019    Arthralgia of both hands:  New problem, workup needed  -     Sedimentation rate; Future; Expected date: 02/05/2019    Need for vaccination against Streptococcus pneumoniae  -     (In Office Administered) Pneumococcal Conjugate Vaccine (13 Valent) (IM)    Will call the patient after we have the results of the test, healthy habits, weight fried foods, red meat and processed starches, drink plenty water. Patient agreed with assessment and plan. Patient verbalized understanding.   Gabapentin Counseling: I discussed with the patient the risks of gabapentin including but not limited to dizziness, somnolence, fatigue and ataxia.

## 2019-09-05 ENCOUNTER — PATIENT MESSAGE (OUTPATIENT)
Dept: UROGYNECOLOGY | Facility: CLINIC | Age: 74
End: 2019-09-05

## 2019-09-18 ENCOUNTER — OFFICE VISIT (OUTPATIENT)
Dept: UROGYNECOLOGY | Facility: CLINIC | Age: 74
End: 2019-09-18
Payer: MEDICARE

## 2019-09-18 VITALS
HEART RATE: 83 BPM | BODY MASS INDEX: 23.37 KG/M2 | DIASTOLIC BLOOD PRESSURE: 77 MMHG | SYSTOLIC BLOOD PRESSURE: 138 MMHG | WEIGHT: 123.69 LBS

## 2019-09-18 DIAGNOSIS — R10.2 PELVIC PAIN: Primary | ICD-10-CM

## 2019-09-18 DIAGNOSIS — N30.10 IC (INTERSTITIAL CYSTITIS): ICD-10-CM

## 2019-09-18 PROCEDURE — 99214 OFFICE O/P EST MOD 30 MIN: CPT | Mod: S$PBB,,, | Performed by: OBSTETRICS & GYNECOLOGY

## 2019-09-18 PROCEDURE — 99999 PR PBB SHADOW E&M-EST. PATIENT-LVL III: ICD-10-PCS | Mod: PBBFAC,,, | Performed by: OBSTETRICS & GYNECOLOGY

## 2019-09-18 PROCEDURE — 99214 PR OFFICE/OUTPT VISIT, EST, LEVL IV, 30-39 MIN: ICD-10-PCS | Mod: S$PBB,,, | Performed by: OBSTETRICS & GYNECOLOGY

## 2019-09-18 PROCEDURE — 99213 OFFICE O/P EST LOW 20 MIN: CPT | Mod: PBBFAC,PO | Performed by: OBSTETRICS & GYNECOLOGY

## 2019-09-18 PROCEDURE — 99999 PR PBB SHADOW E&M-EST. PATIENT-LVL III: CPT | Mod: PBBFAC,,, | Performed by: OBSTETRICS & GYNECOLOGY

## 2019-09-19 ENCOUNTER — PATIENT MESSAGE (OUTPATIENT)
Dept: UROGYNECOLOGY | Facility: CLINIC | Age: 74
End: 2019-09-19

## 2019-09-19 DIAGNOSIS — R10.2 PELVIC PAIN: Primary | ICD-10-CM

## 2019-09-23 ENCOUNTER — PATIENT MESSAGE (OUTPATIENT)
Dept: UROGYNECOLOGY | Facility: CLINIC | Age: 74
End: 2019-09-23

## 2019-10-08 ENCOUNTER — PATIENT MESSAGE (OUTPATIENT)
Dept: UROGYNECOLOGY | Facility: CLINIC | Age: 74
End: 2019-10-08

## 2019-11-19 ENCOUNTER — OFFICE VISIT (OUTPATIENT)
Dept: FAMILY MEDICINE | Facility: CLINIC | Age: 74
End: 2019-11-19
Payer: MEDICARE

## 2019-11-19 ENCOUNTER — LAB VISIT (OUTPATIENT)
Dept: LAB | Facility: HOSPITAL | Age: 74
End: 2019-11-19
Attending: PHYSICIAN ASSISTANT
Payer: MEDICARE

## 2019-11-19 VITALS
HEART RATE: 82 BPM | OXYGEN SATURATION: 97 % | SYSTOLIC BLOOD PRESSURE: 128 MMHG | DIASTOLIC BLOOD PRESSURE: 68 MMHG | BODY MASS INDEX: 23.04 KG/M2 | WEIGHT: 121.94 LBS

## 2019-11-19 DIAGNOSIS — E78.5 HYPERLIPIDEMIA, UNSPECIFIED HYPERLIPIDEMIA TYPE: ICD-10-CM

## 2019-11-19 DIAGNOSIS — R13.10 DYSPHAGIA, UNSPECIFIED TYPE: ICD-10-CM

## 2019-11-19 DIAGNOSIS — E55.9 VITAMIN D DEFICIENCY, UNSPECIFIED: ICD-10-CM

## 2019-11-19 DIAGNOSIS — L65.9 HAIR LOSS: Primary | ICD-10-CM

## 2019-11-19 DIAGNOSIS — E56.9 VITAMIN DEFICIENCY: ICD-10-CM

## 2019-11-19 DIAGNOSIS — L65.9 HAIR LOSS: ICD-10-CM

## 2019-11-19 DIAGNOSIS — E53.8 DEFICIENCY OF OTHER SPECIFIED B GROUP VITAMINS: ICD-10-CM

## 2019-11-19 LAB
25(OH)D3+25(OH)D2 SERPL-MCNC: 52 NG/ML (ref 30–96)
ALBUMIN SERPL BCP-MCNC: 4.1 G/DL (ref 3.5–5.2)
ALP SERPL-CCNC: 105 U/L (ref 55–135)
ALT SERPL W/O P-5'-P-CCNC: 23 U/L (ref 10–44)
ANION GAP SERPL CALC-SCNC: 8 MMOL/L (ref 8–16)
AST SERPL-CCNC: 32 U/L (ref 10–40)
BASOPHILS # BLD AUTO: 0.05 K/UL (ref 0–0.2)
BASOPHILS NFR BLD: 0.8 % (ref 0–1.9)
BILIRUB SERPL-MCNC: 0.5 MG/DL (ref 0.1–1)
BUN SERPL-MCNC: 13 MG/DL (ref 8–23)
CALCIUM SERPL-MCNC: 9.6 MG/DL (ref 8.7–10.5)
CHLORIDE SERPL-SCNC: 108 MMOL/L (ref 95–110)
CHOLEST SERPL-MCNC: 149 MG/DL (ref 120–199)
CHOLEST/HDLC SERPL: 2 {RATIO} (ref 2–5)
CO2 SERPL-SCNC: 26 MMOL/L (ref 23–29)
CREAT SERPL-MCNC: 0.8 MG/DL (ref 0.5–1.4)
DIFFERENTIAL METHOD: ABNORMAL
EOSINOPHIL # BLD AUTO: 0.4 K/UL (ref 0–0.5)
EOSINOPHIL NFR BLD: 6.8 % (ref 0–8)
ERYTHROCYTE [DISTWIDTH] IN BLOOD BY AUTOMATED COUNT: 13.9 % (ref 11.5–14.5)
EST. GFR  (AFRICAN AMERICAN): >60 ML/MIN/1.73 M^2
EST. GFR  (NON AFRICAN AMERICAN): >60 ML/MIN/1.73 M^2
GLUCOSE SERPL-MCNC: 100 MG/DL (ref 70–110)
HCT VFR BLD AUTO: 44.3 % (ref 37–48.5)
HDLC SERPL-MCNC: 76 MG/DL (ref 40–75)
HDLC SERPL: 51 % (ref 20–50)
HGB BLD-MCNC: 13.4 G/DL (ref 12–16)
IMM GRANULOCYTES # BLD AUTO: 0.01 K/UL (ref 0–0.04)
IMM GRANULOCYTES NFR BLD AUTO: 0.2 % (ref 0–0.5)
LDLC SERPL CALC-MCNC: 51.6 MG/DL (ref 63–159)
LYMPHOCYTES # BLD AUTO: 1.3 K/UL (ref 1–4.8)
LYMPHOCYTES NFR BLD: 21.1 % (ref 18–48)
MCH RBC QN AUTO: 28.8 PG (ref 27–31)
MCHC RBC AUTO-ENTMCNC: 30.2 G/DL (ref 32–36)
MCV RBC AUTO: 95 FL (ref 82–98)
MONOCYTES # BLD AUTO: 0.5 K/UL (ref 0.3–1)
MONOCYTES NFR BLD: 7.9 % (ref 4–15)
NEUTROPHILS # BLD AUTO: 4 K/UL (ref 1.8–7.7)
NEUTROPHILS NFR BLD: 63.2 % (ref 38–73)
NONHDLC SERPL-MCNC: 73 MG/DL
NRBC BLD-RTO: 0 /100 WBC
PLATELET # BLD AUTO: 246 K/UL (ref 150–350)
PMV BLD AUTO: 10.3 FL (ref 9.2–12.9)
POTASSIUM SERPL-SCNC: 4 MMOL/L (ref 3.5–5.1)
PROT SERPL-MCNC: 7.7 G/DL (ref 6–8.4)
RBC # BLD AUTO: 4.65 M/UL (ref 4–5.4)
SODIUM SERPL-SCNC: 142 MMOL/L (ref 136–145)
TRIGL SERPL-MCNC: 107 MG/DL (ref 30–150)
TSH SERPL DL<=0.005 MIU/L-ACNC: 1.33 UIU/ML (ref 0.4–4)
VIT B12 SERPL-MCNC: 312 PG/ML (ref 210–950)
WBC # BLD AUTO: 6.31 K/UL (ref 3.9–12.7)

## 2019-11-19 PROCEDURE — 99999 PR PBB SHADOW E&M-EST. PATIENT-LVL III: CPT | Mod: PBBFAC,,, | Performed by: PHYSICIAN ASSISTANT

## 2019-11-19 PROCEDURE — 80053 COMPREHEN METABOLIC PANEL: CPT

## 2019-11-19 PROCEDURE — 36415 COLL VENOUS BLD VENIPUNCTURE: CPT | Mod: PO

## 2019-11-19 PROCEDURE — 1159F PR MEDICATION LIST DOCUMENTED IN MEDICAL RECORD: ICD-10-PCS | Mod: ,,, | Performed by: PHYSICIAN ASSISTANT

## 2019-11-19 PROCEDURE — 84443 ASSAY THYROID STIM HORMONE: CPT

## 2019-11-19 PROCEDURE — 99999 PR PBB SHADOW E&M-EST. PATIENT-LVL III: ICD-10-PCS | Mod: PBBFAC,,, | Performed by: PHYSICIAN ASSISTANT

## 2019-11-19 PROCEDURE — 1126F AMNT PAIN NOTED NONE PRSNT: CPT | Mod: ,,, | Performed by: PHYSICIAN ASSISTANT

## 2019-11-19 PROCEDURE — 85025 COMPLETE CBC W/AUTO DIFF WBC: CPT

## 2019-11-19 PROCEDURE — 99214 PR OFFICE/OUTPT VISIT, EST, LEVL IV, 30-39 MIN: ICD-10-PCS | Mod: S$PBB,,, | Performed by: PHYSICIAN ASSISTANT

## 2019-11-19 PROCEDURE — 1159F MED LIST DOCD IN RCRD: CPT | Mod: ,,, | Performed by: PHYSICIAN ASSISTANT

## 2019-11-19 PROCEDURE — 82306 VITAMIN D 25 HYDROXY: CPT

## 2019-11-19 PROCEDURE — 99213 OFFICE O/P EST LOW 20 MIN: CPT | Mod: PBBFAC,PO | Performed by: PHYSICIAN ASSISTANT

## 2019-11-19 PROCEDURE — 1126F PR PAIN SEVERITY QUANTIFIED, NO PAIN PRESENT: ICD-10-PCS | Mod: ,,, | Performed by: PHYSICIAN ASSISTANT

## 2019-11-19 PROCEDURE — 82607 VITAMIN B-12: CPT

## 2019-11-19 PROCEDURE — 99214 OFFICE O/P EST MOD 30 MIN: CPT | Mod: S$PBB,,, | Performed by: PHYSICIAN ASSISTANT

## 2019-11-19 PROCEDURE — 80061 LIPID PANEL: CPT

## 2019-11-19 NOTE — PROGRESS NOTES
Subjective:       Patient ID: Veronique Johnson is a 74 y.o. female    Chief Complaint: Hair Loss (burning esophagus)    HPI  The patient is familiar to me, PCP is Dr. Song.  She presents today complaining of hair loss that has been getting progressively worse over the past year.  She denies any associated symptoms, no rash, no pruritus, no palpitations.  She did the keto diet for 2 months and lost 20 pounds but she is taking a break from the keto diet.      She has a history of esopageal stricture and she has noticed lately that she is getting food stuck she burning with drinking coffee.     Review of Systems   Constitutional: Negative for activity change, chills, fever and unexpected weight change.   HENT: Positive for trouble swallowing. Negative for congestion, hearing loss, rhinorrhea and sore throat.    Eyes: Negative for discharge and visual disturbance.   Respiratory: Negative for cough, chest tightness, shortness of breath and wheezing.    Cardiovascular: Negative for chest pain and palpitations.   Gastrointestinal: Positive for diarrhea. Negative for abdominal pain, blood in stool, constipation, nausea and vomiting.   Endocrine: Negative for polydipsia and polyuria.   Genitourinary: Negative for difficulty urinating, dysuria, hematuria and menstrual problem.   Musculoskeletal: Negative for arthralgias, joint swelling, myalgias and neck pain.   Skin: Negative for rash.   Neurological: Negative for weakness and headaches.   Psychiatric/Behavioral: Negative for confusion and dysphoric mood. The patient is not nervous/anxious.         Objective:   Physical Exam   Constitutional: She is oriented to person, place, and time. She appears well-developed and well-nourished. No distress.   HENT:   Head: Normocephalic and atraumatic.   Eyes: Pupils are equal, round, and reactive to light. EOM are normal.   Neck: Normal range of motion. Neck supple.   Cardiovascular: Normal rate, regular rhythm, normal heart sounds and  intact distal pulses. Exam reveals no gallop and no friction rub.   No murmur heard.  Pulmonary/Chest: Effort normal and breath sounds normal. No respiratory distress. She has no wheezes. She has no rales. She exhibits no tenderness.   Abdominal: Soft. Bowel sounds are normal. She exhibits no distension and no mass. There is no tenderness. There is no guarding.   Musculoskeletal: Normal range of motion.   Neurological: She is alert and oriented to person, place, and time.   Skin: Skin is warm and dry. No rash noted. She is not diaphoretic. No erythema.   No obvious hair loss, no patches, no receding hairline   Psychiatric: She has a normal mood and affect. Her behavior is normal. Judgment and thought content normal.        Assessment:       1. Hair loss  TSH   2. Hyperlipidemia, unspecified hyperlipidemia type  CBC auto differential    Comprehensive metabolic panel    Lipid panel   3. Vitamin deficiency  Vitamin D    Vitamin B12   4. Vitamin D deficiency, unspecified   Vitamin D   5. Deficiency of other specified B group vitamins   Vitamin B12   6. Dysphagia, unspecified type  Case request GI: ESOPHAGOGASTRODUODENOSCOPY (EGD)        Plan:       Hair loss  -     TSH; Future; Expected date: 11/19/2019    Hyperlipidemia, unspecified hyperlipidemia type  -     CBC auto differential; Future; Expected date: 11/19/2019  -     Comprehensive metabolic panel; Future; Expected date: 11/19/2019  -     Lipid panel; Future; Expected date: 11/19/2019    Vitamin deficiency  -     Vitamin D; Future; Expected date: 11/19/2019  -     Vitamin B12; Future; Expected date: 11/19/2019    Vitamin D deficiency, unspecified   -     Vitamin D; Future; Expected date: 11/19/2019    Deficiency of other specified B group vitamins   -     Vitamin B12; Future; Expected date: 11/19/2019    Dysphagia, unspecified type  -     Case request GI: ESOPHAGOGASTRODUODENOSCOPY (EGD)

## 2019-12-10 NOTE — TELEPHONE ENCOUNTER
"----- Message from Melissa Norris LPN sent at 5/14/2019  5:02 PM CDT -----  Dear Dr. Burton,     You gave me samples of Uribel and then sent a prescription for something else that would be cheaper to [facility].  My insurance did not approve it and labeled it for '"pre-approval" and sent a request to you for an explanation.  Do you know the status of that request?  I only have enough of the sample to last thur tomorrow(Tuesday).  If it is not approved, is there another medication that may be substituted?  If not and you want me to use what you called in I can get it filled for the cash price which is $90.  Please advise.   "
Pa sent to [facility]  
no

## 2019-12-11 ENCOUNTER — PATIENT MESSAGE (OUTPATIENT)
Dept: FAMILY MEDICINE | Facility: CLINIC | Age: 74
End: 2019-12-11

## 2019-12-11 DIAGNOSIS — F41.9 ANXIETY: ICD-10-CM

## 2019-12-11 RX ORDER — ALPRAZOLAM 0.25 MG/1
0.25 TABLET ORAL NIGHTLY PRN
Qty: 30 TABLET | Refills: 0 | Status: SHIPPED | OUTPATIENT
Start: 2019-12-11 | End: 2020-01-15 | Stop reason: SDUPTHER

## 2019-12-11 NOTE — TELEPHONE ENCOUNTER
PCP-Please see mychart message    [facility]- Case request ordered 11/19/2019.  Patient is requesting scheduling. Please advise.

## 2019-12-12 ENCOUNTER — PATIENT MESSAGE (OUTPATIENT)
Dept: GASTROENTEROLOGY | Facility: CLINIC | Age: 74
End: 2019-12-12

## 2020-01-07 ENCOUNTER — OFFICE VISIT (OUTPATIENT)
Dept: FAMILY MEDICINE | Facility: CLINIC | Age: 75
End: 2020-01-07
Payer: MEDICARE

## 2020-01-07 VITALS
DIASTOLIC BLOOD PRESSURE: 58 MMHG | SYSTOLIC BLOOD PRESSURE: 132 MMHG | OXYGEN SATURATION: 97 % | WEIGHT: 124.75 LBS | HEIGHT: 61 IN | HEART RATE: 82 BPM | RESPIRATION RATE: 18 BRPM | TEMPERATURE: 98 F | BODY MASS INDEX: 23.55 KG/M2

## 2020-01-07 DIAGNOSIS — J44.1 COPD EXACERBATION: Primary | ICD-10-CM

## 2020-01-07 PROCEDURE — 99999 PR PBB SHADOW E&M-EST. PATIENT-LVL V: ICD-10-PCS | Mod: PBBFAC,,, | Performed by: NURSE PRACTITIONER

## 2020-01-07 PROCEDURE — 1126F AMNT PAIN NOTED NONE PRSNT: CPT | Mod: ,,, | Performed by: NURSE PRACTITIONER

## 2020-01-07 PROCEDURE — 99214 PR OFFICE/OUTPT VISIT, EST, LEVL IV, 30-39 MIN: ICD-10-PCS | Mod: S$PBB,,, | Performed by: NURSE PRACTITIONER

## 2020-01-07 PROCEDURE — 99999 PR PBB SHADOW E&M-EST. PATIENT-LVL V: CPT | Mod: PBBFAC,,, | Performed by: NURSE PRACTITIONER

## 2020-01-07 PROCEDURE — 1159F PR MEDICATION LIST DOCUMENTED IN MEDICAL RECORD: ICD-10-PCS | Mod: ,,, | Performed by: NURSE PRACTITIONER

## 2020-01-07 PROCEDURE — 1126F PR PAIN SEVERITY QUANTIFIED, NO PAIN PRESENT: ICD-10-PCS | Mod: ,,, | Performed by: NURSE PRACTITIONER

## 2020-01-07 PROCEDURE — 99215 OFFICE O/P EST HI 40 MIN: CPT | Mod: PBBFAC,PO | Performed by: NURSE PRACTITIONER

## 2020-01-07 PROCEDURE — 1159F MED LIST DOCD IN RCRD: CPT | Mod: ,,, | Performed by: NURSE PRACTITIONER

## 2020-01-07 PROCEDURE — 99214 OFFICE O/P EST MOD 30 MIN: CPT | Mod: S$PBB,,, | Performed by: NURSE PRACTITIONER

## 2020-01-07 RX ORDER — PREDNISONE 20 MG/1
TABLET ORAL
Qty: 15 TABLET | Refills: 0 | Status: SHIPPED | OUTPATIENT
Start: 2020-01-07 | End: 2020-01-15

## 2020-01-07 NOTE — PATIENT INSTRUCTIONS
COPD Flare    You have had a flare-up of your COPD.  COPD, or chronic obstructive pulmonary disease, is a common lung disease. It causes your airways to become irritated and narrower. This makes it harder for you to breathe. Emphysema and chronic bronchitis are both types of COPD. This is a chronic condition, which means you always have it. Sometimes it gets worse. When this happens, it is called a flare-up.  Symptoms of COPD  People with COPD may have symptoms most of the time. In a flare-up, your symptoms get worse. These symptoms may mean you are having a flare-up:  · Shortness of breath, shallow or rapid breathing, or wheezing that gets worse  · Lung infection  · Cough that gets worse  · More mucus, thicker mucus or mucus of a different color  · Tiredness, decreased energy, or trouble doing your usual activities  · Fever  · Chest tightness  · Your symptoms dont get better even when you use your usual medicines, inhalers, and nebulizer  · Trouble talking  · You feel confused  Causes of flare-ups  Unfortunately, a flare-up can happen even though you did everything right, and you followed your doctors instructions. Some causes of flare-ups are:  · Smoking or secondhand smoke  · Colds, the flu, or respiratory infections  · Air pollution  · Sudden change in the weather  · Dust, irritating chemicals, or strong fumes  · Not taking your medicines as prescribed  Home care  Here are some things you can do at home to treat a flare-up:  · Try not to panic. This makes it harder to breathe, and keeps you from doing the right things.  · Dont smoke or be around others who are smoking.  · Try to drink more fluids than usual during a flare-up, unless your doctor has told you not to because of heart and kidney problems. More fluids can help loosen the mucus.  · Use your inhalers and nebulizer, if you have one, as you have been told to.  · If you were given antibiotics, take them until they are used up or your doctor tells you  to stop. Its important to finish the antibiotics, even though you feel better. This will make sure the infection has cleared.  · If you were given prednisone or another steroid, finish it even if you feel better.  Preventing a flare-up  Even though flare-ups happen, the best way to treat one is to prevent it before it starts. Here are some pointers:  · Dont smoke or be around others who are smoking.  · Take your medicines as you have been told.  · Talk with your doctor about getting a flu shot every year. Also find out if you need a pneumonia shot.  · If there is a weather advisory warning to stay indoors, try to stay inside when possible.  · Try to eat healthy and get plenty of sleep.  · Try to avoid things that usually set you off, like dust, chemical fumes, hairsprays, or strong perfumes.  Follow-up care  Follow up with your healthcare provider, or as advised.  If a culture was done, you will be told if your treatment needs to be changed. You can call as directed for the results.  If X-rays were done, you will be notified of any new findings that may affect your care.  Call 911  Call 911 if any of these occur:  · You have trouble breathing  · You feel confused or its difficult to wake you up  · You faint or lose consciousness  · You have a rapid heart rate  · You have new pain in your chest, arm, shoulder, neck or upper back  When to seek medical advice  Call your healthcare provider right away if any of these occur:  · Wheezing or shortness of breath gets worse  · You need to use your inhalers more often than usual without relief  · Fever of 100.4°F (38ºC) or higher, or as directed by your healthcare provider  · Coughing up lots of dark-colored or bloody mucus (sputum)  · Chest pain with each breath  · You do not start to get better within 24 hours  · Swelling of your ankles gets worse  · Dizziness or weakness  Date Last Reviewed: 9/1/2016  © 5834-5602 [employer]. [address],  [place] [address]. All rights reserved. This information is not intended as a substitute for professional medical care. Always follow your healthcare professional's instructions.    If you are not better in 2-3 days, if worse or you have concerns or questions, please do not hesitate to call.  You can reach us at [phone] Monday through Thursday (except holidays) 8:30 a.m. to 5 p.m. or call Dr. Song/[place]    Note:  I do not work on Fridays.  So if you have concerns or questions, please contact your primary care provider team or [facility] or go to the Urgent Care on Friday for concerns.     Thank you for using Primary [facility]!    DULCE Govea, CNP, MEGHANAP-BC [facility]    To rate your experience with HARITHA Govea, click on the link below:      [email or web address]

## 2020-01-07 NOTE — PROGRESS NOTES
Answers for HPI/ROS submitted by the patient on 1/7/2020   Cough  Chronicity: new  Onset: yesterday  Progression since onset: rapidly worsening  Frequency: hourly  Cough characteristics: non-productive  chest pain: No  chills: No  ear congestion: Yes  ear pain: No  fever: No  headaches: No  heartburn: No  hemoptysis: No  myalgias: No  nasal congestion: Yes  postnasal drip: Yes  rash: No  rhinorrhea: No  shortness of breath: No  sore throat: Yes  sweats: No  weight loss: No  wheezing: No  bronchitis: Yes  COPD: Yes  Treatments tried: a beta-agonist inhaler  Improvement on treatment: no relief    Veronique Johnson is a 74 y.o. female patient of Becky Song MD who presents to the clinic today for   Chief Complaint   Patient presents with    Cough    Sore Throat   .    HPI    Pt, who is known to me, reports a new problem to me: started with scratchy throat and now croupy cough.  H/o COPD and is concerned about getting an infect.  Cough nonproductive at this time.  Has used her inhaler this morning. .    These symptoms began 2 days ago and status is worse since yesterday.     Symptoms are + acute exac of chronic COPD.    Pt denies the following symptoms:  Fever, sputum production    Aggravating factors include lying down .    Relieving factors include nothing .    OTC Medications tried are cough drops.    Prescription medications taken for symptoms are albuterol MDI.    Pertinent medical history:  H/o COPD.  Benefits from steroids in the past.    Smoking status:  H/o smoking    ROS    Constitutional:   No  fever, no fatigue, no change in appetite.    Head:   No headache  Ears:   No pain but they feel stopped up.  Eyes:  No sxs  Nose:   No sinus pain, + congestion, + runny nose, + post nasal drip.  Throat:  + scratchy ST pain.    Heart:  No palpitations, chest pain.    Lungs:  No difficulty breathing, + coughing, no sputum production, not wheezing.              Symptoms are community acquired.  No known sick  contacts.    GI/:  No sxs    MS:  No new bone, joint or muscle problems.    Skin:  No rashes, itching.      Past Medical History:   Diagnosis Date    Anemia     Anxiety     Chronic bronchitis with COPD (chronic obstructive pulmonary disease)     Esophageal spasm     Essential tremor     GERD (gastroesophageal reflux disease)     Headache     High cholesterol     Hypertension     IBS (irritable bowel syndrome)     Meniere disease     Multiple sclerosis     Muscle spasm        Current Outpatient Medications:     albuterol (PROVENTIL/VENTOLIN HFA) 90 mcg/actuation inhaler, Inhale 4 puffs into the lungs every 6 (six) hours as needed for Wheezing., Disp: 1 each, Rfl: 1    ALPRAZolam (XANAX) 0.25 MG tablet, Take 1 tablet (0.25 mg total) by mouth nightly as needed., Disp: 30 tablet, Rfl: 0    amLODIPine (NORVASC) 5 MG tablet, Take 1 tablet (5 mg total) by mouth once daily., Disp: 30 tablet, Rfl: 11    multivitamin capsule, Take 1 capsule by mouth., Disp: , Rfl:     omeprazole (PRILOSEC) 20 MG capsule, Take 1 capsule (20 mg total) by mouth once daily., Disp: 90 capsule, Rfl: 3    rosuvastatin (CRESTOR) 40 MG Tab, Take 1 tablet (40 mg total) by mouth every evening., Disp: 90 tablet, Rfl: 3    sertraline (ZOLOFT) 100 MG tablet, Take 1 tablet (100 mg total) by mouth once daily., Disp: 30 tablet, Rfl: 11    valACYclovir (VALTREX) 1000 MG tablet, Take 1 tablet by mouth as needed., Disp: , Rfl:     Patient is formerly a smoker.    PHYSICAL EXAM    Alert, coop 74 y.o. female patient in no acute distress.    Vitals:    01/07/20 1302   BP: (!) 132/58   Pulse: 82   Resp: 18   Temp: 98.4 °F (36.9 °C)     VS reviewed.  VS stable.  CC, nursing note, medications & PMH all reviewed today.    Head:  Normocephalic, atraumatic.    EENT:  EACs patent.  TMs no erythema, diffuse LR, no effusions, no TM perforation.                              Eye lids normal, no discharge present.       Sinus tenderness to palp--none.                Nares--mild edema, no d/c present.    Pharynx not injected.                Tonsils not erythematous , not enlarged, no exudate present.    left side with small NT anterior, no posterior cervical lymph nodes palpable.    No submental, submandibular or supraclavicular lymph nodes palp.             Resp:  Respirations even, unlabored.  Harsh sounding cough.   Lungs CTA bilat.  No wheezing.  No crackles.  Mvoes air to bases bilat.    Heart:  RRR, no murmur.    MS:  Ambulates independently with steady gait.             NEURO:  Alert and oriented x 4.  Responds appropriately during interaction.    Skin:  Warm, dry, color good.    COPD exacerbation  -     predniSONE (DELTASONE) 20 MG tablet; 3 tabs daily for 5 days.  Dispense: 15 tablet; Refill: 0      Pt today presents with report of post nasal drip, congestion, scratchy throat and cough x 2 days with h/o COPD flare up if not treated with steroids early enough.  On exam her TMs have diffuse LR, no effusions, pharynx is clear, sinuses NT, lungs CTA bilat.  Harsh-sounding cough.    This is a new problem to me.  No work up is planned.       Pt advised to perform comfort measures recommended on patient instruction sheet .    If not better in 3 days, the patient is advised to call us.  If worse or concerns, the patient is advised to call us.  Explained exam findings, diagnosis and treatment plan to patient.  Questions answered and patient states understanding.

## 2020-01-07 NOTE — Clinical Note
Becky Song MD,  I saw your patient today in Primary Care  If you have any questions, please do not hesitate to contact me.  Thank you!  HARITHA Govea, [facility]

## 2020-01-09 ENCOUNTER — PATIENT MESSAGE (OUTPATIENT)
Dept: FAMILY MEDICINE | Facility: CLINIC | Age: 75
End: 2020-01-09

## 2020-01-09 DIAGNOSIS — R05.8 COUGH WITH CONGESTION OF PARANASAL SINUS: ICD-10-CM

## 2020-01-09 DIAGNOSIS — R09.81 COUGH WITH CONGESTION OF PARANASAL SINUS: ICD-10-CM

## 2020-01-09 DIAGNOSIS — J44.1 COPD EXACERBATION: Primary | ICD-10-CM

## 2020-01-09 RX ORDER — PROMETHAZINE HYDROCHLORIDE AND DEXTROMETHORPHAN HYDROBROMIDE 6.25; 15 MG/5ML; MG/5ML
5 SYRUP ORAL NIGHTLY PRN
Qty: 120 ML | Refills: 0 | Status: SHIPPED | OUTPATIENT
Start: 2020-01-09 | End: 2020-01-19

## 2020-01-09 RX ORDER — DOXYCYCLINE 100 MG/1
100 CAPSULE ORAL EVERY 12 HOURS
Qty: 14 CAPSULE | Refills: 0 | Status: SHIPPED | OUTPATIENT
Start: 2020-01-09 | End: 2020-01-15 | Stop reason: SDUPTHER

## 2020-01-09 RX ORDER — ALBUTEROL SULFATE 0.83 MG/ML
2.5 SOLUTION RESPIRATORY (INHALATION) EVERY 6 HOURS PRN
Qty: 75 ML | Refills: 2 | Status: SHIPPED | OUTPATIENT
Start: 2020-01-09 | End: 2020-07-15

## 2020-01-13 ENCOUNTER — PATIENT MESSAGE (OUTPATIENT)
Dept: FAMILY MEDICINE | Facility: CLINIC | Age: 75
End: 2020-01-13

## 2020-01-15 ENCOUNTER — OFFICE VISIT (OUTPATIENT)
Dept: FAMILY MEDICINE | Facility: CLINIC | Age: 75
End: 2020-01-15
Payer: MEDICARE

## 2020-01-15 ENCOUNTER — TELEPHONE (OUTPATIENT)
Dept: ENDOSCOPY | Facility: HOSPITAL | Age: 75
End: 2020-01-15

## 2020-01-15 VITALS
SYSTOLIC BLOOD PRESSURE: 138 MMHG | HEIGHT: 61 IN | TEMPERATURE: 98 F | OXYGEN SATURATION: 98 % | BODY MASS INDEX: 23.43 KG/M2 | WEIGHT: 124.13 LBS | DIASTOLIC BLOOD PRESSURE: 60 MMHG | HEART RATE: 89 BPM

## 2020-01-15 DIAGNOSIS — E78.49 OTHER HYPERLIPIDEMIA: ICD-10-CM

## 2020-01-15 DIAGNOSIS — F41.9 ANXIETY: ICD-10-CM

## 2020-01-15 DIAGNOSIS — G35 MS (MULTIPLE SCLEROSIS): ICD-10-CM

## 2020-01-15 DIAGNOSIS — I10 ESSENTIAL HYPERTENSION: ICD-10-CM

## 2020-01-15 DIAGNOSIS — J44.1 CHRONIC OBSTRUCTIVE PULMONARY DISEASE WITH ACUTE EXACERBATION: Primary | ICD-10-CM

## 2020-01-15 DIAGNOSIS — I70.0 ATHEROSCLEROSIS OF AORTA: ICD-10-CM

## 2020-01-15 PROCEDURE — 1126F AMNT PAIN NOTED NONE PRSNT: CPT | Mod: ,,, | Performed by: FAMILY MEDICINE

## 2020-01-15 PROCEDURE — 94640 AIRWAY INHALATION TREATMENT: CPT | Mod: PBBFAC,PO

## 2020-01-15 PROCEDURE — 1126F PR PAIN SEVERITY QUANTIFIED, NO PAIN PRESENT: ICD-10-PCS | Mod: ,,, | Performed by: FAMILY MEDICINE

## 2020-01-15 PROCEDURE — 99999 PR PBB SHADOW E&M-EST. PATIENT-LVL III: CPT | Mod: PBBFAC,,, | Performed by: FAMILY MEDICINE

## 2020-01-15 PROCEDURE — 99213 OFFICE O/P EST LOW 20 MIN: CPT | Mod: PBBFAC,PO | Performed by: FAMILY MEDICINE

## 2020-01-15 PROCEDURE — 99214 OFFICE O/P EST MOD 30 MIN: CPT | Mod: S$PBB,,, | Performed by: FAMILY MEDICINE

## 2020-01-15 PROCEDURE — 1159F PR MEDICATION LIST DOCUMENTED IN MEDICAL RECORD: ICD-10-PCS | Mod: ,,, | Performed by: FAMILY MEDICINE

## 2020-01-15 PROCEDURE — 1159F MED LIST DOCD IN RCRD: CPT | Mod: ,,, | Performed by: FAMILY MEDICINE

## 2020-01-15 PROCEDURE — 99214 PR OFFICE/OUTPT VISIT, EST, LEVL IV, 30-39 MIN: ICD-10-PCS | Mod: S$PBB,,, | Performed by: FAMILY MEDICINE

## 2020-01-15 PROCEDURE — 99999 PR PBB SHADOW E&M-EST. PATIENT-LVL III: ICD-10-PCS | Mod: PBBFAC,,, | Performed by: FAMILY MEDICINE

## 2020-01-15 RX ORDER — DOXYCYCLINE 100 MG/1
100 CAPSULE ORAL EVERY 12 HOURS
Qty: 6 CAPSULE | Refills: 0 | Status: SHIPPED | OUTPATIENT
Start: 2020-01-15 | End: 2020-01-18

## 2020-01-15 RX ORDER — ALPRAZOLAM 0.25 MG/1
0.25 TABLET ORAL NIGHTLY PRN
Qty: 30 TABLET | Refills: 5 | Status: SHIPPED | OUTPATIENT
Start: 2020-01-15 | End: 2020-07-15 | Stop reason: SDUPTHER

## 2020-01-15 RX ORDER — IPRATROPIUM BROMIDE AND ALBUTEROL SULFATE 2.5; .5 MG/3ML; MG/3ML
3 SOLUTION RESPIRATORY (INHALATION)
Status: COMPLETED | OUTPATIENT
Start: 2020-01-15 | End: 2020-01-15

## 2020-01-15 RX ADMIN — IPRATROPIUM BROMIDE AND ALBUTEROL SULFATE 3 ML: .5; 2.5 SOLUTION RESPIRATORY (INHALATION) at 11:01

## 2020-01-15 NOTE — TELEPHONE ENCOUNTER
Pt. Needs to cancel. She is currently being treated for bronchitis. Please call her to reschedule. Thank You.

## 2020-01-16 ENCOUNTER — PATIENT MESSAGE (OUTPATIENT)
Dept: FAMILY MEDICINE | Facility: CLINIC | Age: 75
End: 2020-01-16

## 2020-01-16 DIAGNOSIS — J44.9 CHRONIC OBSTRUCTIVE PULMONARY DISEASE, UNSPECIFIED COPD TYPE: Primary | ICD-10-CM

## 2020-01-22 ENCOUNTER — HOSPITAL ENCOUNTER (OUTPATIENT)
Dept: RADIOLOGY | Facility: HOSPITAL | Age: 75
Discharge: HOME | End: 2020-01-22
Attending: ORTHOPAEDIC SURGERY
Payer: MEDICARE

## 2020-01-22 ENCOUNTER — OFFICE VISIT (OUTPATIENT)
Dept: ORTHOPEDICS | Facility: CLINIC | Age: 75
End: 2020-01-22
Payer: MEDICARE

## 2020-01-22 VITALS
WEIGHT: 124 LBS | HEART RATE: 82 BPM | DIASTOLIC BLOOD PRESSURE: 70 MMHG | BODY MASS INDEX: 23.41 KG/M2 | SYSTOLIC BLOOD PRESSURE: 164 MMHG | HEIGHT: 61 IN

## 2020-01-22 DIAGNOSIS — M25.562 LEFT KNEE PAIN, UNSPECIFIED CHRONICITY: ICD-10-CM

## 2020-01-22 DIAGNOSIS — M17.12 ARTHRITIS OF KNEE, LEFT: Primary | ICD-10-CM

## 2020-01-22 DIAGNOSIS — M25.562 LEFT KNEE PAIN, UNSPECIFIED CHRONICITY: Primary | ICD-10-CM

## 2020-01-22 PROCEDURE — 99999 PR PBB SHADOW E&M-EST. PATIENT-LVL III: CPT | Mod: PBBFAC,,, | Performed by: ORTHOPAEDIC SURGERY

## 2020-01-22 PROCEDURE — 99999 PR PBB SHADOW E&M-EST. PATIENT-LVL III: ICD-10-PCS | Mod: PBBFAC,,, | Performed by: ORTHOPAEDIC SURGERY

## 2020-01-22 PROCEDURE — 20610 LARGE JOINT ASPIRATION/INJECTION: L KNEE: ICD-10-PCS | Mod: S$PBB,LT,, | Performed by: ORTHOPAEDIC SURGERY

## 2020-01-22 PROCEDURE — 99213 OFFICE O/P EST LOW 20 MIN: CPT | Mod: PBBFAC,25,PN | Performed by: ORTHOPAEDIC SURGERY

## 2020-01-22 PROCEDURE — 1159F MED LIST DOCD IN RCRD: CPT | Mod: ,,, | Performed by: ORTHOPAEDIC SURGERY

## 2020-01-22 PROCEDURE — 99203 PR OFFICE/OUTPT VISIT, NEW, LEVL III, 30-44 MIN: ICD-10-PCS | Mod: 25,S$PBB,, | Performed by: ORTHOPAEDIC SURGERY

## 2020-01-22 PROCEDURE — 1125F AMNT PAIN NOTED PAIN PRSNT: CPT | Mod: ,,, | Performed by: ORTHOPAEDIC SURGERY

## 2020-01-22 PROCEDURE — 1159F PR MEDICATION LIST DOCUMENTED IN MEDICAL RECORD: ICD-10-PCS | Mod: ,,, | Performed by: ORTHOPAEDIC SURGERY

## 2020-01-22 PROCEDURE — 1125F PR PAIN SEVERITY QUANTIFIED, PAIN PRESENT: ICD-10-PCS | Mod: ,,, | Performed by: ORTHOPAEDIC SURGERY

## 2020-01-22 PROCEDURE — 73564 XR KNEE ORTHO LEFT WITH FLEXION: ICD-10-PCS | Mod: 26,LT,, | Performed by: RADIOLOGY

## 2020-01-22 PROCEDURE — 73562 X-RAY EXAM OF KNEE 3: CPT | Mod: TC,PO,RT

## 2020-01-22 PROCEDURE — 73562 X-RAY EXAM OF KNEE 3: CPT | Mod: 26,RT,, | Performed by: RADIOLOGY

## 2020-01-22 PROCEDURE — 73564 X-RAY EXAM KNEE 4 OR MORE: CPT | Mod: 26,LT,, | Performed by: RADIOLOGY

## 2020-01-22 PROCEDURE — 20610 DRAIN/INJ JOINT/BURSA W/O US: CPT | Mod: PBBFAC,PN | Performed by: ORTHOPAEDIC SURGERY

## 2020-01-22 PROCEDURE — 99203 OFFICE O/P NEW LOW 30 MIN: CPT | Mod: 25,S$PBB,, | Performed by: ORTHOPAEDIC SURGERY

## 2020-01-22 PROCEDURE — 73562 XR KNEE ORTHO LEFT WITH FLEXION: ICD-10-PCS | Mod: 26,RT,, | Performed by: RADIOLOGY

## 2020-01-22 RX ORDER — TRIAMCINOLONE ACETONIDE 40 MG/ML
40 INJECTION, SUSPENSION INTRA-ARTICULAR; INTRAMUSCULAR
Status: DISCONTINUED | OUTPATIENT
Start: 2020-01-22 | End: 2020-01-22 | Stop reason: HOSPADM

## 2020-01-22 RX ORDER — HYDROCODONE BITARTRATE AND ACETAMINOPHEN 5; 325 MG/1; MG/1
1 TABLET ORAL EVERY 6 HOURS PRN
Qty: 10 TABLET | Refills: 0 | Status: SHIPPED | OUTPATIENT
Start: 2020-01-22 | End: 2020-07-15

## 2020-01-22 RX ADMIN — TRIAMCINOLONE ACETONIDE 40 MG: 40 INJECTION, SUSPENSION INTRA-ARTICULAR; INTRAMUSCULAR at 10:01

## 2020-01-22 NOTE — PROGRESS NOTES
Past Medical History:   Diagnosis Date    Anemia     Anxiety     Chronic bronchitis with COPD (chronic obstructive pulmonary disease)     Esophageal spasm     Essential tremor     GERD (gastroesophageal reflux disease)     Headache     High cholesterol     Hypertension     IBS (irritable bowel syndrome)     Meniere disease     Multiple sclerosis     Muscle spasm        Past Surgical History:   Procedure Laterality Date    CATARACT EXTRACTION, BILATERAL  2006    CYSTOSCOPY WITH HYDRODISTENSION OF BLADDER N/A 6/5/2019    Procedure: CYSTOSCOPY, WITH BLADDER HYDRODISTENSION;  Surgeon: Marko Burton MD;  Location: [facility];  Service: OB/GYN;  Laterality: N/A;    DILATION AND CURETTAGE OF UTERUS  1966    HEMORRHOID SURGERY  1997    TONSILLECTOMY  1954    TUBAL LIGATION  1990       Current Outpatient Medications   Medication Sig    albuterol (PROVENTIL) 2.5 mg /3 mL (0.083 %) nebulizer solution Take 3 mLs (2.5 mg total) by nebulization every 6 (six) hours as needed for Wheezing. Rescue    albuterol (PROVENTIL/VENTOLIN HFA) 90 mcg/actuation inhaler Inhale 4 puffs into the lungs every 6 (six) hours as needed for Wheezing.    ALPRAZolam (XANAX) 0.25 MG tablet Take 1 tablet (0.25 mg total) by mouth nightly as needed.    amLODIPine (NORVASC) 5 MG tablet Take 1 tablet (5 mg total) by mouth once daily.    multivitamin capsule Take 1 capsule by mouth.    omeprazole (PRILOSEC) 20 MG capsule Take 1 capsule (20 mg total) by mouth once daily.    rosuvastatin (CRESTOR) 40 MG Tab Take 1 tablet (40 mg total) by mouth every evening.    sertraline (ZOLOFT) 100 MG tablet Take 1 tablet (100 mg total) by mouth once daily.    valACYclovir (VALTREX) 1000 MG tablet Take 1 tablet by mouth as needed.     No current facility-administered medications for this visit.        Review of patient's allergies indicates:   Allergen Reactions    Cephalosporins Anaphylaxis    Penicillins Rash       Family History   Problem  Relation Age of Onset    Coronary artery disease Mother     Heart disease Mother 60        CAD    Heart attack Father     Coronary artery disease Father     Heart disease Father 55        MI    Coronary artery disease Sister     Heart disease Sister 65        CAD       Social History     Socioeconomic History    Marital status:      Spouse name: Not on file    Number of children: Not on file    Years of education: Not on file    Highest education level: Not on file   Occupational History    Not on file   Social Needs    Financial resource strain: Not hard at all    Food insecurity:     Worry: Never true     Inability: Never true    Transportation needs:     Medical: No     Non-medical: No   Tobacco Use    Smoking status: Former Smoker     Last attempt to quit:      Years since quittin.0    Smokeless tobacco: Never Used   Substance and Sexual Activity    Alcohol use: Yes     Alcohol/week: 3.0 - 4.0 standard drinks     Types: 3 - 4 Glasses of wine per week     Frequency: 2-3 times a week     Drinks per session: 3 or 4     Binge frequency: Never     Comment: on weekends    Drug use: No    Sexual activity: Yes   Lifestyle    Physical activity:     Days per week: Not on file     Minutes per session: 70 min    Stress: Only a little   Relationships    Social connections:     Talks on phone: More than three times a week     Gets together: Three times a week     Attends Mosque service: Not on file     Active member of club or organization: Yes     Attends meetings of clubs or organizations: More than 4 times per year     Relationship status:    Other Topics Concern    Not on file   Social History Narrative    Not on file       Chief Complaint:   Chief Complaint   Patient presents with    Knee Pain     left knee pain       History of present illness:  This is a 74-year-old female with history of a Baker cyst in her left knee.  Patient states that she woke up Saturday night  with more pain and swelling in her knee. Knee feels like it catches at times both medially and laterally.  The back of her knee feels more swollen.  Pain is an 8/10.  Previous treatments includes an injection which helped.  No new trauma or inciting event.      Answers for HPI/ROS submitted by the patient on 1/21/2020   Leg pain  unexpected weight change: No  appetite change : No  sleep disturbance: No  IMMUNOCOMPROMISED: No  nervous/ anxious: No  dysphoric mood: No  rash: No  visual disturbance: No  eye redness: No  eye pain: No  ear pain: No  tinnitus: Yes  hearing loss: Yes  sinus pressure : No  nosebleeds: Yes  enviro allergies: No  food allergies: No  cough: Yes  shortness of breath: No  sweating: No  dysuria: No  frequency: No  difficulty urinating: No  hematuria: No  painful intercourse: No  chest pain: No  palpitations: No  nausea: No  vomiting: No  diarrhea: Yes  blood in stool: No  constipation: No  headaches: No  dizziness: No  numbness: No  seizures: No  joint swelling: Yes  myalgia: Yes  weakness: No  back pain: Yes  Pain Chronicity: recurrent  History of trauma: No  Onset: in the past 7 days  Frequency: constantly  Progression since onset: unchanged  injury location: at home  pain- numeric: 8/10  pain location: left knee  pain quality: aching, tightness  Radiating Pain: No  Aggravating factors: standing  fever: No  inability to bear weight: Yes  itching: No  joint locking: No  limited range of motion: Yes  stiffness: Yes  tingling: No  Treatments tried: rest  physical therapy: not tried  Improvement on treatment: moderate      Physical Examination:    Vital Signs:    Vitals:    01/22/20 1030   BP: (!) 164/70   Pulse: 82       Body mass index is 23.43 kg/m².    This a well-developed, well nourished patient in no acute distress.  They are alert and oriented and cooperative to examination.  Pt. walks without an antalgic gait.      Examination of the left knee shows no rashes or erythema. There is a small  palpable Baker cyst along the posterior medial knee. Patient has full range of motion from 0-130°. Patient is nontender to palpation over lateral joint line and nontender to palpation over the medial joint line. Patient has a - Lachman exam, - anterior drawer exam, and - posterior drawer exam. - Roberto's exam. Knee is stable to varus and valgus stress. 5 out of 5 motor strength. Palpable distal pulses. Intact light touch sensation. Negative Patellofemoral crepitus    Examination of the right knee shows no rashes or erythema. There are no masses ecchymosis or effusion. Patient has full range of motion from 0-130°. Patient is nontender to palpation over lateral joint line and nontender to palpation over the medial joint line. Patient has a - Lachman exam, - anterior drawer exam, and - posterior drawer exam. - Roberto's exam. Knee is stable to varus and valgus stress. 5 out of 5 motor strength. Palpable distal pulses. Intact light touch sensation. 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  We talked about treatment options and Baker cyst.  Patient elected to try an intra-articular cortisone injection.  Patient will follow up as needed.    This note was created using [employer] voice recognition software that occasionally misinterpreted phrases or words.    Consult note is delivered via Epic messaging service.

## 2020-01-22 NOTE — PROCEDURES
Large Joint Aspiration/Injection: L knee  Date/Time: 1/22/2020 10:15 AM  Performed by: Shelton Le MD  Authorized by: Shelton Le MD     Consent Done?:  Yes (Verbal)  Indications:  Pain  Procedure site marked: Yes    Timeout: Prior to procedure the correct patient, procedure, and site was verified    Anesthesia    Anesthetic: lidocaine 1% without epinephrine and bupivacaine 0.25% without epinephrine  Anesthetic total: 6mL    Location:  Knee  Site:  L knee  Prep: Patient was prepped and draped in usual sterile fashion    Needle size:  20 G  Approach:  Anterolateral  Medications:  40 mg triamcinolone acetonide 40 mg/mL  Patient tolerance:  Patient tolerated the procedure well with no immediate complications

## 2020-01-24 NOTE — PATIENT INSTRUCTIONS
Eating Heart-Healthy Food: Using the DASH Plan    Eating for your heart doesnt have to be hard or boring. You just need to know how to make healthier choices. The DASH eating plan has been developed to help you do just that. DASH stands for Dietary Approaches to Stop Hypertension. It is a plan that has been proven to be healthier for your heart and to lower your risk for high blood pressure. It can also help lower your risk for cancer, heart disease, osteoporosis, and diabetes.  Choosing from each food group  Choose foods from each of the food groups below each day. Try to get the recommended number of servings for each food group. The serving numbers are based on a diet of 2,000 calories a day. Talk to your doctor if youre unsure about your calorie needs. Along with getting the correct servings, the DASH plan also recommends a sodium intake less than 2,300 mg per day.        Grains  Servings: 6 to 8 a day  A serving is:  · 1 slice bread  · 1 ounce dry cereal  · Half a cup cooked rice, pasta or cereal  Best choices: Whole grains and any grains high in fiber. Vegetables  Servings: 4 to 5 a day  A serving is:  · 1 cup raw leafy vegetable  · Half a cup cut-up raw or cooked vegetable  · Half a cup vegetable juice  Best choices: Fresh or frozen vegetables prepared without added salt or fat.   Fruits  Servings: 4 to 5 a day  A serving is:  · 1 medium fruit  · One-quarter cup dried fruit  · Half a cup fresh, frozen, or canned fruit  · Half a cup of 100% fruit juices  Best choices: A variety of fresh fruits of different colors. Whole fruits are a better choice than fruit juices. Low-fat or fat-free dairy  Servings: 2 to 3 a day  A serving is:  · 1 cup milk  · 1 cup yogurt  · One and a half ounces cheese  Best choices: Skim or 1% milk, low-fat or fat-free yogurt or buttermilk, and low-fat cheeses.         Lean meats, poultry, fish  Servings: 6 or fewer a day  A serving is:  · 1 ounce cooked meats, poultry, or fish  · 1  egg  Best choices: Lean poultry and fish. Trim away visible fat. Broil, grill, roast, or boil instead of frying. Remove skin from poultry before eating. Limit how much red meat you eat.  Nuts, seeds, beans  Servings: 4 to 5 a week  A serving is:  · One-third cup nuts (one and a half ounces)  · 2 tablespoons nut butter or seeds  · Half a cup cooked dry beans or legumes  Best choices: Dry roasted nuts with no salt added, lentils, kidney beans, garbanzo beans, and whole chandra beans.   Fats and oils  Servings: 2 to 3 a day  A serving is:  · 1 teaspoon vegetable oil  · 1 teaspoon soft margarine  · 1 tablespoon mayonnaise  · 2 tablespoons salad dressing  Best choices: Nut and vegetable oils (nontropical vegetable oils), such as olive and canola oil. Sweets  Servings: 5 a week or fewer  A serving is:  · 1 tablespoon sugar, maple syrup, or honey  · 1 tablespoon jam or jelly  · 1 half-ounce jelly beans (about 15)  · 1 cup lemonade  Best choices: Dried fruit can be a satisfying sweet. Choose low-fat sweets. And watch your serving sizes!      For more on the DASH eating plan, visit:  www.nhlbi.nih.gov/health/health-topics/topics/dash   Date Last Reviewed: 6/1/2016  © 1427-3892 [employer]. [address] [place] [address], [place] [address]. All rights reserved. This information is not intended as a substitute for professional medical care. Always follow your healthcare professional's instructions.

## 2020-01-24 NOTE — PROGRESS NOTES
Subjective:       Patient ID: Veronique Johnson is a 74 y.o. female.    Chief Complaint: Follow-up (6 month)    HPI     COPD:  The patient is coming here today for a follow-up visit, came to the clinic, so a nurse practitioner who recommended doxycycline and breathing treatments, the patient stated the symptoms are improved slightly, she is coming here for assessment.  The patient is still complaining of shortness of breath, wheezing, cough with sputum but is improved from previous.    Anxiety:  The patient complains of anxiety, needs a prescription for Xanax to be fill the pharmacy.  The patient denies any side effects of the medication.    Hyperlipidemia:  The patient is currently taking rosuvastatin 40 mg, she denies any side effects of the medication, has presence of atherosclerosis of the aorta also.  The patient cholesterol levels were therapeutic.    Hypertension:  The patient is currently taking amlodipine, she denies any side effects of the medication.  The patient does not monitor her blood pressure home.  She not bring a log of the blood pressure readings.  The patient denies any symptoms of chest pain, nausea, vomiting, dizziness, diarrhea or constipation at this office visit.      Past medical history, past social history was reviewed and discussed with the patient.    Review of Systems   Constitutional: Negative for activity change and unexpected weight change.   HENT: Positive for rhinorrhea and trouble swallowing. Negative for hearing loss.    Eyes: Negative for discharge and visual disturbance.   Respiratory: Positive for wheezing. Negative for chest tightness.    Cardiovascular: Negative for chest pain and palpitations.   Gastrointestinal: Positive for diarrhea. Negative for blood in stool, constipation and vomiting.   Endocrine: Negative for polydipsia and polyuria.   Genitourinary: Negative for difficulty urinating, dysuria, hematuria and menstrual problem.   Musculoskeletal: Positive for  arthralgias and joint swelling. Negative for neck pain.   Neurological: Negative for weakness and headaches.   Psychiatric/Behavioral: Negative for confusion and dysphoric mood.       Objective:      Physical Exam   Constitutional: She appears well-developed and well-nourished. No distress.   HENT:   Head: Normocephalic and atraumatic.   Right Ear: External ear normal.   Left Ear: External ear normal.   Mouth/Throat: Oropharynx is clear and moist. No oropharyngeal exudate.   Eyes: Pupils are equal, round, and reactive to light. Conjunctivae are normal. Right eye exhibits no discharge. Left eye exhibits no discharge. No scleral icterus.   Neck: Neck supple. No tracheal deviation present. No thyromegaly present.   Cardiovascular: Normal rate, regular rhythm and normal heart sounds.   No murmur heard.  Pulmonary/Chest: Effort normal. No respiratory distress. She has wheezes.   Decreased breath sounds   Musculoskeletal: She exhibits no tenderness or deformity.   Neurological: No cranial nerve deficit. Coordination normal.   Skin: No rash noted. She is not diaphoretic. No erythema. No pallor.   Psychiatric: She has a normal mood and affect. Her behavior is normal. Judgment and thought content normal.   Nursing note and vitals reviewed.      Assessment:       1. Chronic obstructive pulmonary disease with acute exacerbation    2. Anxiety    3. Essential hypertension    4. Other hyperlipidemia    5. MS (multiple sclerosis)    6. Atherosclerosis of aorta        Plan:       Chronic obstructive pulmonary disease with acute exacerbation:  Improved  -     albuterol-ipratropium 2.5 mg-0.5 mg/3 mL nebulizer solution 3 mL  -     doxycycline (MONODOX) 100 MG capsule; Take 1 capsule (100 mg total) by mouth every 12 (twelve) hours. for 3 days  Dispense: 6 capsule; Refill: 0    Anxiety:  Stable  -     ALPRAZolam (XANAX) 0.25 MG tablet; Take 1 tablet (0.25 mg total) by mouth nightly as needed.  Dispense: 30 tablet; Refill:  5    Essential hypertension:  Stable    Other hyperlipidemia:  Well controlled    MS (multiple sclerosis):  Stable    Atherosclerosis of aorta:  Stable    [place] prescription monitoring program was checked and okay.  Will call the patient after we have the results of the test.  Will continue with doxycycline until finished 10 days of antibiotics, continue breathing treatments 4 times daily.  If the symptoms get any worse, the patient will notify us immediately.  Healthy eating habits, weight fried foods, red meat and processed starches, drink plenty water.Patient agreed with assessment and plan. Patient verbalized understanding.

## 2020-02-06 ENCOUNTER — PATIENT MESSAGE (OUTPATIENT)
Dept: FAMILY MEDICINE | Facility: CLINIC | Age: 75
End: 2020-02-06

## 2020-02-06 DIAGNOSIS — G35 MS (MULTIPLE SCLEROSIS): Primary | ICD-10-CM

## 2020-02-07 NOTE — TELEPHONE ENCOUNTER
No openings for any neurologist with [facility] until May timeframe. Please refer pt to Miesha Yung or Alfredo on the [facility]. Thank you.

## 2020-02-07 NOTE — TELEPHONE ENCOUNTER
Please schedule the patient as soon as possible with neurologist as patient is developing symptoms of MS.  Thank you

## 2020-02-07 NOTE — TELEPHONE ENCOUNTER
Pt is scheduled to see Dr. Yung on 3/10 at 2:20PM. Pt aware of date, time and location of appt. Advised pt to arrive 20 minutes prior to scheduled appt and bring her most recent MRI disc. Pt v/u.

## 2020-03-10 ENCOUNTER — OFFICE VISIT (OUTPATIENT)
Dept: NEUROLOGY | Facility: CLINIC | Age: 75
End: 2020-03-10
Payer: MEDICARE

## 2020-03-10 VITALS
HEART RATE: 89 BPM | DIASTOLIC BLOOD PRESSURE: 70 MMHG | BODY MASS INDEX: 23.43 KG/M2 | WEIGHT: 124 LBS | SYSTOLIC BLOOD PRESSURE: 134 MMHG

## 2020-03-10 DIAGNOSIS — R26.9 GAIT DISTURBANCE: ICD-10-CM

## 2020-03-10 DIAGNOSIS — M50.30 DEGENERATIVE DISC DISEASE, CERVICAL: ICD-10-CM

## 2020-03-10 DIAGNOSIS — R20.2 PARESTHESIA: ICD-10-CM

## 2020-03-10 DIAGNOSIS — G35 MULTIPLE SCLEROSIS: Primary | ICD-10-CM

## 2020-03-10 DIAGNOSIS — G35 MS (MULTIPLE SCLEROSIS): ICD-10-CM

## 2020-03-10 DIAGNOSIS — Z71.89 COUNSELING REGARDING GOALS OF CARE: ICD-10-CM

## 2020-03-10 PROCEDURE — 99354 PR PROLONGED SVC, OUPT, 1ST HR: ICD-10-PCS | Mod: S$PBB,,, | Performed by: PSYCHIATRY & NEUROLOGY

## 2020-03-10 PROCEDURE — 99205 PR OFFICE/OUTPT VISIT, NEW, LEVL V, 60-74 MIN: ICD-10-PCS | Mod: S$PBB,,, | Performed by: PSYCHIATRY & NEUROLOGY

## 2020-03-10 PROCEDURE — 99354 PR PROLONGED SVC, OUPT, 1ST HR: CPT | Mod: S$PBB,,, | Performed by: PSYCHIATRY & NEUROLOGY

## 2020-03-10 PROCEDURE — 99999 PR PBB SHADOW E&M-EST. PATIENT-LVL IV: CPT | Mod: PBBFAC,,, | Performed by: PSYCHIATRY & NEUROLOGY

## 2020-03-10 PROCEDURE — 99205 OFFICE O/P NEW HI 60 MIN: CPT | Mod: S$PBB,,, | Performed by: PSYCHIATRY & NEUROLOGY

## 2020-03-10 PROCEDURE — 99214 OFFICE O/P EST MOD 30 MIN: CPT | Mod: PBBFAC | Performed by: PSYCHIATRY & NEUROLOGY

## 2020-03-10 PROCEDURE — 99999 PR PBB SHADOW E&M-EST. PATIENT-LVL IV: ICD-10-PCS | Mod: PBBFAC,,, | Performed by: PSYCHIATRY & NEUROLOGY

## 2020-03-10 NOTE — PROGRESS NOTES
Neurology Consultation  Reason for consult:  Multiple Sclerosis  Referring Provider:Becky Song MD      History of present illness:     This is a 74-year-old right-handed lady with a history of multiple sclerosis (never been on a disease modifying therapy) presents to establish care for her multiple sclerosis in for evaluation of new symptoms including unsteady gait and paresthesias.    The patient reports she was diagnosed with multiple sclerosis at age 44 years old in 1993.  She reports her symptoms at the time were vision loss in the left eye without pain and right facial numbness.  Within a few weeks she saw a neurologist at [facility] that route she where she had an EMG, audiogram, MRI, and lumbar puncture.  She reports that the MRI and lumbar puncture were suggestive of multiple sclerosis.  She states that she was not started on any therapy (neither acute or DMT).  She reports her symptoms resolved and she had no other symptoms.  She reports that she was monitored with MRIs for the years in told for the most part her images were stable.  She does report that in 2014, her neurologist recommended that she be placed on treatment but she was not having any symptoms since the onset in 1993 therefore she refused therapy.  She denies having paresthesias, weakness, bladder dysfunction, muscle spasms at that time.    For the past two months, she has tingling/warm sensation in upper neck/back to bilateral shoulder down to waist on left (posteriorly) that intermittently lasts 20-25mins at a time. She has not found any triggers. She also has tingling on right side of head without pain that is intermittent for 4-6 seconds that can occur multiple times per day. She also reports night sweats every night for the past two months (she has to change clothing). No travel outside of country; no fever    No bladder issues.  Chronic Diarrhea for at least 10 years; unsure of cause but is followed by GI.    She reports  "having a "bad neck and bad back" and previously used to get injections in her neck and lower back.  No recent images of her neck or lower back. Last ones were done approximately 6 years ago.   She was being followed by pain management.  No neck surgeries in the past.   She reports mild neck pain.     Chronic joint pain in hands.    FH - No family history of MS  Son has a history of ON - He also just had a stroke of one of his eyes.      Review of systems:   CONSTITUTIONAL: No weight loss, fever, chills, weakness or fatigue.   HEENT: Eyes: No visual loss, blurred vision, double vision or yellow sclerae. Ears, Nose, Throat: No hearing loss, sneezing, congestion, runny nose or sore throat.   SKIN: No rash or itching.   CARDIOVASCULAR: No chest pain, chest pressure or chest discomfort. No palpitations or edema.   RESPIRATORY: No shortness of breath, cough or sputum.   GASTROINTESTINAL: No anorexia, nausea, vomiting or diarrhea. No abdominal pain or blood.   GENITOURINARY: No dysuria, frequency or retention.   NEUROLOGICAL: As in HPI   MUSCULOSKELETAL: No muscle, back pain, joint pain or stiffness.   HEMATOLOGIC: No anemia, bleeding or bruising.   LYMPHATICS: No enlarged nodes.  PSYCHIATRIC: No history of depression or anxiety.   ENDOCRINOLOGIC: No reports of sweating, cold or heat intolerance. No polyuria or polydipsia.     Past Medical History:   Diagnosis Date    Anemia     Anxiety     Chronic bronchitis with COPD (chronic obstructive pulmonary disease)     Esophageal spasm     Essential tremor     GERD (gastroesophageal reflux disease)     Headache     High cholesterol     Hypertension     IBS (irritable bowel syndrome)     Meniere disease     Multiple sclerosis     Muscle spasm        Past Surgical History:   Procedure Laterality Date    CATARACT EXTRACTION, BILATERAL  2006    CYSTOSCOPY WITH HYDRODISTENSION OF BLADDER N/A 6/5/2019    Procedure: CYSTOSCOPY, WITH BLADDER HYDRODISTENSION;  Surgeon: " Marko Burton MD;  [facility];  Service: OB/GYN;  Laterality: N/A;    DILATION AND CURETTAGE OF UTERUS      HEMORRHOID SURGERY      TONSILLECTOMY      TUBAL LIGATION         Family History   Problem Relation Age of Onset    Coronary artery disease Mother     Heart disease Mother 60        CAD    Heart attack Father     Coronary artery disease Father     Heart disease Father 55        MI    Coronary artery disease Sister     Heart disease Sister 65        CAD       Social History     Socioeconomic History    Marital status:      Spouse name: Not on file    Number of children: Not on file    Years of education: Not on file    Highest education level: Not on file   Occupational History    Not on file   Social Needs    Financial resource strain: Not hard at all    Food insecurity:     Worry: Never true     Inability: Never true    Transportation needs:     Medical: No     Non-medical: No   Tobacco Use    Smoking status: Former Smoker     Last attempt to quit:      Years since quittin.2    Smokeless tobacco: Never Used   Substance and Sexual Activity    Alcohol use: Yes     Alcohol/week: 3.0 - 4.0 standard drinks     Types: 3 - 4 Glasses of wine per week     Frequency: 2-3 times a week     Drinks per session: 3 or 4     Binge frequency: Never     Comment: on weekends    Drug use: No    Sexual activity: Yes   Lifestyle    Physical activity:     Days per week: Not on file     Minutes per session: 70 min    Stress: Only a little   Relationships    Social connections:     Talks on phone: More than three times a week     Gets together: Three times a week     Attends Confucianist service: Not on file     Active member of club or organization: Yes     Attends meetings of clubs or organizations: More than 4 times per year     Relationship status:    Other Topics Concern    Not on file   Social History Narrative    Not on file       Scheduled  Meds:  Continuous Infusions:  PRN Meds:.    Review of patient's allergies indicates:   Allergen Reactions    Cephalosporins Anaphylaxis    Penicillins Rash         Physical Exam    Vitals:    03/10/20 1356   BP: 134/70   Pulse: 89   Weight: 56.3 kg (124 lb 0.1 oz)     25ft: 4.65    OD: 20/20; OS20/70   Cataract lens on left    In general, the patient is well nourished.    No bruits. Fundi are normal bilaterally.    MENTAL STATUS: language is fluent, normal verbal comprehension, short-term and remote memory is intact, attention is normal, patient is alert and oriented x 3, fund of knowlege is appropriate by vocabulary.     CRANIAL NERVE EXAM:  There is no PETE; .  Extraocular muscles are intact. Pupils are equal, round, and reactive to light. No facial asymmetry. Facial sensation is intact bilaterally. There is no dysarthria. Uvula is midline, and palate moves symmetrically. Shoulder shrug intact bilaterlly. Tongue protrusion is midline. Hearing is grossly intact. Neck is supple.     MOTOR EXAM: Normal bulk and tone throughout UE and LE bilaterally.   No pronator drift; rapid sequential movements are normal; Strength is  5/5 in all groups in the lower extremities and upper extremities;    REFLEXES: 2+ and symmetric throughout in all four extremeties; toes are down bilaterally    SENSORY EXAM: Normal to light touch and temperature throughout. Mild decrease vibration in feet.     COORDINATION: Normal finger-to-nose exam     GAIT: Narrow based and stable    IMAGING (personally reviewed):  None for review    LABS:  Lab Results   Component Value Date    WBC 6.31 11/19/2019    HGB 13.4 11/19/2019    HCT 44.3 11/19/2019    MCV 95 11/19/2019     11/19/2019     CMP  Sodium   Date Value Ref Range Status   11/19/2019 142 136 - 145 mmol/L Final   05/30/2019 144 134 - 144 mmol/L      Potassium   Date Value Ref Range Status   11/19/2019 4.0 3.5 - 5.1 mmol/L Final     Chloride   Date Value Ref Range Status   11/19/2019 108  95 - 110 mmol/L Final   05/30/2019 110 98 - 110 mmol/L      CO2   Date Value Ref Range Status   11/19/2019 26 23 - 29 mmol/L Final     Glucose   Date Value Ref Range Status   11/19/2019 100 70 - 110 mg/dL Final   05/30/2019 123 (H) 70 - 99 mg/dL      BUN, Bld   Date Value Ref Range Status   11/19/2019 13 8 - 23 mg/dL Final     Creatinine   Date Value Ref Range Status   11/19/2019 0.8 0.5 - 1.4 mg/dL Final   05/30/2019 0.74 0.60 - 1.40 mg/dL      Calcium   Date Value Ref Range Status   11/19/2019 9.6 8.7 - 10.5 mg/dL Final     Total Protein   Date Value Ref Range Status   11/19/2019 7.7 6.0 - 8.4 g/dL Final     Albumin   Date Value Ref Range Status   11/19/2019 4.1 3.5 - 5.2 g/dL Final   05/30/2019 3.8 2.9 - 4.4 g/dL    05/30/2019 4.1 3.1 - 4.7 g/dL      Total Bilirubin   Date Value Ref Range Status   11/19/2019 0.5 0.1 - 1.0 mg/dL Final     Comment:     For infants and newborns, interpretation of results should be based  on gestational age, weight and in agreement with clinical  observations.  Premature Infant recommended reference ranges:  Up to 24 hours.............<8.0 mg/dL  Up to 48 hours............<12.0 mg/dL  3-5 days..................<15.0 mg/dL  6-29 days.................<15.0 mg/dL       Alkaline Phosphatase   Date Value Ref Range Status   11/19/2019 105 55 - 135 U/L Final     AST   Date Value Ref Range Status   11/19/2019 32 10 - 40 U/L Final     ALT   Date Value Ref Range Status   11/19/2019 23 10 - 44 U/L Final     Anion Gap   Date Value Ref Range Status   11/19/2019 8 8 - 16 mmol/L Final     eGFR if    Date Value Ref Range Status   11/19/2019 >60.0 >60 mL/min/1.73 m^2 Final     eGFR if non    Date Value Ref Range Status   11/19/2019 >60.0 >60 mL/min/1.73 m^2 Final     Comment:     Calculation used to obtain the estimated glomerular filtration  rate (eGFR) is the CKD-EPI equation.                 ASSESSMENT:          This is a 74 years old female with a history of  multiple sclerosis that was diagnosed in 1993 but was never on any disease modifying therapy a who presents for evaluation of her multiple sclerosis. After review of MRI brain done in 2014, she does have mild burden of typical MS lesions.  Patient reports since her diagnosis of multiple sclerosis, she has not had any relapses or any new symptoms therefore she was never placed on therapy.  However, in the past few months she has noticed some paresthesias in her neck, upper back and shoulders as well as unsteady gait.  She does report having degenerative changes of her cervical spine in the past which could potentially be contributing.  We discussed that given her age and course of multiple sclerosis, it is lower on the differential that her symptoms are related to multiple sclerosis although still possible but another possibility could be degenerative changes of her cervical spine.  We will obtain MRI of the brain and cervical spine for further evaluation.  We asked the patient to bring MRI of her cervical spine that was done in the past.      Plan:  MRI Brain and Cervical Spine with and without contrast  She will follow-up after imaging has been completed.                Multiple sclerosis  -     MRI Brain Demyelinating W W/O Contrast; Future; Expected date: 03/10/2020  -     MRI Cervical Spine Demyelinating W W/O Contrast; Future; Expected date: 03/10/2020    Gait disturbance  -     MRI Brain Demyelinating W W/O Contrast; Future; Expected date: 03/10/2020  -     MRI Cervical Spine Demyelinating W W/O Contrast; Future; Expected date: 03/10/2020    Paresthesia  -     MRI Brain Demyelinating W W/O Contrast; Future; Expected date: 03/10/2020    Degenerative disc disease, cervical  -     MRI Cervical Spine Demyelinating W W/O Contrast; Future; Expected date: 03/10/2020    MS (multiple sclerosis)    Counseling regarding goals of care         Our visit today lasted 90 minutes, and 100% of this time was spent face to face  with the patient. Over 50% of this visit included discussion of the treatment plan/medication changes/symptom management/exam findings/imaging results/coordination of care. The patient agrees with the plan of care.     Alejo Alanis MD  Neuroimmunology/MS Fellow  [facility]    MS CENTER STAFF ATTESTATION  I have personally seen and examined the patient along with MS Fellow Dr. Alanis, and agree with assessment and recommendations.      Geni Yung MD  [facility]

## 2020-03-11 ENCOUNTER — PATIENT MESSAGE (OUTPATIENT)
Dept: NEUROLOGY | Facility: CLINIC | Age: 75
End: 2020-03-11

## 2020-03-17 PROBLEM — R20.2 PARESTHESIA: Status: ACTIVE | Noted: 2020-03-17

## 2020-03-17 PROBLEM — R26.9 GAIT DISTURBANCE: Status: ACTIVE | Noted: 2020-03-17

## 2020-03-17 PROBLEM — G35 MULTIPLE SCLEROSIS: Status: ACTIVE | Noted: 2020-03-17

## 2020-03-25 ENCOUNTER — PATIENT MESSAGE (OUTPATIENT)
Dept: NEUROLOGY | Facility: CLINIC | Age: 75
End: 2020-03-25

## 2020-03-26 ENCOUNTER — PATIENT MESSAGE (OUTPATIENT)
Dept: NEUROLOGY | Facility: CLINIC | Age: 75
End: 2020-03-26

## 2020-05-01 RX ORDER — ROSUVASTATIN CALCIUM 40 MG/1
40 TABLET, COATED ORAL NIGHTLY
Qty: 90 TABLET | Refills: 1 | Status: SHIPPED | OUTPATIENT
Start: 2020-05-01 | End: 2020-09-28

## 2020-05-02 NOTE — PROGRESS NOTES
Refill Authorization Note     is requesting a refill authorization.    Brief assessment and rationale for refill: APPROVE: prr               Medication reconciliation completed: No                         Comments:  Automatic Epic Protocol Generated Data:    Requested Prescriptions   Signed Prescriptions Disp Refills    rosuvastatin (CRESTOR) 40 MG Tab 90 tablet 1     Sig: TAKE 1 TABLET (40 MG TOTAL) BY MOUTH EVERY EVENING.       Cardiovascular:  Antilipid - Statins Passed - 5/1/2020 10:04 PM        Passed - Patient is at least 18 years old        Passed - Office visit in past 12 months or future 90 days.     Recent Outpatient Visits            1 month ago Multiple sclerosis    [facility] Geni Yung MD    3 months ago Arthritis of knee, left    [facility] Tucker Le MD    3 months ago Chronic obstructive pulmonary disease with acute exacerbation    [facility] Becky Song MD    3 months ago COPD exacerbation    [facility] Mehreen Burr, FAHAD    5 months ago Hair loss    [facility] Marifer Guerra PA-C          Future Appointments              In 1 week [facility]    In 1 week [facility]    In 1 week [facility]    In 2 months Becky Song MD [facility], [place]                Passed - Lipid Panel completed in last 360 days     Lab Results   Component Value Date    CHOL 149 11/19/2019    HDL 76 (H) 11/19/2019    LDLCALC 51.6 (L) 11/19/2019    TRIG 107 11/19/2019             Passed - ALT is 94 or below and within 360 days     ALT   Date Value Ref Range Status   11/19/2019 23 10 - 44 U/L Final     ALT (SGPT)   Date Value Ref Range Status   05/30/2019 21 3 - 33 IU/L               Passed - AST is 54 or below and within 360 days      AST   Date Value Ref Range Status   11/19/2019 32 10 - 40 U/L Final   05/30/2019 25 10 - 40 IU/L                  Appointments  past 12m or future 3m with PCP    Date Provider   Last Visit   1/15/2020 Becky Song MD   Next Visit   7/15/2020 Becky Song MD   ED visits in past 90 days: 0     Note composed:10:08 PM 05/01/2020

## 2020-05-06 ENCOUNTER — PATIENT MESSAGE (OUTPATIENT)
Dept: ADMINISTRATIVE | Facility: HOSPITAL | Age: 75
End: 2020-05-06

## 2020-05-14 ENCOUNTER — PATIENT OUTREACH (OUTPATIENT)
Dept: ADMINISTRATIVE | Facility: OTHER | Age: 75
End: 2020-05-14

## 2020-05-14 ENCOUNTER — HOSPITAL ENCOUNTER (OUTPATIENT)
Dept: RADIOLOGY | Facility: HOSPITAL | Age: 75
Discharge: HOME | End: 2020-05-14
Attending: PSYCHIATRY & NEUROLOGY
Payer: MEDICARE

## 2020-05-14 ENCOUNTER — OFFICE VISIT (OUTPATIENT)
Dept: NEUROLOGY | Facility: CLINIC | Age: 75
End: 2020-05-14
Payer: MEDICARE

## 2020-05-14 DIAGNOSIS — G35 MULTIPLE SCLEROSIS: ICD-10-CM

## 2020-05-14 DIAGNOSIS — Z12.31 ENCOUNTER FOR SCREENING MAMMOGRAM FOR MALIGNANT NEOPLASM OF BREAST: Primary | ICD-10-CM

## 2020-05-14 DIAGNOSIS — R20.2 PARESTHESIA: ICD-10-CM

## 2020-05-14 DIAGNOSIS — M50.30 DEGENERATIVE DISC DISEASE, CERVICAL: ICD-10-CM

## 2020-05-14 DIAGNOSIS — R26.9 GAIT DISTURBANCE: ICD-10-CM

## 2020-05-14 DIAGNOSIS — I67.1 CEREBRAL ANEURYSM: ICD-10-CM

## 2020-05-14 DIAGNOSIS — M48.02 CERVICAL STENOSIS OF SPINAL CANAL: Primary | ICD-10-CM

## 2020-05-14 PROCEDURE — A9585 GADOBUTROL INJECTION: HCPCS | Performed by: PSYCHIATRY & NEUROLOGY

## 2020-05-14 PROCEDURE — 99215 OFFICE O/P EST HI 40 MIN: CPT | Mod: 95,,, | Performed by: PSYCHIATRY & NEUROLOGY

## 2020-05-14 PROCEDURE — 72156 MRI NECK SPINE W/O & W/DYE: CPT | Mod: TC

## 2020-05-14 PROCEDURE — 72156 MRI NECK SPINE W/O & W/DYE: CPT | Mod: 26,,, | Performed by: RADIOLOGY

## 2020-05-14 PROCEDURE — 70553 MRI BRAIN STEM W/O & W/DYE: CPT | Mod: TC

## 2020-05-14 PROCEDURE — 72156 MRI CERVICAL SPINE DEMYELINATING W W/O CONTRAST: ICD-10-PCS | Mod: 26,,, | Performed by: RADIOLOGY

## 2020-05-14 PROCEDURE — 70553 MRI BRAIN STEM W/O & W/DYE: CPT | Mod: 26,,, | Performed by: RADIOLOGY

## 2020-05-14 PROCEDURE — 99215 PR OFFICE/OUTPT VISIT, EST, LEVL V, 40-54 MIN: ICD-10-PCS | Mod: 95,,, | Performed by: PSYCHIATRY & NEUROLOGY

## 2020-05-14 PROCEDURE — 25500020 PHARM REV CODE 255: Performed by: PSYCHIATRY & NEUROLOGY

## 2020-05-14 PROCEDURE — 70553 MRI BRAIN DEMYELINATING W/ WO CONTRAST: ICD-10-PCS | Mod: 26,,, | Performed by: RADIOLOGY

## 2020-05-14 RX ORDER — GADOBUTROL 604.72 MG/ML
6 INJECTION INTRAVENOUS
Status: COMPLETED | OUTPATIENT
Start: 2020-05-14 | End: 2020-05-14

## 2020-05-14 RX ADMIN — GADOBUTROL 6 ML: 604.72 INJECTION INTRAVENOUS at 01:05

## 2020-05-14 NOTE — PROGRESS NOTES
The patient location is: [place] ([place])  The chief complaint leading to consultation is: followup  Visit type: Virtual visit with synchronous audio and video  Total time spent with patient: 40 minutes  Each patient to whom he or she provides medical services by telemedicine is:  (1) informed of the relationship between the physician and patient and the respective role of any other health care provider with respect to management of the patient; and (2) notified that he or she may decline to receive medical services by telemedicine and may withdraw from such care at any time.    Subjective:       Patient ID: Veronique Johnson is a 74 y.o. female who presents today for a routine clinic visit for MS.      History of present illness:  This is a 74-year-old right-handed lady with a history of multiple sclerosis (never been on a disease modifying therapy) presents to establish care for her multiple sclerosis in for evaluation of new symptoms including unsteady gait and paresthesias.     The patient reports she was diagnosed with multiple sclerosis at age 44 years old in 1993.  She reports her symptoms at the time were vision loss in the left eye without pain and right facial numbness.  Within a few weeks she saw a neurologist at [facility] that route she where she had an EMG, audiogram, MRI, and lumbar puncture.  She reports that the MRI and lumbar puncture were suggestive of multiple sclerosis.  She states that she was not started on any therapy (neither acute or DMT).  She reports her symptoms resolved and she had no other symptoms.  She reports that she was monitored with MRIs for the years in told for the most part her images were stable.  She does report that in 2014, her neurologist recommended that she be placed on treatment but she was not having any symptoms since the onset in 1993 therefore she refused therapy.  She denies having paresthesias, weakness, bladder dysfunction, muscle spasms at that  time.     For the past two months, she has tingling/warm sensation in upper neck/back to bilateral shoulder down to waist on left (posteriorly) that intermittently lasts 20-25mins at a time. She has not found any triggers. She also has tingling on right side of head without pain that is intermittent for 4-6 seconds that can occur multiple times per day. She also reports night sweats every night for the past two months (she has to change clothing). No travel outside of country; no fever    Interval history:  · She still has the paresthesias in her left shoulder and at times she has tingling in right part of her head in the parietal region.  · She has cramps in her toes at times but she does not want treatment.  · She had MRI Brain/Cervical spine which showed stable MS plaques when compared to to previous MRIs in 2014.  · MRI cervical spine showed moderate cervical and stenosis and MRI brain showed stable proximal supraclinoid right internal carotid artery aneurysm. She is open to seeing neurosurgery for evaluation of both aneurysm and cervical stenosis.       SOCIAL HISTORY  Social History     Tobacco Use    Smoking status: Former Smoker     Last attempt to quit:      Years since quittin.4    Smokeless tobacco: Never Used   Substance Use Topics    Alcohol use: Yes     Alcohol/week: 3.0 - 4.0 standard drinks     Types: 3 - 4 Glasses of wine per week     Frequency: 2-3 times a week     Drinks per session: 3 or 4     Binge frequency: Never     Comment: on weekends    Drug use: No       REVIEW OF SYMPTOMS 2020   Do you feel abnormally tired on most days? No   Do you feel you generally sleep well? Yes   Do you have difficulty controlling your bladder?  No   Do you have difficulty controlling your bowels?  No   Do you have frequent muscle cramps, tightness or spasms in your limbs?  Yes   Do you have new visual symptoms?  No   Do you have worsening difficulty with your memory or thinking? No   Do you have  worsening symptoms of anxiety or depression?  No   For patients who walk, Do you have more difficulty walking?  No   Have you fallen since your last visit?  No   For patients who use wheelchairs: Do you have any skin wounds or breakdown? Not Applicable   Do you have difficulty using your hands?  No   Do you have shooting or burning pain? No   Do you have difficulty with sexual function?  Yes   If you are sexually active, are you using birth control? Y/N  N/A No   Do you often choke when swallowing liquids or solid food?  Yes   Do you experience worsening symptoms when overheated? Yes   Do you need any new equipment such as a wheelchair, walker or shower chair? No   Do you receive co-pay financial assistance for your principal MS medicine? Not Applicable   Would you be interested in participating in an MS research trial in the future? No   For patients on Gilenya, Tecfidera, Aubagio, Rituxan, Ocrevus, Tysabri, Lemtrada or Methotrexate, are you aware that you should NOT receive live virus vaccines?  Not Applicable   Do you feel you have adequate family/friend support?  Yes   Do you have health insurance?   Yes   Are you currently employed? No   Do you receive SSDI/SSI?  Not Applicable   Do you use marijuana or cannabis products? No   Have you been diagnosed with a urinary tract infection since your last visit here? No   Have you been diagnosed with a respiratory tract infection since your last visit here? No   Have you been to the emergency room since your last visit here? No   Have you been hospitalized since your last visit here?  No             Objective:       Examination limited due to virtual visit.     In general, the patient is well nourished.       MENTAL STATUS: language is fluent, normal verbal comprehension, short-term and remote memory is intact, attention is normal, patient is alert and oriented x 3, fund of knowlege is appropriate by vocabulary.      CRANIAL NERVE EXAM:  There is no PETE; .  Extraocular  muscles are intact. Pupils unable to assess. No facial asymmetry. Facial sensation is intact bilaterally. There is no dysarthria. Uvula is midline, and palate moves symmetrically. Shoulder shrug intact bilaterlly. Tongue protrusion is midline. Hearing is grossly intact. Neck is supple.      MOTOR EXAM: Normal bulk and tone throughout UE and LE bilaterally.   No pronator drift; rapid sequential movements are normal; Strength is appears to be equal and symmetric in the lower extremities and upper extremities with no apparent weakness.      REFLEXES: defered     SENSORY EXAM: Normal to light touch throughout.      COORDINATION: Normal finger-to-nose exam      GAIT: Narrow based and stable          Imaging:       Results for orders placed during the hospital encounter of 05/14/20   MRI Brain Demyelinating W W/O Contrast    Impression Brain appears stable from prior exams, again demonstrating numerous small scattered T2/FLAIR hyperintense lesions in the white matter.  Although not entirely specific, distribution would be consistent with the reported history of multiple sclerosis.  No new foci allowing for variation in technique.  No enhancing lesions to specifically indicate active demyelination.    Apparent medially projected aneurysm arising from the proximal supraclinoid right internal carotid artery.  Lesion grossly stable from the prior MRA of 2014, consider repeat MR arteriogram for better assessment.    Persistent trigeminal artery.    Cervical spinal cord appears within normal limits without evidence of recent or remote demyelination.    Mild to moderate cervical spondylosis as above.      Electronically signed by: Amarjit Lassiter MD  Date:    05/14/2020  Time:    14:06     Results for orders placed during the hospital encounter of 05/14/20   MRI Cervical Spine Demyelinating W W/O Contrast    Impression Brain appears stable from prior exams, again demonstrating numerous small scattered T2/FLAIR hyperintense  lesions in the white matter.  Although not entirely specific, distribution would be consistent with the reported history of multiple sclerosis.  No new foci allowing for variation in technique.  No enhancing lesions to specifically indicate active demyelination.    Apparent medially projected aneurysm arising from the proximal supraclinoid right internal carotid artery.  Lesion grossly stable from the prior MRA of 2014, consider repeat MR arteriogram for better assessment.    Persistent trigeminal artery.    Cervical spinal cord appears within normal limits without evidence of recent or remote demyelination.    Mild to moderate cervical spondylosis as above.      Electronically signed by: Amarjit Lassiter MD  Date:    05/14/2020  Time:    14:06       Labs:     Lab Results   Component Value Date    [id number] 52 11/19/2019     No results found for: JCVINDEX, JCVANTIBODY  No results found for: YL4AKOUZ, ABSOLUTECD3, VT8UOCTS, ABSOLUTECD8, LC0TQINM, ABSOLUTECD4, LABCD48  Lab Results   Component Value Date    WBC 6.31 11/19/2019    RBC 4.65 11/19/2019    HGB 13.4 11/19/2019    HCT 44.3 11/19/2019    MCV 95 11/19/2019    MCH 28.8 11/19/2019    MCHC 30.2 (L) 11/19/2019    RDW 13.9 11/19/2019     11/19/2019    MPV 10.3 11/19/2019    GRAN 4.0 11/19/2019    GRAN 63.2 11/19/2019    LYMPH 1.3 11/19/2019    LYMPH 21.1 11/19/2019    MONO 0.5 11/19/2019    MONO 7.9 11/19/2019    EOS 0.4 11/19/2019    BASO 0.05 11/19/2019    EOSINOPHIL 6.8 11/19/2019    BASOPHIL 0.8 11/19/2019     Sodium   Date Value Ref Range Status   11/19/2019 142 136 - 145 mmol/L Final   05/30/2019 144 134 - 144 mmol/L      Potassium   Date Value Ref Range Status   11/19/2019 4.0 3.5 - 5.1 mmol/L Final     Chloride   Date Value Ref Range Status   11/19/2019 108 95 - 110 mmol/L Final   05/30/2019 110 98 - 110 mmol/L      CO2   Date Value Ref Range Status   11/19/2019 26 23 - 29 mmol/L Final     Glucose   Date Value Ref Range Status   11/19/2019 100 70 -  110 mg/dL Final   05/30/2019 123 (H) 70 - 99 mg/dL      BUN, Bld   Date Value Ref Range Status   11/19/2019 13 8 - 23 mg/dL Final     Creatinine   Date Value Ref Range Status   11/19/2019 0.8 0.5 - 1.4 mg/dL Final   05/30/2019 0.74 0.60 - 1.40 mg/dL      Calcium   Date Value Ref Range Status   11/19/2019 9.6 8.7 - 10.5 mg/dL Final     Total Protein   Date Value Ref Range Status   11/19/2019 7.7 6.0 - 8.4 g/dL Final     Albumin   Date Value Ref Range Status   11/19/2019 4.1 3.5 - 5.2 g/dL Final   05/30/2019 3.8 2.9 - 4.4 g/dL    05/30/2019 4.1 3.1 - 4.7 g/dL      Total Bilirubin   Date Value Ref Range Status   11/19/2019 0.5 0.1 - 1.0 mg/dL Final     Comment:     For infants and newborns, interpretation of results should be based  on gestational age, weight and in agreement with clinical  observations.  Premature Infant recommended reference ranges:  Up to 24 hours.............<8.0 mg/dL  Up to 48 hours............<12.0 mg/dL  3-5 days..................<15.0 mg/dL  6-29 days.................<15.0 mg/dL       Alkaline Phosphatase   Date Value Ref Range Status   11/19/2019 105 55 - 135 U/L Final     AST   Date Value Ref Range Status   11/19/2019 32 10 - 40 U/L Final     ALT   Date Value Ref Range Status   11/19/2019 23 10 - 44 U/L Final     Anion Gap   Date Value Ref Range Status   11/19/2019 8 8 - 16 mmol/L Final     eGFR if    Date Value Ref Range Status   11/19/2019 >60.0 >60 mL/min/1.73 m^2 Final     eGFR if non    Date Value Ref Range Status   11/19/2019 >60.0 >60 mL/min/1.73 m^2 Final     Comment:     Calculation used to obtain the estimated glomerular filtration  rate (eGFR) is the CKD-EPI equation.                    Diagnosis/Assessment/Plan:    ASSESSMENT:          1. Multiple Sclerosis  2. Degenerative changes in cervical region  3. Paresthesias  3. Cerebral aneurysm    DISCUSSION:  This is a 74 years old female with a history of multiple sclerosis that was diagnosed in 1993  but was never on any disease modifying therapy. In the past few months she has noticed some paresthesias in her neck, upper back and shoulders as well as unsteady gait.  Repeat MRI's brain done today compared to 2014 study was stable with no evidence of active MS disease. No plans for DMT at this time.  However, her cervical spine moderate cervical stenosis which could certainly explain her symptoms. In addition, there was an internal carotid artery aneurysm that was seen which is stable compared to 2014 study.  She was educated on stroke symptoms and stroke risk factors.  She was open to referral to Neurosurgery for evaluation of both degenerative changes of the spine and carotid artery aneurysm.  From an MS standpoint, she can follow-up as needed.       Our visit today lasted 40 minutes. Over 50% of this visit included discussion of the treatment plan/medication changes/symptom management/exam findings/imaging results/coordination of care. The patient agrees with the plan of care.       Problem List Items Addressed This Visit        Neuro    Multiple sclerosis    Cerebral aneurysm    Relevant Orders    Ambulatory referral/consult to Neurosurgery       Other    Paresthesia      Other Visit Diagnoses     Cervical stenosis of spinal canal    -  Primary    Relevant Orders    Ambulatory referral/consult to Neurosurgery          Alejo Alanis MD  Neuroimmunology/MS Fellow  [facility]

## 2020-05-18 ENCOUNTER — TELEPHONE (OUTPATIENT)
Dept: NEUROSURGERY | Facility: CLINIC | Age: 75
End: 2020-05-18

## 2020-05-18 LAB
CREAT SERPL-MCNC: 0.6 MG/DL (ref 0.5–1.4)
SAMPLE: NORMAL

## 2020-05-20 ENCOUNTER — PATIENT OUTREACH (OUTPATIENT)
Dept: ADMINISTRATIVE | Facility: OTHER | Age: 75
End: 2020-05-20

## 2020-05-20 ENCOUNTER — OFFICE VISIT (OUTPATIENT)
Dept: NEUROSURGERY | Facility: CLINIC | Age: 75
End: 2020-05-20
Payer: MEDICARE

## 2020-05-20 VITALS
HEIGHT: 61 IN | WEIGHT: 127 LBS | SYSTOLIC BLOOD PRESSURE: 143 MMHG | TEMPERATURE: 98 F | BODY MASS INDEX: 23.98 KG/M2 | DIASTOLIC BLOOD PRESSURE: 77 MMHG | HEART RATE: 88 BPM

## 2020-05-20 DIAGNOSIS — M43.12 SPONDYLOLISTHESIS OF CERVICAL REGION: ICD-10-CM

## 2020-05-20 DIAGNOSIS — I67.1 CEREBRAL ANEURYSM: Primary | ICD-10-CM

## 2020-05-20 DIAGNOSIS — M79.18 CERVICAL MYOFASCIAL PAIN SYNDROME: ICD-10-CM

## 2020-05-20 PROCEDURE — 99205 PR OFFICE/OUTPT VISIT, NEW, LEVL V, 60-74 MIN: ICD-10-PCS | Mod: S$PBB,,, | Performed by: NEUROLOGICAL SURGERY

## 2020-05-20 PROCEDURE — 99999 PR PBB SHADOW E&M-EST. PATIENT-LVL III: ICD-10-PCS | Mod: PBBFAC,,, | Performed by: NEUROLOGICAL SURGERY

## 2020-05-20 PROCEDURE — 99999 PR PBB SHADOW E&M-EST. PATIENT-LVL III: CPT | Mod: PBBFAC,,, | Performed by: NEUROLOGICAL SURGERY

## 2020-05-20 PROCEDURE — 99213 OFFICE O/P EST LOW 20 MIN: CPT | Mod: PBBFAC,PN | Performed by: NEUROLOGICAL SURGERY

## 2020-05-20 PROCEDURE — 99205 OFFICE O/P NEW HI 60 MIN: CPT | Mod: S$PBB,,, | Performed by: NEUROLOGICAL SURGERY

## 2020-05-20 NOTE — LETTER
May 26, 2020      Alejo Alanis MD  [address] [facility] [id number]           [facility]  [address] [place] [address]  Phone: [phone]  Fax: [phone]          Patient: Veronique Johnson   MR Number: [id number]   YOB: 1945   Date of Visit: 5/20/2020       Dear Dr. Alejo Alnais:    Thank you for referring Veronique Johnson to me for evaluation. Attached you will find relevant portions of my assessment and plan of care.    If you have questions, please do not hesitate to call me. I look forward to following Veronique Johnson along with you.    Sincerely,    Shelton Smith MD    Enclosure  CC:  No Recipients    If you would like to receive this communication electronically, please contact [email or web address] or [phone] to request more information on [facility] access.    For providers and/or their staff who would like to refer a patient to [facility], please contact us through our one-stop-shop provider referral line, [facility], at [phone].    If you feel you have received this communication in error or would no longer like to receive these types of communications, please e-mail [email or web address]

## 2020-05-20 NOTE — PROGRESS NOTES
"Neurosurgery History & Physical    Patient ID: Veronique Johnson is a 74 y.o. female.    Chief Complaint   Patient presents with    Neck Pain     Pt is right-hand dominant female c/o pain described as left shoulder shocking sensation that is in the left posterior shoulder and travels down to the thoracic spine area left of midline. This has been onset for 6 mos. No numbness or tingling in arms. No symptoms on the right side. Had ART once due to shoulder pain. No PT.     Neurologic Problem     She notes an aneurysm that was seen on imaging from years ago that was never addressed. She denies any vision or hearing loss. No headaches. She does have a "crawling" sensation on the right side of her head.        History of Present Illness:   Ms. Johnson is a 74 year old right handed  female with MS (diagnosed in 1993), essential tremor, HLD, COPD, IBS, HTN who presents today for evaluation of neuroimaging and neck pain. She was previously managed by Neurologist in [place] for MS until about 2 years ago. She recently was evaluated by Dr. Alanis with [facility] who reported MS was stable.     She reports a 2 month history of posterior neck pain/left shoulder that radiates down left side to mid back with some associated tingling/ "warm sensation" to that area. These symptoms are episodic and tend to last around 25 mins each. She also reports sporadic episodes of tingling to right side of head that occur several times a day. There are no known triggers.     She reports some gait instability where she tends to drift to one side while walking. She cannot recall a particular side that she drifts to.  She denies falls s/t gait instability. She denies dizziness, visual disturbance, extremity weakness, numbness/tingling, bladder/bowel dysfunction. Her symptoms do not interfere with her daily activities. She is still active, reports walking her dog daily.     She has seen Pain Management 3 years ago d/t neck pain who " performed an injection with some relief of pain. She is unable to obtain those records as the clinic has since shut down.    She was recently informed of an aneurysm which was incidentally found on imaging. She has a remote history of tobacco use (smoked for 35 years, stopped 20 years ago). She reports HTN is well controlled, systolic is usually <130. She denies family history of aneurysm.       Review of Systems   Constitutional: Negative for activity change, fatigue and fever.   HENT: Negative for hearing loss, trouble swallowing and voice change.    Eyes: Negative for photophobia and visual disturbance.   Respiratory: Negative for chest tightness and shortness of breath.    Cardiovascular: Negative for chest pain.   Gastrointestinal: Negative for abdominal pain, diarrhea, nausea and vomiting.   Endocrine: Negative for cold intolerance and heat intolerance.   Genitourinary: Negative for difficulty urinating, dysuria, frequency, hematuria, pelvic pain and urgency.   Musculoskeletal: Positive for gait problem and neck pain. Negative for back pain.   Skin: Negative for wound.   Allergic/Immunologic: Negative for immunocompromised state.   Neurological: Negative for weakness, numbness and headaches.   Psychiatric/Behavioral: Negative for confusion and suicidal ideas.       Past Medical History:   Diagnosis Date    Anemia     Anxiety     Chronic bronchitis with COPD (chronic obstructive pulmonary disease)     Esophageal spasm     Essential tremor     GERD (gastroesophageal reflux disease)     Headache     High cholesterol     Hypertension     IBS (irritable bowel syndrome)     Meniere disease     Multiple sclerosis     Muscle spasm      Social History     Socioeconomic History    Marital status:      Spouse name: Not on file    Number of children: Not on file    Years of education: Not on file    Highest education level: Not on file   Occupational History    Not on file   Social Needs     Financial resource strain: Not hard at all    Food insecurity:     Worry: Never true     Inability: Never true    Transportation needs:     Medical: No     Non-medical: No   Tobacco Use    Smoking status: Former Smoker     Last attempt to quit:      Years since quittin.3    Smokeless tobacco: Never Used   Substance and Sexual Activity    Alcohol use: Yes     Alcohol/week: 3.0 - 4.0 standard drinks     Types: 3 - 4 Glasses of wine per week     Frequency: 2-3 times a week     Drinks per session: 3 or 4     Binge frequency: Never     Comment: on weekends    Drug use: No    Sexual activity: Yes   Lifestyle    Physical activity:     Days per week: Not on file     Minutes per session: 70 min    Stress: Only a little   Relationships    Social connections:     Talks on phone: More than three times a week     Gets together: Three times a week     Attends [employer]ical service: Not on file     Active member of club or organization: Yes     Attends meetings of clubs or organizations: More than 4 times per year     Relationship status:    Other Topics Concern    Not on file   Social History Narrative    Not on file     Family History   Problem Relation Age of Onset    Coronary artery disease Mother     Heart disease Mother 60        CAD    Heart attack Father     Coronary artery disease Father     Heart disease Father 55        MI    Coronary artery disease Sister     Heart disease Sister 65        CAD     Review of patient's allergies indicates:   Allergen Reactions    Cephalosporins Anaphylaxis    Penicillins Rash       Current Outpatient Medications:     albuterol (PROVENTIL) 2.5 mg /3 mL (0.083 %) nebulizer solution, Take 3 mLs (2.5 mg total) by nebulization every 6 (six) hours as needed for Wheezing. Rescue, Disp: 75 mL, Rfl: 2    albuterol (PROVENTIL/VENTOLIN HFA) 90 mcg/actuation inhaler, Inhale 4 puffs into the lungs every 6 (six) hours as needed for Wheezing., Disp: 1 each, Rfl: 1     "ALPRAZolam (XANAX) 0.25 MG tablet, Take 1 tablet (0.25 mg total) by mouth nightly as needed., Disp: 30 tablet, Rfl: 5    amLODIPine (NORVASC) 5 MG tablet, Take 1 tablet (5 mg total) by mouth once daily., Disp: 30 tablet, Rfl: 11    HYDROcodone-acetaminophen (NORCO) 5-325 mg per tablet, Take 1 tablet by mouth every 6 (six) hours as needed for Pain., Disp: 10 tablet, Rfl: 0    multivitamin capsule, Take 1 capsule by mouth., Disp: , Rfl:     omeprazole (PRILOSEC) 20 MG capsule, Take 1 capsule (20 mg total) by mouth once daily., Disp: 90 capsule, Rfl: 3    rosuvastatin (CRESTOR) 40 MG Tab, TAKE 1 TABLET (40 MG TOTAL) BY MOUTH EVERY EVENING., Disp: 90 tablet, Rfl: 1    sertraline (ZOLOFT) 100 MG tablet, Take 1 tablet (100 mg total) by mouth once daily., Disp: 30 tablet, Rfl: 11    valACYclovir (VALTREX) 1000 MG tablet, Take 1 tablet by mouth as needed., Disp: , Rfl:   Blood pressure (!) 143/77, pulse 88, temperature 98 °F (36.7 °C), height 5' 1" (1.549 m), weight 57.6 kg (127 lb).      Neurologic Exam     Mental Status   Oriented to person, place, and time.   Level of consciousness: alert    Cranial Nerves   Cranial nerves II through XII intact.     CN III, IV, VI   Pupils are equal, round, and reactive to light.  Extraocular motions are normal.     Motor Exam   Muscle bulk: normal  Overall muscle tone: normal  Right arm tone: normal  Left arm tone: normal  Right arm pronator drift: absent  Left arm pronator drift: absent  Right leg tone: normal  Left leg tone: normal    Strength   Strength 5/5 throughout.     Sensory Exam   Light touch normal.     Gait, Coordination, and Reflexes     Gait  Gait: normal    Coordination   Romberg: positive  Finger to nose coordination: normal  Tandem walking coordination: normal    Reflexes   Right brachioradialis: 2+  Left brachioradialis: 2+  Right biceps: 1+  Left biceps: 1+  Right triceps: 1+  Left triceps: 1+  Right patellar: 3+  Left patellar: 2+  Right achilles: 2+  Left " achilles: 2+  Right Rice: absent  Left Rice: absent  Right ankle clonus: absent  Left ankle clonus: absent      Physical Exam   Constitutional: She is oriented to person, place, and time. She appears well-developed and well-nourished.   HENT:   Head: Normocephalic and atraumatic.   Eyes: Pupils are equal, round, and reactive to light. EOM are normal.   Pulmonary/Chest: Effort normal.   Neurological: She is alert and oriented to person, place, and time. She has normal strength. She has an abnormal Romberg Test. She has a normal Finger-Nose-Finger Test and a normal Tandem Gait Test. Gait normal.   Reflex Scores:       Tricep reflexes are 1+ on the right side and 1+ on the left side.       Bicep reflexes are 1+ on the right side and 1+ on the left side.       Brachioradialis reflexes are 2+ on the right side and 2+ on the left side.       Patellar reflexes are 3+ on the right side and 2+ on the left side.       Achilles reflexes are 2+ on the right side and 2+ on the left side.  Skin: Skin is warm and dry.   Psychiatric: She has a normal mood and affect.   Nursing note and vitals reviewed.      Oswestry: 12%  PHQ: 1    Imaging:   MRI Brain/Cervical spine dated 5/14/2020 reviewed. Imaging demonstrates loss of normal lordosis of cervical spine and Grade I anterolisthesis of C4 on C5. There is multilevel spondylosis throughout the cervical spine, most notable at C5-C6, C6-C7. No significant stenosis or evidence of cord signal change.     There is a small superior hypophyseal aneurysm measuring approx 3-4mm in greatest dimension. When compared to imaging from 2014, it appears unchanged. However, I would like an MRA for more accurate comparison.     There are also numerous small scattered hyperintense lesion on T2/FLAIR sequencing. These appear to be consistent with patient's history of multiple sclerosis.     Assessment & Plan:     1. Cerebral aneurysm  Ambulatory referral/consult to Neurosurgery    MRA Brain   2.  Spondylolisthesis of cervical region  X-Ray Cervical Spine AP Lat with Flex Ex    Ambulatory referral/consult to Physical/Occupational Therapy   3. Cervical myofascial pain syndrome           Ms. Johnson is a 74 year old  female with a small hypophyseal aneurysm and cervical spondylosis. I have discussed exam and imaging findings with the patient. This aneurysm measures approximately 3-4mm in greatest dimension.  I discussed with the patient the natural history of unruptured intracranial aneurysms.  I quoted her an approximately 0% annual risk of bleeding of this aneurysm given the size per the ISUIA data. I also discussed cerebral angiography as well as endovascular treatment versus surgical clipping of the aneurysm and risks/benefits of each.     After discussing the above, we will obtain an MRA for further evaluation of the aneurysm and to compare to previous imaging from 2014. If there is no significant change, annual monitoring may be a reasonable option. In regards to her neck pain, we have discussed that it appears to be myofascial in nature.  We will refer her to physical therapy for evaluation a and strengthening and obtain cervical AP/Lat with Flex/Ex XR to evaluate for dynamic instability.    We will call her with results of imaging and discuss further treatment plan at that time. She is agreeable to the plan. She will call our office with any questions/concerns in the interim.     Total visit time 45 minutes with 50% in face-to-face counseling.

## 2020-05-21 PROBLEM — M43.12 SPONDYLOLISTHESIS OF CERVICAL REGION: Status: ACTIVE | Noted: 2020-05-21

## 2020-05-21 PROBLEM — M79.18 CERVICAL MYOFASCIAL PAIN SYNDROME: Status: ACTIVE | Noted: 2020-05-21

## 2020-05-21 PROBLEM — I67.1 CEREBRAL ANEURYSM: Status: ACTIVE | Noted: 2020-05-21

## 2020-05-27 ENCOUNTER — HOSPITAL ENCOUNTER (OUTPATIENT)
Dept: RADIOLOGY | Facility: HOSPITAL | Age: 75
Discharge: HOME | End: 2020-05-27
Attending: NURSE PRACTITIONER
Payer: MEDICARE

## 2020-05-27 DIAGNOSIS — I67.1 CEREBRAL ANEURYSM: ICD-10-CM

## 2020-05-27 DIAGNOSIS — M43.12 SPONDYLOLISTHESIS OF CERVICAL REGION: ICD-10-CM

## 2020-05-27 PROCEDURE — 70544 MR ANGIOGRAPHY HEAD W/O DYE: CPT | Mod: TC,PO

## 2020-05-27 PROCEDURE — 72050 XR CERVICAL SPINE AP LAT WITH FLEX EXTEN: ICD-10-PCS | Mod: 26,,, | Performed by: RADIOLOGY

## 2020-05-27 PROCEDURE — 72050 X-RAY EXAM NECK SPINE 4/5VWS: CPT | Mod: TC,FY,PO

## 2020-05-27 PROCEDURE — 72050 X-RAY EXAM NECK SPINE 4/5VWS: CPT | Mod: 26,,, | Performed by: RADIOLOGY

## 2020-05-27 PROCEDURE — 70544 MRA BRAIN WITHOUT CONTRAST: ICD-10-PCS | Mod: 26,,, | Performed by: RADIOLOGY

## 2020-05-27 PROCEDURE — 70544 MR ANGIOGRAPHY HEAD W/O DYE: CPT | Mod: 26,,, | Performed by: RADIOLOGY

## 2020-06-02 DIAGNOSIS — K21.9 GASTROESOPHAGEAL REFLUX DISEASE, ESOPHAGITIS PRESENCE NOT SPECIFIED: ICD-10-CM

## 2020-06-02 RX ORDER — OMEPRAZOLE 20 MG/1
20 CAPSULE, DELAYED RELEASE ORAL DAILY
Qty: 90 CAPSULE | Refills: 3 | Status: SHIPPED | OUTPATIENT
Start: 2020-06-02 | End: 2021-05-14

## 2020-06-02 RX ORDER — SERTRALINE HYDROCHLORIDE 100 MG/1
100 TABLET, FILM COATED ORAL DAILY
Qty: 30 TABLET | Refills: 11 | Status: SHIPPED | OUTPATIENT
Start: 2020-06-02 | End: 2021-08-09

## 2020-06-02 RX ORDER — AMLODIPINE BESYLATE 5 MG/1
5 TABLET ORAL DAILY
Qty: 30 TABLET | Refills: 11 | Status: SHIPPED | OUTPATIENT
Start: 2020-06-02 | End: 2021-06-16

## 2020-07-01 ENCOUNTER — PATIENT OUTREACH (OUTPATIENT)
Dept: ADMINISTRATIVE | Facility: HOSPITAL | Age: 75
End: 2020-07-01

## 2020-07-01 NOTE — PROGRESS NOTES
Chart review completed 07/01/2020  Care Everywhere updates requested and reviewed.  Immunizations reconciled. Media reviewed.     Health Maintenance Due   Topic Date Due    Shingles Vaccine (2 of 3) 12/18/2015    Mammogram  05/03/2020

## 2020-07-14 ENCOUNTER — HOSPITAL ENCOUNTER (OUTPATIENT)
Dept: RADIOLOGY | Facility: HOSPITAL | Age: 75
Discharge: HOME | End: 2020-07-14
Attending: FAMILY MEDICINE
Payer: MEDICARE

## 2020-07-14 DIAGNOSIS — Z12.31 ENCOUNTER FOR SCREENING MAMMOGRAM FOR MALIGNANT NEOPLASM OF BREAST: ICD-10-CM

## 2020-07-14 PROCEDURE — 77067 SCR MAMMO BI INCL CAD: CPT | Mod: TC,PO

## 2020-07-14 PROCEDURE — 77063 BREAST TOMOSYNTHESIS BI: CPT | Mod: 26,,, | Performed by: RADIOLOGY

## 2020-07-14 PROCEDURE — 77063 MAMMO DIGITAL SCREENING BILAT WITH TOMOSYNTHESIS_CAD: ICD-10-PCS | Mod: 26,,, | Performed by: RADIOLOGY

## 2020-07-14 PROCEDURE — 77067 SCR MAMMO BI INCL CAD: CPT | Mod: 26,,, | Performed by: RADIOLOGY

## 2020-07-14 PROCEDURE — 77067 MAMMO DIGITAL SCREENING BILAT WITH TOMOSYNTHESIS_CAD: ICD-10-PCS | Mod: 26,,, | Performed by: RADIOLOGY

## 2020-07-15 ENCOUNTER — OFFICE VISIT (OUTPATIENT)
Dept: FAMILY MEDICINE | Facility: CLINIC | Age: 75
End: 2020-07-15
Payer: MEDICARE

## 2020-07-15 ENCOUNTER — IMMUNIZATION (OUTPATIENT)
Dept: PHARMACY | Facility: CLINIC | Age: 75
End: 2020-07-15
Payer: MEDICARE

## 2020-07-15 VITALS
DIASTOLIC BLOOD PRESSURE: 68 MMHG | OXYGEN SATURATION: 96 % | BODY MASS INDEX: 24.66 KG/M2 | HEART RATE: 88 BPM | SYSTOLIC BLOOD PRESSURE: 122 MMHG | TEMPERATURE: 98 F | WEIGHT: 130.5 LBS

## 2020-07-15 DIAGNOSIS — K21.9 GASTROESOPHAGEAL REFLUX DISEASE WITHOUT ESOPHAGITIS: ICD-10-CM

## 2020-07-15 DIAGNOSIS — F41.9 ANXIETY: ICD-10-CM

## 2020-07-15 DIAGNOSIS — M54.2 NECK PAIN: Primary | ICD-10-CM

## 2020-07-15 DIAGNOSIS — E78.49 OTHER HYPERLIPIDEMIA: ICD-10-CM

## 2020-07-15 DIAGNOSIS — I10 ESSENTIAL HYPERTENSION: ICD-10-CM

## 2020-07-15 DIAGNOSIS — R23.2 HOT FLASHES: ICD-10-CM

## 2020-07-15 PROCEDURE — 99214 OFFICE O/P EST MOD 30 MIN: CPT | Mod: PBBFAC,PO | Performed by: FAMILY MEDICINE

## 2020-07-15 PROCEDURE — 99999 PR PBB SHADOW E&M-EST. PATIENT-LVL IV: CPT | Mod: PBBFAC,,, | Performed by: FAMILY MEDICINE

## 2020-07-15 PROCEDURE — 99214 PR OFFICE/OUTPT VISIT, EST, LEVL IV, 30-39 MIN: ICD-10-PCS | Mod: S$PBB,,, | Performed by: FAMILY MEDICINE

## 2020-07-15 PROCEDURE — 99214 OFFICE O/P EST MOD 30 MIN: CPT | Mod: S$PBB,,, | Performed by: FAMILY MEDICINE

## 2020-07-15 PROCEDURE — 99999 PR PBB SHADOW E&M-EST. PATIENT-LVL IV: ICD-10-PCS | Mod: PBBFAC,,, | Performed by: FAMILY MEDICINE

## 2020-07-15 RX ORDER — MULTIVIT WITH MINERALS/HERBS
1 TABLET ORAL DAILY
COMMUNITY
End: 2021-03-01

## 2020-07-15 RX ORDER — GABAPENTIN 100 MG/1
CAPSULE ORAL
Qty: 90 CAPSULE | Refills: 0 | Status: SHIPPED | OUTPATIENT
Start: 2020-07-15 | End: 2020-08-27 | Stop reason: DRUGHIGH

## 2020-07-15 RX ORDER — ALPRAZOLAM 0.25 MG/1
0.25 TABLET ORAL NIGHTLY PRN
Qty: 30 TABLET | Refills: 5 | Status: SHIPPED | OUTPATIENT
Start: 2020-07-15 | End: 2021-01-19 | Stop reason: SDUPTHER

## 2020-07-15 NOTE — PROGRESS NOTES
Subjective:       Patient ID: Veronique Johnson is a 74 y.o. female.    Chief Complaint: Follow-up    HPI     Hypertension:  The patient did not bring a log of the blood pressure readings today.  The patient is taking amlodipine 5 mg daily.  Denies any side effects of the medication.  Denies symptoms of chest pain, nausea, vomiting, abdominal pain.    Diaphoresis:  The patient is complaining of sweating profusely especially at nighttime, like hot flashes.  The symptoms are getting worse.  The patient is coming here for assessment.    Hyperlipidemia:  The last cholesterol levels were good, the HDL was slightly elevated, LDL was low.  The patient is currently taking Crestor 40 mg, denies any side effects of the medication.  The patient denies any symptoms of chest pain or worsening shortness of breath.    Neck pain:  The patient complains of neck pain associated with numbness and tingling sensation on the left upper extremity.  She went to physical therapy in the symptoms improved considerably.    Past medical history, past social history was reviewed and discussed with the patient.    Review of Systems   Constitutional: Positive for diaphoresis. Negative for activity change and unexpected weight change.   HENT: Negative for hearing loss, rhinorrhea and trouble swallowing.    Eyes: Negative for discharge and visual disturbance.   Respiratory: Negative for chest tightness and wheezing.    Cardiovascular: Negative for chest pain and palpitations.   Gastrointestinal: Negative for blood in stool, constipation and vomiting.   Endocrine: Negative for polydipsia and polyuria.   Genitourinary: Negative for difficulty urinating, dysuria, hematuria and menstrual problem.   Musculoskeletal: Positive for neck pain. Negative for arthralgias and joint swelling.   Neurological: Negative for weakness and headaches.   Psychiatric/Behavioral: Negative for confusion and dysphoric mood.       Objective:      Physical Exam  Vitals signs  and nursing note reviewed.   Constitutional:       General: She is not in acute distress.     Appearance: She is well-developed. She is not diaphoretic.   HENT:      Head: Normocephalic and atraumatic.      Right Ear: External ear normal.      Left Ear: External ear normal.      Nose: Nose normal.   Neck:      Musculoskeletal: Neck supple.   Cardiovascular:      Rate and Rhythm: Normal rate and regular rhythm.      Heart sounds: Normal heart sounds. No murmur.   Pulmonary:      Effort: Pulmonary effort is normal. No respiratory distress.      Breath sounds: Normal breath sounds. No wheezing.   Musculoskeletal:         General: No tenderness or deformity.   Skin:     Coloration: Skin is not pale.      Findings: No erythema.   Neurological:      Cranial Nerves: No cranial nerve deficit.      Coordination: Coordination normal.   Psychiatric:         Behavior: Behavior normal.         Thought Content: Thought content normal.         Judgment: Judgment normal.         Assessment:       1. Neck pain    2. Essential hypertension    3. Other hyperlipidemia    4. Night sweats    5. Gastroesophageal reflux disease without esophagitis    6. Anxiety        Plan:       Neck pain:  Improved  -     gabapentin (NEURONTIN) 100 MG capsule; Take one tablet PO qhs for 1 week, and then 2 tablets PO qhs for 1 week and then 3 tablets  Dispense: 90 capsule; Refill: 0    Essential hypertension:  Stable    Other hyperlipidemia:  Well controlled    Hot flashes:  Worsening  -     gabapentin (NEURONTIN) 100 MG capsule; Take one tablet PO qhs for 1 week, and then 2 tablets PO qhs for 1 week and then 3 tablets  Dispense: 90 capsule; Refill: 0    Gastroesophageal reflux disease without esophagitis:  Stable    Anxiety:  Stable  -     ALPRAZolam (XANAX) 0.25 MG tablet; Take 1 tablet (0.25 mg total) by mouth nightly as needed.  Dispense: 30 tablet; Refill: 5    [place] prescription monitoring program was checked and okay.  Alprazolam was prescribed  for the patient to be fill on 08/01/2020.  Gabapentin was prescribed secondary to the patient's symptoms of radiculopathy and for hot flashes.  Will recheck the patient back in 3 months.   Patient agreed with assessment and plan. Patient verbalized understanding.

## 2020-07-15 NOTE — PATIENT INSTRUCTIONS
Eating Heart-Healthy Food: Using the DASH Plan    Eating for your heart doesnt have to be hard or boring. You just need to know how to make healthier choices. The DASH eating plan has been developed to help you do just that. DASH stands for Dietary Approaches to Stop Hypertension. It is a plan that has been proven to be healthier for your heart and to lower your risk for high blood pressure. It can also help lower your risk for cancer, heart disease, osteoporosis, and diabetes.  Choosing from each food group  Choose foods from each of the food groups below each day. Try to get the recommended number of servings for each food group. The serving numbers are based on a diet of 2,000 calories a day. Talk to your doctor if youre unsure about your calorie needs. Along with getting the correct servings, the DASH plan also recommends a sodium intake less than 2,300 mg per day.        Grains  Servings: 6 to 8 a day  A serving is:  · 1 slice bread  · 1 ounce dry cereal  · Half a cup cooked rice, pasta or cereal  Best choices: Whole grains and any grains high in fiber. Vegetables  Servings: 4 to 5 a day  A serving is:  · 1 cup raw leafy vegetable  · Half a cup cut-up raw or cooked vegetable  · Half a cup vegetable juice  Best choices: Fresh or frozen vegetables prepared without added salt or fat.   Fruits  Servings: 4 to 5 a day  A serving is:  · 1 medium fruit  · One-quarter cup dried fruit  · Half a cup fresh, frozen, or canned fruit  · Half a cup of 100% fruit juices  Best choices: A variety of fresh fruits of different colors. Whole fruits are a better choice than fruit juices. Low-fat or fat-free dairy  Servings: 2 to 3 a day  A serving is:  · 1 cup milk  · 1 cup yogurt  · One and a half ounces cheese  Best choices: Skim or 1% milk, low-fat or fat-free yogurt or buttermilk, and low-fat cheeses.         Lean meats, poultry, fish  Servings: 6 or fewer a day  A serving is:  · 1 ounce cooked meats, poultry, or fish  · 1  egg  Best choices: Lean poultry and fish. Trim away visible fat. Broil, grill, roast, or boil instead of frying. Remove skin from poultry before eating. Limit how much red meat you eat.  Nuts, seeds, beans  Servings: 4 to 5 a week  A serving is:  · One-third cup nuts (one and a half ounces)  · 2 tablespoons nut butter or seeds  · Half a cup cooked dry beans or legumes  Best choices: Dry roasted nuts with no salt added, lentils, kidney beans, garbanzo beans, and whole chandra beans.   Fats and oils  Servings: 2 to 3 a day  A serving is:  · 1 teaspoon vegetable oil  · 1 teaspoon soft margarine  · 1 tablespoon mayonnaise  · 2 tablespoons salad dressing  Best choices: Nut and vegetable oils (nontropical vegetable oils), such as olive and canola oil. Sweets  Servings: 5 a week or fewer  A serving is:  · 1 tablespoon sugar, maple syrup, or honey  · 1 tablespoon jam or jelly  · 1 half-ounce jelly beans (about 15)  · 1 cup lemonade  Best choices: Dried fruit can be a satisfying sweet. Choose low-fat sweets. And watch your serving sizes!      For more on the DASH eating plan, visit:  www.nhlbi.nih.gov/health/health-topics/topics/dash   Date Last Reviewed: 6/1/2016  © 9411-8667 [employer]. [address] [place] [address], [place] [address]. All rights reserved. This information is not intended as a substitute for professional medical care. Always follow your healthcare professional's instructions.

## 2020-07-17 DIAGNOSIS — Z71.89 COMPLEX CARE COORDINATION: ICD-10-CM

## 2020-08-26 ENCOUNTER — PATIENT MESSAGE (OUTPATIENT)
Dept: FAMILY MEDICINE | Facility: CLINIC | Age: 75
End: 2020-08-26

## 2020-08-27 RX ORDER — GABAPENTIN 400 MG/1
400 CAPSULE ORAL NIGHTLY
Qty: 30 CAPSULE | Refills: 11 | Status: SHIPPED | OUTPATIENT
Start: 2020-08-27 | End: 2020-10-15

## 2020-09-27 DIAGNOSIS — I10 ESSENTIAL HYPERTENSION: ICD-10-CM

## 2020-09-27 DIAGNOSIS — E78.49 OTHER HYPERLIPIDEMIA: Primary | ICD-10-CM

## 2020-09-27 NOTE — TELEPHONE ENCOUNTER
Care Due:                  Date            Visit Type   Department     Provider  --------------------------------------------------------------------------------                                             [facility]  Last Visit: 07-      None         MEDICINE       DONNA MONTESINOS                              [facility]  Next Visit: 10-      PATIENT      MEDICINE       DONNA MONTESINOS                                                            Last  Test          Frequency    Reason                     Performed    Due Date  --------------------------------------------------------------------------------    ALT.........  12 months..  rosuvastatin.............  11- 11-    AST.........  12 months..  rosuvastatin.............  11- 11-    HDL.........  12 months..  rosuvastatin.............  11- 11-    LDL.........  12 months..  rosuvastatin.............  11- 11-    Total         12 months..  rosuvastatin.............  11- 11-  Cholesterol.    Triglyceride  12 months..  rosuvastatin.............  11- 11-  s...........    Powered by [employer]. Reference number: [id number]. 9/27/2020 10:40:27 AM   CDT

## 2020-09-28 RX ORDER — ROSUVASTATIN CALCIUM 40 MG/1
40 TABLET, COATED ORAL NIGHTLY
Qty: 90 TABLET | Refills: 0 | Status: SHIPPED | OUTPATIENT
Start: 2020-09-28 | End: 2020-10-28

## 2020-09-29 NOTE — TELEPHONE ENCOUNTER
Provider Staff:     Please schedule patient for Labs (CMP, LIPID)    Thanks!  [facility]     Appointments  past 12m or future 3m with PCP    Date Provider   Last Visit   7/15/2020 Becky Song MD   Next Visit   10/15/2020 Becky Song MD     Note composed:9:36 PM 09/28/2020

## 2020-09-29 NOTE — PROGRESS NOTES
Refill Authorization Note   Veronique Johnson is requesting a refill authorization.     Brief assessment and rationale for refill: APPROVE: need labs     Medication-related problems identified: Requires labs    Medication Therapy Plan: CDMR. NTBO(CMP, LIPID); approve 3 more     Medication reconciliation completed: No                    Comments:          Requested Prescriptions   Pending Prescriptions Disp Refills    rosuvastatin (CRESTOR) 40 MG Tab [Pharmacy Med Name: ROSUVASTATIN CALCIUM 40 MG 40 Tablet] 90 tablet 0     Sig: TAKE 1 TABLET (40 MG TOTAL) BY MOUTH EVERY EVENING.       Cardiovascular:  Antilipid - Statins Passed - 9/28/2020  9:32 PM        Passed - Patient is at least 18 years old        Passed - Office Visit within last 12 months or future 90 days.     Recent Outpatient Visits            2 months ago Neck pain    [facility] Becky Song MD    4 months ago Cerebral aneurysm    [facility] Shelton Smith MD    4 months ago Cervical stenosis of spinal canal    [address] Alejo Alanis MD    6 months ago Multiple sclerosis    [address] Geni Yung MD    8 months ago Arthritis of knee, left    [facility] Tucker Le MD          Future Appointments              In 2 weeks Becky Song MD [facility]                Passed - ALT is 94 or below and within 360 days     ALT   Date Value Ref Range Status   11/19/2019 23 10 - 44 U/L Final     ALT (SGPT)   Date Value Ref Range Status   05/30/2019 21 3 - 33 IU/L               Passed - AST is 54 or below and within 360 days     AST   Date Value Ref Range Status   11/19/2019 32 10 - 40 U/L Final   05/30/2019 25 10 - 40 IU/L               Passed - Total Cholesterol within 360 days     Cholesterol   Date Value Ref Range Status   11/19/2019 149 120 - 199 mg/dL Final     Comment:     The National Cholesterol Education Program (NCEP) has  set the  following guidelines (reference ranges) for Cholesterol:  Optimal.....................<200 mg/dL  Borderline High.............200-239 mg/dL  High........................> or = 240 mg/dL     12/14/2018 150 0 - 200 mg/dL Final   11/23/2016 163 0 - 200 MG/DL Final              Passed - LDL within 360 days     LDL Calculated   Date Value Ref Range Status   12/14/2018 50 0 - 160 mg/dL Final     LDL Cholesterol   Date Value Ref Range Status   11/19/2019 51.6 (L) 63.0 - 159.0 mg/dL Final     Comment:     The National Cholesterol Education Program (NCEP) has set the  following guidelines (reference values) for LDL Cholesterol:  Optimal.......................<130 mg/dL  Borderline High...............130-159 mg/dL  High..........................160-189 mg/dL  Very High.....................>190 mg/dL              Passed - HDL within 360 days     HDL   Date Value Ref Range Status   11/19/2019 76 (H) 40 - 75 mg/dL Final     Comment:     The National Cholesterol Education Program (NCEP) has set the  following guidelines (reference values) for HDL Cholesterol:  Low...............<40 mg/dL  Optimal...........>60 mg/dL     12/14/2018 80 mg/dL Final            Passed - Triglycerides within 360 days     Triglycerides   Date Value Ref Range Status   11/19/2019 107 30 - 150 mg/dL Final     Comment:     The National Cholesterol Education Program (NCEP) has set the  following guidelines (reference values) for triglycerides:  Normal......................<150 mg/dL  Borderline High.............150-199 mg/dL  High........................200-499 mg/dL     12/14/2018 102 mg/dL Final   11/23/2016 116 40 - 160 mg/dL Final                  Appointments  past 12m or future 3m with PCP    Date Provider   Last Visit   7/15/2020 Becky Song MD   Next Visit   10/15/2020 Becky Song MD   ED visits in past 90 days: 0     Note composed:9:34 PM 09/28/2020

## 2020-10-01 ENCOUNTER — PATIENT MESSAGE (OUTPATIENT)
Dept: OTHER | Facility: OTHER | Age: 75
End: 2020-10-01

## 2020-10-06 ENCOUNTER — OFFICE VISIT (OUTPATIENT)
Dept: FAMILY MEDICINE | Facility: CLINIC | Age: 75
End: 2020-10-06
Payer: MEDICARE

## 2020-10-06 VITALS
HEART RATE: 96 BPM | HEIGHT: 61 IN | SYSTOLIC BLOOD PRESSURE: 132 MMHG | DIASTOLIC BLOOD PRESSURE: 52 MMHG | WEIGHT: 126.75 LBS | OXYGEN SATURATION: 97 % | BODY MASS INDEX: 23.93 KG/M2

## 2020-10-06 DIAGNOSIS — R30.0 DYSURIA: Primary | ICD-10-CM

## 2020-10-06 LAB
BILIRUB UR QL STRIP: NEGATIVE
CLARITY UR: CLEAR
COLOR UR: YELLOW
GLUCOSE UR QL STRIP: NEGATIVE
HGB UR QL STRIP: ABNORMAL
KETONES UR QL STRIP: ABNORMAL
LEUKOCYTE ESTERASE UR QL STRIP: NEGATIVE
NITRITE UR QL STRIP: NEGATIVE
PH UR STRIP: 6 [PH] (ref 5–8)
PROT UR QL STRIP: NEGATIVE
SP GR UR STRIP: <=1.005 (ref 1–1.03)
URN SPEC COLLECT METH UR: ABNORMAL

## 2020-10-06 PROCEDURE — G0008 ADMIN INFLUENZA VIRUS VAC: HCPCS | Mod: PBBFAC,PO

## 2020-10-06 PROCEDURE — 90694 VACC AIIV4 NO PRSRV 0.5ML IM: CPT | Mod: PBBFAC,PO

## 2020-10-06 PROCEDURE — 99214 PR OFFICE/OUTPT VISIT, EST, LEVL IV, 30-39 MIN: ICD-10-PCS | Mod: S$PBB,,, | Performed by: PHYSICIAN ASSISTANT

## 2020-10-06 PROCEDURE — 99214 OFFICE O/P EST MOD 30 MIN: CPT | Mod: S$PBB,,, | Performed by: PHYSICIAN ASSISTANT

## 2020-10-06 PROCEDURE — 81003 URINALYSIS AUTO W/O SCOPE: CPT | Mod: PO

## 2020-10-06 PROCEDURE — 99999 PR PBB SHADOW E&M-EST. PATIENT-LVL III: CPT | Mod: PBBFAC,,, | Performed by: PHYSICIAN ASSISTANT

## 2020-10-06 PROCEDURE — 99213 OFFICE O/P EST LOW 20 MIN: CPT | Mod: PBBFAC,PO | Performed by: PHYSICIAN ASSISTANT

## 2020-10-06 PROCEDURE — 87086 URINE CULTURE/COLONY COUNT: CPT

## 2020-10-06 PROCEDURE — 99999 PR PBB SHADOW E&M-EST. PATIENT-LVL III: ICD-10-PCS | Mod: PBBFAC,,, | Performed by: PHYSICIAN ASSISTANT

## 2020-10-06 RX ORDER — CYANOCOBALAMIN, ISOPROPYL ALCOHOL 1000MCG/ML
KIT INJECTION
COMMUNITY
Start: 2020-04-12 | End: 2021-10-13 | Stop reason: CLARIF

## 2020-10-06 NOTE — PROGRESS NOTES
Subjective:       Patient ID: Veronique Johnson is a 75 y.o. female    Chief Complaint: Urinary Tract Infection (x6days)    HPI  The patient is familiar to me, pcp is Dr Song.  She is CO dysuria, pelvic pressure after urination.  She has a history of interstitial cystitis and she is taking mybetric, flavoxate and uro mp with some relief.  She is not currently having any symptoms but her pain last night was unbearable.      Review of Systems   Constitutional: Negative for activity change, chills and fever.   HENT: Negative for congestion and sore throat.    Eyes: Negative for visual disturbance.   Respiratory: Negative for cough and shortness of breath.    Cardiovascular: Negative for chest pain and palpitations.   Gastrointestinal: Negative for abdominal pain, diarrhea, nausea and vomiting.   Endocrine: Negative for polydipsia and polyuria.   Genitourinary: Positive for dysuria and frequency. Negative for urgency.   Musculoskeletal: Negative for myalgias.   Skin: Negative for rash.   Neurological: Negative for headaches.   Psychiatric/Behavioral: The patient is not nervous/anxious.         Objective:   Physical Exam  Constitutional:       General: She is not in acute distress.     Appearance: She is well-developed. She is not diaphoretic.   HENT:      Head: Normocephalic and atraumatic.   Eyes:      Pupils: Pupils are equal, round, and reactive to light.   Neck:      Musculoskeletal: Normal range of motion and neck supple.   Cardiovascular:      Rate and Rhythm: Normal rate and regular rhythm.      Heart sounds: Normal heart sounds. No murmur. No friction rub. No gallop.    Pulmonary:      Effort: Pulmonary effort is normal. No respiratory distress.      Breath sounds: Normal breath sounds. No wheezing or rales.   Chest:      Chest wall: No tenderness.   Abdominal:      Tenderness: There is no right CVA tenderness or left CVA tenderness.   Musculoskeletal: Normal range of motion.   Skin:     General: Skin is warm  and dry.      Findings: No rash.   Neurological:      Mental Status: She is alert and oriented to person, place, and time.   Psychiatric:         Behavior: Behavior normal.         Thought Content: Thought content normal.         Judgment: Judgment normal.          Assessment:       1. Dysuria  Urinalysis    Urine culture        Plan:       Dysuria  -     Urinalysis  -     Urine culture    Other orders  -     Influenza - Quadrivalent (Adjuvanted)

## 2020-10-07 LAB — BACTERIA UR CULT: NO GROWTH

## 2020-10-08 DIAGNOSIS — N30.10 INTERSTITIAL CYSTITIS: Primary | ICD-10-CM

## 2020-10-12 ENCOUNTER — PATIENT MESSAGE (OUTPATIENT)
Dept: FAMILY MEDICINE | Facility: CLINIC | Age: 75
End: 2020-10-12

## 2020-10-12 DIAGNOSIS — R30.0 DYSURIA: Primary | ICD-10-CM

## 2020-10-14 ENCOUNTER — OFFICE VISIT (OUTPATIENT)
Dept: UROLOGY | Facility: CLINIC | Age: 75
End: 2020-10-14
Payer: MEDICARE

## 2020-10-14 VITALS — WEIGHT: 126.75 LBS | HEIGHT: 61 IN | BODY MASS INDEX: 23.93 KG/M2

## 2020-10-14 DIAGNOSIS — R30.0 DYSURIA: Primary | ICD-10-CM

## 2020-10-14 DIAGNOSIS — N30.10 INTERSTITIAL CYSTITIS: ICD-10-CM

## 2020-10-14 PROCEDURE — 99213 OFFICE O/P EST LOW 20 MIN: CPT | Mod: S$PBB,,, | Performed by: UROLOGY

## 2020-10-14 PROCEDURE — 99999 PR PBB SHADOW E&M-EST. PATIENT-LVL IV: CPT | Mod: PBBFAC,,, | Performed by: UROLOGY

## 2020-10-14 PROCEDURE — 99999 PR PBB SHADOW E&M-EST. PATIENT-LVL IV: ICD-10-PCS | Mod: PBBFAC,,, | Performed by: UROLOGY

## 2020-10-14 PROCEDURE — 99213 PR OFFICE/OUTPT VISIT, EST, LEVL III, 20-29 MIN: ICD-10-PCS | Mod: S$PBB,,, | Performed by: UROLOGY

## 2020-10-14 PROCEDURE — 99214 OFFICE O/P EST MOD 30 MIN: CPT | Mod: PBBFAC,PO | Performed by: UROLOGY

## 2020-10-14 RX ORDER — MIRABEGRON 50 MG/1
TABLET, FILM COATED, EXTENDED RELEASE ORAL
COMMUNITY
End: 2021-02-08

## 2020-10-14 RX ORDER — FLAVOXATE HYDROCHLORIDE 100 MG/1
100 TABLET ORAL 3 TIMES DAILY PRN
COMMUNITY
End: 2021-02-08

## 2020-10-14 NOTE — LETTER
October 14, 2020      Becky Song MD  [address] [facility] [address] [place] [address]           [facility]  [address] [place] [address]  Phone: [phone]  Fax: [phone]          Patient: Veronique Johnson   MR Number: [id number]   YOB: 1945   Date of Visit: 10/14/2020       Dear Dr. Becky Song:    Thank you for referring Veronique Johnson to me for evaluation. Attached you will find relevant portions of my assessment and plan of care.    If you have questions, please do not hesitate to call me. I look forward to following Veronique Johnson along with you.    Sincerely,    VICENTE Cunningham MD    Enclosure  CC:  No Recipients    If you would like to receive this communication electronically, please contact [email or web address] or [phone] to request more information on [employer] access.    For providers and/or their staff who would like to refer a patient to [facility], please contact us through our one-stop-shop provider referral line, [facility], at [phone].    If you feel you have received this communication in error or would no longer like to receive these types of communications, please e-mail [email or web address]

## 2020-10-14 NOTE — PROGRESS NOTES
Subjective:       Patient ID: Veronique Johnson is a 75 y.o. female.    Chief Complaint: Dysuria and Cystitis    HPI     75-year-old with a long history of interstitial cystitis.  She has been treated with intravesical instillations in the past which have been successful.  Last year she underwent cystoscopy with hydro distension.  She did well until recently.  She began having pressure sensation as well as urgency.  Her urinalysis was clear and her culture was negative.  She had a prescription for Myrbetriq and Urispas which she has been taking and her symptoms have improved some but not completely resolved.  She did recently start key to diet about 3 weeks ago.      Past Medical History:   Diagnosis Date    Anemia     Anxiety     Chronic bronchitis with COPD (chronic obstructive pulmonary disease)     Esophageal spasm     Essential tremor     GERD (gastroesophageal reflux disease)     Headache     High cholesterol     Hypertension     IBS (irritable bowel syndrome)     Meniere disease     Multiple sclerosis     Muscle spasm      Past Surgical History:   Procedure Laterality Date    CATARACT EXTRACTION, BILATERAL  2006    CYSTOSCOPY WITH HYDRODISTENSION OF BLADDER N/A 6/5/2019    Procedure: CYSTOSCOPY, WITH BLADDER HYDRODISTENSION;  Surgeon: Marko Burton MD;  Location: [facility];  Service: OB/GYN;  Laterality: N/A;    DILATION AND CURETTAGE OF UTERUS  1966    HEMORRHOID SURGERY  1997    TONSILLECTOMY  1954    TUBAL LIGATION  1990       Current Outpatient Medications:     ALPRAZolam (XANAX) 0.25 MG tablet, Take 1 tablet (0.25 mg total) by mouth nightly as needed., Disp: 30 tablet, Rfl: 5    amLODIPine (NORVASC) 5 MG tablet, Take 1 tablet (5 mg total) by mouth once daily., Disp: 30 tablet, Rfl: 11    b complex vitamins tablet, Take 1 tablet by mouth once daily., Disp: , Rfl:     cyanocobalamin, vitamin B-12, (B-12 COMPLIANCE) 1,000 mcg/mL Kit, , Disp: , Rfl:     flavoxATE (URISPAS) 100 mg  Tab, Take 100 mg by mouth 3 (three) times daily as needed., Disp: , Rfl:     gabapentin (NEURONTIN) 400 MG capsule, Take 1 capsule (400 mg total) by mouth every evening., Disp: 30 capsule, Rfl: 11    mirabegron (MYRBETRIQ) 50 mg Tb24, Take by mouth., Disp: , Rfl:     multivitamin capsule, Take 1 capsule by mouth., Disp: , Rfl:     omeprazole (PRILOSEC) 20 MG capsule, Take 1 capsule (20 mg total) by mouth once daily., Disp: 90 capsule, Rfl: 3    rosuvastatin (CRESTOR) 40 MG Tab, TAKE 1 TABLET (40 MG TOTAL) BY MOUTH EVERY EVENING., Disp: 90 tablet, Rfl: 0    sertraline (ZOLOFT) 100 MG tablet, Take 1 tablet (100 mg total) by mouth once daily., Disp: 30 tablet, Rfl: 11    valACYclovir (VALTREX) 1000 MG tablet, Take 1 tablet by mouth as needed., Disp: , Rfl:     IC BLADDER INSTILLATION # 1  dimethyl sulfoxide 50 % Soln 50 mL, lidocaine 10 mg/mL (1 %) Soln 5 mL, triamcinolone acetonide 10 mg/mL Susp 1 mL, heparin (porcine) 10,000 unit/mL Soln 1 mL, sodium bicarbonate 8.4 % (1 mEq/mL) Soln 50 mL, bupivacaine 0.5 % (5 mg/mL) Soln 5 mL, Instill 112 mLs into the bladder once. for 1 dose, Disp: 1 Dose, Rfl: 0      Review of Systems   Constitutional: Negative for fever.   Eyes: Negative for visual disturbance.   Respiratory: Negative for shortness of breath.    Cardiovascular: Negative for chest pain.   Gastrointestinal: Negative for nausea.   Genitourinary: Positive for dysuria, frequency and urgency. Negative for hematuria.   Musculoskeletal: Negative for gait problem.   Skin: Negative for rash.   Neurological: Negative for seizures.   Psychiatric/Behavioral: Negative for confusion.       Objective:      Physical Exam  Vitals signs reviewed.   Constitutional:       Appearance: She is well-developed.   Pulmonary:      Effort: Pulmonary effort is normal. No respiratory distress.   Skin:     General: Skin is warm.   Neurological:      Mental Status: She is alert and oriented to person, place, and time.   Psychiatric:          Behavior: Behavior normal.         Assessment:       1. Dysuria    2. Interstitial cystitis        Plan:       Dysuria  -     POCT URINE DIPSTICK WITHOUT MICROSCOPE  -     Ambulatory referral/consult to Urology    Interstitial cystitis    Other orders  -     IC BLADDER INSTILLATION # 1  dimethyl sulfoxide 50 % Soln 50 mL, lidocaine 10 mg/mL (1 %) Soln 5 mL, triamcinolone acetonide 10 mg/mL Susp 1 mL, heparin (porcine) 10,000 unit/mL Soln 1 mL, sodium bicarbonate 8.4 % (1 mEq/mL) Soln 50 mL, bupivacaine 0.5 % (5 mg/mL) Soln 5 mL; Instill 112 mLs into the bladder once. for 1 dose  Dispense: 1 Dose; Refill: 0      intravesical instillation today

## 2020-10-15 ENCOUNTER — OFFICE VISIT (OUTPATIENT)
Dept: FAMILY MEDICINE | Facility: CLINIC | Age: 75
End: 2020-10-15
Payer: MEDICARE

## 2020-10-15 VITALS
SYSTOLIC BLOOD PRESSURE: 130 MMHG | HEART RATE: 80 BPM | TEMPERATURE: 98 F | OXYGEN SATURATION: 97 % | DIASTOLIC BLOOD PRESSURE: 82 MMHG | BODY MASS INDEX: 23.62 KG/M2 | WEIGHT: 125 LBS

## 2020-10-15 DIAGNOSIS — I10 ESSENTIAL HYPERTENSION: ICD-10-CM

## 2020-10-15 DIAGNOSIS — R61 NIGHT SWEATS: Primary | ICD-10-CM

## 2020-10-15 DIAGNOSIS — N30.10 IC (INTERSTITIAL CYSTITIS): ICD-10-CM

## 2020-10-15 PROCEDURE — 99999 PR PBB SHADOW E&M-EST. PATIENT-LVL IV: ICD-10-PCS | Mod: PBBFAC,,, | Performed by: FAMILY MEDICINE

## 2020-10-15 PROCEDURE — 99213 OFFICE O/P EST LOW 20 MIN: CPT | Mod: S$PBB,,, | Performed by: FAMILY MEDICINE

## 2020-10-15 PROCEDURE — 99999 PR PBB SHADOW E&M-EST. PATIENT-LVL IV: CPT | Mod: PBBFAC,,, | Performed by: FAMILY MEDICINE

## 2020-10-15 PROCEDURE — 99214 OFFICE O/P EST MOD 30 MIN: CPT | Mod: PBBFAC,PO | Performed by: FAMILY MEDICINE

## 2020-10-15 PROCEDURE — 99213 PR OFFICE/OUTPT VISIT, EST, LEVL III, 20-29 MIN: ICD-10-PCS | Mod: S$PBB,,, | Performed by: FAMILY MEDICINE

## 2020-10-15 RX ORDER — GABAPENTIN 600 MG/1
600 TABLET ORAL NIGHTLY
Qty: 30 TABLET | Refills: 11 | Status: SHIPPED | OUTPATIENT
Start: 2020-10-15 | End: 2021-08-09

## 2020-10-15 NOTE — PATIENT INSTRUCTIONS
Eating Heart-Healthy Food: Using the DASH Plan    Eating for your heart doesnt have to be hard or boring. You just need to know how to make healthier choices. The DASH eating plan has been developed to help you do just that. DASH stands for Dietary Approaches to Stop Hypertension. It is a plan that has been proven to be healthier for your heart and to lower your risk for high blood pressure. It can also help lower your risk for cancer, heart disease, osteoporosis, and diabetes.  Choosing from each food group  Choose foods from each of the food groups below each day. Try to get the recommended number of servings for each food group. The serving numbers are based on a diet of 2,000 calories a day. Talk to your doctor if youre unsure about your calorie needs. Along with getting the correct servings, the DASH plan also recommends a sodium intake less than 2,300 mg per day.        Grains  Servings: 6 to 8 a day  A serving is:  · 1 slice bread  · 1 ounce dry cereal  · Half a cup cooked rice, pasta or cereal  Best choices: Whole grains and any grains high in fiber. Vegetables  Servings: 4 to 5 a day  A serving is:  · 1 cup raw leafy vegetable  · Half a cup cut-up raw or cooked vegetable  · Half a cup vegetable juice  Best choices: Fresh or frozen vegetables prepared without added salt or fat.   Fruits  Servings: 4 to 5 a day  A serving is:  · 1 medium fruit  · One-quarter cup dried fruit  · Half a cup fresh, frozen, or canned fruit  · Half a cup of 100% fruit juices  Best choices: A variety of fresh fruits of different colors. Whole fruits are a better choice than fruit juices. Low-fat or fat-free dairy  Servings: 2 to 3 a day  A serving is:  · 1 cup milk  · 1 cup yogurt  · One and a half ounces cheese  Best choices: Skim or 1% milk, low-fat or fat-free yogurt or buttermilk, and low-fat cheeses.         Lean meats, poultry, fish  Servings: 6 or fewer a day  A serving is:  · 1 ounce cooked meats, poultry, or fish  · 1  egg  Best choices: Lean poultry and fish. Trim away visible fat. Broil, grill, roast, or boil instead of frying. Remove skin from poultry before eating. Limit how much red meat you eat.  Nuts, seeds, beans  Servings: 4 to 5 a week  A serving is:  · One-third cup nuts (one and a half ounces)  · 2 tablespoons nut butter or seeds  · Half a cup cooked dry beans or legumes  Best choices: Dry roasted nuts with no salt added, lentils, kidney beans, garbanzo beans, and whole chandra beans.   Fats and oils  Servings: 2 to 3 a day  A serving is:  · 1 teaspoon vegetable oil  · 1 teaspoon soft margarine  · 1 tablespoon mayonnaise  · 2 tablespoons salad dressing  Best choices: Nut and vegetable oils (nontropical vegetable oils), such as olive and canola oil. Sweets  Servings: 5 a week or fewer  A serving is:  · 1 tablespoon sugar, maple syrup, or honey  · 1 tablespoon jam or jelly  · 1 half-ounce jelly beans (about 15)  · 1 cup lemonade  Best choices: Dried fruit can be a satisfying sweet. Choose low-fat sweets. And watch your serving sizes!      For more on the DASH eating plan, visit:  www.nhlbi.nih.gov/health/health-topics/topics/dash   Date Last Reviewed: 6/1/2016  © 1844-4399 [employer]. [address] [place] [address], [place] [address]. All rights reserved. This information is not intended as a substitute for professional medical care. Always follow your healthcare professional's instructions.

## 2020-10-15 NOTE — PROGRESS NOTES
Subjective:       Patient ID: Veronique Johnson is a 75 y.o. female.    Chief Complaint: Follow-up (3mths)    HPI     The patient is coming here today for a follow-up visit, she was started on gabapentin for night sweats, the symptoms are improved but still have them, she would like to see if the gabapentin dosage can be increased, she is currently taking 300 mg at bedtime.  She denies any side effects of the medication.  She also went to see the urologist secondary to interstitial cystitis, he recommended an instillation on the bladder which did not help.  The patient stated that the symptoms are improved and she only has episodes once a year.  And she prefer not take any medications for this problem.  The patient has been taking her blood pressure medications as directed.    Past medical history, past social history was reviewed and discussed with the patient.    Review of Systems   Constitutional: Negative for activity change and appetite change.   HENT: Negative for congestion and ear discharge.    Eyes: Negative for discharge and itching.   Respiratory: Negative for choking and chest tightness.    Cardiovascular: Negative for chest pain and leg swelling.   Gastrointestinal: Negative for abdominal distention and abdominal pain.   Endocrine: Negative for cold intolerance and heat intolerance.        Night sweats   Genitourinary: Positive for dysuria. Negative for flank pain.   Musculoskeletal: Negative for arthralgias and back pain.   Skin: Negative for pallor and rash.   Allergic/Immunologic: Negative for environmental allergies and food allergies.   Neurological: Negative for dizziness, facial asymmetry and headaches.   Hematological: Negative for adenopathy. Does not bruise/bleed easily.   Psychiatric/Behavioral: Negative for agitation, confusion and sleep disturbance.       Objective:      Physical Exam  Vitals signs and nursing note reviewed.   Constitutional:       General: She is not in acute distress.      Appearance: She is well-developed. She is not diaphoretic.   HENT:      Head: Normocephalic and atraumatic.      Right Ear: External ear normal.      Left Ear: External ear normal.      Nose: Nose normal.   Eyes:      General:         Right eye: No discharge.         Left eye: No discharge.      Conjunctiva/sclera: Conjunctivae normal.      Pupils: Pupils are equal, round, and reactive to light.   Neck:      Musculoskeletal: Neck supple.      Thyroid: No thyromegaly.      Vascular: No JVD.      Trachea: No tracheal deviation.   Cardiovascular:      Rate and Rhythm: Normal rate and regular rhythm.      Heart sounds: Normal heart sounds. No murmur.   Pulmonary:      Effort: Pulmonary effort is normal. No respiratory distress.      Breath sounds: Normal breath sounds. No wheezing.   Abdominal:      General: There is no distension.      Palpations: There is no mass.      Tenderness: There is no abdominal tenderness.   Musculoskeletal:         General: No tenderness or deformity.   Lymphadenopathy:      Cervical: No cervical adenopathy.   Skin:     Coloration: Skin is not pale.      Findings: No erythema.   Neurological:      Cranial Nerves: No cranial nerve deficit.      Coordination: Coordination normal.   Psychiatric:         Behavior: Behavior normal.         Thought Content: Thought content normal.         Judgment: Judgment normal.         Assessment:       1. Night sweats    2. IC (interstitial cystitis)    3. Essential hypertension        Plan:       Night sweats:  Improved  -     gabapentin (NEURONTIN) 600 MG tablet; Take 1 tablet (600 mg total) by mouth every evening.  Dispense: 30 tablet; Refill: 11  -     CBC auto differential; Future; Expected date: 10/15/2020    IC (interstitial cystitis):  Stable    Essential hypertension:  Stable  -     Comprehensive Metabolic Panel; Future; Expected date: 10/15/2020  -     Lipid Panel; Future; Expected date: 10/15/2020      Symptoms are improved but is still present of  night sweats, will increase the dosage of the gabapentin to 600 mg at bedtime.  The patient's BMI has been recorded in the chart. The patient has been provided educational materials regarding the benefits of attaining and maintaining a normal weight. We will continue to address and follow this issue during follow up visits.   Patient agreed with assessment and plan. Patient verbalized understanding.

## 2020-10-27 ENCOUNTER — LAB VISIT (OUTPATIENT)
Dept: LAB | Facility: HOSPITAL | Age: 75
End: 2020-10-27
Attending: FAMILY MEDICINE
Payer: MEDICARE

## 2020-10-27 DIAGNOSIS — R61 NIGHT SWEATS: ICD-10-CM

## 2020-10-27 DIAGNOSIS — I10 ESSENTIAL HYPERTENSION: ICD-10-CM

## 2020-10-27 LAB
ALBUMIN SERPL BCP-MCNC: 3.7 G/DL (ref 3.5–5.2)
ALP SERPL-CCNC: 170 U/L (ref 55–135)
ALT SERPL W/O P-5'-P-CCNC: 35 U/L (ref 10–44)
ANION GAP SERPL CALC-SCNC: 7 MMOL/L (ref 8–16)
AST SERPL-CCNC: 48 U/L (ref 10–40)
BASOPHILS # BLD AUTO: 0.04 K/UL (ref 0–0.2)
BASOPHILS NFR BLD: 0.5 % (ref 0–1.9)
BILIRUB SERPL-MCNC: 0.3 MG/DL (ref 0.1–1)
BUN SERPL-MCNC: 13 MG/DL (ref 8–23)
CALCIUM SERPL-MCNC: 9.5 MG/DL (ref 8.7–10.5)
CHLORIDE SERPL-SCNC: 103 MMOL/L (ref 95–110)
CHOLEST SERPL-MCNC: 147 MG/DL (ref 120–199)
CHOLEST/HDLC SERPL: 2.8 {RATIO} (ref 2–5)
CO2 SERPL-SCNC: 32 MMOL/L (ref 23–29)
CREAT SERPL-MCNC: 0.7 MG/DL (ref 0.5–1.4)
DIFFERENTIAL METHOD: ABNORMAL
EOSINOPHIL # BLD AUTO: 0.6 K/UL (ref 0–0.5)
EOSINOPHIL NFR BLD: 7.1 % (ref 0–8)
ERYTHROCYTE [DISTWIDTH] IN BLOOD BY AUTOMATED COUNT: 15 % (ref 11.5–14.5)
EST. GFR  (AFRICAN AMERICAN): >60 ML/MIN/1.73 M^2
EST. GFR  (NON AFRICAN AMERICAN): >60 ML/MIN/1.73 M^2
GLUCOSE SERPL-MCNC: 88 MG/DL (ref 70–110)
HCT VFR BLD AUTO: 37.9 % (ref 37–48.5)
HDLC SERPL-MCNC: 53 MG/DL (ref 40–75)
HDLC SERPL: 36.1 % (ref 20–50)
HGB BLD-MCNC: 11.2 G/DL (ref 12–16)
IMM GRANULOCYTES # BLD AUTO: 0.01 K/UL (ref 0–0.04)
IMM GRANULOCYTES NFR BLD AUTO: 0.1 % (ref 0–0.5)
LDLC SERPL CALC-MCNC: 76.8 MG/DL (ref 63–159)
LYMPHOCYTES # BLD AUTO: 1.3 K/UL (ref 1–4.8)
LYMPHOCYTES NFR BLD: 16.2 % (ref 18–48)
MCH RBC QN AUTO: 26.2 PG (ref 27–31)
MCHC RBC AUTO-ENTMCNC: 29.6 G/DL (ref 32–36)
MCV RBC AUTO: 89 FL (ref 82–98)
MONOCYTES # BLD AUTO: 0.7 K/UL (ref 0.3–1)
MONOCYTES NFR BLD: 9.1 % (ref 4–15)
NEUTROPHILS # BLD AUTO: 5.3 K/UL (ref 1.8–7.7)
NEUTROPHILS NFR BLD: 67 % (ref 38–73)
NONHDLC SERPL-MCNC: 94 MG/DL
NRBC BLD-RTO: 0 /100 WBC
PLATELET # BLD AUTO: 271 K/UL (ref 150–350)
PMV BLD AUTO: 10.4 FL (ref 9.2–12.9)
POTASSIUM SERPL-SCNC: 4.1 MMOL/L (ref 3.5–5.1)
PROT SERPL-MCNC: 7.4 G/DL (ref 6–8.4)
RBC # BLD AUTO: 4.28 M/UL (ref 4–5.4)
SODIUM SERPL-SCNC: 142 MMOL/L (ref 136–145)
TRIGL SERPL-MCNC: 86 MG/DL (ref 30–150)
WBC # BLD AUTO: 7.92 K/UL (ref 3.9–12.7)

## 2020-10-27 PROCEDURE — 80053 COMPREHEN METABOLIC PANEL: CPT

## 2020-10-27 PROCEDURE — 80061 LIPID PANEL: CPT

## 2020-10-27 PROCEDURE — 85025 COMPLETE CBC W/AUTO DIFF WBC: CPT

## 2020-10-27 PROCEDURE — 36415 COLL VENOUS BLD VENIPUNCTURE: CPT | Mod: PO

## 2020-10-27 NOTE — TELEPHONE ENCOUNTER
No new care gaps identified.  Powered by [employer]. Reference number: [id number]. 10/27/2020 10:53:27 AM   SHOAIB

## 2020-10-28 RX ORDER — ROSUVASTATIN CALCIUM 40 MG/1
40 TABLET, COATED ORAL NIGHTLY
Qty: 90 TABLET | Refills: 3 | Status: SHIPPED | OUTPATIENT
Start: 2020-10-28 | End: 2021-12-06

## 2020-10-28 NOTE — PROGRESS NOTES
Refill Authorization Note   Veronique Johnson is requesting a refill authorization.  Brief assessment and rationale for refill: Approve     Medication Therapy Plan: [facility]    Medication reconciliation completed: No   Comments:   Orders Placed This Encounter    rosuvastatin (CRESTOR) 40 MG Tab      Requested Prescriptions   Signed Prescriptions Disp Refills    rosuvastatin (CRESTOR) 40 MG Tab 90 tablet 3     Sig: TAKE 1 TABLET (40 MG TOTAL) BY MOUTH EVERY EVENING.       Cardiovascular:  Antilipid - Statins Passed - 10/27/2020 10:53 AM        Passed - Patient is at least 18 years old        Passed - Office visit in past 12 months or future 90 days     Recent Outpatient Visits            1 week ago Night sweats    [facility] Becky Song MD    2 weeks ago Dysuria    [facility] Urology VICENTE Cunningham MD    3 weeks ago Dysuria    [facility] Marifer Guerra PA-C    3 months ago Neck pain    [facility] Becky Song MD    5 months ago Cerebral aneurysm    [facility] Neurosurgery Shelton Smith MD          Future Appointments              In 2 months Becky Song MD [facility]                Passed - ALT is 94 or below and within 360 days     ALT   Date Value Ref Range Status   10/27/2020 35 10 - 44 U/L Final   11/19/2019 23 10 - 44 U/L Final     ALT (SGPT)   Date Value Ref Range Status   05/30/2019 21 3 - 33 IU/L               Passed - AST is 54 or below and within 360 days     AST   Date Value Ref Range Status   10/27/2020 48 (H) 10 - 40 U/L Final   11/19/2019 32 10 - 40 U/L Final   05/30/2019 25 10 - 40 IU/L               Passed - Total Cholesterol within 360 days     Cholesterol   Date Value Ref Range Status   10/27/2020 147 120 - 199 mg/dL Final     Comment:     The National Cholesterol Education Program (NCEP) has set the  following guidelines (reference ranges) for Cholesterol:  Optimal.....................<200 mg/dL  Borderline  High.............200-239 mg/dL  High........................> or = 240 mg/dL     11/19/2019 149 120 - 199 mg/dL Final     Comment:     The National Cholesterol Education Program (NCEP) has set the  following guidelines (reference ranges) for Cholesterol:  Optimal.....................<200 mg/dL  Borderline High.............200-239 mg/dL  High........................> or = 240 mg/dL     12/14/2018 150 0 - 200 mg/dL Final              Passed - LDL within 360 days     LDL Calculated   Date Value Ref Range Status   12/14/2018 50 0 - 160 mg/dL Final     LDL Cholesterol   Date Value Ref Range Status   10/27/2020 76.8 63.0 - 159.0 mg/dL Final     Comment:     The National Cholesterol Education Program (NCEP) has set the  following guidelines (reference values) for LDL Cholesterol:  Optimal.......................<130 mg/dL  Borderline High...............130-159 mg/dL  High..........................160-189 mg/dL  Very High.....................>190 mg/dL              Passed - HDL within 360 days     HDL   Date Value Ref Range Status   10/27/2020 53 40 - 75 mg/dL Final     Comment:     The National Cholesterol Education Program (NCEP) has set the  following guidelines (reference values) for HDL Cholesterol:  Low...............<40 mg/dL  Optimal...........>60 mg/dL     12/14/2018 80 mg/dL Final            Passed - Triglycerides within 360 days     Triglycerides   Date Value Ref Range Status   10/27/2020 86 30 - 150 mg/dL Final     Comment:     The National Cholesterol Education Program (NCEP) has set the  following guidelines (reference values) for triglycerides:  Normal......................<150 mg/dL  Borderline High.............150-199 mg/dL  High........................200-499 mg/dL     11/19/2019 107 30 - 150 mg/dL Final     Comment:     The National Cholesterol Education Program (NCEP) has set the  following guidelines (reference values) for triglycerides:  Normal......................<150 mg/dL  Borderline  High.............150-199 mg/dL  High........................200-499 mg/dL     12/14/2018 102 mg/dL Final   11/23/2016 116 40 - 160 mg/dL Final                  Appointments  past 12m or future 3m with PCP    Date Provider   Last Visit   10/15/2020 Becky Song MD   Next Visit   1/19/2021 Becky Song MD   ED visits in past 90 days: 0     Note composed:5:24 PM 10/28/2020

## 2020-11-24 DIAGNOSIS — M25.519 SHOULDER PAIN, UNSPECIFIED CHRONICITY, UNSPECIFIED LATERALITY: Primary | ICD-10-CM

## 2020-11-25 ENCOUNTER — OFFICE VISIT (OUTPATIENT)
Dept: ORTHOPEDICS | Facility: CLINIC | Age: 75
End: 2020-11-25
Payer: MEDICARE

## 2020-11-25 ENCOUNTER — HOSPITAL ENCOUNTER (OUTPATIENT)
Dept: RADIOLOGY | Facility: HOSPITAL | Age: 75
Discharge: HOME | End: 2020-11-25
Attending: ORTHOPAEDIC SURGERY
Payer: MEDICARE

## 2020-11-25 VITALS — RESPIRATION RATE: 16 BRPM | BODY MASS INDEX: 23.6 KG/M2 | HEIGHT: 61 IN | WEIGHT: 125 LBS

## 2020-11-25 DIAGNOSIS — M25.519 SHOULDER PAIN, UNSPECIFIED CHRONICITY, UNSPECIFIED LATERALITY: ICD-10-CM

## 2020-11-25 DIAGNOSIS — M75.100 ROTATOR CUFF SYNDROME, UNSPECIFIED LATERALITY: Primary | ICD-10-CM

## 2020-11-25 PROCEDURE — 99214 PR OFFICE/OUTPT VISIT, EST, LEVL IV, 30-39 MIN: ICD-10-PCS | Mod: 25,S$PBB,, | Performed by: ORTHOPAEDIC SURGERY

## 2020-11-25 PROCEDURE — 73030 X-RAY EXAM OF SHOULDER: CPT | Mod: 26,LT,, | Performed by: RADIOLOGY

## 2020-11-25 PROCEDURE — 99214 OFFICE O/P EST MOD 30 MIN: CPT | Mod: 25,S$PBB,, | Performed by: ORTHOPAEDIC SURGERY

## 2020-11-25 PROCEDURE — 73030 X-RAY EXAM OF SHOULDER: CPT | Mod: TC,PO,LT

## 2020-11-25 PROCEDURE — 99213 OFFICE O/P EST LOW 20 MIN: CPT | Mod: PBBFAC,25,PN | Performed by: ORTHOPAEDIC SURGERY

## 2020-11-25 PROCEDURE — 99999 PR PBB SHADOW E&M-EST. PATIENT-LVL III: ICD-10-PCS | Mod: PBBFAC,,, | Performed by: ORTHOPAEDIC SURGERY

## 2020-11-25 PROCEDURE — 73030 XR SHOULDER TRAUMA 3 VIEW LEFT: ICD-10-PCS | Mod: 26,LT,, | Performed by: RADIOLOGY

## 2020-11-25 PROCEDURE — 20610 LARGE JOINT ASPIRATION/INJECTION: L SUBACROMIAL BURSA: ICD-10-PCS | Mod: S$PBB,LT,, | Performed by: ORTHOPAEDIC SURGERY

## 2020-11-25 PROCEDURE — 20610 DRAIN/INJ JOINT/BURSA W/O US: CPT | Mod: PBBFAC,PN | Performed by: ORTHOPAEDIC SURGERY

## 2020-11-25 PROCEDURE — 99999 PR PBB SHADOW E&M-EST. PATIENT-LVL III: CPT | Mod: PBBFAC,,, | Performed by: ORTHOPAEDIC SURGERY

## 2020-11-25 RX ORDER — TRIAMCINOLONE ACETONIDE 40 MG/ML
40 INJECTION, SUSPENSION INTRA-ARTICULAR; INTRAMUSCULAR
Status: DISCONTINUED | OUTPATIENT
Start: 2020-11-25 | End: 2020-11-25 | Stop reason: HOSPADM

## 2020-11-25 RX ADMIN — TRIAMCINOLONE ACETONIDE 40 MG: 40 INJECTION, SUSPENSION INTRA-ARTICULAR; INTRAMUSCULAR at 09:11

## 2020-11-25 NOTE — PROCEDURES
Large Joint Aspiration/Injection: L subacromial bursa    Date/Time: 11/25/2020 9:00 AM  Performed by: Shelton Le MD  Authorized by: Shelton Le MD     Consent Done?:  Yes (Verbal)  Indications:  Pain  Site marked: the procedure site was marked    Timeout: prior to procedure the correct patient, procedure, and site was verified    Local anesthetic:  Lidocaine 1% without epinephrine and bupivacaine 0.25% without epinephrine  Anesthetic total (ml):  6      Details:  Needle Size:  20 G  Ultrasonic Guidance for needle placement?: No    Approach:  Posterior  Location:  Shoulder  Site:  L subacromial bursa  Medications:  40 mg triamcinolone acetonide 40 mg/mL  Patient tolerance:  Patient tolerated the procedure well with no immediate complications

## 2020-11-25 NOTE — PROGRESS NOTES
Past Medical History:   Diagnosis Date    Anemia     Anxiety     Chronic bronchitis with COPD (chronic obstructive pulmonary disease)     Esophageal spasm     Essential tremor     GERD (gastroesophageal reflux disease)     Headache     High cholesterol     Hypertension     IBS (irritable bowel syndrome)     Meniere disease     Multiple sclerosis     Muscle spasm        Past Surgical History:   Procedure Laterality Date    CATARACT EXTRACTION, BILATERAL  2006    CYSTOSCOPY WITH HYDRODISTENSION OF BLADDER N/A 6/5/2019    Procedure: CYSTOSCOPY, WITH BLADDER HYDRODISTENSION;  Surgeon: Marko Burton MD;  Location: [facility] OR;  Service: OB/GYN;  Laterality: N/A;    DILATION AND CURETTAGE OF UTERUS  1966    HEMORRHOID SURGERY  1997    TONSILLECTOMY  1954    TUBAL LIGATION  1990       Current Outpatient Medications   Medication Sig    ALPRAZolam (XANAX) 0.25 MG tablet Take 1 tablet (0.25 mg total) by mouth nightly as needed.    amLODIPine (NORVASC) 5 MG tablet Take 1 tablet (5 mg total) by mouth once daily.    cyanocobalamin, vitamin B-12, (B-12 COMPLIANCE) 1,000 mcg/mL Kit     gabapentin (NEURONTIN) 600 MG tablet Take 1 tablet (600 mg total) by mouth every evening.    multivitamin capsule Take 1 capsule by mouth.    omeprazole (PRILOSEC) 20 MG capsule Take 1 capsule (20 mg total) by mouth once daily.    rosuvastatin (CRESTOR) 40 MG Tab TAKE 1 TABLET (40 MG TOTAL) BY MOUTH EVERY EVENING.    sertraline (ZOLOFT) 100 MG tablet Take 1 tablet (100 mg total) by mouth once daily.    valACYclovir (VALTREX) 1000 MG tablet Take 1 tablet by mouth as needed.    b complex vitamins tablet Take 1 tablet by mouth once daily.    flavoxATE (URISPAS) 100 mg Tab Take 100 mg by mouth 3 (three) times daily as needed.    mirabegron (MYRBETRIQ) 50 mg Tb24 Take by mouth.     No current facility-administered medications for this visit.        Review of patient's allergies indicates:   Allergen Reactions     Cephalosporins Anaphylaxis    Penicillins Rash       Family History   Problem Relation Age of Onset    Coronary artery disease Mother     Heart disease Mother 60        CAD    Heart attack Father     Coronary artery disease Father     Heart disease Father 55        MI    Coronary artery disease Sister     Heart disease Sister 65        CAD       Social History     Socioeconomic History    Marital status:      Spouse name: Not on file    Number of children: Not on file    Years of education: Not on file    Highest education level: Not on file   Occupational History    Not on file   Social Needs    Financial resource strain: Not hard at all    Food insecurity     Worry: Never true     Inability: Never true    Transportation needs     Medical: No     Non-medical: No   Tobacco Use    Smoking status: Former Smoker     Quit date:      Years since quittin.9    Smokeless tobacco: Never Used   Substance and Sexual Activity    Alcohol use: Yes     Alcohol/week: 3.0 - 4.0 standard drinks     Types: 3 - 4 Glasses of wine per week     Frequency: 2-4 times a month     Drinks per session: 1 or 2     Binge frequency: Never     Comment: on weekends    Drug use: No    Sexual activity: Yes   Lifestyle    Physical activity     Days per week: 0 days     Minutes per session: 70 min    Stress: To some extent   Relationships    Social connections     Talks on phone: More than three times a week     Gets together: Twice a week     Attends [employer] service: Not on file     Active member of club or organization: No     Attends meetings of clubs or organizations: More than 4 times per year     Relationship status:    Other Topics Concern    Not on file   Social History Narrative    Not on file       Chief Complaint:   Chief Complaint   Patient presents with    Left Shoulder - Pain       History of present illness:  This is a 75-year-old female seen for about 1 week of left shoulder pain.  It  started over the weekend.  There was no injury or trauma.  Patient has pain reaching up reaching behind her back.  It pops at times.  Describes as a throbbing pain but does have some sharp pain periodically.  Patient is right-hand-dominant.  Pain is up to a 9/10.        Physical Examination:    Vital Signs:    Vitals:    11/25/20 0844   Resp: 16       Body mass index is 23.62 kg/m².    This a well-developed, well nourished patient in no acute distress.  They are alert and oriented and cooperative to examination.  Pt. walks without an antalgic gait.      Examination of the left shoulder shows no rashes or erythema. There are no masses, ecchymosis, or atrophy. The patient has full range of motion in forward flexion, external rotation, and internal rotation to the mid T-spine. The patient has moderately positive impingement signs. - Gladwin's test. - Speeds test. Nontender to palpation over a.c. joint. Normal stability anteriorly, posteriorly, and negative sulcus sign. Passive range of motion: Forward flexion of 180°, external rotation at 90° of 90°, internal rotation of 50°, and external rotation at 0° of 50°. 2+ radial pulse. Intact axillary, radial, median and ulnar sensation. 5 out of 5 resisted forward flexion, external rotation, and negative lift off test.    Examination of the right shoulder shows no rashes or erythema. There are no masses, ecchymosis, or atrophy. The patient has full range of motion in forward flexion, external rotation, and internal rotation to the mid T-spine. The patient has - impingement signs. - Gladwin's test. - Speeds test. Nontender to palpation over a.c. joint. Normal stability anteriorly, posteriorly, and negative sulcus sign. Passive range of motion: Forward flexion of 180°, external rotation at 90° of 90°, internal rotation of 50°, and external rotation at 0° of 50°. 2+ radial pulse. Intact axillary, radial, median and ulnar sensation. 5 out of 5 resisted forward flexion, external  rotation, and negative lift off test.        X-rays:  X-rays left shoulder is ordered and reviewed which show some sclerosis around the greater tuberosity     Assessment::  Left rotator cuff syndrome    Plan:  I reviewed the findings with her today.  We talked about treatment options for rotator cuff syndrome.  Patient wanted to try a subacromial injection.  I also gave her a Thera-Band and rotator cuff guide.  Follow-up as needed.    This note was created using [employer] voice recognition software that occasionally misinterpreted phrases or words.    Consult note is delivered via Epic messaging service.

## 2020-11-30 ENCOUNTER — EXTERNAL CHRONIC CARE MANAGEMENT (OUTPATIENT)
Dept: PRIMARY CARE CLINIC | Facility: CLINIC | Age: 75
End: 2020-11-30
Payer: MEDICARE

## 2020-11-30 PROCEDURE — 99490 CHRNC CARE MGMT STAFF 1ST 20: CPT | Mod: PBBFAC,PO | Performed by: FAMILY MEDICINE

## 2020-11-30 PROCEDURE — 99490 PR CHRONIC CARE MGMT, 1ST 20 MIN: ICD-10-PCS | Mod: S$PBB,,, | Performed by: FAMILY MEDICINE

## 2020-11-30 PROCEDURE — 99490 CHRNC CARE MGMT STAFF 1ST 20: CPT | Mod: S$PBB,,, | Performed by: FAMILY MEDICINE

## 2020-12-11 ENCOUNTER — PATIENT MESSAGE (OUTPATIENT)
Dept: OTHER | Facility: OTHER | Age: 75
End: 2020-12-11

## 2020-12-31 ENCOUNTER — EXTERNAL CHRONIC CARE MANAGEMENT (OUTPATIENT)
Dept: PRIMARY CARE CLINIC | Facility: CLINIC | Age: 75
End: 2020-12-31
Payer: MEDICARE

## 2020-12-31 PROCEDURE — 99490 CHRNC CARE MGMT STAFF 1ST 20: CPT | Mod: S$PBB,,, | Performed by: FAMILY MEDICINE

## 2020-12-31 PROCEDURE — 99490 CHRNC CARE MGMT STAFF 1ST 20: CPT | Mod: PBBFAC,PO | Performed by: FAMILY MEDICINE

## 2020-12-31 PROCEDURE — 99490 PR CHRONIC CARE MGMT, 1ST 20 MIN: ICD-10-PCS | Mod: S$PBB,,, | Performed by: FAMILY MEDICINE

## 2021-01-19 ENCOUNTER — PATIENT MESSAGE (OUTPATIENT)
Dept: FAMILY MEDICINE | Facility: CLINIC | Age: 76
End: 2021-01-19

## 2021-01-19 DIAGNOSIS — F41.9 ANXIETY: ICD-10-CM

## 2021-01-20 ENCOUNTER — PATIENT MESSAGE (OUTPATIENT)
Dept: FAMILY MEDICINE | Facility: CLINIC | Age: 76
End: 2021-01-20

## 2021-01-20 RX ORDER — ALPRAZOLAM 0.25 MG/1
0.25 TABLET ORAL NIGHTLY PRN
Qty: 30 TABLET | Refills: 1 | Status: SHIPPED | OUTPATIENT
Start: 2021-01-20 | End: 2021-02-23

## 2021-01-31 ENCOUNTER — EXTERNAL CHRONIC CARE MANAGEMENT (OUTPATIENT)
Dept: PRIMARY CARE CLINIC | Facility: CLINIC | Age: 76
End: 2021-01-31
Payer: MEDICARE

## 2021-01-31 PROCEDURE — 99490 CHRNC CARE MGMT STAFF 1ST 20: CPT | Mod: PBBFAC,PO | Performed by: FAMILY MEDICINE

## 2021-01-31 PROCEDURE — 99490 PR CHRONIC CARE MGMT, 1ST 20 MIN: ICD-10-PCS | Mod: S$PBB,,, | Performed by: FAMILY MEDICINE

## 2021-01-31 PROCEDURE — 99490 CHRNC CARE MGMT STAFF 1ST 20: CPT | Mod: S$PBB,,, | Performed by: FAMILY MEDICINE

## 2021-02-03 ENCOUNTER — TELEPHONE (OUTPATIENT)
Dept: FAMILY MEDICINE | Facility: CLINIC | Age: 76
End: 2021-02-03

## 2021-02-03 ENCOUNTER — PATIENT MESSAGE (OUTPATIENT)
Dept: FAMILY MEDICINE | Facility: CLINIC | Age: 76
End: 2021-02-03

## 2021-02-05 ENCOUNTER — PATIENT MESSAGE (OUTPATIENT)
Dept: NEUROLOGY | Facility: CLINIC | Age: 76
End: 2021-02-05

## 2021-02-05 DIAGNOSIS — R19.7 DIARRHEA, UNSPECIFIED TYPE: Primary | ICD-10-CM

## 2021-02-08 ENCOUNTER — OFFICE VISIT (OUTPATIENT)
Dept: FAMILY MEDICINE | Facility: CLINIC | Age: 76
End: 2021-02-08
Payer: MEDICARE

## 2021-02-08 ENCOUNTER — IMMUNIZATION (OUTPATIENT)
Dept: PHARMACY | Facility: CLINIC | Age: 76
End: 2021-02-08
Payer: MEDICARE

## 2021-02-08 VITALS
SYSTOLIC BLOOD PRESSURE: 134 MMHG | DIASTOLIC BLOOD PRESSURE: 74 MMHG | HEART RATE: 85 BPM | WEIGHT: 133.38 LBS | OXYGEN SATURATION: 99 % | BODY MASS INDEX: 25.2 KG/M2

## 2021-02-08 DIAGNOSIS — R19.7 DIARRHEA, UNSPECIFIED TYPE: Primary | ICD-10-CM

## 2021-02-08 DIAGNOSIS — G35 MULTIPLE SCLEROSIS: ICD-10-CM

## 2021-02-08 DIAGNOSIS — F41.9 ANXIETY: ICD-10-CM

## 2021-02-08 DIAGNOSIS — Z87.892 HISTORY OF ANAPHYLAXIS: ICD-10-CM

## 2021-02-08 DIAGNOSIS — I10 ESSENTIAL HYPERTENSION: ICD-10-CM

## 2021-02-08 PROCEDURE — 99999 PR PBB SHADOW E&M-EST. PATIENT-LVL III: CPT | Mod: PBBFAC,,, | Performed by: PHYSICIAN ASSISTANT

## 2021-02-08 PROCEDURE — 99999 PR PBB SHADOW E&M-EST. PATIENT-LVL III: ICD-10-PCS | Mod: PBBFAC,,, | Performed by: PHYSICIAN ASSISTANT

## 2021-02-08 PROCEDURE — 99214 OFFICE O/P EST MOD 30 MIN: CPT | Mod: S$PBB,,, | Performed by: PHYSICIAN ASSISTANT

## 2021-02-08 PROCEDURE — 99214 PR OFFICE/OUTPT VISIT, EST, LEVL IV, 30-39 MIN: ICD-10-PCS | Mod: S$PBB,,, | Performed by: PHYSICIAN ASSISTANT

## 2021-02-08 PROCEDURE — 99213 OFFICE O/P EST LOW 20 MIN: CPT | Mod: PBBFAC,PO | Performed by: PHYSICIAN ASSISTANT

## 2021-02-17 ENCOUNTER — OFFICE VISIT (OUTPATIENT)
Dept: FAMILY MEDICINE | Facility: CLINIC | Age: 76
End: 2021-02-17
Payer: MEDICARE

## 2021-02-17 ENCOUNTER — PATIENT MESSAGE (OUTPATIENT)
Dept: FAMILY MEDICINE | Facility: CLINIC | Age: 76
End: 2021-02-17

## 2021-02-17 ENCOUNTER — HOSPITAL ENCOUNTER (OUTPATIENT)
Dept: RADIOLOGY | Facility: HOSPITAL | Age: 76
Discharge: HOME | End: 2021-02-17
Attending: PHYSICIAN ASSISTANT
Payer: MEDICARE

## 2021-02-17 VITALS
HEART RATE: 82 BPM | SYSTOLIC BLOOD PRESSURE: 136 MMHG | DIASTOLIC BLOOD PRESSURE: 78 MMHG | WEIGHT: 135.81 LBS | OXYGEN SATURATION: 99 % | BODY MASS INDEX: 25.66 KG/M2

## 2021-02-17 DIAGNOSIS — M54.32 LEFT SIDED SCIATICA: Primary | ICD-10-CM

## 2021-02-17 DIAGNOSIS — M54.32 LEFT SIDED SCIATICA: ICD-10-CM

## 2021-02-17 PROCEDURE — 99214 OFFICE O/P EST MOD 30 MIN: CPT | Mod: S$PBB,,, | Performed by: PHYSICIAN ASSISTANT

## 2021-02-17 PROCEDURE — 72190 X-RAY EXAM OF PELVIS: CPT | Mod: 26,,, | Performed by: RADIOLOGY

## 2021-02-17 PROCEDURE — 72190 XR PELVIS COMPLETE MIN 3 VIEWS: ICD-10-PCS | Mod: 26,,, | Performed by: RADIOLOGY

## 2021-02-17 PROCEDURE — 96372 THER/PROPH/DIAG INJ SC/IM: CPT | Mod: PBBFAC,PO

## 2021-02-17 PROCEDURE — 99999 PR PBB SHADOW E&M-EST. PATIENT-LVL III: ICD-10-PCS | Mod: PBBFAC,,, | Performed by: PHYSICIAN ASSISTANT

## 2021-02-17 PROCEDURE — 99214 PR OFFICE/OUTPT VISIT, EST, LEVL IV, 30-39 MIN: ICD-10-PCS | Mod: S$PBB,,, | Performed by: PHYSICIAN ASSISTANT

## 2021-02-17 PROCEDURE — 72190 X-RAY EXAM OF PELVIS: CPT | Mod: TC,FY,PO

## 2021-02-17 PROCEDURE — 99213 OFFICE O/P EST LOW 20 MIN: CPT | Mod: PBBFAC,PO,25 | Performed by: PHYSICIAN ASSISTANT

## 2021-02-17 PROCEDURE — 99999 PR PBB SHADOW E&M-EST. PATIENT-LVL III: CPT | Mod: PBBFAC,,, | Performed by: PHYSICIAN ASSISTANT

## 2021-02-17 RX ORDER — KETOROLAC TROMETHAMINE 30 MG/ML
30 INJECTION, SOLUTION INTRAMUSCULAR; INTRAVENOUS ONCE
Status: COMPLETED | OUTPATIENT
Start: 2021-02-17 | End: 2021-02-17

## 2021-02-17 RX ORDER — MELOXICAM 15 MG/1
15 TABLET ORAL DAILY
Qty: 10 TABLET | Refills: 0 | Status: SHIPPED | OUTPATIENT
Start: 2021-02-17 | End: 2021-02-27

## 2021-02-17 RX ADMIN — KETOROLAC TROMETHAMINE 30 MG: 30 INJECTION, SOLUTION INTRAMUSCULAR; INTRAVENOUS at 03:02

## 2021-02-22 ENCOUNTER — PATIENT MESSAGE (OUTPATIENT)
Dept: FAMILY MEDICINE | Facility: CLINIC | Age: 76
End: 2021-02-22

## 2021-02-22 ENCOUNTER — TELEPHONE (OUTPATIENT)
Dept: FAMILY MEDICINE | Facility: CLINIC | Age: 76
End: 2021-02-22

## 2021-02-22 DIAGNOSIS — M54.9 BACK PAIN, UNSPECIFIED BACK LOCATION, UNSPECIFIED BACK PAIN LATERALITY, UNSPECIFIED CHRONICITY: Primary | ICD-10-CM

## 2021-02-22 RX ORDER — METHYLPREDNISOLONE 4 MG/1
TABLET ORAL
Qty: 1 PACKAGE | Refills: 0 | Status: SHIPPED | OUTPATIENT
Start: 2021-02-22 | End: 2021-03-01

## 2021-02-23 DIAGNOSIS — F41.9 ANXIETY: ICD-10-CM

## 2021-02-23 RX ORDER — ALPRAZOLAM 0.25 MG/1
0.25 TABLET ORAL NIGHTLY PRN
Qty: 30 TABLET | Refills: 2 | Status: SHIPPED | OUTPATIENT
Start: 2021-02-23 | End: 2021-06-21 | Stop reason: SDUPTHER

## 2021-02-28 ENCOUNTER — EXTERNAL CHRONIC CARE MANAGEMENT (OUTPATIENT)
Dept: PRIMARY CARE CLINIC | Facility: CLINIC | Age: 76
End: 2021-02-28
Payer: MEDICARE

## 2021-02-28 PROCEDURE — 99490 CHRNC CARE MGMT STAFF 1ST 20: CPT | Mod: PBBFAC,PO | Performed by: FAMILY MEDICINE

## 2021-02-28 PROCEDURE — 99490 CHRNC CARE MGMT STAFF 1ST 20: CPT | Mod: S$PBB,,, | Performed by: FAMILY MEDICINE

## 2021-02-28 PROCEDURE — 99490 PR CHRONIC CARE MGMT, 1ST 20 MIN: ICD-10-PCS | Mod: S$PBB,,, | Performed by: FAMILY MEDICINE

## 2021-03-01 ENCOUNTER — OFFICE VISIT (OUTPATIENT)
Dept: GASTROENTEROLOGY | Facility: CLINIC | Age: 76
End: 2021-03-01
Payer: MEDICARE

## 2021-03-01 ENCOUNTER — LAB VISIT (OUTPATIENT)
Dept: LAB | Facility: HOSPITAL | Age: 76
End: 2021-03-01
Attending: NURSE PRACTITIONER
Payer: MEDICARE

## 2021-03-01 VITALS — WEIGHT: 132.25 LBS | BODY MASS INDEX: 24.97 KG/M2 | HEIGHT: 61 IN

## 2021-03-01 DIAGNOSIS — Z87.19 HISTORY OF GASTROESOPHAGEAL REFLUX (GERD): ICD-10-CM

## 2021-03-01 DIAGNOSIS — R19.7 DIARRHEA, UNSPECIFIED TYPE: Primary | ICD-10-CM

## 2021-03-01 DIAGNOSIS — R10.13 EPIGASTRIC PAIN: ICD-10-CM

## 2021-03-01 DIAGNOSIS — R15.9 INCONTINENCE OF FECES WITH FECAL URGENCY: ICD-10-CM

## 2021-03-01 DIAGNOSIS — R19.7 DIARRHEA, UNSPECIFIED TYPE: ICD-10-CM

## 2021-03-01 DIAGNOSIS — R14.0 BLOATING SYMPTOM: ICD-10-CM

## 2021-03-01 DIAGNOSIS — Z01.812 PRE-PROCEDURE LAB EXAM: ICD-10-CM

## 2021-03-01 DIAGNOSIS — R19.4 CHANGE IN BOWEL HABITS: ICD-10-CM

## 2021-03-01 DIAGNOSIS — R13.14 PHARYNGOESOPHAGEAL DYSPHAGIA: ICD-10-CM

## 2021-03-01 DIAGNOSIS — R15.2 INCONTINENCE OF FECES WITH FECAL URGENCY: ICD-10-CM

## 2021-03-01 PROCEDURE — 99214 PR OFFICE/OUTPT VISIT, EST, LEVL IV, 30-39 MIN: ICD-10-PCS | Mod: S$PBB,,, | Performed by: NURSE PRACTITIONER

## 2021-03-01 PROCEDURE — 82784 ASSAY IGA/IGD/IGG/IGM EACH: CPT

## 2021-03-01 PROCEDURE — 89125 SPECIMEN FAT STAIN: CPT | Performed by: NURSE PRACTITIONER

## 2021-03-01 PROCEDURE — 87427 SHIGA-LIKE TOXIN AG IA: CPT | Performed by: NURSE PRACTITIONER

## 2021-03-01 PROCEDURE — 99214 OFFICE O/P EST MOD 30 MIN: CPT | Mod: PBBFAC,PO | Performed by: NURSE PRACTITIONER

## 2021-03-01 PROCEDURE — 87324 CLOSTRIDIUM AG IA: CPT | Performed by: NURSE PRACTITIONER

## 2021-03-01 PROCEDURE — 89055 LEUKOCYTE ASSESSMENT FECAL: CPT | Performed by: NURSE PRACTITIONER

## 2021-03-01 PROCEDURE — 99999 PR PBB SHADOW E&M-EST. PATIENT-LVL IV: CPT | Mod: PBBFAC,,, | Performed by: NURSE PRACTITIONER

## 2021-03-01 PROCEDURE — 87425 ROTAVIRUS AG IA: CPT | Performed by: NURSE PRACTITIONER

## 2021-03-01 PROCEDURE — 99999 PR PBB SHADOW E&M-EST. PATIENT-LVL IV: ICD-10-PCS | Mod: PBBFAC,,, | Performed by: NURSE PRACTITIONER

## 2021-03-01 PROCEDURE — 87046 STOOL CULTR AEROBIC BACT EA: CPT | Mod: 59 | Performed by: NURSE PRACTITIONER

## 2021-03-01 PROCEDURE — 99214 OFFICE O/P EST MOD 30 MIN: CPT | Mod: S$PBB,,, | Performed by: NURSE PRACTITIONER

## 2021-03-01 PROCEDURE — 87329 GIARDIA AG IA: CPT | Performed by: NURSE PRACTITIONER

## 2021-03-01 PROCEDURE — 83516 IMMUNOASSAY NONANTIBODY: CPT

## 2021-03-01 PROCEDURE — 83986 ASSAY PH BODY FLUID NOS: CPT | Performed by: NURSE PRACTITIONER

## 2021-03-01 PROCEDURE — 82272 OCCULT BLD FECES 1-3 TESTS: CPT | Performed by: NURSE PRACTITIONER

## 2021-03-01 PROCEDURE — 87045 FECES CULTURE AEROBIC BACT: CPT | Performed by: NURSE PRACTITIONER

## 2021-03-01 PROCEDURE — 82656 EL-1 FECAL QUAL/SEMIQ: CPT | Performed by: NURSE PRACTITIONER

## 2021-03-01 PROCEDURE — 87449 NOS EACH ORGANISM AG IA: CPT | Performed by: NURSE PRACTITIONER

## 2021-03-01 RX ORDER — HYOSCYAMINE SULFATE 0.12 MG/5ML
125 LIQUID ORAL EVERY 4 HOURS PRN
COMMUNITY
End: 2021-06-16

## 2021-03-02 LAB — IGA SERPL-MCNC: 180 MG/DL (ref 40–350)

## 2021-03-04 ENCOUNTER — TELEPHONE (OUTPATIENT)
Dept: GASTROENTEROLOGY | Facility: CLINIC | Age: 76
End: 2021-03-04

## 2021-03-04 LAB — TTG IGA SER-ACNC: 5 UNITS

## 2021-03-06 LAB
C DIFF GDH STL QL: NEGATIVE
C DIFF TOX A+B STL QL IA: NEGATIVE
FAT STL SUDAN IV STN: NORMAL
OB PNL STL: NEGATIVE
PH STL: NORMAL [PH]
RV AG STL QL IA.RAPID: NEGATIVE
WBC #/AREA STL HPF: NORMAL /[HPF]

## 2021-03-07 LAB
CRYPTOSP AG STL QL IA: NEGATIVE
E COLI SXT1 STL QL IA: NEGATIVE
E COLI SXT2 STL QL IA: NEGATIVE
G LAMBLIA AG STL QL IA: NEGATIVE

## 2021-03-09 LAB
BACTERIA STL CULT: NORMAL
ELASTASE 1, FECAL: 196 MCG/G
O+P STL MICRO: NORMAL

## 2021-03-10 LAB
HADV DNA SERPL QL NAA+PROBE: NEGATIVE
SPECIMEN SOURCE: NORMAL

## 2021-03-11 ENCOUNTER — TELEPHONE (OUTPATIENT)
Dept: GASTROENTEROLOGY | Facility: CLINIC | Age: 76
End: 2021-03-11

## 2021-03-11 DIAGNOSIS — K86.89 PANCREATIC INSUFFICIENCY: Primary | ICD-10-CM

## 2021-03-15 ENCOUNTER — OFFICE VISIT (OUTPATIENT)
Dept: URGENT CARE | Facility: CLINIC | Age: 76
End: 2021-03-15
Payer: MEDICARE

## 2021-03-15 VITALS
HEART RATE: 76 BPM | OXYGEN SATURATION: 97 % | BODY MASS INDEX: 24.55 KG/M2 | WEIGHT: 130 LBS | SYSTOLIC BLOOD PRESSURE: 124 MMHG | TEMPERATURE: 98 F | DIASTOLIC BLOOD PRESSURE: 69 MMHG | HEIGHT: 61 IN | RESPIRATION RATE: 16 BRPM

## 2021-03-15 DIAGNOSIS — M25.531 WRIST PAIN, ACUTE, RIGHT: Primary | ICD-10-CM

## 2021-03-15 PROCEDURE — 99214 PR OFFICE/OUTPT VISIT, EST, LEVL IV, 30-39 MIN: ICD-10-PCS | Mod: S$GLB,,, | Performed by: PHYSICIAN ASSISTANT

## 2021-03-15 PROCEDURE — 73110 X-RAY EXAM OF WRIST: CPT | Mod: RT,S$GLB,, | Performed by: RADIOLOGY

## 2021-03-15 PROCEDURE — 99214 OFFICE O/P EST MOD 30 MIN: CPT | Mod: S$GLB,,, | Performed by: PHYSICIAN ASSISTANT

## 2021-03-15 PROCEDURE — 73110 XR WRIST COMPLETE 3 VIEWS RIGHT: ICD-10-PCS | Mod: RT,S$GLB,, | Performed by: RADIOLOGY

## 2021-03-22 ENCOUNTER — OFFICE VISIT (OUTPATIENT)
Dept: ORTHOPEDICS | Facility: CLINIC | Age: 76
End: 2021-03-22
Payer: MEDICARE

## 2021-03-22 VITALS — WEIGHT: 130 LBS | BODY MASS INDEX: 24.55 KG/M2 | HEIGHT: 61 IN

## 2021-03-22 DIAGNOSIS — G35 MS (MULTIPLE SCLEROSIS): ICD-10-CM

## 2021-03-22 DIAGNOSIS — M25.531 RIGHT WRIST PAIN: Primary | ICD-10-CM

## 2021-03-22 DIAGNOSIS — S52.501A CLOSED FRACTURE OF DISTAL END OF RIGHT RADIUS, UNSPECIFIED FRACTURE MORPHOLOGY, INITIAL ENCOUNTER: ICD-10-CM

## 2021-03-22 PROCEDURE — 25600 PR CLOSED RX DIST RAD/ULNA FX: ICD-10-PCS | Mod: S$PBB,RT,, | Performed by: ORTHOPAEDIC SURGERY

## 2021-03-22 PROCEDURE — 99999 PR PBB SHADOW E&M-EST. PATIENT-LVL III: CPT | Mod: PBBFAC,,, | Performed by: ORTHOPAEDIC SURGERY

## 2021-03-22 PROCEDURE — 25600 CLTX DST RDL FX/EPHYS SEP WO: CPT | Mod: PBBFAC,PN | Performed by: ORTHOPAEDIC SURGERY

## 2021-03-22 PROCEDURE — 25600 CLTX DST RDL FX/EPHYS SEP WO: CPT | Mod: S$PBB,RT,, | Performed by: ORTHOPAEDIC SURGERY

## 2021-03-22 PROCEDURE — 99213 OFFICE O/P EST LOW 20 MIN: CPT | Mod: PBBFAC,PN,25 | Performed by: ORTHOPAEDIC SURGERY

## 2021-03-22 PROCEDURE — 99213 OFFICE O/P EST LOW 20 MIN: CPT | Mod: 57,S$PBB,, | Performed by: ORTHOPAEDIC SURGERY

## 2021-03-22 PROCEDURE — 99213 PR OFFICE/OUTPT VISIT, EST, LEVL III, 20-29 MIN: ICD-10-PCS | Mod: 57,S$PBB,, | Performed by: ORTHOPAEDIC SURGERY

## 2021-03-22 PROCEDURE — 99999 PR PBB SHADOW E&M-EST. PATIENT-LVL III: ICD-10-PCS | Mod: PBBFAC,,, | Performed by: ORTHOPAEDIC SURGERY

## 2021-03-25 ENCOUNTER — PATIENT MESSAGE (OUTPATIENT)
Dept: ORTHOPEDICS | Facility: CLINIC | Age: 76
End: 2021-03-25

## 2021-03-31 ENCOUNTER — EXTERNAL CHRONIC CARE MANAGEMENT (OUTPATIENT)
Dept: PRIMARY CARE CLINIC | Facility: CLINIC | Age: 76
End: 2021-03-31
Payer: MEDICARE

## 2021-03-31 ENCOUNTER — IMMUNIZATION (OUTPATIENT)
Dept: PRIMARY CARE CLINIC | Facility: CLINIC | Age: 76
End: 2021-03-31
Payer: MEDICARE

## 2021-03-31 DIAGNOSIS — Z23 NEED FOR VACCINATION: Primary | ICD-10-CM

## 2021-03-31 PROCEDURE — 0031A PR IMMUNIZ ADMIN, SARS-COV-2 COVID-19 VACC, 5X10VP/0.5ML: CPT | Mod: CV19,PBBFAC | Performed by: INTERNAL MEDICINE

## 2021-03-31 PROCEDURE — 99490 PR CHRONIC CARE MGMT, 1ST 20 MIN: ICD-10-PCS | Mod: S$PBB,,, | Performed by: FAMILY MEDICINE

## 2021-03-31 PROCEDURE — 99490 CHRNC CARE MGMT STAFF 1ST 20: CPT | Mod: S$PBB,,, | Performed by: FAMILY MEDICINE

## 2021-03-31 PROCEDURE — 99490 CHRNC CARE MGMT STAFF 1ST 20: CPT | Mod: PBBFAC,PO | Performed by: FAMILY MEDICINE

## 2021-03-31 PROCEDURE — 91303 PR SARSCOV2 VAC AD26 .5ML IM: CPT | Mod: PBBFAC | Performed by: INTERNAL MEDICINE

## 2021-03-31 RX ADMIN — JNJ-78436735 0.5 ML: 50000000000 SUSPENSION INTRAMUSCULAR at 12:03

## 2021-04-01 DIAGNOSIS — M25.531 RIGHT WRIST PAIN: Primary | ICD-10-CM

## 2021-04-01 DIAGNOSIS — S52.501A CLOSED FRACTURE OF DISTAL END OF RIGHT RADIUS, UNSPECIFIED FRACTURE MORPHOLOGY, INITIAL ENCOUNTER: ICD-10-CM

## 2021-04-05 ENCOUNTER — OFFICE VISIT (OUTPATIENT)
Dept: ORTHOPEDICS | Facility: CLINIC | Age: 76
End: 2021-04-05
Payer: MEDICARE

## 2021-04-05 ENCOUNTER — HOSPITAL ENCOUNTER (OUTPATIENT)
Dept: RADIOLOGY | Facility: HOSPITAL | Age: 76
Discharge: HOME | End: 2021-04-05
Attending: ORTHOPAEDIC SURGERY
Payer: MEDICARE

## 2021-04-05 VITALS — BODY MASS INDEX: 24.55 KG/M2 | HEIGHT: 61 IN | WEIGHT: 130 LBS

## 2021-04-05 DIAGNOSIS — S69.91XA INJURY OF RIGHT WRIST, INITIAL ENCOUNTER: Primary | ICD-10-CM

## 2021-04-05 DIAGNOSIS — S52.501A CLOSED FRACTURE OF DISTAL END OF RIGHT RADIUS, UNSPECIFIED FRACTURE MORPHOLOGY, INITIAL ENCOUNTER: ICD-10-CM

## 2021-04-05 PROCEDURE — 73110 X-RAY EXAM OF WRIST: CPT | Mod: 26,RT,, | Performed by: RADIOLOGY

## 2021-04-05 PROCEDURE — 99999 PR PBB SHADOW E&M-EST. PATIENT-LVL III: ICD-10-PCS | Mod: PBBFAC,,, | Performed by: ORTHOPAEDIC SURGERY

## 2021-04-05 PROCEDURE — 99999 PR PBB SHADOW E&M-EST. PATIENT-LVL III: CPT | Mod: PBBFAC,,, | Performed by: ORTHOPAEDIC SURGERY

## 2021-04-05 PROCEDURE — 99024 PR POST-OP FOLLOW-UP VISIT: ICD-10-PCS | Mod: POP,,, | Performed by: ORTHOPAEDIC SURGERY

## 2021-04-05 PROCEDURE — 73110 XR WRIST COMPLETE 3 VIEWS RIGHT: ICD-10-PCS | Mod: 26,RT,, | Performed by: RADIOLOGY

## 2021-04-05 PROCEDURE — 73110 X-RAY EXAM OF WRIST: CPT | Mod: TC,PO,RT

## 2021-04-05 PROCEDURE — 99024 POSTOP FOLLOW-UP VISIT: CPT | Mod: POP,,, | Performed by: ORTHOPAEDIC SURGERY

## 2021-04-05 PROCEDURE — 99213 OFFICE O/P EST LOW 20 MIN: CPT | Mod: PBBFAC,25,PN | Performed by: ORTHOPAEDIC SURGERY

## 2021-04-19 ENCOUNTER — LAB VISIT (OUTPATIENT)
Dept: FAMILY MEDICINE | Facility: CLINIC | Age: 76
End: 2021-04-19
Payer: MEDICARE

## 2021-04-19 DIAGNOSIS — Z01.812 PRE-PROCEDURE LAB EXAM: ICD-10-CM

## 2021-04-19 PROCEDURE — U0003 INFECTIOUS AGENT DETECTION BY NUCLEIC ACID (DNA OR RNA); SEVERE ACUTE RESPIRATORY SYNDROME CORONAVIRUS 2 (SARS-COV-2) (CORONAVIRUS DISEASE [COVID-19]), AMPLIFIED PROBE TECHNIQUE, MAKING USE OF HIGH THROUGHPUT TECHNOLOGIES AS DESCRIBED BY CMS-2020-01-R: HCPCS | Performed by: INTERNAL MEDICINE

## 2021-04-19 PROCEDURE — U0005 INFEC AGEN DETEC AMPLI PROBE: HCPCS | Performed by: INTERNAL MEDICINE

## 2021-04-20 LAB — SARS-COV-2 RNA RESP QL NAA+PROBE: NOT DETECTED

## 2021-04-22 ENCOUNTER — ANESTHESIA (OUTPATIENT)
Dept: ENDOSCOPY | Facility: HOSPITAL | Age: 76
End: 2021-04-22
Payer: MEDICARE

## 2021-04-22 ENCOUNTER — ANESTHESIA EVENT (OUTPATIENT)
Dept: ENDOSCOPY | Facility: HOSPITAL | Age: 76
End: 2021-04-22
Payer: MEDICARE

## 2021-04-22 ENCOUNTER — HOSPITAL ENCOUNTER (OUTPATIENT)
Facility: HOSPITAL | Age: 76
Discharge: HOME | End: 2021-04-22
Attending: INTERNAL MEDICINE | Admitting: INTERNAL MEDICINE
Payer: MEDICARE

## 2021-04-22 DIAGNOSIS — R19.7 DIARRHEA: ICD-10-CM

## 2021-04-22 DIAGNOSIS — R19.7 DIARRHEA, UNSPECIFIED TYPE: Primary | ICD-10-CM

## 2021-04-22 PROCEDURE — 45380 PR COLONOSCOPY,BIOPSY: ICD-10-PCS | Mod: 59,,, | Performed by: INTERNAL MEDICINE

## 2021-04-22 PROCEDURE — 27201012 HC FORCEPS, HOT/COLD, DISP: Mod: PO | Performed by: INTERNAL MEDICINE

## 2021-04-22 PROCEDURE — 25000003 PHARM REV CODE 250: Mod: PO | Performed by: NURSE ANESTHETIST, CERTIFIED REGISTERED

## 2021-04-22 PROCEDURE — 27201089 HC SNARE, DISP (ANY): Mod: PO | Performed by: INTERNAL MEDICINE

## 2021-04-22 PROCEDURE — 43239 EGD BIOPSY SINGLE/MULTIPLE: CPT | Mod: PO | Performed by: INTERNAL MEDICINE

## 2021-04-22 PROCEDURE — 37000009 HC ANESTHESIA EA ADD 15 MINS: Mod: PO | Performed by: INTERNAL MEDICINE

## 2021-04-22 PROCEDURE — 43248 EGD GUIDE WIRE INSERTION: CPT | Mod: ,,, | Performed by: INTERNAL MEDICINE

## 2021-04-22 PROCEDURE — 45380 COLONOSCOPY AND BIOPSY: CPT | Mod: PO | Performed by: INTERNAL MEDICINE

## 2021-04-22 PROCEDURE — 88305 TISSUE EXAM BY PATHOLOGIST: CPT | Mod: 59 | Performed by: PATHOLOGY

## 2021-04-22 PROCEDURE — 45385 PR COLONOSCOPY,REMV LESN,SNARE: ICD-10-PCS | Mod: ,,, | Performed by: INTERNAL MEDICINE

## 2021-04-22 PROCEDURE — 88305 TISSUE EXAM BY PATHOLOGIST: CPT | Mod: 26,,, | Performed by: PATHOLOGY

## 2021-04-22 PROCEDURE — 63600175 PHARM REV CODE 636 W HCPCS: Mod: PO | Performed by: NURSE ANESTHETIST, CERTIFIED REGISTERED

## 2021-04-22 PROCEDURE — 43248 PR EGD, FLEX, W/DILATION OVER GUIDEWIRE: ICD-10-PCS | Mod: ,,, | Performed by: INTERNAL MEDICINE

## 2021-04-22 PROCEDURE — 37000008 HC ANESTHESIA 1ST 15 MINUTES: Mod: PO | Performed by: INTERNAL MEDICINE

## 2021-04-22 PROCEDURE — D9220A PRA ANESTHESIA: ICD-10-PCS | Mod: CRNA,,, | Performed by: NURSE ANESTHETIST, CERTIFIED REGISTERED

## 2021-04-22 PROCEDURE — 43239 PR EGD, FLEX, W/BIOPSY, SGL/MULTI: ICD-10-PCS | Mod: 59,,, | Performed by: INTERNAL MEDICINE

## 2021-04-22 PROCEDURE — 43239 EGD BIOPSY SINGLE/MULTIPLE: CPT | Mod: 59,,, | Performed by: INTERNAL MEDICINE

## 2021-04-22 PROCEDURE — 45385 COLONOSCOPY W/LESION REMOVAL: CPT | Mod: PO | Performed by: INTERNAL MEDICINE

## 2021-04-22 PROCEDURE — D9220A PRA ANESTHESIA: ICD-10-PCS | Mod: ANES,,, | Performed by: ANESTHESIOLOGY

## 2021-04-22 PROCEDURE — 88305 TISSUE EXAM BY PATHOLOGIST: ICD-10-PCS | Mod: 26,,, | Performed by: PATHOLOGY

## 2021-04-22 PROCEDURE — 45385 COLONOSCOPY W/LESION REMOVAL: CPT | Mod: ,,, | Performed by: INTERNAL MEDICINE

## 2021-04-22 PROCEDURE — 45380 COLONOSCOPY AND BIOPSY: CPT | Mod: 59,,, | Performed by: INTERNAL MEDICINE

## 2021-04-22 PROCEDURE — D9220A PRA ANESTHESIA: Mod: ANES,,, | Performed by: ANESTHESIOLOGY

## 2021-04-22 PROCEDURE — 43248 EGD GUIDE WIRE INSERTION: CPT | Mod: PO | Performed by: INTERNAL MEDICINE

## 2021-04-22 PROCEDURE — D9220A PRA ANESTHESIA: Mod: CRNA,,, | Performed by: NURSE ANESTHETIST, CERTIFIED REGISTERED

## 2021-04-22 PROCEDURE — 63600175 PHARM REV CODE 636 W HCPCS: Mod: PO | Performed by: INTERNAL MEDICINE

## 2021-04-22 RX ORDER — LIDOCAINE HYDROCHLORIDE 20 MG/ML
INJECTION INTRAVENOUS
Status: DISCONTINUED | OUTPATIENT
Start: 2021-04-22 | End: 2021-04-22

## 2021-04-22 RX ORDER — PROPOFOL 10 MG/ML
VIAL (ML) INTRAVENOUS CONTINUOUS PRN
Status: DISCONTINUED | OUTPATIENT
Start: 2021-04-22 | End: 2021-04-22

## 2021-04-22 RX ORDER — FENTANYL CITRATE 50 UG/ML
INJECTION, SOLUTION INTRAMUSCULAR; INTRAVENOUS
Status: DISCONTINUED | OUTPATIENT
Start: 2021-04-22 | End: 2021-04-22

## 2021-04-22 RX ORDER — SODIUM CHLORIDE 0.9 % (FLUSH) 0.9 %
10 SYRINGE (ML) INJECTION EVERY 6 HOURS PRN
Status: DISCONTINUED | OUTPATIENT
Start: 2021-04-22 | End: 2021-04-22 | Stop reason: HOSPADM

## 2021-04-22 RX ORDER — SODIUM CHLORIDE, SODIUM LACTATE, POTASSIUM CHLORIDE, CALCIUM CHLORIDE 600; 310; 30; 20 MG/100ML; MG/100ML; MG/100ML; MG/100ML
INJECTION, SOLUTION INTRAVENOUS CONTINUOUS
Status: DISCONTINUED | OUTPATIENT
Start: 2021-04-22 | End: 2021-04-22 | Stop reason: HOSPADM

## 2021-04-22 RX ORDER — PROPOFOL 10 MG/ML
VIAL (ML) INTRAVENOUS
Status: DISCONTINUED | OUTPATIENT
Start: 2021-04-22 | End: 2021-04-22

## 2021-04-22 RX ADMIN — PROPOFOL 150 MCG/KG/MIN: 10 INJECTION, EMULSION INTRAVENOUS at 11:04

## 2021-04-22 RX ADMIN — LIDOCAINE HYDROCHLORIDE 100 MG: 20 INJECTION, SOLUTION INTRAVENOUS at 11:04

## 2021-04-22 RX ADMIN — PROPOFOL 25 MG: 10 INJECTION, EMULSION INTRAVENOUS at 11:04

## 2021-04-22 RX ADMIN — GLYCOPYRROLATE 0.4 MCG: 0.2 INJECTION, SOLUTION INTRAMUSCULAR; INTRAVITREAL at 10:04

## 2021-04-22 RX ADMIN — PROPOFOL 100 MG: 10 INJECTION, EMULSION INTRAVENOUS at 11:04

## 2021-04-22 RX ADMIN — SODIUM CHLORIDE, SODIUM LACTATE, POTASSIUM CHLORIDE, AND CALCIUM CHLORIDE: .6; .31; .03; .02 INJECTION, SOLUTION INTRAVENOUS at 10:04

## 2021-04-22 RX ADMIN — FENTANYL CITRATE 25 MCG: 50 INJECTION, SOLUTION INTRAMUSCULAR; INTRAVENOUS at 10:04

## 2021-04-23 VITALS
RESPIRATION RATE: 16 BRPM | TEMPERATURE: 98 F | BODY MASS INDEX: 24.55 KG/M2 | WEIGHT: 130 LBS | DIASTOLIC BLOOD PRESSURE: 75 MMHG | SYSTOLIC BLOOD PRESSURE: 154 MMHG | HEIGHT: 61 IN | HEART RATE: 77 BPM | OXYGEN SATURATION: 94 %

## 2021-04-30 ENCOUNTER — EXTERNAL CHRONIC CARE MANAGEMENT (OUTPATIENT)
Dept: PRIMARY CARE CLINIC | Facility: CLINIC | Age: 76
End: 2021-04-30
Payer: MEDICARE

## 2021-04-30 DIAGNOSIS — M25.531 RIGHT WRIST PAIN: Primary | ICD-10-CM

## 2021-04-30 LAB
FINAL PATHOLOGIC DIAGNOSIS: NORMAL
GROSS: NORMAL
Lab: NORMAL

## 2021-04-30 PROCEDURE — 99490 PR CHRONIC CARE MGMT, 1ST 20 MIN: ICD-10-PCS | Mod: S$PBB,,, | Performed by: FAMILY MEDICINE

## 2021-04-30 PROCEDURE — 99490 CHRNC CARE MGMT STAFF 1ST 20: CPT | Mod: S$PBB,,, | Performed by: FAMILY MEDICINE

## 2021-04-30 PROCEDURE — 99490 CHRNC CARE MGMT STAFF 1ST 20: CPT | Mod: PBBFAC,PO | Performed by: FAMILY MEDICINE

## 2021-05-03 ENCOUNTER — OFFICE VISIT (OUTPATIENT)
Dept: ORTHOPEDICS | Facility: CLINIC | Age: 76
End: 2021-05-03
Payer: MEDICARE

## 2021-05-03 ENCOUNTER — HOSPITAL ENCOUNTER (OUTPATIENT)
Dept: RADIOLOGY | Facility: HOSPITAL | Age: 76
Discharge: HOME | End: 2021-05-03
Attending: ORTHOPAEDIC SURGERY
Payer: MEDICARE

## 2021-05-03 VITALS — WEIGHT: 130 LBS | HEIGHT: 61 IN | BODY MASS INDEX: 24.55 KG/M2

## 2021-05-03 DIAGNOSIS — S52.501A CLOSED FRACTURE OF DISTAL END OF RIGHT RADIUS, UNSPECIFIED FRACTURE MORPHOLOGY, INITIAL ENCOUNTER: Primary | ICD-10-CM

## 2021-05-03 DIAGNOSIS — M25.531 RIGHT WRIST PAIN: ICD-10-CM

## 2021-05-03 DIAGNOSIS — M25.531 RIGHT WRIST PAIN: Primary | ICD-10-CM

## 2021-05-03 PROCEDURE — 99999 PR PBB SHADOW E&M-EST. PATIENT-LVL III: ICD-10-PCS | Mod: PBBFAC,,, | Performed by: ORTHOPAEDIC SURGERY

## 2021-05-03 PROCEDURE — 99213 OFFICE O/P EST LOW 20 MIN: CPT | Mod: PBBFAC,25,PN | Performed by: ORTHOPAEDIC SURGERY

## 2021-05-03 PROCEDURE — 73110 X-RAY EXAM OF WRIST: CPT | Mod: 26,RT,, | Performed by: RADIOLOGY

## 2021-05-03 PROCEDURE — 99999 PR PBB SHADOW E&M-EST. PATIENT-LVL III: CPT | Mod: PBBFAC,,, | Performed by: ORTHOPAEDIC SURGERY

## 2021-05-03 PROCEDURE — 73110 XR WRIST COMPLETE 3 VIEWS RIGHT: ICD-10-PCS | Mod: 26,RT,, | Performed by: RADIOLOGY

## 2021-05-03 PROCEDURE — 99024 POSTOP FOLLOW-UP VISIT: CPT | Mod: POP,,, | Performed by: ORTHOPAEDIC SURGERY

## 2021-05-03 PROCEDURE — 99024 PR POST-OP FOLLOW-UP VISIT: ICD-10-PCS | Mod: POP,,, | Performed by: ORTHOPAEDIC SURGERY

## 2021-05-03 PROCEDURE — 73110 X-RAY EXAM OF WRIST: CPT | Mod: TC,PO,RT

## 2021-05-05 ENCOUNTER — CLINICAL SUPPORT (OUTPATIENT)
Dept: REHABILITATION | Facility: HOSPITAL | Age: 76
End: 2021-05-05
Attending: ORTHOPAEDIC SURGERY
Payer: MEDICARE

## 2021-05-05 DIAGNOSIS — Z78.9 IMPAIRED MOBILITY AND ADLS: ICD-10-CM

## 2021-05-05 DIAGNOSIS — M25.531 RIGHT WRIST PAIN: ICD-10-CM

## 2021-05-05 DIAGNOSIS — R29.898 WEAKNESS OF RIGHT HAND: ICD-10-CM

## 2021-05-05 DIAGNOSIS — S52.501A CLOSED FRACTURE OF DISTAL END OF RIGHT RADIUS, UNSPECIFIED FRACTURE MORPHOLOGY, INITIAL ENCOUNTER: ICD-10-CM

## 2021-05-05 DIAGNOSIS — M25.631 STIFFNESS OF RIGHT WRIST JOINT: ICD-10-CM

## 2021-05-05 DIAGNOSIS — Z74.09 IMPAIRED MOBILITY AND ADLS: ICD-10-CM

## 2021-05-05 PROCEDURE — 97166 OT EVAL MOD COMPLEX 45 MIN: CPT | Mod: PO

## 2021-05-05 PROCEDURE — 97110 THERAPEUTIC EXERCISES: CPT | Mod: PO

## 2021-05-11 ENCOUNTER — CLINICAL SUPPORT (OUTPATIENT)
Dept: REHABILITATION | Facility: HOSPITAL | Age: 76
End: 2021-05-11
Attending: ORTHOPAEDIC SURGERY
Payer: MEDICARE

## 2021-05-11 DIAGNOSIS — M25.631 STIFFNESS OF RIGHT WRIST JOINT: Primary | ICD-10-CM

## 2021-05-11 DIAGNOSIS — R29.898 WEAKNESS OF RIGHT HAND: ICD-10-CM

## 2021-05-11 DIAGNOSIS — Z74.09 IMPAIRED MOBILITY AND ADLS: ICD-10-CM

## 2021-05-11 DIAGNOSIS — Z78.9 IMPAIRED MOBILITY AND ADLS: ICD-10-CM

## 2021-05-11 PROCEDURE — 97140 MANUAL THERAPY 1/> REGIONS: CPT | Mod: PO

## 2021-05-11 PROCEDURE — 97530 THERAPEUTIC ACTIVITIES: CPT | Mod: PO

## 2021-05-11 PROCEDURE — 97110 THERAPEUTIC EXERCISES: CPT | Mod: PO

## 2021-05-12 DIAGNOSIS — K21.9 GASTROESOPHAGEAL REFLUX DISEASE: ICD-10-CM

## 2021-05-14 ENCOUNTER — CLINICAL SUPPORT (OUTPATIENT)
Dept: REHABILITATION | Facility: HOSPITAL | Age: 76
End: 2021-05-14
Attending: ORTHOPAEDIC SURGERY
Payer: MEDICARE

## 2021-05-14 DIAGNOSIS — Z78.9 IMPAIRED MOBILITY AND ADLS: ICD-10-CM

## 2021-05-14 DIAGNOSIS — Z74.09 IMPAIRED MOBILITY AND ADLS: ICD-10-CM

## 2021-05-14 DIAGNOSIS — M25.631 STIFFNESS OF RIGHT WRIST JOINT: ICD-10-CM

## 2021-05-14 DIAGNOSIS — R29.898 WEAKNESS OF RIGHT HAND: ICD-10-CM

## 2021-05-14 PROCEDURE — 97110 THERAPEUTIC EXERCISES: CPT | Mod: PO

## 2021-05-14 PROCEDURE — 97530 THERAPEUTIC ACTIVITIES: CPT | Mod: PO

## 2021-05-14 PROCEDURE — 97140 MANUAL THERAPY 1/> REGIONS: CPT | Mod: PO

## 2021-05-16 RX ORDER — OMEPRAZOLE 20 MG/1
20 CAPSULE, DELAYED RELEASE ORAL DAILY
Qty: 90 CAPSULE | Refills: 1 | Status: SHIPPED | OUTPATIENT
Start: 2021-05-16 | End: 2021-10-13

## 2021-05-18 ENCOUNTER — CLINICAL SUPPORT (OUTPATIENT)
Dept: REHABILITATION | Facility: HOSPITAL | Age: 76
End: 2021-05-18
Attending: ORTHOPAEDIC SURGERY
Payer: MEDICARE

## 2021-05-18 DIAGNOSIS — M25.631 STIFFNESS OF RIGHT WRIST JOINT: Primary | ICD-10-CM

## 2021-05-18 DIAGNOSIS — Z78.9 IMPAIRED MOBILITY AND ADLS: ICD-10-CM

## 2021-05-18 DIAGNOSIS — Z74.09 IMPAIRED MOBILITY AND ADLS: ICD-10-CM

## 2021-05-18 DIAGNOSIS — R29.898 WEAKNESS OF RIGHT HAND: ICD-10-CM

## 2021-05-18 PROCEDURE — 97140 MANUAL THERAPY 1/> REGIONS: CPT | Mod: PO

## 2021-05-18 PROCEDURE — 97530 THERAPEUTIC ACTIVITIES: CPT | Mod: PO

## 2021-05-25 ENCOUNTER — TELEPHONE (OUTPATIENT)
Dept: NEUROSURGERY | Facility: CLINIC | Age: 76
End: 2021-05-25

## 2021-05-25 ENCOUNTER — PATIENT MESSAGE (OUTPATIENT)
Dept: NEUROSURGERY | Facility: CLINIC | Age: 76
End: 2021-05-25

## 2021-05-25 ENCOUNTER — CLINICAL SUPPORT (OUTPATIENT)
Dept: REHABILITATION | Facility: HOSPITAL | Age: 76
End: 2021-05-25
Attending: ORTHOPAEDIC SURGERY
Payer: MEDICARE

## 2021-05-25 DIAGNOSIS — K86.89 PANCREATIC INSUFFICIENCY: ICD-10-CM

## 2021-05-25 DIAGNOSIS — I67.1 CEREBRAL ANEURYSM: Primary | ICD-10-CM

## 2021-05-25 DIAGNOSIS — M25.631 STIFFNESS OF RIGHT WRIST JOINT: ICD-10-CM

## 2021-05-25 DIAGNOSIS — Z78.9 IMPAIRED MOBILITY AND ADLS: ICD-10-CM

## 2021-05-25 DIAGNOSIS — R29.898 WEAKNESS OF RIGHT HAND: ICD-10-CM

## 2021-05-25 DIAGNOSIS — Z74.09 IMPAIRED MOBILITY AND ADLS: ICD-10-CM

## 2021-05-25 PROCEDURE — 97140 MANUAL THERAPY 1/> REGIONS: CPT | Mod: PO

## 2021-05-25 PROCEDURE — 97110 THERAPEUTIC EXERCISES: CPT | Mod: PO

## 2021-05-26 DIAGNOSIS — G35 MULTIPLE SCLEROSIS: Primary | ICD-10-CM

## 2021-05-31 ENCOUNTER — EXTERNAL CHRONIC CARE MANAGEMENT (OUTPATIENT)
Dept: PRIMARY CARE CLINIC | Facility: CLINIC | Age: 76
End: 2021-05-31
Payer: MEDICARE

## 2021-05-31 ENCOUNTER — PATIENT MESSAGE (OUTPATIENT)
Dept: PSYCHIATRY | Facility: CLINIC | Age: 76
End: 2021-05-31

## 2021-05-31 PROCEDURE — 99490 CHRNC CARE MGMT STAFF 1ST 20: CPT | Mod: PBBFAC,PO | Performed by: FAMILY MEDICINE

## 2021-05-31 PROCEDURE — 99490 PR CHRONIC CARE MGMT, 1ST 20 MIN: ICD-10-PCS | Mod: S$PBB,,, | Performed by: FAMILY MEDICINE

## 2021-05-31 PROCEDURE — 99490 CHRNC CARE MGMT STAFF 1ST 20: CPT | Mod: S$PBB,,, | Performed by: FAMILY MEDICINE

## 2021-06-01 ENCOUNTER — CLINICAL SUPPORT (OUTPATIENT)
Dept: REHABILITATION | Facility: HOSPITAL | Age: 76
End: 2021-06-01
Attending: ORTHOPAEDIC SURGERY
Payer: MEDICARE

## 2021-06-01 DIAGNOSIS — Z78.9 IMPAIRED MOBILITY AND ADLS: ICD-10-CM

## 2021-06-01 DIAGNOSIS — R29.898 WEAKNESS OF RIGHT HAND: ICD-10-CM

## 2021-06-01 DIAGNOSIS — M25.631 STIFFNESS OF RIGHT WRIST JOINT: Primary | ICD-10-CM

## 2021-06-01 DIAGNOSIS — Z74.09 IMPAIRED MOBILITY AND ADLS: ICD-10-CM

## 2021-06-01 PROCEDURE — 97530 THERAPEUTIC ACTIVITIES: CPT | Mod: PO

## 2021-06-01 PROCEDURE — 97110 THERAPEUTIC EXERCISES: CPT | Mod: PO

## 2021-06-01 PROCEDURE — 97140 MANUAL THERAPY 1/> REGIONS: CPT | Mod: PO

## 2021-06-07 ENCOUNTER — PATIENT MESSAGE (OUTPATIENT)
Dept: NEUROSURGERY | Facility: CLINIC | Age: 76
End: 2021-06-07

## 2021-06-08 ENCOUNTER — HOSPITAL ENCOUNTER (OUTPATIENT)
Dept: RADIOLOGY | Facility: HOSPITAL | Age: 76
Discharge: HOME | End: 2021-06-08
Attending: NURSE PRACTITIONER
Payer: MEDICARE

## 2021-06-08 ENCOUNTER — CLINICAL SUPPORT (OUTPATIENT)
Dept: REHABILITATION | Facility: HOSPITAL | Age: 76
End: 2021-06-08
Attending: ORTHOPAEDIC SURGERY
Payer: MEDICARE

## 2021-06-08 DIAGNOSIS — Z74.09 IMPAIRED MOBILITY AND ADLS: ICD-10-CM

## 2021-06-08 DIAGNOSIS — R29.898 WEAKNESS OF RIGHT HAND: ICD-10-CM

## 2021-06-08 DIAGNOSIS — Z78.9 IMPAIRED MOBILITY AND ADLS: ICD-10-CM

## 2021-06-08 DIAGNOSIS — M25.631 STIFFNESS OF RIGHT WRIST JOINT: Primary | ICD-10-CM

## 2021-06-08 DIAGNOSIS — I67.1 CEREBRAL ANEURYSM: ICD-10-CM

## 2021-06-08 PROCEDURE — 97110 THERAPEUTIC EXERCISES: CPT | Mod: PO

## 2021-06-08 PROCEDURE — 70544 MR ANGIOGRAPHY HEAD W/O DYE: CPT | Mod: 26,,, | Performed by: RADIOLOGY

## 2021-06-08 PROCEDURE — 70544 MRA BRAIN WITHOUT CONTRAST: ICD-10-PCS | Mod: 26,,, | Performed by: RADIOLOGY

## 2021-06-08 PROCEDURE — 70544 MR ANGIOGRAPHY HEAD W/O DYE: CPT | Mod: TC,PO

## 2021-06-08 PROCEDURE — 97530 THERAPEUTIC ACTIVITIES: CPT | Mod: PO

## 2021-06-14 DIAGNOSIS — I10 ESSENTIAL HYPERTENSION: Primary | ICD-10-CM

## 2021-06-15 ENCOUNTER — CLINICAL SUPPORT (OUTPATIENT)
Dept: REHABILITATION | Facility: HOSPITAL | Age: 76
End: 2021-06-15
Attending: ORTHOPAEDIC SURGERY
Payer: MEDICARE

## 2021-06-15 DIAGNOSIS — R29.898 WEAKNESS OF RIGHT HAND: ICD-10-CM

## 2021-06-15 DIAGNOSIS — Z78.9 IMPAIRED MOBILITY AND ADLS: ICD-10-CM

## 2021-06-15 DIAGNOSIS — M25.631 STIFFNESS OF RIGHT WRIST JOINT: ICD-10-CM

## 2021-06-15 DIAGNOSIS — Z74.09 IMPAIRED MOBILITY AND ADLS: ICD-10-CM

## 2021-06-15 PROCEDURE — 97530 THERAPEUTIC ACTIVITIES: CPT | Mod: PO

## 2021-06-15 PROCEDURE — 97110 THERAPEUTIC EXERCISES: CPT | Mod: PO

## 2021-06-15 PROCEDURE — 97035 APP MDLTY 1+ULTRASOUND EA 15: CPT | Mod: PO

## 2021-06-16 ENCOUNTER — OFFICE VISIT (OUTPATIENT)
Dept: NEUROSURGERY | Facility: CLINIC | Age: 76
End: 2021-06-16
Payer: MEDICARE

## 2021-06-16 VITALS
RESPIRATION RATE: 16 BRPM | SYSTOLIC BLOOD PRESSURE: 115 MMHG | BODY MASS INDEX: 24.55 KG/M2 | HEIGHT: 61 IN | WEIGHT: 130.06 LBS | DIASTOLIC BLOOD PRESSURE: 68 MMHG | HEART RATE: 79 BPM

## 2021-06-16 DIAGNOSIS — I67.1 CEREBRAL ANEURYSM, NONRUPTURED: Primary | ICD-10-CM

## 2021-06-16 PROCEDURE — 99999 PR PBB SHADOW E&M-EST. PATIENT-LVL III: CPT | Mod: PBBFAC,,, | Performed by: NEUROLOGICAL SURGERY

## 2021-06-16 PROCEDURE — 99214 PR OFFICE/OUTPT VISIT, EST, LEVL IV, 30-39 MIN: ICD-10-PCS | Mod: S$PBB,,, | Performed by: NEUROLOGICAL SURGERY

## 2021-06-16 PROCEDURE — 99999 PR PBB SHADOW E&M-EST. PATIENT-LVL III: ICD-10-PCS | Mod: PBBFAC,,, | Performed by: NEUROLOGICAL SURGERY

## 2021-06-16 PROCEDURE — 99214 OFFICE O/P EST MOD 30 MIN: CPT | Mod: S$PBB,,, | Performed by: NEUROLOGICAL SURGERY

## 2021-06-16 PROCEDURE — 99213 OFFICE O/P EST LOW 20 MIN: CPT | Mod: PBBFAC,PN | Performed by: NEUROLOGICAL SURGERY

## 2021-06-16 RX ORDER — AMLODIPINE BESYLATE 5 MG/1
5 TABLET ORAL DAILY
Qty: 90 TABLET | Refills: 1 | Status: SHIPPED | OUTPATIENT
Start: 2021-06-16 | End: 2022-01-27

## 2021-06-21 DIAGNOSIS — F41.9 ANXIETY: ICD-10-CM

## 2021-06-21 RX ORDER — ALPRAZOLAM 0.25 MG/1
0.25 TABLET ORAL NIGHTLY PRN
Qty: 30 TABLET | Refills: 2 | Status: SHIPPED | OUTPATIENT
Start: 2021-06-21 | End: 2021-08-09 | Stop reason: SDUPTHER

## 2021-06-22 ENCOUNTER — CLINICAL SUPPORT (OUTPATIENT)
Dept: REHABILITATION | Facility: HOSPITAL | Age: 76
End: 2021-06-22
Attending: ORTHOPAEDIC SURGERY
Payer: MEDICARE

## 2021-06-22 DIAGNOSIS — Z74.09 IMPAIRED MOBILITY AND ADLS: ICD-10-CM

## 2021-06-22 DIAGNOSIS — Z78.9 IMPAIRED MOBILITY AND ADLS: ICD-10-CM

## 2021-06-22 DIAGNOSIS — M25.631 STIFFNESS OF RIGHT WRIST JOINT: ICD-10-CM

## 2021-06-22 DIAGNOSIS — R29.898 WEAKNESS OF RIGHT HAND: ICD-10-CM

## 2021-06-22 PROCEDURE — 97035 APP MDLTY 1+ULTRASOUND EA 15: CPT | Mod: PO

## 2021-06-22 PROCEDURE — 97530 THERAPEUTIC ACTIVITIES: CPT | Mod: PO

## 2021-06-29 ENCOUNTER — CLINICAL SUPPORT (OUTPATIENT)
Dept: REHABILITATION | Facility: HOSPITAL | Age: 76
End: 2021-06-29
Attending: ORTHOPAEDIC SURGERY
Payer: MEDICARE

## 2021-06-29 DIAGNOSIS — Z74.09 IMPAIRED MOBILITY AND ADLS: ICD-10-CM

## 2021-06-29 DIAGNOSIS — M25.631 STIFFNESS OF RIGHT WRIST JOINT: Primary | ICD-10-CM

## 2021-06-29 DIAGNOSIS — Z78.9 IMPAIRED MOBILITY AND ADLS: ICD-10-CM

## 2021-06-29 DIAGNOSIS — R29.898 WEAKNESS OF RIGHT HAND: ICD-10-CM

## 2021-06-29 PROCEDURE — 97140 MANUAL THERAPY 1/> REGIONS: CPT | Mod: PO

## 2021-06-29 PROCEDURE — 97018 PARAFFIN BATH THERAPY: CPT | Mod: PO

## 2021-06-29 PROCEDURE — 97035 APP MDLTY 1+ULTRASOUND EA 15: CPT | Mod: PO

## 2021-06-29 PROCEDURE — 97530 THERAPEUTIC ACTIVITIES: CPT | Mod: PO

## 2021-06-30 ENCOUNTER — EXTERNAL CHRONIC CARE MANAGEMENT (OUTPATIENT)
Dept: PRIMARY CARE CLINIC | Facility: CLINIC | Age: 76
End: 2021-06-30
Payer: MEDICARE

## 2021-06-30 PROCEDURE — 99490 PR CHRONIC CARE MGMT, 1ST 20 MIN: ICD-10-PCS | Mod: S$PBB,,, | Performed by: FAMILY MEDICINE

## 2021-06-30 PROCEDURE — 99490 CHRNC CARE MGMT STAFF 1ST 20: CPT | Mod: S$PBB,,, | Performed by: FAMILY MEDICINE

## 2021-06-30 PROCEDURE — 99490 CHRNC CARE MGMT STAFF 1ST 20: CPT | Mod: PBBFAC,PO | Performed by: FAMILY MEDICINE

## 2021-07-07 ENCOUNTER — HOSPITAL ENCOUNTER (OUTPATIENT)
Dept: RADIOLOGY | Facility: HOSPITAL | Age: 76
Discharge: HOME | End: 2021-07-07
Attending: ORTHOPAEDIC SURGERY
Payer: MEDICARE

## 2021-07-07 ENCOUNTER — OFFICE VISIT (OUTPATIENT)
Dept: ORTHOPEDICS | Facility: CLINIC | Age: 76
End: 2021-07-07
Payer: MEDICARE

## 2021-07-07 VITALS — HEIGHT: 61 IN | WEIGHT: 130 LBS | RESPIRATION RATE: 16 BRPM | BODY MASS INDEX: 24.55 KG/M2

## 2021-07-07 DIAGNOSIS — M25.561 RIGHT KNEE PAIN, UNSPECIFIED CHRONICITY: Primary | ICD-10-CM

## 2021-07-07 DIAGNOSIS — S83.281A ACUTE LATERAL MENISCAL TEAR, RIGHT, INITIAL ENCOUNTER: ICD-10-CM

## 2021-07-07 DIAGNOSIS — M25.561 RIGHT KNEE PAIN, UNSPECIFIED CHRONICITY: ICD-10-CM

## 2021-07-07 PROCEDURE — 73564 X-RAY EXAM KNEE 4 OR MORE: CPT | Mod: TC,PO,RT

## 2021-07-07 PROCEDURE — 73564 XR KNEE ORTHO RIGHT WITH FLEXION: ICD-10-PCS | Mod: 26,RT,, | Performed by: RADIOLOGY

## 2021-07-07 PROCEDURE — 99213 OFFICE O/P EST LOW 20 MIN: CPT | Mod: 25,S$PBB,, | Performed by: ORTHOPAEDIC SURGERY

## 2021-07-07 PROCEDURE — 99214 OFFICE O/P EST MOD 30 MIN: CPT | Mod: PBBFAC,PN | Performed by: ORTHOPAEDIC SURGERY

## 2021-07-07 PROCEDURE — 20610 DRAIN/INJ JOINT/BURSA W/O US: CPT | Mod: PBBFAC,PN | Performed by: ORTHOPAEDIC SURGERY

## 2021-07-07 PROCEDURE — 73564 X-RAY EXAM KNEE 4 OR MORE: CPT | Mod: 26,RT,, | Performed by: RADIOLOGY

## 2021-07-07 PROCEDURE — 99999 PR PBB SHADOW E&M-EST. PATIENT-LVL IV: CPT | Mod: PBBFAC,,, | Performed by: ORTHOPAEDIC SURGERY

## 2021-07-07 PROCEDURE — 99999 PR PBB SHADOW E&M-EST. PATIENT-LVL IV: ICD-10-PCS | Mod: PBBFAC,,, | Performed by: ORTHOPAEDIC SURGERY

## 2021-07-07 PROCEDURE — 99213 PR OFFICE/OUTPT VISIT, EST, LEVL III, 20-29 MIN: ICD-10-PCS | Mod: 25,S$PBB,, | Performed by: ORTHOPAEDIC SURGERY

## 2021-07-07 PROCEDURE — 20610 LARGE JOINT ASPIRATION/INJECTION: R KNEE: ICD-10-PCS | Mod: S$PBB,RT,, | Performed by: ORTHOPAEDIC SURGERY

## 2021-07-07 PROCEDURE — 73562 XR KNEE ORTHO RIGHT WITH FLEXION: ICD-10-PCS | Mod: 26,LT,, | Performed by: RADIOLOGY

## 2021-07-07 PROCEDURE — 73562 X-RAY EXAM OF KNEE 3: CPT | Mod: 26,LT,, | Performed by: RADIOLOGY

## 2021-07-07 RX ORDER — TRIAMCINOLONE ACETONIDE 40 MG/ML
40 INJECTION, SUSPENSION INTRA-ARTICULAR; INTRAMUSCULAR
Status: DISCONTINUED | OUTPATIENT
Start: 2021-07-07 | End: 2021-07-07 | Stop reason: HOSPADM

## 2021-07-07 RX ADMIN — TRIAMCINOLONE ACETONIDE 40 MG: 40 INJECTION, SUSPENSION INTRA-ARTICULAR; INTRAMUSCULAR at 11:07

## 2021-07-16 ENCOUNTER — TELEPHONE (OUTPATIENT)
Dept: ORTHOPEDICS | Facility: CLINIC | Age: 76
End: 2021-07-16

## 2021-07-19 ENCOUNTER — PATIENT OUTREACH (OUTPATIENT)
Dept: ADMINISTRATIVE | Facility: OTHER | Age: 76
End: 2021-07-19

## 2021-07-19 ENCOUNTER — PATIENT MESSAGE (OUTPATIENT)
Dept: ADMINISTRATIVE | Facility: OTHER | Age: 76
End: 2021-07-19

## 2021-07-19 DIAGNOSIS — Z12.31 BREAST CANCER SCREENING BY MAMMOGRAM: Primary | ICD-10-CM

## 2021-07-21 ENCOUNTER — OFFICE VISIT (OUTPATIENT)
Dept: ORTHOPEDICS | Facility: CLINIC | Age: 76
End: 2021-07-21
Payer: MEDICARE

## 2021-07-21 VITALS — WEIGHT: 130 LBS | HEIGHT: 61 IN | BODY MASS INDEX: 24.55 KG/M2 | RESPIRATION RATE: 16 BRPM

## 2021-07-21 DIAGNOSIS — S83.241D ACUTE MEDIAL MENISCAL TEAR, RIGHT, SUBSEQUENT ENCOUNTER: Primary | ICD-10-CM

## 2021-07-21 DIAGNOSIS — Z01.818 PRE-OP TESTING: Primary | ICD-10-CM

## 2021-07-21 PROCEDURE — 99214 PR OFFICE/OUTPT VISIT, EST, LEVL IV, 30-39 MIN: ICD-10-PCS | Mod: 57,S$PBB,, | Performed by: ORTHOPAEDIC SURGERY

## 2021-07-21 PROCEDURE — 99214 OFFICE O/P EST MOD 30 MIN: CPT | Mod: 57,S$PBB,, | Performed by: ORTHOPAEDIC SURGERY

## 2021-07-21 PROCEDURE — 99999 PR PBB SHADOW E&M-EST. PATIENT-LVL III: CPT | Mod: PBBFAC,,, | Performed by: ORTHOPAEDIC SURGERY

## 2021-07-21 PROCEDURE — 99213 OFFICE O/P EST LOW 20 MIN: CPT | Mod: PBBFAC,PN | Performed by: ORTHOPAEDIC SURGERY

## 2021-07-21 PROCEDURE — 99999 PR PBB SHADOW E&M-EST. PATIENT-LVL III: ICD-10-PCS | Mod: PBBFAC,,, | Performed by: ORTHOPAEDIC SURGERY

## 2021-07-22 ENCOUNTER — TELEPHONE (OUTPATIENT)
Dept: FAMILY MEDICINE | Facility: CLINIC | Age: 76
End: 2021-07-22

## 2021-07-23 DIAGNOSIS — Z01.818 PRE-OP TESTING: ICD-10-CM

## 2021-07-23 DIAGNOSIS — S83.241A ACUTE MEDIAL MENISCAL TEAR, RIGHT, INITIAL ENCOUNTER: ICD-10-CM

## 2021-07-23 DIAGNOSIS — S83.241D ACUTE MEDIAL MENISCAL TEAR, RIGHT, SUBSEQUENT ENCOUNTER: Primary | ICD-10-CM

## 2021-07-23 RX ORDER — VANCOMYCIN HCL IN 5 % DEXTROSE 1G/250ML
1000 PLASTIC BAG, INJECTION (ML) INTRAVENOUS
Status: CANCELLED | OUTPATIENT
Start: 2021-07-23

## 2021-07-28 ENCOUNTER — HOSPITAL ENCOUNTER (OUTPATIENT)
Dept: RADIOLOGY | Facility: HOSPITAL | Age: 76
Discharge: HOME | End: 2021-07-28
Attending: ORTHOPAEDIC SURGERY
Payer: MEDICARE

## 2021-07-28 ENCOUNTER — CLINICAL SUPPORT (OUTPATIENT)
Dept: CARDIOLOGY | Facility: HOSPITAL | Age: 76
End: 2021-07-28
Attending: ORTHOPAEDIC SURGERY
Payer: MEDICARE

## 2021-07-28 DIAGNOSIS — Z01.818 PRE-OP TESTING: ICD-10-CM

## 2021-07-28 PROCEDURE — 93005 ELECTROCARDIOGRAM TRACING: CPT | Mod: PO

## 2021-07-28 PROCEDURE — 71046 X-RAY EXAM CHEST 2 VIEWS: CPT | Mod: 26,,, | Performed by: RADIOLOGY

## 2021-07-28 PROCEDURE — 93010 EKG 12-LEAD: ICD-10-PCS | Mod: ,,, | Performed by: INTERNAL MEDICINE

## 2021-07-28 PROCEDURE — 71046 X-RAY EXAM CHEST 2 VIEWS: CPT | Mod: TC,FY,PO

## 2021-07-28 PROCEDURE — 93010 ELECTROCARDIOGRAM REPORT: CPT | Mod: ,,, | Performed by: INTERNAL MEDICINE

## 2021-07-28 PROCEDURE — 71046 XR CHEST PA AND LATERAL: ICD-10-PCS | Mod: 26,,, | Performed by: RADIOLOGY

## 2021-07-30 ENCOUNTER — PATIENT MESSAGE (OUTPATIENT)
Dept: FAMILY MEDICINE | Facility: CLINIC | Age: 76
End: 2021-07-30

## 2021-07-30 DIAGNOSIS — R91.1 SOLITARY PULMONARY NODULE: Primary | ICD-10-CM

## 2021-07-31 ENCOUNTER — EXTERNAL CHRONIC CARE MANAGEMENT (OUTPATIENT)
Dept: PRIMARY CARE CLINIC | Facility: CLINIC | Age: 76
End: 2021-07-31
Payer: MEDICARE

## 2021-07-31 PROCEDURE — 99490 PR CHRONIC CARE MGMT, 1ST 20 MIN: ICD-10-PCS | Mod: S$PBB,,, | Performed by: FAMILY MEDICINE

## 2021-07-31 PROCEDURE — 99490 CHRNC CARE MGMT STAFF 1ST 20: CPT | Mod: S$PBB,,, | Performed by: FAMILY MEDICINE

## 2021-07-31 PROCEDURE — 99490 CHRNC CARE MGMT STAFF 1ST 20: CPT | Mod: PBBFAC,PO | Performed by: FAMILY MEDICINE

## 2021-08-02 ENCOUNTER — HOSPITAL ENCOUNTER (OUTPATIENT)
Dept: RADIOLOGY | Facility: HOSPITAL | Age: 76
Discharge: HOME | End: 2021-08-02
Attending: FAMILY MEDICINE
Payer: MEDICARE

## 2021-08-02 DIAGNOSIS — R91.1 SOLITARY PULMONARY NODULE: ICD-10-CM

## 2021-08-02 PROCEDURE — 71250 CT CHEST WITHOUT CONTRAST: ICD-10-PCS | Mod: 26,,, | Performed by: RADIOLOGY

## 2021-08-02 PROCEDURE — 71250 CT THORAX DX C-: CPT | Mod: 26,,, | Performed by: RADIOLOGY

## 2021-08-02 PROCEDURE — 71250 CT THORAX DX C-: CPT | Mod: TC,PO

## 2021-08-03 ENCOUNTER — PATIENT MESSAGE (OUTPATIENT)
Dept: FAMILY MEDICINE | Facility: CLINIC | Age: 76
End: 2021-08-03

## 2021-08-04 ENCOUNTER — PATIENT MESSAGE (OUTPATIENT)
Dept: FAMILY MEDICINE | Facility: CLINIC | Age: 76
End: 2021-08-04

## 2021-08-05 ENCOUNTER — TELEPHONE (OUTPATIENT)
Dept: FAMILY MEDICINE | Facility: CLINIC | Age: 76
End: 2021-08-05

## 2021-08-05 ENCOUNTER — OFFICE VISIT (OUTPATIENT)
Dept: FAMILY MEDICINE | Facility: CLINIC | Age: 76
End: 2021-08-05
Payer: MEDICARE

## 2021-08-05 DIAGNOSIS — E04.1 THYROID NODULE: Primary | ICD-10-CM

## 2021-08-05 DIAGNOSIS — R91.1 SOLITARY PULMONARY NODULE: ICD-10-CM

## 2021-08-05 DIAGNOSIS — R05.9 COUGH: ICD-10-CM

## 2021-08-05 DIAGNOSIS — J44.1 CHRONIC OBSTRUCTIVE PULMONARY DISEASE WITH ACUTE EXACERBATION: ICD-10-CM

## 2021-08-05 DIAGNOSIS — R06.02 SHORTNESS OF BREATH: ICD-10-CM

## 2021-08-05 DIAGNOSIS — I70.0 ATHEROSCLEROSIS OF AORTA: ICD-10-CM

## 2021-08-05 DIAGNOSIS — R93.89 ABNORMAL CT SCAN, CHEST: ICD-10-CM

## 2021-08-05 PROCEDURE — 99214 PR OFFICE/OUTPT VISIT, EST, LEVL IV, 30-39 MIN: ICD-10-PCS | Mod: 95,,, | Performed by: FAMILY MEDICINE

## 2021-08-05 PROCEDURE — 99214 OFFICE O/P EST MOD 30 MIN: CPT | Mod: 95,,, | Performed by: FAMILY MEDICINE

## 2021-08-05 RX ORDER — DOXYCYCLINE 100 MG/1
100 CAPSULE ORAL 2 TIMES DAILY
Qty: 20 CAPSULE | Refills: 0 | Status: SHIPPED | OUTPATIENT
Start: 2021-08-05 | End: 2021-08-15

## 2021-08-09 ENCOUNTER — PATIENT MESSAGE (OUTPATIENT)
Dept: SURGERY | Facility: HOSPITAL | Age: 76
End: 2021-08-09

## 2021-08-09 ENCOUNTER — LAB VISIT (OUTPATIENT)
Dept: LAB | Facility: HOSPITAL | Age: 76
End: 2021-08-09
Attending: FAMILY MEDICINE
Payer: MEDICARE

## 2021-08-09 ENCOUNTER — TELEPHONE (OUTPATIENT)
Dept: ORTHOPEDICS | Facility: CLINIC | Age: 76
End: 2021-08-09

## 2021-08-09 ENCOUNTER — OFFICE VISIT (OUTPATIENT)
Dept: FAMILY MEDICINE | Facility: CLINIC | Age: 76
End: 2021-08-09
Payer: MEDICARE

## 2021-08-09 VITALS
HEART RATE: 85 BPM | BODY MASS INDEX: 25.68 KG/M2 | SYSTOLIC BLOOD PRESSURE: 138 MMHG | RESPIRATION RATE: 18 BRPM | HEIGHT: 61 IN | TEMPERATURE: 98 F | WEIGHT: 136 LBS | OXYGEN SATURATION: 95 % | DIASTOLIC BLOOD PRESSURE: 60 MMHG

## 2021-08-09 DIAGNOSIS — R06.02 SHORTNESS OF BREATH: ICD-10-CM

## 2021-08-09 DIAGNOSIS — I70.0 ATHEROSCLEROSIS OF AORTA: ICD-10-CM

## 2021-08-09 DIAGNOSIS — F43.10 PTSD (POST-TRAUMATIC STRESS DISORDER): Primary | ICD-10-CM

## 2021-08-09 DIAGNOSIS — E04.1 THYROID NODULE: ICD-10-CM

## 2021-08-09 DIAGNOSIS — R61 NIGHT SWEATS: ICD-10-CM

## 2021-08-09 DIAGNOSIS — K58.8 OTHER IRRITABLE BOWEL SYNDROME: ICD-10-CM

## 2021-08-09 DIAGNOSIS — Z01.818 PRE-OP TESTING: Primary | ICD-10-CM

## 2021-08-09 DIAGNOSIS — F41.9 ANXIETY: ICD-10-CM

## 2021-08-09 DIAGNOSIS — F41.0 PANIC ATTACKS: ICD-10-CM

## 2021-08-09 LAB
BASOPHILS # BLD AUTO: 0.05 K/UL (ref 0–0.2)
BASOPHILS NFR BLD: 0.6 % (ref 0–1.9)
CHOLEST SERPL-MCNC: 139 MG/DL (ref 120–199)
CHOLEST/HDLC SERPL: 2 {RATIO} (ref 2–5)
DIFFERENTIAL METHOD: ABNORMAL
EOSINOPHIL # BLD AUTO: 0.5 K/UL (ref 0–0.5)
EOSINOPHIL NFR BLD: 5.2 % (ref 0–8)
ERYTHROCYTE [DISTWIDTH] IN BLOOD BY AUTOMATED COUNT: 15.4 % (ref 11.5–14.5)
HCT VFR BLD AUTO: 40.2 % (ref 37–48.5)
HDLC SERPL-MCNC: 71 MG/DL (ref 40–75)
HDLC SERPL: 51.1 % (ref 20–50)
HGB BLD-MCNC: 12.6 G/DL (ref 12–16)
IMM GRANULOCYTES # BLD AUTO: 0.02 K/UL (ref 0–0.04)
IMM GRANULOCYTES NFR BLD AUTO: 0.2 % (ref 0–0.5)
LDLC SERPL CALC-MCNC: 53.6 MG/DL (ref 63–159)
LYMPHOCYTES # BLD AUTO: 0.9 K/UL (ref 1–4.8)
LYMPHOCYTES NFR BLD: 10.1 % (ref 18–48)
MCH RBC QN AUTO: 27.3 PG (ref 27–31)
MCHC RBC AUTO-ENTMCNC: 31.3 G/DL (ref 32–36)
MCV RBC AUTO: 87 FL (ref 82–98)
MONOCYTES # BLD AUTO: 0.6 K/UL (ref 0.3–1)
MONOCYTES NFR BLD: 6.8 % (ref 4–15)
NEUTROPHILS # BLD AUTO: 6.9 K/UL (ref 1.8–7.7)
NEUTROPHILS NFR BLD: 77.1 % (ref 38–73)
NONHDLC SERPL-MCNC: 68 MG/DL
NRBC BLD-RTO: 0 /100 WBC
PLATELET # BLD AUTO: 210 K/UL (ref 150–450)
PMV BLD AUTO: 11 FL (ref 9.2–12.9)
RBC # BLD AUTO: 4.61 M/UL (ref 4–5.4)
TRIGL SERPL-MCNC: 72 MG/DL (ref 30–150)
TSH SERPL DL<=0.005 MIU/L-ACNC: 1.36 UIU/ML (ref 0.4–4)
WBC # BLD AUTO: 8.91 K/UL (ref 3.9–12.7)

## 2021-08-09 PROCEDURE — 84443 ASSAY THYROID STIM HORMONE: CPT | Performed by: FAMILY MEDICINE

## 2021-08-09 PROCEDURE — 99214 OFFICE O/P EST MOD 30 MIN: CPT | Mod: PBBFAC,PO | Performed by: FAMILY MEDICINE

## 2021-08-09 PROCEDURE — 99999 PR PBB SHADOW E&M-EST. PATIENT-LVL IV: ICD-10-PCS | Mod: PBBFAC,,, | Performed by: FAMILY MEDICINE

## 2021-08-09 PROCEDURE — 99999 PR PBB SHADOW E&M-EST. PATIENT-LVL IV: CPT | Mod: PBBFAC,,, | Performed by: FAMILY MEDICINE

## 2021-08-09 PROCEDURE — 99214 OFFICE O/P EST MOD 30 MIN: CPT | Mod: S$PBB,,, | Performed by: FAMILY MEDICINE

## 2021-08-09 PROCEDURE — 80061 LIPID PANEL: CPT | Performed by: FAMILY MEDICINE

## 2021-08-09 PROCEDURE — 36415 COLL VENOUS BLD VENIPUNCTURE: CPT | Mod: PO | Performed by: FAMILY MEDICINE

## 2021-08-09 PROCEDURE — 85025 COMPLETE CBC W/AUTO DIFF WBC: CPT | Performed by: FAMILY MEDICINE

## 2021-08-09 PROCEDURE — 99214 PR OFFICE/OUTPT VISIT, EST, LEVL IV, 30-39 MIN: ICD-10-PCS | Mod: S$PBB,,, | Performed by: FAMILY MEDICINE

## 2021-08-09 RX ORDER — HYOSCYAMINE SULFATE 0.12 MG/1
0.12 TABLET SUBLINGUAL EVERY 4 HOURS PRN
COMMUNITY
End: 2021-08-09 | Stop reason: SDUPTHER

## 2021-08-09 RX ORDER — HYOSCYAMINE SULFATE 0.12 MG/1
0.12 TABLET SUBLINGUAL EVERY 8 HOURS PRN
Qty: 30 TABLET | Refills: 5 | Status: SHIPPED | OUTPATIENT
Start: 2021-08-09 | End: 2022-01-13

## 2021-08-09 RX ORDER — GABAPENTIN 300 MG/1
300 CAPSULE ORAL NIGHTLY
COMMUNITY
End: 2021-09-09

## 2021-08-09 RX ORDER — SERTRALINE HYDROCHLORIDE 50 MG/1
50 TABLET, FILM COATED ORAL DAILY
COMMUNITY
End: 2021-09-09 | Stop reason: ALTCHOICE

## 2021-08-09 RX ORDER — ALPRAZOLAM 0.25 MG/1
0.25 TABLET ORAL 2 TIMES DAILY PRN
Qty: 60 TABLET | Refills: 0 | Status: SHIPPED | OUTPATIENT
Start: 2021-08-09 | End: 2021-09-09 | Stop reason: SDUPTHER

## 2021-08-09 RX ORDER — BUPROPION HYDROCHLORIDE 150 MG/1
150 TABLET ORAL DAILY
Qty: 30 TABLET | Refills: 11 | Status: SHIPPED | OUTPATIENT
Start: 2021-08-09 | End: 2021-09-09 | Stop reason: SDUPTHER

## 2021-08-10 ENCOUNTER — LAB VISIT (OUTPATIENT)
Dept: FAMILY MEDICINE | Facility: CLINIC | Age: 76
End: 2021-08-10
Payer: MEDICARE

## 2021-08-10 DIAGNOSIS — Z01.818 PRE-OP TESTING: ICD-10-CM

## 2021-08-10 PROCEDURE — U0005 INFEC AGEN DETEC AMPLI PROBE: HCPCS | Performed by: ORTHOPAEDIC SURGERY

## 2021-08-10 PROCEDURE — U0003 INFECTIOUS AGENT DETECTION BY NUCLEIC ACID (DNA OR RNA); SEVERE ACUTE RESPIRATORY SYNDROME CORONAVIRUS 2 (SARS-COV-2) (CORONAVIRUS DISEASE [COVID-19]), AMPLIFIED PROBE TECHNIQUE, MAKING USE OF HIGH THROUGHPUT TECHNOLOGIES AS DESCRIBED BY CMS-2020-01-R: HCPCS | Performed by: ORTHOPAEDIC SURGERY

## 2021-08-11 ENCOUNTER — HOSPITAL ENCOUNTER (OUTPATIENT)
Dept: RADIOLOGY | Facility: HOSPITAL | Age: 76
Discharge: HOME | End: 2021-08-11
Attending: FAMILY MEDICINE
Payer: MEDICARE

## 2021-08-11 DIAGNOSIS — E04.1 THYROID NODULE: Primary | ICD-10-CM

## 2021-08-11 DIAGNOSIS — E04.1 THYROID NODULE: ICD-10-CM

## 2021-08-11 LAB
SARS-COV-2 RNA RESP QL NAA+PROBE: NOT DETECTED
SARS-COV-2- CYCLE NUMBER: -1

## 2021-08-11 PROCEDURE — 76536 US EXAM OF HEAD AND NECK: CPT | Mod: 26,,, | Performed by: RADIOLOGY

## 2021-08-11 PROCEDURE — 76536 US SOFT TISSUE HEAD NECK THYROID: ICD-10-PCS | Mod: 26,,, | Performed by: RADIOLOGY

## 2021-08-11 PROCEDURE — 76536 US EXAM OF HEAD AND NECK: CPT | Mod: TC,PO

## 2021-08-12 ENCOUNTER — ANESTHESIA EVENT (OUTPATIENT)
Dept: SURGERY | Facility: HOSPITAL | Age: 76
End: 2021-08-12
Payer: MEDICARE

## 2021-08-12 ENCOUNTER — TELEPHONE (OUTPATIENT)
Dept: ORTHOPEDICS | Facility: CLINIC | Age: 76
End: 2021-08-12

## 2021-08-12 ENCOUNTER — TELEPHONE (OUTPATIENT)
Dept: SURGERY | Facility: HOSPITAL | Age: 76
End: 2021-08-12

## 2021-08-13 ENCOUNTER — PATIENT MESSAGE (OUTPATIENT)
Dept: SURGERY | Facility: HOSPITAL | Age: 76
End: 2021-08-13

## 2021-08-13 ENCOUNTER — HOSPITAL ENCOUNTER (OUTPATIENT)
Facility: HOSPITAL | Age: 76
Discharge: HOME | End: 2021-08-13
Attending: ORTHOPAEDIC SURGERY | Admitting: ORTHOPAEDIC SURGERY
Payer: MEDICARE

## 2021-08-13 ENCOUNTER — ANESTHESIA (OUTPATIENT)
Dept: SURGERY | Facility: HOSPITAL | Age: 76
End: 2021-08-13
Payer: MEDICARE

## 2021-08-13 VITALS
HEIGHT: 61 IN | BODY MASS INDEX: 25.68 KG/M2 | SYSTOLIC BLOOD PRESSURE: 139 MMHG | WEIGHT: 136 LBS | DIASTOLIC BLOOD PRESSURE: 65 MMHG | RESPIRATION RATE: 16 BRPM | HEART RATE: 65 BPM | OXYGEN SATURATION: 98 % | TEMPERATURE: 98 F

## 2021-08-13 DIAGNOSIS — Z01.818 PRE-OP TESTING: ICD-10-CM

## 2021-08-13 DIAGNOSIS — S83.241D ACUTE MEDIAL MENISCAL TEAR, RIGHT, SUBSEQUENT ENCOUNTER: ICD-10-CM

## 2021-08-13 DIAGNOSIS — S83.241A ACUTE MEDIAL MENISCAL TEAR, RIGHT, INITIAL ENCOUNTER: ICD-10-CM

## 2021-08-13 PROCEDURE — D9220A PRA ANESTHESIA: ICD-10-PCS | Mod: CRNA,,, | Performed by: NURSE ANESTHETIST, CERTIFIED REGISTERED

## 2021-08-13 PROCEDURE — 36000711: Mod: PO | Performed by: ORTHOPAEDIC SURGERY

## 2021-08-13 PROCEDURE — 29881 PR KNEE SCOPE SINGLE MENISECECTOMY: ICD-10-PCS | Mod: RT,,, | Performed by: ORTHOPAEDIC SURGERY

## 2021-08-13 PROCEDURE — 63600175 PHARM REV CODE 636 W HCPCS: Mod: PO | Performed by: NURSE ANESTHETIST, CERTIFIED REGISTERED

## 2021-08-13 PROCEDURE — D9220A PRA ANESTHESIA: ICD-10-PCS | Mod: ANES,,, | Performed by: ANESTHESIOLOGY

## 2021-08-13 PROCEDURE — 25000003 PHARM REV CODE 250: Mod: PO | Performed by: ORTHOPAEDIC SURGERY

## 2021-08-13 PROCEDURE — 27200651 HC AIRWAY, LMA: Mod: PO | Performed by: NURSE ANESTHETIST, CERTIFIED REGISTERED

## 2021-08-13 PROCEDURE — 25000003 PHARM REV CODE 250: Mod: PO | Performed by: NURSE ANESTHETIST, CERTIFIED REGISTERED

## 2021-08-13 PROCEDURE — 63600175 PHARM REV CODE 636 W HCPCS: Mod: PO | Performed by: ORTHOPAEDIC SURGERY

## 2021-08-13 PROCEDURE — 29881 ARTHRS KNE SRG MNISECTMY M/L: CPT | Mod: RT,,, | Performed by: ORTHOPAEDIC SURGERY

## 2021-08-13 PROCEDURE — 71000015 HC POSTOP RECOV 1ST HR: Mod: PO | Performed by: ORTHOPAEDIC SURGERY

## 2021-08-13 PROCEDURE — 27201423 OPTIME MED/SURG SUP & DEVICES STERILE SUPPLY: Mod: PO | Performed by: ORTHOPAEDIC SURGERY

## 2021-08-13 PROCEDURE — 36000710: Mod: PO | Performed by: ORTHOPAEDIC SURGERY

## 2021-08-13 PROCEDURE — D9220A PRA ANESTHESIA: Mod: CRNA,,, | Performed by: NURSE ANESTHETIST, CERTIFIED REGISTERED

## 2021-08-13 PROCEDURE — 37000008 HC ANESTHESIA 1ST 15 MINUTES: Mod: PO | Performed by: ORTHOPAEDIC SURGERY

## 2021-08-13 PROCEDURE — 71000033 HC RECOVERY, INTIAL HOUR: Mod: PO | Performed by: ORTHOPAEDIC SURGERY

## 2021-08-13 PROCEDURE — 25000003 PHARM REV CODE 250: Mod: PO | Performed by: ANESTHESIOLOGY

## 2021-08-13 PROCEDURE — 37000009 HC ANESTHESIA EA ADD 15 MINS: Mod: PO | Performed by: ORTHOPAEDIC SURGERY

## 2021-08-13 PROCEDURE — D9220A PRA ANESTHESIA: Mod: ANES,,, | Performed by: ANESTHESIOLOGY

## 2021-08-13 PROCEDURE — 63600175 PHARM REV CODE 636 W HCPCS: Mod: PO | Performed by: ANESTHESIOLOGY

## 2021-08-13 RX ORDER — OXYCODONE HYDROCHLORIDE 5 MG/1
5 TABLET ORAL
Status: DISCONTINUED | OUTPATIENT
Start: 2021-08-13 | End: 2021-08-13 | Stop reason: HOSPADM

## 2021-08-13 RX ORDER — DIPHENHYDRAMINE HYDROCHLORIDE 50 MG/ML
INJECTION INTRAMUSCULAR; INTRAVENOUS
Status: DISCONTINUED | OUTPATIENT
Start: 2021-08-13 | End: 2021-08-13

## 2021-08-13 RX ORDER — DEXAMETHASONE SODIUM PHOSPHATE 4 MG/ML
INJECTION, SOLUTION INTRA-ARTICULAR; INTRALESIONAL; INTRAMUSCULAR; INTRAVENOUS; SOFT TISSUE
Status: DISCONTINUED | OUTPATIENT
Start: 2021-08-13 | End: 2021-08-13

## 2021-08-13 RX ORDER — MIDAZOLAM HYDROCHLORIDE 1 MG/ML
INJECTION, SOLUTION INTRAMUSCULAR; INTRAVENOUS
Status: DISCONTINUED | OUTPATIENT
Start: 2021-08-13 | End: 2021-08-13

## 2021-08-13 RX ORDER — BUPIVACAINE HYDROCHLORIDE AND EPINEPHRINE 2.5; 5 MG/ML; UG/ML
INJECTION, SOLUTION EPIDURAL; INFILTRATION; INTRACAUDAL; PERINEURAL
Status: DISCONTINUED | OUTPATIENT
Start: 2021-08-13 | End: 2021-08-13 | Stop reason: HOSPADM

## 2021-08-13 RX ORDER — FENTANYL CITRATE 50 UG/ML
25 INJECTION, SOLUTION INTRAMUSCULAR; INTRAVENOUS EVERY 5 MIN PRN
Status: DISCONTINUED | OUTPATIENT
Start: 2021-08-13 | End: 2021-08-13 | Stop reason: HOSPADM

## 2021-08-13 RX ORDER — SODIUM CHLORIDE, SODIUM LACTATE, POTASSIUM CHLORIDE, CALCIUM CHLORIDE 600; 310; 30; 20 MG/100ML; MG/100ML; MG/100ML; MG/100ML
INJECTION, SOLUTION INTRAVENOUS CONTINUOUS
Status: DISCONTINUED | OUTPATIENT
Start: 2021-08-13 | End: 2021-08-13 | Stop reason: HOSPADM

## 2021-08-13 RX ORDER — LIDOCAINE HYDROCHLORIDE 10 MG/ML
1 INJECTION, SOLUTION EPIDURAL; INFILTRATION; INTRACAUDAL; PERINEURAL ONCE
Status: DISCONTINUED | OUTPATIENT
Start: 2021-08-13 | End: 2021-08-13 | Stop reason: HOSPADM

## 2021-08-13 RX ORDER — FENTANYL CITRATE 50 UG/ML
INJECTION, SOLUTION INTRAMUSCULAR; INTRAVENOUS
Status: DISCONTINUED | OUTPATIENT
Start: 2021-08-13 | End: 2021-08-13

## 2021-08-13 RX ORDER — HYDROMORPHONE HYDROCHLORIDE 2 MG/ML
0.2 INJECTION, SOLUTION INTRAMUSCULAR; INTRAVENOUS; SUBCUTANEOUS EVERY 5 MIN PRN
Status: DISCONTINUED | OUTPATIENT
Start: 2021-08-13 | End: 2021-08-13 | Stop reason: HOSPADM

## 2021-08-13 RX ORDER — HYDROCODONE BITARTRATE AND ACETAMINOPHEN 5; 325 MG/1; MG/1
1 TABLET ORAL EVERY 6 HOURS PRN
Qty: 10 TABLET | Refills: 0 | Status: SHIPPED | OUTPATIENT
Start: 2021-08-13 | End: 2021-08-23

## 2021-08-13 RX ORDER — ONDANSETRON 2 MG/ML
INJECTION INTRAMUSCULAR; INTRAVENOUS
Status: DISCONTINUED | OUTPATIENT
Start: 2021-08-13 | End: 2021-08-13

## 2021-08-13 RX ORDER — PROPOFOL 10 MG/ML
VIAL (ML) INTRAVENOUS
Status: DISCONTINUED | OUTPATIENT
Start: 2021-08-13 | End: 2021-08-13

## 2021-08-13 RX ORDER — VANCOMYCIN HCL IN 5 % DEXTROSE 1G/250ML
1000 PLASTIC BAG, INJECTION (ML) INTRAVENOUS
Status: COMPLETED | OUTPATIENT
Start: 2021-08-13 | End: 2021-08-13

## 2021-08-13 RX ORDER — SODIUM CHLORIDE 0.9 G/100ML
IRRIGANT IRRIGATION
Status: DISCONTINUED | OUTPATIENT
Start: 2021-08-13 | End: 2021-08-13 | Stop reason: HOSPADM

## 2021-08-13 RX ORDER — LIDOCAINE HYDROCHLORIDE 20 MG/ML
INJECTION INTRAVENOUS
Status: DISCONTINUED | OUTPATIENT
Start: 2021-08-13 | End: 2021-08-13

## 2021-08-13 RX ORDER — ASPIRIN 81 MG/1
81 TABLET ORAL 2 TIMES DAILY
Refills: 0 | COMMUNITY
Start: 2021-08-13 | End: 2022-08-13

## 2021-08-13 RX ORDER — EPINEPHRINE 1 MG/ML
INJECTION INTRAMUSCULAR; INTRAVENOUS; SUBCUTANEOUS
Status: DISCONTINUED | OUTPATIENT
Start: 2021-08-13 | End: 2021-08-13 | Stop reason: HOSPADM

## 2021-08-13 RX ADMIN — MIDAZOLAM HYDROCHLORIDE 2 MG: 1 INJECTION, SOLUTION INTRAMUSCULAR; INTRAVENOUS at 09:08

## 2021-08-13 RX ADMIN — OXYCODONE 5 MG: 5 TABLET ORAL at 10:08

## 2021-08-13 RX ADMIN — ONDANSETRON 4 MG: 2 INJECTION INTRAMUSCULAR; INTRAVENOUS at 10:08

## 2021-08-13 RX ADMIN — SODIUM CHLORIDE, SODIUM LACTATE, POTASSIUM CHLORIDE, AND CALCIUM CHLORIDE: .6; .31; .03; .02 INJECTION, SOLUTION INTRAVENOUS at 09:08

## 2021-08-13 RX ADMIN — VANCOMYCIN HYDROCHLORIDE 1000 MG: 1 INJECTION, POWDER, LYOPHILIZED, FOR SOLUTION INTRAVENOUS at 09:08

## 2021-08-13 RX ADMIN — LIDOCAINE HYDROCHLORIDE 100 MG: 20 INJECTION, SOLUTION INTRAVENOUS at 09:08

## 2021-08-13 RX ADMIN — FENTANYL CITRATE 50 MCG: 50 INJECTION, SOLUTION INTRAMUSCULAR; INTRAVENOUS at 09:08

## 2021-08-13 RX ADMIN — DEXAMETHASONE SODIUM PHOSPHATE 4 MG: 4 INJECTION, SOLUTION INTRAMUSCULAR; INTRAVENOUS at 10:08

## 2021-08-13 RX ADMIN — PROPOFOL 150 MG: 10 INJECTION, EMULSION INTRAVENOUS at 09:08

## 2021-08-13 RX ADMIN — DIPHENHYDRAMINE HYDROCHLORIDE 6.25 MG: 50 INJECTION INTRAMUSCULAR; INTRAVENOUS at 10:08

## 2021-08-14 ENCOUNTER — NURSE TRIAGE (OUTPATIENT)
Dept: ADMINISTRATIVE | Facility: CLINIC | Age: 76
End: 2021-08-14

## 2021-08-14 RX ORDER — OXYCODONE AND ACETAMINOPHEN 5; 325 MG/1; MG/1
1 TABLET ORAL EVERY 6 HOURS PRN
Qty: 40 TABLET | Refills: 0 | Status: SHIPPED | OUTPATIENT
Start: 2021-08-14 | End: 2021-09-22

## 2021-08-25 ENCOUNTER — HOSPITAL ENCOUNTER (OUTPATIENT)
Dept: RADIOLOGY | Facility: HOSPITAL | Age: 76
Discharge: HOME | End: 2021-08-25
Attending: FAMILY MEDICINE
Payer: MEDICARE

## 2021-08-25 ENCOUNTER — OFFICE VISIT (OUTPATIENT)
Dept: ORTHOPEDICS | Facility: CLINIC | Age: 76
End: 2021-08-25
Payer: MEDICARE

## 2021-08-25 VITALS — BODY MASS INDEX: 25.68 KG/M2 | RESPIRATION RATE: 16 BRPM | HEIGHT: 61 IN | WEIGHT: 136 LBS

## 2021-08-25 DIAGNOSIS — Z12.31 BREAST CANCER SCREENING BY MAMMOGRAM: ICD-10-CM

## 2021-08-25 DIAGNOSIS — S83.241D ACUTE MEDIAL MENISCAL TEAR, RIGHT, SUBSEQUENT ENCOUNTER: Primary | ICD-10-CM

## 2021-08-25 PROCEDURE — 99999 PR PBB SHADOW E&M-EST. PATIENT-LVL III: ICD-10-PCS | Mod: PBBFAC,,, | Performed by: ORTHOPAEDIC SURGERY

## 2021-08-25 PROCEDURE — 99024 PR POST-OP FOLLOW-UP VISIT: ICD-10-PCS | Mod: POP,,, | Performed by: ORTHOPAEDIC SURGERY

## 2021-08-25 PROCEDURE — 99999 PR PBB SHADOW E&M-EST. PATIENT-LVL III: CPT | Mod: PBBFAC,,, | Performed by: ORTHOPAEDIC SURGERY

## 2021-08-25 PROCEDURE — 99213 OFFICE O/P EST LOW 20 MIN: CPT | Mod: PBBFAC,PN | Performed by: ORTHOPAEDIC SURGERY

## 2021-08-25 PROCEDURE — 77067 SCR MAMMO BI INCL CAD: CPT | Mod: TC,PO

## 2021-08-25 PROCEDURE — 99024 POSTOP FOLLOW-UP VISIT: CPT | Mod: POP,,, | Performed by: ORTHOPAEDIC SURGERY

## 2021-08-25 PROCEDURE — 77063 MAMMO DIGITAL SCREENING BILAT WITH TOMO: ICD-10-PCS | Mod: 26,,, | Performed by: RADIOLOGY

## 2021-08-25 PROCEDURE — 77067 MAMMO DIGITAL SCREENING BILAT WITH TOMO: ICD-10-PCS | Mod: 26,,, | Performed by: RADIOLOGY

## 2021-08-25 PROCEDURE — 77063 BREAST TOMOSYNTHESIS BI: CPT | Mod: 26,,, | Performed by: RADIOLOGY

## 2021-08-25 PROCEDURE — 77067 SCR MAMMO BI INCL CAD: CPT | Mod: 26,,, | Performed by: RADIOLOGY

## 2021-08-31 ENCOUNTER — EXTERNAL CHRONIC CARE MANAGEMENT (OUTPATIENT)
Dept: PRIMARY CARE CLINIC | Facility: CLINIC | Age: 76
End: 2021-08-31
Payer: MEDICARE

## 2021-08-31 PROCEDURE — 99490 CHRNC CARE MGMT STAFF 1ST 20: CPT | Mod: S$PBB,,, | Performed by: FAMILY MEDICINE

## 2021-08-31 PROCEDURE — 99490 PR CHRONIC CARE MGMT, 1ST 20 MIN: ICD-10-PCS | Mod: S$PBB,,, | Performed by: FAMILY MEDICINE

## 2021-08-31 PROCEDURE — 99490 CHRNC CARE MGMT STAFF 1ST 20: CPT | Mod: PBBFAC,PO | Performed by: FAMILY MEDICINE

## 2021-09-01 ENCOUNTER — PATIENT OUTREACH (OUTPATIENT)
Dept: ADMINISTRATIVE | Facility: OTHER | Age: 76
End: 2021-09-01

## 2021-09-01 ENCOUNTER — PATIENT MESSAGE (OUTPATIENT)
Dept: PSYCHIATRY | Facility: CLINIC | Age: 76
End: 2021-09-01

## 2021-09-01 ENCOUNTER — PATIENT MESSAGE (OUTPATIENT)
Dept: FAMILY MEDICINE | Facility: CLINIC | Age: 76
End: 2021-09-01

## 2021-09-02 ENCOUNTER — PATIENT MESSAGE (OUTPATIENT)
Dept: PSYCHIATRY | Facility: CLINIC | Age: 76
End: 2021-09-02

## 2021-09-09 ENCOUNTER — HOSPITAL ENCOUNTER (OUTPATIENT)
Dept: RADIOLOGY | Facility: HOSPITAL | Age: 76
Discharge: HOME | End: 2021-09-09
Attending: PHYSICIAN ASSISTANT
Payer: MEDICARE

## 2021-09-09 ENCOUNTER — OFFICE VISIT (OUTPATIENT)
Dept: FAMILY MEDICINE | Facility: CLINIC | Age: 76
End: 2021-09-09
Payer: MEDICARE

## 2021-09-09 VITALS
HEART RATE: 81 BPM | BODY MASS INDEX: 26.24 KG/M2 | DIASTOLIC BLOOD PRESSURE: 68 MMHG | TEMPERATURE: 98 F | WEIGHT: 138.88 LBS | SYSTOLIC BLOOD PRESSURE: 146 MMHG | OXYGEN SATURATION: 96 %

## 2021-09-09 DIAGNOSIS — S00.33XA CONTUSION OF NOSE, INITIAL ENCOUNTER: ICD-10-CM

## 2021-09-09 DIAGNOSIS — F43.10 PTSD (POST-TRAUMATIC STRESS DISORDER): ICD-10-CM

## 2021-09-09 DIAGNOSIS — F41.9 ANXIETY: ICD-10-CM

## 2021-09-09 DIAGNOSIS — S00.33XA CONTUSION OF NOSE, INITIAL ENCOUNTER: Primary | ICD-10-CM

## 2021-09-09 DIAGNOSIS — S83.91XA SPRAIN OF RIGHT KNEE, UNSPECIFIED LIGAMENT, INITIAL ENCOUNTER: ICD-10-CM

## 2021-09-09 PROCEDURE — 99999 PR PBB SHADOW E&M-EST. PATIENT-LVL IV: CPT | Mod: PBBFAC,,, | Performed by: PHYSICIAN ASSISTANT

## 2021-09-09 PROCEDURE — 70160 XR NASAL BONES: ICD-10-PCS | Mod: 26,,, | Performed by: RADIOLOGY

## 2021-09-09 PROCEDURE — 70160 X-RAY EXAM OF NASAL BONES: CPT | Mod: TC,FY,PO

## 2021-09-09 PROCEDURE — 70160 X-RAY EXAM OF NASAL BONES: CPT | Mod: 26,,, | Performed by: RADIOLOGY

## 2021-09-09 PROCEDURE — 99214 PR OFFICE/OUTPT VISIT, EST, LEVL IV, 30-39 MIN: ICD-10-PCS | Mod: S$PBB,,, | Performed by: PHYSICIAN ASSISTANT

## 2021-09-09 PROCEDURE — 99214 OFFICE O/P EST MOD 30 MIN: CPT | Mod: S$PBB,,, | Performed by: PHYSICIAN ASSISTANT

## 2021-09-09 PROCEDURE — 99999 PR PBB SHADOW E&M-EST. PATIENT-LVL IV: ICD-10-PCS | Mod: PBBFAC,,, | Performed by: PHYSICIAN ASSISTANT

## 2021-09-09 PROCEDURE — 99214 OFFICE O/P EST MOD 30 MIN: CPT | Mod: PBBFAC,PO | Performed by: PHYSICIAN ASSISTANT

## 2021-09-09 RX ORDER — ALPRAZOLAM 0.25 MG/1
0.25 TABLET ORAL 2 TIMES DAILY PRN
Qty: 60 TABLET | Refills: 0 | Status: SHIPPED | OUTPATIENT
Start: 2021-09-09 | End: 2021-10-07 | Stop reason: SDUPTHER

## 2021-09-09 RX ORDER — BUPROPION HYDROCHLORIDE 300 MG/1
300 TABLET ORAL DAILY
Qty: 30 TABLET | Refills: 11 | Status: SHIPPED | OUTPATIENT
Start: 2021-09-09 | End: 2022-10-05

## 2021-09-22 ENCOUNTER — OFFICE VISIT (OUTPATIENT)
Dept: OTOLARYNGOLOGY | Facility: CLINIC | Age: 76
End: 2021-09-22
Payer: MEDICARE

## 2021-09-22 ENCOUNTER — OFFICE VISIT (OUTPATIENT)
Dept: ORTHOPEDICS | Facility: CLINIC | Age: 76
End: 2021-09-22
Payer: MEDICARE

## 2021-09-22 VITALS — BODY MASS INDEX: 25.97 KG/M2 | HEIGHT: 61 IN | WEIGHT: 137.56 LBS

## 2021-09-22 VITALS — HEIGHT: 61 IN | BODY MASS INDEX: 26.06 KG/M2 | WEIGHT: 138 LBS | RESPIRATION RATE: 16 BRPM

## 2021-09-22 DIAGNOSIS — K13.79 RECURRENT ORAL ULCERS: ICD-10-CM

## 2021-09-22 DIAGNOSIS — S83.241D ACUTE MEDIAL MENISCAL TEAR, RIGHT, SUBSEQUENT ENCOUNTER: Primary | ICD-10-CM

## 2021-09-22 DIAGNOSIS — S02.2XXA CLOSED FRACTURE OF NASAL BONE, INITIAL ENCOUNTER: Primary | ICD-10-CM

## 2021-09-22 PROCEDURE — 99203 OFFICE O/P NEW LOW 30 MIN: CPT | Mod: S$PBB,,, | Performed by: OTOLARYNGOLOGY

## 2021-09-22 PROCEDURE — 99024 POSTOP FOLLOW-UP VISIT: CPT | Mod: POP,,, | Performed by: ORTHOPAEDIC SURGERY

## 2021-09-22 PROCEDURE — 99024 PR POST-OP FOLLOW-UP VISIT: ICD-10-PCS | Mod: POP,,, | Performed by: ORTHOPAEDIC SURGERY

## 2021-09-22 PROCEDURE — 99999 PR PBB SHADOW E&M-EST. PATIENT-LVL III: CPT | Mod: PBBFAC,,, | Performed by: OTOLARYNGOLOGY

## 2021-09-22 PROCEDURE — 99213 OFFICE O/P EST LOW 20 MIN: CPT | Mod: PBBFAC,PN | Performed by: ORTHOPAEDIC SURGERY

## 2021-09-22 PROCEDURE — 99203 PR OFFICE/OUTPT VISIT, NEW, LEVL III, 30-44 MIN: ICD-10-PCS | Mod: S$PBB,,, | Performed by: OTOLARYNGOLOGY

## 2021-09-22 PROCEDURE — 99999 PR PBB SHADOW E&M-EST. PATIENT-LVL III: CPT | Mod: PBBFAC,,, | Performed by: ORTHOPAEDIC SURGERY

## 2021-09-22 PROCEDURE — 99999 PR PBB SHADOW E&M-EST. PATIENT-LVL III: ICD-10-PCS | Mod: PBBFAC,,, | Performed by: ORTHOPAEDIC SURGERY

## 2021-09-22 PROCEDURE — 99213 OFFICE O/P EST LOW 20 MIN: CPT | Mod: PBBFAC,27,PO | Performed by: OTOLARYNGOLOGY

## 2021-09-22 PROCEDURE — 99999 PR PBB SHADOW E&M-EST. PATIENT-LVL III: ICD-10-PCS | Mod: PBBFAC,,, | Performed by: OTOLARYNGOLOGY

## 2021-09-30 ENCOUNTER — EXTERNAL CHRONIC CARE MANAGEMENT (OUTPATIENT)
Dept: PRIMARY CARE CLINIC | Facility: CLINIC | Age: 76
End: 2021-09-30
Payer: MEDICARE

## 2021-09-30 PROCEDURE — 99490 PR CHRONIC CARE MGMT, 1ST 20 MIN: ICD-10-PCS | Mod: S$PBB,,, | Performed by: FAMILY MEDICINE

## 2021-09-30 PROCEDURE — 99490 CHRNC CARE MGMT STAFF 1ST 20: CPT | Mod: PBBFAC,PO | Performed by: FAMILY MEDICINE

## 2021-09-30 PROCEDURE — 99490 CHRNC CARE MGMT STAFF 1ST 20: CPT | Mod: S$PBB,,, | Performed by: FAMILY MEDICINE

## 2021-10-04 ENCOUNTER — PATIENT MESSAGE (OUTPATIENT)
Dept: ORTHOPEDICS | Facility: CLINIC | Age: 76
End: 2021-10-04

## 2021-10-07 DIAGNOSIS — F41.9 ANXIETY: ICD-10-CM

## 2021-10-07 RX ORDER — ALPRAZOLAM 0.25 MG/1
0.25 TABLET ORAL 2 TIMES DAILY PRN
Qty: 60 TABLET | Refills: 2 | Status: SHIPPED | OUTPATIENT
Start: 2021-10-07 | End: 2021-12-23 | Stop reason: SDUPTHER

## 2021-10-13 ENCOUNTER — OFFICE VISIT (OUTPATIENT)
Dept: ORTHOPEDICS | Facility: CLINIC | Age: 76
End: 2021-10-13
Payer: MEDICARE

## 2021-10-13 ENCOUNTER — OFFICE VISIT (OUTPATIENT)
Dept: FAMILY MEDICINE | Facility: CLINIC | Age: 76
End: 2021-10-13
Payer: MEDICARE

## 2021-10-13 VITALS — HEIGHT: 61 IN | WEIGHT: 138 LBS | BODY MASS INDEX: 26.06 KG/M2 | RESPIRATION RATE: 16 BRPM

## 2021-10-13 VITALS
SYSTOLIC BLOOD PRESSURE: 126 MMHG | HEIGHT: 61 IN | OXYGEN SATURATION: 96 % | WEIGHT: 138 LBS | DIASTOLIC BLOOD PRESSURE: 74 MMHG | HEART RATE: 80 BPM | BODY MASS INDEX: 26.06 KG/M2

## 2021-10-13 DIAGNOSIS — S83.241D ACUTE MEDIAL MENISCAL TEAR, RIGHT, SUBSEQUENT ENCOUNTER: ICD-10-CM
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
## 2021-10-13 DIAGNOSIS — R07.89 OTHER CHEST PAIN: ICD-10-CM

## 2021-10-13 DIAGNOSIS — F41.9 ANXIETY: ICD-10-CM

## 2021-10-13 DIAGNOSIS — E78.49 OTHER HYPERLIPIDEMIA: ICD-10-CM

## 2021-10-13 DIAGNOSIS — I10 ESSENTIAL HYPERTENSION: ICD-10-CM

## 2021-10-13 DIAGNOSIS — K21.9 GASTROESOPHAGEAL REFLUX DISEASE WITHOUT ESOPHAGITIS: ICD-10-CM

## 2021-10-13 DIAGNOSIS — R21 RASH: Primary | ICD-10-CM

## 2021-10-13 PROBLEM — Z71.89 ACP (ADVANCE CARE PLANNING): Status: ACTIVE | Noted: 2021-10-13

## 2021-10-13 PROBLEM — J44.9 COPD (CHRONIC OBSTRUCTIVE PULMONARY DISEASE): Status: ACTIVE | Noted: 2021-08-05

## 2021-10-13 PROBLEM — E78.5 HYPERLIPIDEMIA: Status: ACTIVE | Noted: 2021-10-13

## 2021-10-13 PROBLEM — I24.9 ACS (ACUTE CORONARY SYNDROME): Status: ACTIVE | Noted: 2021-10-13

## 2021-10-13 PROCEDURE — 93010 ELECTROCARDIOGRAM REPORT: CPT | Mod: S$PBB,,, | Performed by: INTERNAL MEDICINE

## 2021-10-13 PROCEDURE — 99215 OFFICE O/P EST HI 40 MIN: CPT | Mod: PBBFAC,27,PO,25 | Performed by: FAMILY MEDICINE

## 2021-10-13 PROCEDURE — 99999 PR PBB SHADOW E&M-EST. PATIENT-LVL V: ICD-10-PCS | Mod: PBBFAC,,, | Performed by: FAMILY MEDICINE

## 2021-10-13 PROCEDURE — G0008 ADMIN INFLUENZA VIRUS VAC: HCPCS | Mod: PBBFAC,PO

## 2021-10-13 PROCEDURE — 99999 PR PBB SHADOW E&M-EST. PATIENT-LVL V: CPT | Mod: PBBFAC,,, | Performed by: FAMILY MEDICINE

## 2021-10-13 PROCEDURE — 93010 EKG 12-LEAD: ICD-10-PCS | Mod: S$PBB,,, | Performed by: INTERNAL MEDICINE

## 2021-10-13 PROCEDURE — 99214 PR OFFICE/OUTPT VISIT, EST, LEVL IV, 30-39 MIN: ICD-10-PCS | Mod: S$PBB,,, | Performed by: FAMILY MEDICINE

## 2021-10-13 PROCEDURE — 99999 PR PBB SHADOW E&M-EST. PATIENT-LVL III: ICD-10-PCS | Mod: PBBFAC,,, | Performed by: ORTHOPAEDIC SURGERY

## 2021-10-13 PROCEDURE — 99999 PR PBB SHADOW E&M-EST. PATIENT-LVL III: CPT | Mod: PBBFAC,,, | Performed by: ORTHOPAEDIC SURGERY

## 2021-10-13 PROCEDURE — 90694 VACC AIIV4 NO PRSRV 0.5ML IM: CPT | Mod: PBBFAC,PO

## 2021-10-13 PROCEDURE — 99213 OFFICE O/P EST LOW 20 MIN: CPT | Mod: PBBFAC,PN,25 | Performed by: ORTHOPAEDIC SURGERY

## 2021-10-13 PROCEDURE — 99214 OFFICE O/P EST MOD 30 MIN: CPT | Mod: S$PBB,,, | Performed by: FAMILY MEDICINE

## 2021-10-13 PROCEDURE — 99213 PR OFFICE/OUTPT VISIT, EST, LEVL III, 20-29 MIN: ICD-10-PCS | Mod: 24,S$PBB,, | Performed by: ORTHOPAEDIC SURGERY

## 2021-10-13 PROCEDURE — 93005 ELECTROCARDIOGRAM TRACING: CPT | Mod: PBBFAC,PO | Performed by: INTERNAL MEDICINE

## 2021-10-13 PROCEDURE — 99213 OFFICE O/P EST LOW 20 MIN: CPT | Mod: 24,S$PBB,, | Performed by: ORTHOPAEDIC SURGERY

## 2021-10-13 RX ORDER — NITROGLYCERIN 0.4 MG/1
0.4 TABLET SUBLINGUAL EVERY 5 MIN PRN
Qty: 30 TABLET | Refills: 0 | Status: SHIPPED | OUTPATIENT
Start: 2021-10-13 | End: 2023-03-15

## 2021-10-13 RX ORDER — OMEPRAZOLE 40 MG/1
40 CAPSULE, DELAYED RELEASE ORAL DAILY
Status: ON HOLD | COMMUNITY
End: 2021-12-01 | Stop reason: CLARIF

## 2021-10-13 RX ORDER — METHYLPREDNISOLONE 4 MG/1
TABLET ORAL
Qty: 1 PACKAGE | Refills: 0 | Status: ON HOLD | OUTPATIENT
Start: 2021-10-13 | End: 2021-10-14 | Stop reason: HOSPADM

## 2021-10-14 ENCOUNTER — TELEPHONE (OUTPATIENT)
Dept: CARDIOLOGY | Facility: CLINIC | Age: 76
End: 2021-10-14

## 2021-10-21 ENCOUNTER — OFFICE VISIT (OUTPATIENT)
Dept: FAMILY MEDICINE | Facility: CLINIC | Age: 76
End: 2021-10-21
Payer: MEDICARE

## 2021-10-21 VITALS
SYSTOLIC BLOOD PRESSURE: 132 MMHG | DIASTOLIC BLOOD PRESSURE: 72 MMHG | OXYGEN SATURATION: 97 % | WEIGHT: 136.88 LBS | BODY MASS INDEX: 25.84 KG/M2 | HEART RATE: 96 BPM | HEIGHT: 61 IN

## 2021-10-21 DIAGNOSIS — I25.10 CORONARY ARTERY DISEASE INVOLVING NATIVE HEART WITHOUT ANGINA PECTORIS, UNSPECIFIED VESSEL OR LESION TYPE: Primary | ICD-10-CM

## 2021-10-21 DIAGNOSIS — I10 ESSENTIAL HYPERTENSION: ICD-10-CM

## 2021-10-21 DIAGNOSIS — L50.9 URTICARIA: ICD-10-CM

## 2021-10-21 DIAGNOSIS — E78.5 HYPERLIPIDEMIA, UNSPECIFIED HYPERLIPIDEMIA TYPE: ICD-10-CM

## 2021-10-21 PROCEDURE — 99999 PR PBB SHADOW E&M-EST. PATIENT-LVL III: ICD-10-PCS | Mod: PBBFAC,,, | Performed by: PHYSICIAN ASSISTANT

## 2021-10-21 PROCEDURE — 99214 PR OFFICE/OUTPT VISIT, EST, LEVL IV, 30-39 MIN: ICD-10-PCS | Mod: S$PBB,,, | Performed by: PHYSICIAN ASSISTANT

## 2021-10-21 PROCEDURE — 99999 PR PBB SHADOW E&M-EST. PATIENT-LVL III: CPT | Mod: PBBFAC,,, | Performed by: PHYSICIAN ASSISTANT

## 2021-10-21 PROCEDURE — 99214 OFFICE O/P EST MOD 30 MIN: CPT | Mod: S$PBB,,, | Performed by: PHYSICIAN ASSISTANT

## 2021-10-21 PROCEDURE — 99213 OFFICE O/P EST LOW 20 MIN: CPT | Mod: PBBFAC,PO | Performed by: PHYSICIAN ASSISTANT

## 2021-10-29 ENCOUNTER — PATIENT MESSAGE (OUTPATIENT)
Dept: FAMILY MEDICINE | Facility: CLINIC | Age: 76
End: 2021-10-29
Payer: MEDICARE

## 2021-10-31 ENCOUNTER — EXTERNAL CHRONIC CARE MANAGEMENT (OUTPATIENT)
Dept: PRIMARY CARE CLINIC | Facility: CLINIC | Age: 76
End: 2021-10-31
Payer: MEDICARE

## 2021-10-31 PROCEDURE — 99490 CHRNC CARE MGMT STAFF 1ST 20: CPT | Mod: PBBFAC,PO | Performed by: FAMILY MEDICINE

## 2021-10-31 PROCEDURE — 99490 PR CHRONIC CARE MGMT, 1ST 20 MIN: ICD-10-PCS | Mod: S$PBB,,, | Performed by: FAMILY MEDICINE

## 2021-10-31 PROCEDURE — 99490 CHRNC CARE MGMT STAFF 1ST 20: CPT | Mod: S$PBB,,, | Performed by: FAMILY MEDICINE

## 2021-11-03 ENCOUNTER — OFFICE VISIT (OUTPATIENT)
Dept: ORTHOPEDICS | Facility: CLINIC | Age: 76
End: 2021-11-03
Payer: MEDICARE

## 2021-11-03 VITALS — BODY MASS INDEX: 25.68 KG/M2 | HEIGHT: 61 IN | WEIGHT: 136 LBS | RESPIRATION RATE: 16 BRPM

## 2021-11-03 DIAGNOSIS — S83.241D ACUTE MEDIAL MENISCAL TEAR, RIGHT, SUBSEQUENT ENCOUNTER: Primary | ICD-10-CM

## 2021-11-03 PROCEDURE — 99213 PR OFFICE/OUTPT VISIT, EST, LEVL III, 20-29 MIN: ICD-10-PCS | Mod: S$PBB,24,, | Performed by: ORTHOPAEDIC SURGERY

## 2021-11-03 PROCEDURE — 99999 PR PBB SHADOW E&M-EST. PATIENT-LVL IV: CPT | Mod: PBBFAC,,, | Performed by: ORTHOPAEDIC SURGERY

## 2021-11-03 PROCEDURE — 99999 PR PBB SHADOW E&M-EST. PATIENT-LVL IV: ICD-10-PCS | Mod: PBBFAC,,, | Performed by: ORTHOPAEDIC SURGERY

## 2021-11-03 PROCEDURE — 99214 OFFICE O/P EST MOD 30 MIN: CPT | Mod: PBBFAC,PN | Performed by: ORTHOPAEDIC SURGERY

## 2021-11-03 PROCEDURE — 99213 OFFICE O/P EST LOW 20 MIN: CPT | Mod: S$PBB,24,, | Performed by: ORTHOPAEDIC SURGERY

## 2021-11-04 ENCOUNTER — CLINICAL SUPPORT (OUTPATIENT)
Dept: REHABILITATION | Facility: HOSPITAL | Age: 76
End: 2021-11-04
Attending: ORTHOPAEDIC SURGERY
Payer: MEDICARE

## 2021-11-04 DIAGNOSIS — S83.241D ACUTE MEDIAL MENISCAL TEAR, RIGHT, SUBSEQUENT ENCOUNTER: ICD-10-CM

## 2021-11-04 DIAGNOSIS — M25.669 DECREASED RANGE OF MOTION OF KNEE, UNSPECIFIED LATERALITY: ICD-10-CM

## 2021-11-04 DIAGNOSIS — Z74.09 IMPAIRED FUNCTIONAL MOBILITY, BALANCE, GAIT, AND ENDURANCE: ICD-10-CM

## 2021-11-04 PROCEDURE — 97161 PT EVAL LOW COMPLEX 20 MIN: CPT | Mod: PO

## 2021-11-05 ENCOUNTER — HOSPITAL ENCOUNTER (OUTPATIENT)
Dept: RADIOLOGY | Facility: HOSPITAL | Age: 76
Discharge: HOME | End: 2021-11-05
Attending: FAMILY MEDICINE
Payer: MEDICARE

## 2021-11-05 DIAGNOSIS — R91.1 SOLITARY PULMONARY NODULE: ICD-10-CM

## 2021-11-05 PROCEDURE — 71250 CT THORAX DX C-: CPT | Mod: TC,PO

## 2021-11-05 PROCEDURE — 71250 CT THORAX DX C-: CPT | Mod: 26,,, | Performed by: RADIOLOGY

## 2021-11-05 PROCEDURE — 71250 CT CHEST WITHOUT CONTRAST: ICD-10-PCS | Mod: 26,,, | Performed by: RADIOLOGY

## 2021-11-08 ENCOUNTER — OFFICE VISIT (OUTPATIENT)
Dept: CARDIOLOGY | Facility: CLINIC | Age: 76
End: 2021-11-08
Payer: MEDICARE

## 2021-11-08 ENCOUNTER — CLINICAL SUPPORT (OUTPATIENT)
Dept: REHABILITATION | Facility: HOSPITAL | Age: 76
End: 2021-11-08
Attending: ORTHOPAEDIC SURGERY
Payer: MEDICARE

## 2021-11-08 VITALS
BODY MASS INDEX: 25.84 KG/M2 | HEART RATE: 112 BPM | HEIGHT: 61 IN | WEIGHT: 136.88 LBS | DIASTOLIC BLOOD PRESSURE: 81 MMHG | SYSTOLIC BLOOD PRESSURE: 141 MMHG

## 2021-11-08 DIAGNOSIS — Z74.09 IMPAIRED FUNCTIONAL MOBILITY, BALANCE, GAIT, AND ENDURANCE: ICD-10-CM

## 2021-11-08 DIAGNOSIS — I10 ESSENTIAL HYPERTENSION: ICD-10-CM

## 2021-11-08 DIAGNOSIS — G35 MS (MULTIPLE SCLEROSIS): ICD-10-CM

## 2021-11-08 DIAGNOSIS — I67.1 CEREBRAL ANEURYSM: ICD-10-CM

## 2021-11-08 DIAGNOSIS — R07.89 OTHER CHEST PAIN: ICD-10-CM

## 2021-11-08 DIAGNOSIS — D50.8 OTHER IRON DEFICIENCY ANEMIA: ICD-10-CM

## 2021-11-08 DIAGNOSIS — I25.10 CORONARY ARTERY DISEASE INVOLVING NATIVE CORONARY ARTERY OF NATIVE HEART WITHOUT ANGINA PECTORIS: ICD-10-CM

## 2021-11-08 DIAGNOSIS — E78.5 HYPERLIPIDEMIA, UNSPECIFIED HYPERLIPIDEMIA TYPE: ICD-10-CM

## 2021-11-08 DIAGNOSIS — I70.0 ATHEROSCLEROSIS OF AORTA: Primary | ICD-10-CM

## 2021-11-08 DIAGNOSIS — J44.9 CHRONIC OBSTRUCTIVE PULMONARY DISEASE, UNSPECIFIED COPD TYPE: ICD-10-CM

## 2021-11-08 DIAGNOSIS — M25.669 DECREASED RANGE OF MOTION OF KNEE, UNSPECIFIED LATERALITY: ICD-10-CM

## 2021-11-08 PROCEDURE — 97110 THERAPEUTIC EXERCISES: CPT | Mod: PO

## 2021-11-08 PROCEDURE — 99214 PR OFFICE/OUTPT VISIT, EST, LEVL IV, 30-39 MIN: ICD-10-PCS | Mod: S$PBB,,, | Performed by: PHYSICIAN ASSISTANT

## 2021-11-08 PROCEDURE — 99999 PR PBB SHADOW E&M-EST. PATIENT-LVL IV: ICD-10-PCS | Mod: PBBFAC,,, | Performed by: PHYSICIAN ASSISTANT

## 2021-11-08 PROCEDURE — 97140 MANUAL THERAPY 1/> REGIONS: CPT | Mod: PO

## 2021-11-08 PROCEDURE — 99214 OFFICE O/P EST MOD 30 MIN: CPT | Mod: S$PBB,,, | Performed by: PHYSICIAN ASSISTANT

## 2021-11-08 PROCEDURE — 99999 PR PBB SHADOW E&M-EST. PATIENT-LVL IV: CPT | Mod: PBBFAC,,, | Performed by: PHYSICIAN ASSISTANT

## 2021-11-08 PROCEDURE — 99214 OFFICE O/P EST MOD 30 MIN: CPT | Mod: PBBFAC,PO | Performed by: PHYSICIAN ASSISTANT

## 2021-11-10 ENCOUNTER — CLINICAL SUPPORT (OUTPATIENT)
Dept: REHABILITATION | Facility: HOSPITAL | Age: 76
End: 2021-11-10
Attending: ORTHOPAEDIC SURGERY
Payer: MEDICARE

## 2021-11-10 DIAGNOSIS — Z74.09 IMPAIRED FUNCTIONAL MOBILITY, BALANCE, GAIT, AND ENDURANCE: ICD-10-CM

## 2021-11-10 DIAGNOSIS — M25.669 DECREASED RANGE OF MOTION OF KNEE, UNSPECIFIED LATERALITY: ICD-10-CM

## 2021-11-10 PROCEDURE — 97110 THERAPEUTIC EXERCISES: CPT | Mod: PO

## 2021-11-15 ENCOUNTER — CLINICAL SUPPORT (OUTPATIENT)
Dept: REHABILITATION | Facility: HOSPITAL | Age: 76
End: 2021-11-15
Payer: MEDICARE

## 2021-11-15 DIAGNOSIS — Z74.09 IMPAIRED FUNCTIONAL MOBILITY, BALANCE, GAIT, AND ENDURANCE: ICD-10-CM

## 2021-11-15 DIAGNOSIS — M25.669 DECREASED RANGE OF MOTION OF KNEE, UNSPECIFIED LATERALITY: ICD-10-CM

## 2021-11-15 PROCEDURE — 97140 MANUAL THERAPY 1/> REGIONS: CPT | Mod: PO

## 2021-11-15 PROCEDURE — 97112 NEUROMUSCULAR REEDUCATION: CPT | Mod: PO

## 2021-11-15 PROCEDURE — 97110 THERAPEUTIC EXERCISES: CPT | Mod: PO

## 2021-11-17 ENCOUNTER — CLINICAL SUPPORT (OUTPATIENT)
Dept: REHABILITATION | Facility: HOSPITAL | Age: 76
End: 2021-11-17
Attending: ORTHOPAEDIC SURGERY
Payer: MEDICARE

## 2021-11-17 DIAGNOSIS — M25.669 DECREASED RANGE OF MOTION OF KNEE, UNSPECIFIED LATERALITY: ICD-10-CM

## 2021-11-17 DIAGNOSIS — Z74.09 IMPAIRED FUNCTIONAL MOBILITY, BALANCE, GAIT, AND ENDURANCE: ICD-10-CM

## 2021-11-17 PROCEDURE — 97110 THERAPEUTIC EXERCISES: CPT | Mod: PO,CQ

## 2021-11-17 PROCEDURE — 97140 MANUAL THERAPY 1/> REGIONS: CPT | Mod: PO,CQ

## 2021-11-17 PROCEDURE — 97112 NEUROMUSCULAR REEDUCATION: CPT | Mod: PO,CQ

## 2021-11-29 ENCOUNTER — CLINICAL SUPPORT (OUTPATIENT)
Dept: REHABILITATION | Facility: HOSPITAL | Age: 76
End: 2021-11-29
Attending: ORTHOPAEDIC SURGERY
Payer: MEDICARE

## 2021-11-29 DIAGNOSIS — Z74.09 IMPAIRED FUNCTIONAL MOBILITY, BALANCE, GAIT, AND ENDURANCE: ICD-10-CM

## 2021-11-29 DIAGNOSIS — M25.669 DECREASED RANGE OF MOTION OF KNEE, UNSPECIFIED LATERALITY: ICD-10-CM

## 2021-11-29 PROCEDURE — 97110 THERAPEUTIC EXERCISES: CPT | Mod: PO

## 2021-11-29 PROCEDURE — 97140 MANUAL THERAPY 1/> REGIONS: CPT | Mod: PO

## 2021-11-30 ENCOUNTER — EXTERNAL CHRONIC CARE MANAGEMENT (OUTPATIENT)
Dept: PRIMARY CARE CLINIC | Facility: CLINIC | Age: 76
End: 2021-11-30
Payer: MEDICARE

## 2021-11-30 ENCOUNTER — PATIENT MESSAGE (OUTPATIENT)
Dept: FAMILY MEDICINE | Facility: CLINIC | Age: 76
End: 2021-11-30
Payer: MEDICARE

## 2021-11-30 PROCEDURE — 99490 PR CHRONIC CARE MGMT, 1ST 20 MIN: ICD-10-PCS | Mod: S$PBB,,, | Performed by: FAMILY MEDICINE

## 2021-11-30 PROCEDURE — 99490 CHRNC CARE MGMT STAFF 1ST 20: CPT | Mod: PBBFAC,PO | Performed by: FAMILY MEDICINE

## 2021-11-30 PROCEDURE — 99490 CHRNC CARE MGMT STAFF 1ST 20: CPT | Mod: S$PBB,,, | Performed by: FAMILY MEDICINE

## 2021-12-01 PROBLEM — R47.01 EXPRESSIVE APHASIA: Status: ACTIVE | Noted: 2021-12-01

## 2021-12-02 PROBLEM — R42 DIZZINESS: Status: ACTIVE | Noted: 2021-12-02

## 2021-12-06 ENCOUNTER — OFFICE VISIT (OUTPATIENT)
Dept: FAMILY MEDICINE | Facility: CLINIC | Age: 76
End: 2021-12-06
Payer: MEDICARE

## 2021-12-06 ENCOUNTER — CLINICAL SUPPORT (OUTPATIENT)
Dept: REHABILITATION | Facility: HOSPITAL | Age: 76
End: 2021-12-06
Attending: ORTHOPAEDIC SURGERY
Payer: MEDICARE

## 2021-12-06 VITALS
DIASTOLIC BLOOD PRESSURE: 68 MMHG | OXYGEN SATURATION: 97 % | HEART RATE: 96 BPM | SYSTOLIC BLOOD PRESSURE: 132 MMHG | TEMPERATURE: 98 F | BODY MASS INDEX: 25.58 KG/M2 | WEIGHT: 135.38 LBS

## 2021-12-06 DIAGNOSIS — M25.669 DECREASED RANGE OF MOTION OF KNEE, UNSPECIFIED LATERALITY: ICD-10-CM

## 2021-12-06 DIAGNOSIS — J40 BRONCHITIS: ICD-10-CM

## 2021-12-06 DIAGNOSIS — H81.09 MENIERE'S DISEASE, UNSPECIFIED LATERALITY: Primary | ICD-10-CM

## 2021-12-06 DIAGNOSIS — Z74.09 IMPAIRED FUNCTIONAL MOBILITY, BALANCE, GAIT, AND ENDURANCE: ICD-10-CM

## 2021-12-06 PROCEDURE — 97112 NEUROMUSCULAR REEDUCATION: CPT | Mod: KX,PO,CQ

## 2021-12-06 PROCEDURE — 99214 OFFICE O/P EST MOD 30 MIN: CPT | Mod: PBBFAC,PO | Performed by: PHYSICIAN ASSISTANT

## 2021-12-06 PROCEDURE — 99999 PR PBB SHADOW E&M-EST. PATIENT-LVL IV: CPT | Mod: PBBFAC,,, | Performed by: PHYSICIAN ASSISTANT

## 2021-12-06 PROCEDURE — 97110 THERAPEUTIC EXERCISES: CPT | Mod: KX,PO,CQ

## 2021-12-06 PROCEDURE — 99214 OFFICE O/P EST MOD 30 MIN: CPT | Mod: S$PBB,,, | Performed by: PHYSICIAN ASSISTANT

## 2021-12-06 PROCEDURE — 99214 PR OFFICE/OUTPT VISIT, EST, LEVL IV, 30-39 MIN: ICD-10-PCS | Mod: S$PBB,,, | Performed by: PHYSICIAN ASSISTANT

## 2021-12-06 PROCEDURE — 99999 PR PBB SHADOW E&M-EST. PATIENT-LVL IV: ICD-10-PCS | Mod: PBBFAC,,, | Performed by: PHYSICIAN ASSISTANT

## 2021-12-06 RX ORDER — METHYLPREDNISOLONE 4 MG/1
TABLET ORAL
Qty: 1 EACH | Refills: 0 | Status: SHIPPED | OUTPATIENT
Start: 2021-12-06 | End: 2021-12-15

## 2021-12-06 RX ORDER — ROSUVASTATIN CALCIUM 40 MG/1
40 TABLET, COATED ORAL NIGHTLY
Qty: 90 TABLET | Refills: 3 | Status: SHIPPED | OUTPATIENT
Start: 2021-12-06 | End: 2022-12-08

## 2021-12-13 ENCOUNTER — CLINICAL SUPPORT (OUTPATIENT)
Dept: REHABILITATION | Facility: HOSPITAL | Age: 76
End: 2021-12-13
Attending: ORTHOPAEDIC SURGERY
Payer: MEDICARE

## 2021-12-13 DIAGNOSIS — M25.669 DECREASED RANGE OF MOTION OF KNEE, UNSPECIFIED LATERALITY: ICD-10-CM

## 2021-12-13 DIAGNOSIS — Z74.09 IMPAIRED FUNCTIONAL MOBILITY, BALANCE, GAIT, AND ENDURANCE: ICD-10-CM

## 2021-12-13 PROCEDURE — 97110 THERAPEUTIC EXERCISES: CPT | Mod: PO

## 2021-12-14 ENCOUNTER — CLINICAL SUPPORT (OUTPATIENT)
Dept: REHABILITATION | Facility: HOSPITAL | Age: 76
End: 2021-12-14
Attending: ORTHOPAEDIC SURGERY
Payer: MEDICARE

## 2021-12-14 DIAGNOSIS — R26.89 BALANCE PROBLEM: ICD-10-CM

## 2021-12-14 DIAGNOSIS — H81.09 MENIERE'S DISEASE, UNSPECIFIED LATERALITY: ICD-10-CM

## 2021-12-14 DIAGNOSIS — Z74.09 IMPAIRED FUNCTIONAL MOBILITY, BALANCE, GAIT, AND ENDURANCE: ICD-10-CM

## 2021-12-14 PROCEDURE — 97161 PT EVAL LOW COMPLEX 20 MIN: CPT | Mod: KX,PO | Performed by: PHYSICAL THERAPIST

## 2021-12-14 PROCEDURE — 97112 NEUROMUSCULAR REEDUCATION: CPT | Mod: KX,PO | Performed by: PHYSICAL THERAPIST

## 2021-12-15 ENCOUNTER — OFFICE VISIT (OUTPATIENT)
Dept: ORTHOPEDICS | Facility: CLINIC | Age: 76
End: 2021-12-15
Payer: MEDICARE

## 2021-12-15 VITALS — HEIGHT: 61 IN | BODY MASS INDEX: 25.49 KG/M2 | WEIGHT: 135 LBS | RESPIRATION RATE: 18 BRPM

## 2021-12-15 DIAGNOSIS — M17.10 ARTHRITIS OF KNEE: Primary | ICD-10-CM

## 2021-12-15 DIAGNOSIS — S83.241D ACUTE MEDIAL MENISCAL TEAR, RIGHT, SUBSEQUENT ENCOUNTER: ICD-10-CM
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
## 2021-12-15 PROCEDURE — 99213 OFFICE O/P EST LOW 20 MIN: CPT | Mod: 25,S$PBB,, | Performed by: ORTHOPAEDIC SURGERY

## 2021-12-15 PROCEDURE — 99213 OFFICE O/P EST LOW 20 MIN: CPT | Mod: PBBFAC,PN,25 | Performed by: ORTHOPAEDIC SURGERY

## 2021-12-15 PROCEDURE — 99999 PR PBB SHADOW E&M-EST. PATIENT-LVL III: CPT | Mod: PBBFAC,,, | Performed by: ORTHOPAEDIC SURGERY

## 2021-12-15 PROCEDURE — 99213 PR OFFICE/OUTPT VISIT, EST, LEVL III, 20-29 MIN: ICD-10-PCS | Mod: 25,S$PBB,, | Performed by: ORTHOPAEDIC SURGERY

## 2021-12-15 PROCEDURE — 20610 LARGE JOINT ASPIRATION/INJECTION: R KNEE: ICD-10-PCS | Mod: S$PBB,RT,, | Performed by: ORTHOPAEDIC SURGERY

## 2021-12-15 PROCEDURE — 20610 DRAIN/INJ JOINT/BURSA W/O US: CPT | Mod: PBBFAC,PN | Performed by: ORTHOPAEDIC SURGERY

## 2021-12-15 PROCEDURE — 99999 PR PBB SHADOW E&M-EST. PATIENT-LVL III: ICD-10-PCS | Mod: PBBFAC,,, | Performed by: ORTHOPAEDIC SURGERY

## 2021-12-15 RX ORDER — TRIAMCINOLONE ACETONIDE 40 MG/ML
40 INJECTION, SUSPENSION INTRA-ARTICULAR; INTRAMUSCULAR
Status: DISCONTINUED | OUTPATIENT
Start: 2021-12-15 | End: 2021-12-15 | Stop reason: HOSPADM

## 2021-12-15 RX ADMIN — TRIAMCINOLONE ACETONIDE 40 MG: 40 INJECTION, SUSPENSION INTRA-ARTICULAR; INTRAMUSCULAR at 08:12

## 2021-12-21 ENCOUNTER — PATIENT MESSAGE (OUTPATIENT)
Dept: NEUROLOGY | Facility: CLINIC | Age: 76
End: 2021-12-21
Payer: MEDICARE

## 2021-12-21 ENCOUNTER — CLINICAL SUPPORT (OUTPATIENT)
Dept: REHABILITATION | Facility: HOSPITAL | Age: 76
End: 2021-12-21
Attending: ORTHOPAEDIC SURGERY
Payer: MEDICARE

## 2021-12-21 DIAGNOSIS — M17.10 ARTHRITIS OF KNEE: ICD-10-CM

## 2021-12-21 DIAGNOSIS — M25.661 DECREASED RANGE OF MOTION OF RIGHT KNEE: ICD-10-CM

## 2021-12-21 DIAGNOSIS — S83.241D ACUTE MEDIAL MENISCAL TEAR, RIGHT, SUBSEQUENT ENCOUNTER: ICD-10-CM
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
## 2021-12-21 DIAGNOSIS — Z74.09 IMPAIRED FUNCTIONAL MOBILITY, BALANCE, GAIT, AND ENDURANCE: ICD-10-CM

## 2021-12-21 PROCEDURE — 97112 NEUROMUSCULAR REEDUCATION: CPT | Mod: KX,PO

## 2021-12-21 PROCEDURE — 97110 THERAPEUTIC EXERCISES: CPT | Mod: KX,PO

## 2021-12-21 RX ORDER — OMEPRAZOLE 20 MG/1
CAPSULE, DELAYED RELEASE ORAL
Qty: 30 CAPSULE | Refills: 1 | Status: SHIPPED | OUTPATIENT
Start: 2021-12-21 | End: 2022-02-18 | Stop reason: SDUPTHER

## 2021-12-22 ENCOUNTER — CLINICAL SUPPORT (OUTPATIENT)
Dept: REHABILITATION | Facility: HOSPITAL | Age: 76
End: 2021-12-22
Attending: ORTHOPAEDIC SURGERY
Payer: MEDICARE

## 2021-12-22 DIAGNOSIS — R26.89 BALANCE PROBLEM: ICD-10-CM

## 2021-12-22 DIAGNOSIS — Z74.09 IMPAIRED FUNCTIONAL MOBILITY, BALANCE, GAIT, AND ENDURANCE: ICD-10-CM

## 2021-12-22 PROCEDURE — 97112 NEUROMUSCULAR REEDUCATION: CPT | Mod: KX,PO | Performed by: PHYSICAL THERAPIST

## 2021-12-22 PROCEDURE — 97110 THERAPEUTIC EXERCISES: CPT | Mod: KX,PO | Performed by: PHYSICAL THERAPIST

## 2021-12-23 ENCOUNTER — PATIENT MESSAGE (OUTPATIENT)
Dept: FAMILY MEDICINE | Facility: CLINIC | Age: 76
End: 2021-12-23
Payer: MEDICARE

## 2021-12-23 DIAGNOSIS — F41.9 ANXIETY: ICD-10-CM

## 2021-12-26 RX ORDER — ALPRAZOLAM 0.25 MG/1
0.25 TABLET ORAL 2 TIMES DAILY PRN
Qty: 60 TABLET | Refills: 2 | Status: SHIPPED | OUTPATIENT
Start: 2021-12-26 | End: 2022-03-30 | Stop reason: SDUPTHER

## 2021-12-27 ENCOUNTER — CLINICAL SUPPORT (OUTPATIENT)
Dept: REHABILITATION | Facility: HOSPITAL | Age: 76
End: 2021-12-27
Attending: ORTHOPAEDIC SURGERY
Payer: MEDICARE

## 2021-12-27 DIAGNOSIS — M25.661 DECREASED RANGE OF MOTION OF RIGHT KNEE: ICD-10-CM

## 2021-12-27 DIAGNOSIS — Z74.09 IMPAIRED FUNCTIONAL MOBILITY, BALANCE, GAIT, AND ENDURANCE: ICD-10-CM

## 2021-12-27 PROCEDURE — 97112 NEUROMUSCULAR REEDUCATION: CPT | Mod: KX,PO

## 2021-12-27 PROCEDURE — 97110 THERAPEUTIC EXERCISES: CPT | Mod: KX,PO

## 2021-12-29 ENCOUNTER — CLINICAL SUPPORT (OUTPATIENT)
Dept: REHABILITATION | Facility: HOSPITAL | Age: 76
End: 2021-12-29
Attending: ORTHOPAEDIC SURGERY
Payer: MEDICARE

## 2021-12-29 DIAGNOSIS — R26.89 BALANCE PROBLEM: ICD-10-CM

## 2021-12-29 DIAGNOSIS — Z74.09 IMPAIRED FUNCTIONAL MOBILITY, BALANCE, GAIT, AND ENDURANCE: ICD-10-CM

## 2021-12-29 PROCEDURE — 97112 NEUROMUSCULAR REEDUCATION: CPT | Mod: KX,PO | Performed by: PHYSICAL THERAPIST

## 2021-12-31 ENCOUNTER — EXTERNAL CHRONIC CARE MANAGEMENT (OUTPATIENT)
Dept: PRIMARY CARE CLINIC | Facility: CLINIC | Age: 76
End: 2021-12-31
Payer: MEDICARE

## 2021-12-31 PROCEDURE — 99490 CHRNC CARE MGMT STAFF 1ST 20: CPT | Mod: PBBFAC,PO | Performed by: FAMILY MEDICINE

## 2021-12-31 PROCEDURE — 99490 CHRNC CARE MGMT STAFF 1ST 20: CPT | Mod: S$PBB,,, | Performed by: FAMILY MEDICINE

## 2021-12-31 PROCEDURE — 99490 PR CHRONIC CARE MGMT, 1ST 20 MIN: ICD-10-PCS | Mod: S$PBB,,, | Performed by: FAMILY MEDICINE

## 2022-01-04 ENCOUNTER — CLINICAL SUPPORT (OUTPATIENT)
Dept: REHABILITATION | Facility: HOSPITAL | Age: 77
End: 2022-01-04
Attending: ORTHOPAEDIC SURGERY
Payer: MEDICARE

## 2022-01-04 DIAGNOSIS — M25.661 DECREASED RANGE OF MOTION OF RIGHT KNEE: ICD-10-CM

## 2022-01-04 DIAGNOSIS — Z74.09 IMPAIRED FUNCTIONAL MOBILITY, BALANCE, GAIT, AND ENDURANCE: ICD-10-CM

## 2022-01-04 PROCEDURE — 97110 THERAPEUTIC EXERCISES: CPT | Mod: PO

## 2022-01-04 PROCEDURE — 97112 NEUROMUSCULAR REEDUCATION: CPT | Mod: PO

## 2022-01-04 NOTE — PROGRESS NOTES
Physical Therapy Daily Treatment Note     Name: Veronique Johnson  Clinic Number: [id number]    Therapy Diagnosis:   Encounter Diagnoses   Name Primary?    Decreased range of motion of right knee     Impaired functional mobility, balance, gait, and endurance      Physician: Shelton Le,*    Visit Date: 1/4/2022  Physician: Shelton Le,*     Physician Orders: PT Eval and Treat   Medical Diagnosis from Referral: Other tear of medial meniscus, current injury, right knee, subsequent encounter [S83.241D]  Evaluation Date: 11/4/2021  Authorization Period Expiration: 12/31/21  Plan of Care Expiration: 1/27/22  Visit # / Visits authorized: 1/20  FOTO #2 11/29/21: 57/100=43% limited    Time In: 8:54am  Time Out: 8:47am  Total Billable Time: 53 minutes    Precautions: Standard    Subjective     Pt reports: walking around for about 3 hours yesterday and had some knee soreness last night. Today she is feeling good and feels like her normal self.      She was compliant with home exercise program.  Response to previous treatment: no adverse effect  Functional change: improve gait     Pain: 0/10  Location: right knee      Objective     Veronique received therapeutic exercises to develop strength, ROM and flexibility for 26 minutes including:    Upright bike x 5 minutes, level 3   SLR 2 x 10 2#  SL hip ABD 3 x 10 B 2#  DL shuttle squats 3x10 4 bands   SL shuttle squats 3x10 2.5 bands  Patient education on frequency of exercises and HEP    NP today:  Bridges 3x10 Green TB  SL clams 2x10 Green TB  Resisted lateral steps 3 laps yellow sports loop (terminated after 1 lap due to unsteadiness reported by patient)  Prone quad stretch 30s x3  Standing TKE Blue TB 2x10  DL heel raise to SL heel raise 3 x 10      Veronique received the following manual therapy techniques: Joint mobilizations and Soft tissue Mobilization were applied to the: knee for 0 minutes, including:  Hinged knee extension mobilization  Patella mobs  "  Tibial ER   Hinged knee extension     Veronique participated in neuromuscular re-education activities to improve: for 27 minutes. The following activities were included:    18" box squat + blue airex 3x8 c 8lb DB  Lateral step down 6" 3 x 8 B  Standing hip abd yellow sports loop 3 x 10 B (chair support for balance)  Standing hip extension yellow sports loop 3 x 10 B    NP today  SL heel raise 3 x 8 B  Tandem stance 20 sec x 3  Step up 2x10 8" step: np  SL stance 20 sec x 2 (B):np    Veronique participated in dynamic functional therapeutic activities to improve functional performance for   minutes, including:    Home Exercises Provided and Patient Education Provided     Education provided:   - HEP, POC    Written Home Exercises Provided: Patient instructed to cont prior HEP.  Exercises were reviewed and Veronique was able to demonstrate them prior to the end of the session.  Veronique demonstrated good  understanding of the education provided.     See EMR under Patient Instructions for exercises provided 11/8/2021.    Assessment     Patient demonstrates improvements with DL and SL tasks today, decreased amount of knee valgus with all exercise. Introduced SL lateral step down and patient completed with minimal cuing needed, vastly improved hip stability and knee control throughout exercise. Patient reported minor knee discomfort at end of session, attributes it to "knee being worked". Continue to progress LE strengthening to facilitate improved function, completion of ADLs, gait and balance.        Veronique is progressing well towards her goals.   Pt prognosis is Good.     Pt will continue to benefit from skilled outpatient physical therapy to address the deficits listed in the problem list box on initial evaluation, provide pt/family education and to maximize pt's level of independence in the home and community environment.     Pt's spiritual, cultural and educational needs considered and pt agreeable to plan of care and goals.   "   Anticipated barriers to physical therapy: none    Goals  GOALS: 8 weeks  1. Report decreased R knee pain  <  / =  5/10 at worst to increase tolerance for prolonged standing.  2. Pt will report 50% improvement in ability to walk long distances since start of care to indicate improved functional mobility.   3. Pt will ambulate with equal B step length with ambulation to indicate improved gait pattern.   4. Pt will tolerate walking on treadmill for 5 minutes without pain to indicate improved walking endurance.   5. Pt to tolerate HEP to improve ROM and independence with ADL's.     Long Term Goals: 12 weeks  1. Report decreased R knee pain  <  / =  3/10 at worst to increase tolerance for prolonged standing.  2. Pt will report 80% improvement in ability to walk long distances since start of care to indicate improved functional mobility.   3. Pt will ambulate with proper heel-to-toe gait pattern to indicate improved gait pattern.   4. Pt will tolerate walking on treadmill for 10 minutes without pain to indicate improved walking endurance.   5. Pt to be Independent with HEP to improve ROM and independence with ADL's.      Plan     Plan of care Certification: 11/4/2021 to 1/27/22.     Outpatient Physical Therapy 2 times weekly for 12 weeks to include the following interventions: Gait Training, Manual Therapy, Moist Heat/ Ice, Neuromuscular Re-ed, Patient Education, Self Care, Therapeutic Activites and Therapeutic Exercise.       Hitesh Purdy, PT

## 2022-01-06 ENCOUNTER — CLINICAL SUPPORT (OUTPATIENT)
Dept: REHABILITATION | Facility: HOSPITAL | Age: 77
End: 2022-01-06
Attending: ORTHOPAEDIC SURGERY
Payer: MEDICARE

## 2022-01-06 DIAGNOSIS — R26.89 BALANCE PROBLEM: ICD-10-CM

## 2022-01-06 DIAGNOSIS — Z74.09 IMPAIRED FUNCTIONAL MOBILITY, BALANCE, GAIT, AND ENDURANCE: ICD-10-CM

## 2022-01-06 PROCEDURE — 97110 THERAPEUTIC EXERCISES: CPT | Mod: PO | Performed by: PHYSICAL THERAPIST

## 2022-01-06 PROCEDURE — 97112 NEUROMUSCULAR REEDUCATION: CPT | Mod: PO | Performed by: PHYSICAL THERAPIST

## 2022-01-06 NOTE — PROGRESS NOTES
Physical Therapy Daily Treatment Note     Name: Veronique Johnson  Clinic Number: [id number]    Therapy Diagnosis:   No diagnosis found.  Physician: Marifer Guerra PA-C    Visit Date: 1/6/2022  Physician Orders: PT Eval and Treat   Medical Diagnosis from Referral: Meniere's disease  Evaluation Date: 12/14/2021  Authorization Period Expiration: 12/31/21  Plan of Care Expiration: 2/14/22  Progress Note Due: 1/14/22  Visit # / Visits authorized: 2/ 20 (+ 1 prior auth)    Time In: 9:15AM  Time Out: 10:00AM  Total Billable Time: 45 minutes    Precautions: Standard and Fall        Subjective     Pt reports: she had some difficulty with balance with trying on dresses for grandmother of the bride. She had to lean against wall for support.  She was compliant with home exercise program.  Response to previous treatment: no difficulty  Functional change: able to recover displaced COG    Pain: N/A for vestibular/balance problem  Location: core and LEs           Objective         Veronique received therapeutic exercises to develop strength and core stabilization for 10 minutes including:  Posterior pelvic tilt 3 sec hold x20   Lumbar stabilization supine marching x20   Lumbar stabilization leg extension 2/10 ea   Bridging with PPT 5 sec hold x20   Paloff press RTB x20 ea with stagger stance    Veronique participated in neuromuscular re-education activities to improve: Balance, Coordination, Kinesthetic, Sense, Proprioception and Posture for 35 minutes. The following activities were included:    Rhomberg stance EO, EC, on airex EO, EC 30 sec ea  Tandem stance EO, EC, on airex 30 sec ea way  Single leg stance EO 30 sec  Marching EO, EC 30 sec ea  Heel raises x20  Toe raises x20  Rhomberg stance with head turns with focus on target EO, on airex 30 sec ea  Perturbations in standing all directions 2 min  Perturbations in stagger stance 1 min ea  Ankle strategy x20  Hip strategy x20  Step strategy x20    Dynamic:  Weave in/out of cones 25 ft  x4 np   cones x10 np np  Step over 1/2 rolls x2 and onto airex, 8x np  Walk along straight line 25'x4 np  Walk backwards 50'x4  Square walk x4- np  Y toe taps x10 ea  Walk stop suddenly 25'x4- forward/backward- np  Walk and turn 25'x4-np  Sit to stand turn around 5x ea  Turn shift backwards x10 ea      Home Exercises Provided and Patient Education Provided     Education provided:   - Instructed pt in HEP for static balance and core strengthening    Written Home Exercises Provided: yes.  Exercises were reviewed and Veronique was able to demonstrate them prior to the end of the session.  Veronique demonstrated good  understanding of the education provided.     See EMR under Patient Instructions for exercises provided 12/22/2021.    Assessment     Performed perturbations at core with standing and stagger stance and pt was able to recover better than with resistance at shoulders. Still weak and needed cues to more quickly respond to offset of gravity backwards and side to side. Improved awareness of posture and alignment..  Veronique Is progressing well towards her goals.   Pt prognosis is Excellent.     Pt will continue to benefit from skilled outpatient physical therapy to address the deficits listed in the problem list box on initial evaluation, provide pt/family education and to maximize pt's level of independence in the home and community environment.     Pt's spiritual, cultural and educational needs considered and pt agreeable to plan of care and goals.    Anticipated barriers to physical therapy: none    Goals:   Short Term Goals: 4 weeks   1. Patient independent in HEP of balance and head-eye coordination.  Exercises to be done Twice a day.  2. Pt will improve core strength by 1/3 grade and engage core to prevent loss of balance with displacement of center of gravity.  3. Pt will be able to tandem stance 30 sec without HHA and single leg stance 15 sec on each LE without HHA.  4. Pt will have no falls in next 4  weeks.     Long Term Goals: 8 weeks   1. Patient able to ambulate in home and community safely without assistive device, on varied terrainwith head movement, without LOB or c/o dizziness.  2. Patient able to perform walking with head turns without LOB   3. Pt to be I with self management of condition and progression vestibular program for maintenance.  4. Pt will have no falls in second 4 weeks of PT.    Plan     Cont per POC progressing toward established goals.  PTA may be involved in care for this patient under direction of PT.       Juana Alcantara, PT

## 2022-01-10 ENCOUNTER — CLINICAL SUPPORT (OUTPATIENT)
Dept: REHABILITATION | Facility: HOSPITAL | Age: 77
End: 2022-01-10
Attending: ORTHOPAEDIC SURGERY
Payer: MEDICARE

## 2022-01-10 DIAGNOSIS — Z74.09 IMPAIRED FUNCTIONAL MOBILITY, BALANCE, GAIT, AND ENDURANCE: ICD-10-CM

## 2022-01-10 DIAGNOSIS — M25.661 DECREASED RANGE OF MOTION OF RIGHT KNEE: ICD-10-CM

## 2022-01-10 PROCEDURE — 97112 NEUROMUSCULAR REEDUCATION: CPT | Mod: PO

## 2022-01-10 PROCEDURE — 97110 THERAPEUTIC EXERCISES: CPT | Mod: PO

## 2022-01-10 NOTE — PROGRESS NOTES
Physical Therapy    Physical Therapy Treatment Note  Name: Ta Arango MRN: [id number] :   1948   Date:  1/10/2022   Admission Date: 2022 Room:  [address] [place] [address]   Restrictions/Precautions: fall risk; general precautions; droplet plus  Communication with other providers:  TANNER Benjamin approves this patient for rehab today. Subjective:  Patient states:  \"Want to lay down\" but easily participated in rehab today  Pain:   Location, Type, Intensity (0/10 to 10/10): Denies  Objective:    Observation:  Pt on BSC with RN and STNA today, PTA assists with pericare and sit to stand during pericare  Treatment, including education/measures:  Therapeutic Activity Training:   Therapeutic activity training was instructed today. Cues were given for safety, sequence, UE/LE placement, awareness, and balance. Activities performed today included bed mobility training, sup-sit, sit-stand, SPT. Supine <-> sit: min A for completion  Scooting: Seated SBA, Supine up to [facility] Mod A with pateint able to put effort through BUE and BLE  Seated balance: Fair +, CGA and patient would intermittently lop to the Lt for rest ~5 mins x 2. Able to brush hair with no LOB  Sit <-> stand: min A progressing to SBA w/ time on task ; 4 sets, 2 from EOB and 2 from [facility]  Standing balance: fair ; inc postural sway and reliance on single to BL UE support during OOB activity   Stand <-> sit: CGA  Pt becomes frustrated and SOB, requesting to end tx session w/ cueing from therapist ; left in bed, bed alarmed, call light in reach, gait belt t/o, all needs met, RN handoff provided    Gait Training:  Cues were given for safety, sequence, device management, balance, posture, awareness, path.     10 ft using FWW at min A to CGA ; mod A for AD mgmt, CGA for steady ; dec salud, dec EL, poor AD mgmt, dec stride length/step height BL ; inc postural sway ; poor ability attending to cues, mod - max t/o to address quality of functional mobility    Assessment / Subjective:      Patient ID: Veronique Johnson is a 73 y.o. female.    Chief Complaint:  Rectal Pain      History of Present Illness  Patient returns stating she is increased level of pain in the anal canal    Colonoscopy 1 year ago completely normal.  UA by me a few months ago completely normal.  No    Patient wants also referral to pain management organization most likely in coming 10    Patient stopped all medications secondary to side effects.            Past Medical History:   Diagnosis Date    Anemia     Anxiety     Chronic bronchitis with COPD (chronic obstructive pulmonary disease)     Esophageal spasm     Essential tremor     GERD (gastroesophageal reflux disease)     Headache     High cholesterol     Hypertension     IBS (irritable bowel syndrome)     Meniere disease     Multiple sclerosis     Muscle spasm        Past Surgical History:   Procedure Laterality Date    CATARACT EXTRACTION, BILATERAL  2006    CYSTOSCOPY, WITH BLADDER HYDRODISTENSION N/A 6/5/2019    Performed by Marko Burton [facility] at [facility]    DILATION AND CURETTAGE OF UTERUS  1966    HEMORRHOID SURGERY  1997    TONSILLECTOMY  1954    TUBAL LIGATION  1990       GYN & OB History  No LMP recorded. Patient is postmenopausal.   Date of Last Pap: No result found    OB History   No data available       Health Maintenance       Date Due Completion Date    Shingles Vaccine (2 of 3) 12/18/2015 10/23/2015    Influenza Vaccine (1) 09/01/2019 10/30/2018    Mammogram 05/03/2020 5/3/2018    High Dose Statin 09/18/2020 9/18/2019    DEXA SCAN 02/05/2022 2/5/2019    Lipid Panel 12/14/2023 12/14/2018    Colonoscopy 09/05/2028 9/5/2018    TETANUS VACCINE 02/05/2029 2/5/2019          Family History   Problem Relation Age of Onset    Coronary artery disease Mother     Heart disease Mother 60        CAD    Heart attack Father     Coronary artery disease Father     Heart disease Father 55        MI    Coronary artery disease Sister      Heart disease Sister 65        CAD       Social History     Socioeconomic History    Marital status:      Spouse name: Not on file    Number of children: Not on file    Years of education: Not on file    Highest education level: Not on file   Occupational History    Not on file   Social Needs    Financial resource strain: Not hard at all    Food insecurity:     Worry: Never true     Inability: Never true    Transportation needs:     Medical: No     Non-medical: No   Tobacco Use    Smoking status: Former Smoker     Last attempt to quit:      Years since quittin.7    Smokeless tobacco: Never Used   Substance and Sexual Activity    Alcohol use: Yes     Alcohol/week: 1.8 - 2.4 oz     Types: 3 - 4 Glasses of wine per week     Frequency: 2-3 times a week     Drinks per session: 3 or 4     Binge frequency: Never     Comment: on weekends    Drug use: No    Sexual activity: Yes   Lifestyle    Physical activity:     Days per week: Not on file     Minutes per session: 70 min    Stress: Only a little   Relationships    Social connections:     Talks on phone: More than three times a week     Gets together: Three times a week     Attends Rastafari service: Not on file     Active member of club or organization: Yes     Attends meetings of clubs or organizations: More than 4 times per year     Relationship status:    Other Topics Concern    Not on file   Social History Narrative    Not on file       Review of Systems  Review of Systems   Gastrointestinal: Abdominal pain: Anal pain.          Objective:   /77   Pulse 83   Wt 56.1 kg (123 lb 10.9 oz)   BMI 23.37 kg/m²     Physical Exam   Genitourinary: Pelvic exam was performed with patient supine. Skenes normal and bartholins normal. Right labia normal. Urethra exhibits no urethral caruncle, no urethral diverticulum, no urethral mass and no hypermobility. There is atrophy and lavator tenderness in the vagina. No rectocele, cystocele  Impression:    Initiated functional mobility, balance, transfer, functional strengthening, and gait training this session. Pt tolerating fairly today with low motivation for rehab acivities. Cues for safe walker management hardly improved walker management, manual assistance was necessary. Will cont to benefit from skilled therapy services to address neuro deficits. Patient's tolerance of treatment:  Fair +   Barriers to improvement:  Cognition; aphasia  Plan for Next Session:    Cont w/ est POC and DC plan    Time in:  1520  Time out:  1550  Timed treatment minutes:  30  Total treatment time:  30 (TA, GT)    Previously filed items:  Social/Functional History  Lives With: Spouse  Type of Home: House  Home Layout: Two level,Bed/Bath upstairs  Home Access: Stairs to enter with rails  Entrance Stairs - Number of Steps: 2-3  Bathroom Shower/Tub: Walk-in shower  Bathroom Equipment: Grab bars in shower,Grab bars around [employer] chair  Home Equipment: Cane  ADL Assistance: [address] [place] [address] Avenue: [address] [place] [address] Responsibilities: Yes (shares with )  Ambulation Assistance: Independent (with cane)  Transfer Assistance: Independent  Active : No  Patient's  Info:  and dtr  Short term goals  Time Frame for Short term goals: 1 week  Short term goal 1: Pt will AMB x25 ft with RW, CGA-SBA. Short term goal 2: Pt will demonstrate stand-step transfer from EOB<>chair with RW and SBA. Short term goal 3: Pt will demonstrate STS from low surface to RW with CGA. Short term goal 4: Pt will tolerate x5' standing dynamic balance activity with emphasis on visual tracking, CGA, UE support,       Electronically signed by:     Anthony Feliz PTA  1/10/2022, 3:48 PM or unspecified prolapse of vaginal walls in the vagina. Cervix exhibits absence. Uterus is absent.   POP-Q deferred.     Anal exam rectovaginal exam all by cannot palpate any type of mass of organic cause.  Delighted with doing rectal exam in palpating straight up through the vagina to the trigone that is was reproduce seeing her significant discomfort after changing gloves multiple times and doing isolated exam of trigone as well as through erectile access this is what is reproduce in pain.       Assessment:     1. Pelvic pain    2. IC (interstitial cystitis)            Plan:     1. Pelvic pain    2. IC (interstitial cystitis)        After having patient dressed we then move forward with explanation.  New  At this point I do think that this is trigonitis.  I am asking her to restart just urine bell in isolated fashion as well as use her vaginal Valium.  Unmasking her to every titrated up with her Myrbetriq 25 but only using it every other day.  I am asking her to add the medicine 1 at a time so that we can see which medication is causing the side effects that we can then managed successfully.      Patient will be contacting us with this information.  Patient will be contacted us the name of the organization she wants referral to.  Patient many questions all addressed total time of this consultation 20 min greater than 50% time 15 min in direct discussion with the patient answering questions regarding medication she has been prescribed.    There are no Patient Instructions on file for this visit.

## 2022-01-10 NOTE — PROGRESS NOTES
Physical Therapy Daily Treatment Note     Name: Veronique Johnson  Clinic Number: [id number]    Therapy Diagnosis:   Encounter Diagnoses   Name Primary?    Decreased range of motion of right knee     Impaired functional mobility, balance, gait, and endurance      Physician: Shelton Le,*    Visit Date: 1/10/2022  Physician: Shelton Le,*     Physician Orders: PT Eval and Treat   Medical Diagnosis from Referral: Other tear of medial meniscus, current injury, right knee, subsequent encounter [S83.241D]  Evaluation Date: 11/4/2021  Authorization Period Expiration: 12/31/21  Plan of Care Expiration: 1/27/22  Visit # / Visits authorized: 1/20  FOTO #2 11/29/21: 57/100=43% limited    Time In: 9:01am  Time Out: 9:54am  Total Billable Time: 53 minutes    Precautions: Standard    Subjective     Pt reports: her knee is feeling really good today. She performed HEP over weekend with no symptoms and min muscle burn.      She was compliant with home exercise program.  Response to previous treatment: no adverse effect  Functional change: improve gait     Pain: 0/10  Location: right knee      Objective     Veronique received therapeutic exercises to develop strength, ROM and flexibility for 24 minutes including:    Upright bike x 5 minutes, level 3   SLR 2 x 10 2#  SL hip ABD 3 x 10 B 2#  DL shuttle squats 3x10 4 bands   SL shuttle squats 3x10 2.5 bands B  Patient education on frequency of exercises and HEP    NP today:  Bridges 3x10 Green TB  SL clams 2x10 Green TB  Resisted lateral steps 3 laps yellow sports loop (terminated after 1 lap due to unsteadiness reported by patient)  Prone quad stretch 30s x3  Standing TKE Blue TB 2x10  DL heel raise to SL heel raise 3 x 10      Veronique received the following manual therapy techniques: Joint mobilizations and Soft tissue Mobilization were applied to the: knee for 0 minutes, including:  Hinged knee extension mobilization  Patella mobs   Tibial ER   Hinged knee extension  "    Veronique participated in neuromuscular re-education activities to improve: for 29 minutes. The following activities were included:    18" box squat + blue airex 3x8 c 8lb DB  Lateral step down 6" 3 x 8 B  Forward step down 6" 3 x 6 B  SL heel raise 3 x 8 B  Step over jeanne flat 3 x 20 B (focus on small steps, in control)  Step over jeanne 3 x 10 B (great difficulty)    NP today:  Standing hip abd yellow sports loop 3 x 10 B (chair support for balance)  Standing hip extension yellow sports loop 3 x 10 B  Tandem stance 20 sec x 3  Step up 2x10 8" step: np  SL stance 20 sec x 2 (B):np    Veronique participated in dynamic functional therapeutic activities to improve functional performance for   minutes, including:    Home Exercises Provided and Patient Education Provided     Education provided:   - HEP, POC    Written Home Exercises Provided: Patient instructed to cont prior HEP.  Exercises were reviewed and Veronique was able to demonstrate them prior to the end of the session.  Veronique demonstrated good  understanding of the education provided.     See EMR under Patient Instructions for exercises provided 11/8/2021.    Assessment     Patient demonstrates improved LE strength in both DL and SL. Introduced anterior step down to facilitate quad strength and patient performed with no cues needed. Aside from LE strengthening, patient was challenged with jeanne step over to facilitate improved gait and decrease fall risk. Patient improved with motor control when prompted to decrease speed and step length. Continue to improve LE strength, gait, and balance to decrease fall risk.        Veronique is progressing well towards her goals.   Pt prognosis is Good.     Pt will continue to benefit from skilled outpatient physical therapy to address the deficits listed in the problem list box on initial evaluation, provide pt/family education and to maximize pt's level of independence in the home and community environment.     Pt's spiritual, " cultural and educational needs considered and pt agreeable to plan of care and goals.     Anticipated barriers to physical therapy: none    Goals  GOALS: 8 weeks  1. Report decreased R knee pain  <  / =  5/10 at worst to increase tolerance for prolonged standing.  2. Pt will report 50% improvement in ability to walk long distances since start of care to indicate improved functional mobility.   3. Pt will ambulate with equal B step length with ambulation to indicate improved gait pattern.   4. Pt will tolerate walking on treadmill for 5 minutes without pain to indicate improved walking endurance.   5. Pt to tolerate HEP to improve ROM and independence with ADL's.     Long Term Goals: 12 weeks  1. Report decreased R knee pain  <  / =  3/10 at worst to increase tolerance for prolonged standing.  2. Pt will report 80% improvement in ability to walk long distances since start of care to indicate improved functional mobility.   3. Pt will ambulate with proper heel-to-toe gait pattern to indicate improved gait pattern.   4. Pt will tolerate walking on treadmill for 10 minutes without pain to indicate improved walking endurance.   5. Pt to be Independent with HEP to improve ROM and independence with ADL's.      Plan     Plan of care Certification: 11/4/2021 to 1/27/22.     Outpatient Physical Therapy 2 times weekly for 12 weeks to include the following interventions: Gait Training, Manual Therapy, Moist Heat/ Ice, Neuromuscular Re-ed, Patient Education, Self Care, Therapeutic Activites and Therapeutic Exercise.       Hitesh Purdy, PT

## 2022-01-12 ENCOUNTER — PATIENT MESSAGE (OUTPATIENT)
Dept: FAMILY MEDICINE | Facility: CLINIC | Age: 77
End: 2022-01-12
Payer: MEDICARE

## 2022-01-12 ENCOUNTER — CLINICAL SUPPORT (OUTPATIENT)
Dept: REHABILITATION | Facility: HOSPITAL | Age: 77
End: 2022-01-12
Attending: ORTHOPAEDIC SURGERY
Payer: MEDICARE

## 2022-01-12 DIAGNOSIS — R26.89 BALANCE PROBLEM: ICD-10-CM

## 2022-01-12 DIAGNOSIS — Z74.09 IMPAIRED FUNCTIONAL MOBILITY, BALANCE, GAIT, AND ENDURANCE: ICD-10-CM

## 2022-01-12 PROCEDURE — 97112 NEUROMUSCULAR REEDUCATION: CPT | Mod: PO | Performed by: PHYSICAL THERAPIST

## 2022-01-12 PROCEDURE — 97110 THERAPEUTIC EXERCISES: CPT | Mod: PO | Performed by: PHYSICAL THERAPIST

## 2022-01-12 NOTE — PROGRESS NOTES
Physical Therapy Daily Treatment Note     Name: Veronique Johnson  Clinic Number: [id number]    Therapy Diagnosis:   Encounter Diagnoses   Name Primary?    Balance problem     Impaired functional mobility, balance, gait, and endurance      Physician: Marifer Guerra PA-C    Visit Date: 1/12/2022  Physician Orders: PT Eval and Treat   Medical Diagnosis from Referral: Meniere's disease  Evaluation Date: 12/14/2021  Authorization Period Expiration: 12/31/21  Plan of Care Expiration: 2/14/22  Progress Note Due: 1/14/22  Visit # / Visits authorized: 2/ 20 (+ 1 prior auth)    Time In: 11:25AM  Time Out: 12:10PM  Total Billable Time: 45 minutes    Precautions: Standard and Fall        Subjective     Pt reports: she feels like she is doing better overall, but is aware of consistent loss of balance with turning to right with a particular doorway in her house.  She was compliant with home exercise program.  Response to previous treatment: no difficulty  Functional change: able to recover displaced COG    Pain: N/A for vestibular/balance problem  Location: core and LEs           Objective         Veronique received therapeutic exercises to develop strength and core stabilization for 10 minutes including:  Posterior pelvic tilt 3 sec hold x20   Lumbar stabilization supine marching x20   Lumbar stabilization leg extension 2/10 ea   Bridging with PPT 5 sec hold x20   Paloff press RTB x20 ea with stagger stance    Veronique participated in neuromuscular re-education activities to improve: Balance, Coordination, Kinesthetic, Sense, Proprioception and Posture for 35 minutes. The following activities were included:    Rhomberg stance EO, EC, on airex EO, EC 30 sec ea  Tandem stance EO, EC, on airex 30 sec ea way  Single leg stance EO 30 sec  Marching EO, EC 30 sec ea  Heel raises x20  Toe raises x20  Rhomberg stance with head turns with focus on target EO, on airex 30 sec ea  Perturbations in standing all directions 2 min  Perturbations in  stagger stance 1 min ea  Ankle strategy x20  Hip strategy x20  Step strategy x20  Head circles 10x CW/CCW    Dynamic:  Weave in/out of cones 25 ft x4 np   cones x10 np np  Step over 1/2 rolls x2 and onto airex, 8x   Walk along straight line 25'x4 np  Walk backwards 50'x4  Square walk x4  Y toe taps x10 ea np  Walk stop suddenly 25'x4- forward/backward- np  Walk and turn 25'x4-np  Sit to stand turn around 5x ea  Turn shift backwards x10 ea- np      Home Exercises Provided and Patient Education Provided     Education provided:   - Instructed pt in HEP for static balance and core strengthening    Written Home Exercises Provided: yes.  Exercises were reviewed and Veronique was able to demonstrate them prior to the end of the session.  Veronique demonstrated good  understanding of the education provided.     See EMR under Patient Instructions for exercises provided 12/22/2021.    Assessment     Fewer missteps from COG with gait activities. Occasionally off center with head turning activities. Mild dizziness with head circles added today, but otherwise dizziness is not an issue for pt. She is standing and walking with much improved alignment and not leaning backwards as much. Slow response to perturbations pusing ant to post, but improving.  Veronique Is progressing well towards her goals.   Pt prognosis is Excellent.     Pt will continue to benefit from skilled outpatient physical therapy to address the deficits listed in the problem list box on initial evaluation, provide pt/family education and to maximize pt's level of independence in the home and community environment.     Pt's spiritual, cultural and educational needs considered and pt agreeable to plan of care and goals.    Anticipated barriers to physical therapy: none    Goals:   Short Term Goals: 4 weeks   1. Patient independent in HEP of balance and head-eye coordination.  Exercises to be done Twice a day. MET 1/12/22  2. Pt will improve core strength by 1/3 grade  and engage core to prevent loss of balance with displacement of center of gravity. MET 1/12/22   3. Pt will be able to tandem stance 30 sec without HHA and single leg stance 15 sec on each LE without HHA. MET 1/12/22  4. Pt will have no falls in next 4 weeks. MET 1/12/22     Long Term Goals: 8 weeks   1. Patient able to ambulate in home and community safely without assistive device, on varied terrainwith head movement, without LOB or c/o dizziness. PROGRESSING  2. Patient able to perform walking with head turns without LOB PROGRESSING  3. Pt to be I with self management of condition and progression vestibular program for maintenance. PROGRESSING  4. Pt will have no falls in second 4 weeks of PT. PROGRESSING    Plan     Cont per POC progressing toward established goals.  PTA may be involved in care for this patient under direction of PT.       Juana Alcantara, PT

## 2022-01-13 ENCOUNTER — OFFICE VISIT (OUTPATIENT)
Dept: FAMILY MEDICINE | Facility: CLINIC | Age: 77
End: 2022-01-13
Payer: MEDICARE

## 2022-01-13 ENCOUNTER — DOCUMENTATION ONLY (OUTPATIENT)
Dept: FAMILY MEDICINE | Facility: CLINIC | Age: 77
End: 2022-01-13

## 2022-01-13 VITALS
WEIGHT: 131.63 LBS | HEIGHT: 61 IN | DIASTOLIC BLOOD PRESSURE: 62 MMHG | HEART RATE: 94 BPM | OXYGEN SATURATION: 95 % | SYSTOLIC BLOOD PRESSURE: 134 MMHG | BODY MASS INDEX: 24.85 KG/M2

## 2022-01-13 DIAGNOSIS — J44.9 CHRONIC OBSTRUCTIVE PULMONARY DISEASE, UNSPECIFIED COPD TYPE: ICD-10-CM

## 2022-01-13 DIAGNOSIS — G35 MULTIPLE SCLEROSIS: ICD-10-CM

## 2022-01-13 DIAGNOSIS — I70.0 ATHEROSCLEROSIS OF AORTA: ICD-10-CM

## 2022-01-13 DIAGNOSIS — I25.119 ATHEROSCLEROSIS OF NATIVE CORONARY ARTERY OF NATIVE HEART WITH ANGINA PECTORIS: ICD-10-CM

## 2022-01-13 DIAGNOSIS — J30.89 NON-SEASONAL ALLERGIC RHINITIS DUE TO OTHER ALLERGIC TRIGGER: ICD-10-CM

## 2022-01-13 DIAGNOSIS — F41.9 ANXIETY: Primary | ICD-10-CM

## 2022-01-13 DIAGNOSIS — Z78.0 POST-MENOPAUSAL: ICD-10-CM

## 2022-01-13 PROCEDURE — 99214 PR OFFICE/OUTPT VISIT, EST, LEVL IV, 30-39 MIN: ICD-10-PCS | Mod: S$PBB,,, | Performed by: FAMILY MEDICINE

## 2022-01-13 PROCEDURE — 99999 PR PBB SHADOW E&M-EST. PATIENT-LVL V: CPT | Mod: PBBFAC,,, | Performed by: FAMILY MEDICINE

## 2022-01-13 PROCEDURE — 99214 OFFICE O/P EST MOD 30 MIN: CPT | Mod: S$PBB,,, | Performed by: FAMILY MEDICINE

## 2022-01-13 PROCEDURE — 99215 OFFICE O/P EST HI 40 MIN: CPT | Mod: PBBFAC,PO | Performed by: FAMILY MEDICINE

## 2022-01-13 PROCEDURE — 99999 PR PBB SHADOW E&M-EST. PATIENT-LVL V: ICD-10-PCS | Mod: PBBFAC,,, | Performed by: FAMILY MEDICINE

## 2022-01-13 RX ORDER — MONTELUKAST SODIUM 10 MG/1
10 TABLET ORAL NIGHTLY
Qty: 30 TABLET | Refills: 0 | Status: SHIPPED | OUTPATIENT
Start: 2022-01-13 | End: 2022-02-12

## 2022-01-13 NOTE — PROGRESS NOTES
Subjective:       Patient ID: Veronique Johnson is a 76 y.o. female.    Chief Complaint: Follow-up anxiety    HPI     Anxiety:  Patient currently taking Xanax, denies any side effects of the medication, is taking Xanax for many years secondary to PTSD, was taking before 4 times daily, currently taking twice daily.  The patient stated that her insurance is not going to cover the medication next month.  The patient stated the medication works well and is not having any side effects.    Atherosclerosis of the aorta:  The patient denies any new symptoms of chest pain, currently taking Crestor 40 mg daily, the last cholesterol levels were therapeutic.    Multiple sclerosis:  The patient is in remission, currently seen the neurologist.    COPD:  Taking Breo as directed.  The patient denies any side effects of medication, denies any worsening shortness of breath symptoms.    Patient is taking Levsin as needed for spasms, the patient was told that because of she is more than 65, she should not be taking the medication because of the risk of side effects, the patient will stop the medicine.    Past medical history, past social history was reviewed and discussed with the patient.    Review of Systems   Constitutional: Negative for activity change, appetite change and chills.   HENT: Negative for congestion and ear discharge.    Eyes: Negative for discharge and itching.   Respiratory: Negative for choking and chest tightness.    Cardiovascular: Negative for chest pain, palpitations and leg swelling.   Gastrointestinal: Negative for abdominal distention, abdominal pain and constipation.   Endocrine: Negative for cold intolerance and heat intolerance.   Genitourinary: Negative for dysuria and flank pain.   Musculoskeletal: Negative for arthralgias and back pain.   Skin: Negative for pallor and rash.   Allergic/Immunologic: Negative for environmental allergies and food allergies.   Neurological: Negative for dizziness, facial  asymmetry and headaches.   Hematological: Negative for adenopathy. Does not bruise/bleed easily.   Psychiatric/Behavioral: Negative for agitation, confusion, decreased concentration and sleep disturbance.       Objective:      Physical Exam  Vitals and nursing note reviewed.   Constitutional:       General: She is not in acute distress.     Appearance: Normal appearance. She is well-developed, normal weight and well-nourished. She is not diaphoretic.   HENT:      Head: Normocephalic and atraumatic.      Right Ear: External ear normal.      Left Ear: External ear normal.      Nose: Nose normal.      Mouth/Throat:      Mouth: Oropharynx is clear and moist.      Pharynx: No oropharyngeal exudate.   Eyes:      General: No scleral icterus.        Right eye: No discharge.         Left eye: No discharge.      Conjunctiva/sclera: Conjunctivae normal.   Cardiovascular:      Rate and Rhythm: Normal rate and regular rhythm.      Pulses: Intact distal pulses.      Heart sounds: Normal heart sounds. No murmur heard.      Pulmonary:      Effort: Pulmonary effort is normal. No respiratory distress.      Breath sounds: Normal breath sounds. No wheezing.   Musculoskeletal:         General: No tenderness or deformity.      Cervical back: Neck supple.   Skin:     Coloration: Skin is not pale.      Findings: No erythema.   Neurological:      Mental Status: She is alert.      Cranial Nerves: No cranial nerve deficit.      Coordination: Coordination normal.   Psychiatric:         Mood and Affect: Mood and affect normal.         Behavior: Behavior normal.         Thought Content: Thought content normal.         Judgment: Judgment normal.         Assessment and plan:    Anxiety:  Stable    Atherosclerosis of native coronary artery of native heart with angina pectoris:  Improved    Multiple sclerosis:  Stable    Atherosclerosis of aorta:  Stable    Chronic obstructive pulmonary disease, unspecified COPD type:  Stable    Post-menopausal:   Stable  -     DXA Bone Density Spine And Hip; Future; Expected date: 01/13/2022    Non-seasonal allergic rhinitis due to other allergic trigger:  Worsening  -     montelukast (SINGULAIR) 10 mg tablet; Take 1 tablet (10 mg total) by mouth every evening.  Dispense: 30 tablet; Refill: 0      Will start patient on montelukast 10 mg at bedtime, the patient was advised to continue with the rest of the medication.  If the symptoms get worse, the patient notify us immediately.   Patient agreed with assessment and plan. Patient verbalized understanding.

## 2022-01-13 NOTE — PATIENT INSTRUCTIONS
Patient Education       DASH Diet   About this topic   DASH stands for Dietary Approaches to Stop Hypertension. The DASH diet may help you lower blood pressure. It may also help keep you from getting high blood pressure. You will eat less fat and more fiber on the DASH diet.  This diet gives you more minerals that fight high blood pressure. Some nutrients in this diet are:  · Potassium ? Acts to help you get rid of salt. This may help to lower blood pressure.  · Calcium ? Makes blood vessels and muscles work the right way  · Magnesium - Helps blood vessels relax  · Fiber ? Helps you feel full. It also helps digestion.  What will the results be?   The DASH diet may help you:  · Lower your blood pressure and cholesterol  · Lower your risk for cancer, heart disease, heart attack, and stroke. It may also lower your risk for heart failure, kidney stones, and diabetes.  · Lose weight or keep a healthy weight  What lifestyle changes are needed?   · Add regular exercise to get the most help from this diet.  · Try to lower stress. Find ways to relax.  · Stop smoking. Avoid secondhand smoke.  · Limit alcohol intake.  What changes to diet are needed?   · Know about poor eating habits. Then, you can fix them as you work with the program.  · This diet encourages fruits and vegetables, whole grains, lean meats, healthy fats, and low-fat or fat-free dairy products.  · This diet is lower in saturated fats, trans-fats, cholesterol, added sugars, and sodium.  Who should use this diet?   This eating plan is good for the whole family. It is also good for people with high blood pressure and those at risk for high blood pressure.  What foods are good to eat?   · Grains: Try to eat 6 to 8 servings of whole grain, high fiber foods each day. These are bread, cereals, brown rice, or pasta.  · Fruits and vegetables: Eat 4 to 5 servings each day. Try to pick many kinds and colors. Fresh or frozen are best. Look for low sodium or salt-free if  you choose canned.  · Dairy: Try to eat 2 to 3 servings of fat free and low fat milk products each day.  · Lean meats, poultry, and seafood: Try to eat 6 servings or less of lean meats, poultry, and seafood each day. Try to choose more low fat or lean meats like chicken and turkey. Eat less red meat. Eat more fish instead.  · Nuts, seeds, and legumes (dry beans and peas): Try to eat 4 to 5 servings each week. Try to pick nuts such as almonds and walnuts, sunflower seeds, peanut butter, soy beans, lentils, kidney beans, and split peas.  · Fats and oils: Try to eat 2 to 3 servings of fats and oils each day. Eat good fats found in fish, nuts, and avocados. Try using olive oil or vegetable oils such as canola oil. Other good oils to try are corn, safflower, sunflower, or soybean oils. Use low-sodium and low-fat salad dressing and mayonnaise.  · Condiments: Pepper, herbs, spices, vinegar, lemon or lime juices are great for seasoning. Be careful to choose low-sodium or salt-free products if you use broths, soups, or soy sauce.  · Sweets: Try to eat less than 5 servings each week. Choose low-fat and trans fat-free desserts. These are things like fruit flavored gelatin, sorbet, jelly beans, mireya crackers, animal crackers, low-fat fig bars, and jacinda snaps. Eat fruit to satisfy your desire for sweets.     What foods should be limited or avoided?   · Grains: Salted breads, rolls, crackers, quick breads, self-rising flours, biscuit mixes, regular bread crumbs, instant hot cereals, commercially-prepared rice, pasta, stuffing mixes  · Fruits and vegetables: Commercially-prepared potatoes and vegetable mixes, regular canned vegetables and juices, vegetables frozen with sauce or pickled vegetables, processed fruits with salt or sodium  · Milk: Whole milk, malted milk, chocolate milk, buttermilk, cheese, ice cream  · Meats and beans: Smoked, cured, salted, or canned fish; meats or poultry such as hays, sausages, sardines;  high-fat cuts of meat like beef, lamb, or pork; chicken with the skin on it  · Fats: Cut back on solid fats like butter, lard, and margarine. Eat less food with high saturated fat, cholesterol and total fat.  · Condiments and snacks: Salted and canned peas, beans, and olives; salted snack foods; fried foods; soda or other sweetened drinks  · Sweets: High-fat baked goods such as muffins, donuts, pastries, commercial baked goods, candy bars  · If you choose to drink alcohol, limit the amount you drink. Women should have 1 drink or less per day and men should have 2 drinks or less per day.  Helpful tips   · Avoid eating canned vegetables and processed foods. These have a lot of salt in them. Look for a low-salt or low-sodium choice.  · Try baking or broiling instead of frying food.  · Write down the foods you eat. This will help you track what you have eaten each week.  · When you go to a grocery store, have a list or a meal plan. Do not shop when you are hungry to avoid cravings for foods.  · Read food labels with care. They will show you how much is in a serving. The amount is given as a percentage of the total amount you need each day. Reading labels will help you make healthy food choices.       · Avoid fast foods.  · Talk to your doctor or dietitian to see if you need vitamin and mineral supplements to help you balance your diet.  · Talk to a dietitian for help.  Where can I learn more?   [employer]  https://www.eatright.org/health/wellness/heart-and-cardiovascular-health/dash-diet-reducing-hypertension-through-diet-and-lifestyle   [employer]Doctor.org  http://familydoctor.org/familydoctor/en/prevention-wellness/food-nutrition/weight-loss/the-dash-diet-healthy-eating-to-control-your-blood-pressure.html   Last Reviewed Date   2021-03-15  Consumer Information Use and Disclaimer   This information is not specific medical advice and does not replace information you receive from your health care  provider. This is only a brief summary of general information. It does NOT include all information about conditions, illnesses, injuries, tests, procedures, treatments, therapies, discharge instructions or life-style choices that may apply to you. You must talk with your health care provider for complete information about your health and treatment options. This information should not be used to decide whether or not to accept your health care providers advice, instructions or recommendations. Only your health care provider has the knowledge and training to provide advice that is right for you.  Copyright   Copyright © 2021 [employer] and its affiliates and/or licensors. All rights reserved.

## 2022-01-13 NOTE — PROGRESS NOTES
[place] APPROVED for Alprazolan 0.25 mg tablet  Case ID: [id number]  [place]: [id number]  Valid 1/1/22 - 1/13/23  ART

## 2022-01-18 ENCOUNTER — CLINICAL SUPPORT (OUTPATIENT)
Dept: REHABILITATION | Facility: HOSPITAL | Age: 77
End: 2022-01-18
Attending: ORTHOPAEDIC SURGERY
Payer: MEDICARE

## 2022-01-18 DIAGNOSIS — Z74.09 IMPAIRED FUNCTIONAL MOBILITY, BALANCE, GAIT, AND ENDURANCE: ICD-10-CM

## 2022-01-18 DIAGNOSIS — M25.661 DECREASED RANGE OF MOTION OF RIGHT KNEE: ICD-10-CM

## 2022-01-18 PROCEDURE — 97140 MANUAL THERAPY 1/> REGIONS: CPT | Mod: PO

## 2022-01-18 PROCEDURE — 97110 THERAPEUTIC EXERCISES: CPT | Mod: PO

## 2022-01-18 PROCEDURE — 97112 NEUROMUSCULAR REEDUCATION: CPT | Mod: PO

## 2022-01-18 NOTE — PROGRESS NOTES
"  Physical Therapy Daily Treatment Note     Name: Veronique Johnson  Clinic Number: [id number]    Therapy Diagnosis:   Encounter Diagnoses   Name Primary?    Decreased range of motion of right knee     Impaired functional mobility, balance, gait, and endurance      Physician: Shelton Le,*    Visit Date: 1/18/2022  Physician: Shelton Le,*     Physician Orders: PT Eval and Treat   Medical Diagnosis from Referral: Other tear of medial meniscus, current injury, right knee, subsequent encounter [S83.241D]  Evaluation Date: 11/4/2021  Authorization Period Expiration: 12/31/21  Plan of Care Expiration: 1/27/22  Visit # / Visits authorized: 4/20  FOTO #2 11/29/21: 57/100=43% limited    Time In: 9:01am  Time Out: 9:57am  Total Billable Time: 56 minutes    Precautions: Standard    Subjective     Pt reports: her knee is really hurting today. Said she flared it up over the weekend doing too much.      She was compliant with home exercise program.  Response to previous treatment: no adverse effect  Functional change: improve gait     Pain: 1/10  Location: right knee      Objective     Veronique received therapeutic exercises to develop strength, ROM and flexibility for 14 minutes including:    Upright bike x 6 minutes, level 3   SLR 2 x 10 2#  SL hip ABD 3 x 10 B 2#  Bridges 2 x 10  DL shuttle squats 3x10 4.5 bands   SL shuttle squats 3x10 2.5 bands B  Patient education on frequency of exercises and HEP    NP today:  SL clams 2x10 Green TB  Prone quad stretch 30s x3  Standing TKE Blue TB 2x10        Veronique received the following manual therapy techniques: Joint mobilizations and Soft tissue Mobilization were applied to the: knee for 10 minutes, including:  Hinged knee extension mobilization  Patella mobs   Tibial ER   Hinged knee extension     Veronique participated in neuromuscular re-education activities to improve: for 32 minutes. The following activities were included:    18" box squat + blue airex 3x8 c 8lb " "DB  Lateral step down 6" 3 x 8 B  deadlift off 12" box 20lbs 3 x 10 (cues for hip hinge)  SL heel raise 3 x 8 B  Step over jeanne flat 3 x 20 B (focus on small steps, in control)      NP today:  Standing hip abd yellow sports loop 3 x 10 B (chair support for balance)  Standing hip extension yellow sports loop 3 x 10 B  Tandem stance 20 sec x 3  Step up 2x10 8" step: np  SL stance 20 sec x 2 (B):np    Veronique participated in dynamic functional therapeutic activities to improve functional performance for   minutes, including:    Home Exercises Provided and Patient Education Provided     Education provided:   - HEP, POC    Written Home Exercises Provided: Patient instructed to cont prior HEP.  Exercises were reviewed and Veronique was able to demonstrate them prior to the end of the session.  Veronique demonstrated good  understanding of the education provided.     See EMR under Patient Instructions for exercises provided 11/8/2021.    Assessment     Patient had a decrease in overall R knee pain from beginning to end of tx session following manual therapy and exercise. Introduced deadlifting to facilitate hip hinge patterns, patient needed min cuing to facilitate proper form. Continue to progress overall LE strengthening in functional patterns to improve ADLs and decrease fall risk.        Veronique is progressing well towards her goals.   Pt prognosis is Good.     Pt will continue to benefit from skilled outpatient physical therapy to address the deficits listed in the problem list box on initial evaluation, provide pt/family education and to maximize pt's level of independence in the home and community environment.     Pt's spiritual, cultural and educational needs considered and pt agreeable to plan of care and goals.     Anticipated barriers to physical therapy: none    Goals  GOALS: 8 weeks  1. Report decreased R knee pain  <  / =  5/10 at worst to increase tolerance for prolonged standing.  2. Pt will report 50% improvement in " ability to walk long distances since start of care to indicate improved functional mobility.   3. Pt will ambulate with equal B step length with ambulation to indicate improved gait pattern.   4. Pt will tolerate walking on treadmill for 5 minutes without pain to indicate improved walking endurance.   5. Pt to tolerate HEP to improve ROM and independence with ADL's.     Long Term Goals: 12 weeks  1. Report decreased R knee pain  <  / =  3/10 at worst to increase tolerance for prolonged standing.  2. Pt will report 80% improvement in ability to walk long distances since start of care to indicate improved functional mobility.   3. Pt will ambulate with proper heel-to-toe gait pattern to indicate improved gait pattern.   4. Pt will tolerate walking on treadmill for 10 minutes without pain to indicate improved walking endurance.   5. Pt to be Independent with HEP to improve ROM and independence with ADL's.      Plan     Plan of care Certification: 11/4/2021 to 1/27/22.     Outpatient Physical Therapy 2 times weekly for 12 weeks to include the following interventions: Gait Training, Manual Therapy, Moist Heat/ Ice, Neuromuscular Re-ed, Patient Education, Self Care, Therapeutic Activites and Therapeutic Exercise.       Hitesh Purdy, PT

## 2022-01-19 DIAGNOSIS — I10 ESSENTIAL HYPERTENSION: ICD-10-CM

## 2022-01-19 NOTE — TELEPHONE ENCOUNTER
No new care gaps identified.  Powered by [employer]. Reference number: [id number].   1/19/2022 9:06:33 AM CST

## 2022-01-25 ENCOUNTER — CLINICAL SUPPORT (OUTPATIENT)
Dept: REHABILITATION | Facility: HOSPITAL | Age: 77
End: 2022-01-25
Attending: ORTHOPAEDIC SURGERY
Payer: MEDICARE

## 2022-01-25 DIAGNOSIS — Z74.09 IMPAIRED FUNCTIONAL MOBILITY, BALANCE, GAIT, AND ENDURANCE: ICD-10-CM

## 2022-01-25 DIAGNOSIS — M25.661 DECREASED RANGE OF MOTION OF RIGHT KNEE: ICD-10-CM

## 2022-01-25 PROCEDURE — 97112 NEUROMUSCULAR REEDUCATION: CPT | Mod: PO

## 2022-01-25 PROCEDURE — 97110 THERAPEUTIC EXERCISES: CPT | Mod: PO

## 2022-01-25 NOTE — PROGRESS NOTES
Physical Therapy Daily Treatment Note     Name: Veronique Johnson  Clinic Number: [id number]    Therapy Diagnosis:   Encounter Diagnoses   Name Primary?    Decreased range of motion of right knee     Impaired functional mobility, balance, gait, and endurance      Physician: Shelton Le,*    Visit Date: 1/25/2022  Physician: Shelton Le,*     Physician Orders: PT Eval and Treat   Medical Diagnosis from Referral: Other tear of medial meniscus, current injury, right knee, subsequent encounter [S83.241D]  Evaluation Date: 11/4/2021  Authorization Period Expiration: 12/31/21  Plan of Care Expiration: 1/27/22  Visit # / Visits authorized: 4/20  FOTO #2 11/29/21: 57/100=43% limited    Time In: 8:00am  Time Out: 8:53am  Total Billable Time: (8:00-8:20 and 8:35-8:53) 2 units     Precautions: Standard    Subjective     Pt reports: her knee is still hurting but not as bad as last week.      She was compliant with home exercise program.  Response to previous treatment: no adverse effect  Functional change: improve gait     Pain: 1/10  Location: right knee      Objective     Veronique received therapeutic exercises to develop strength, ROM and flexibility for 18 minutes including:    Upright bike x 6 minutes, level 3   SLR 2 x 10 2#  SL hip ABD 3 x 10 B 2#   DL shuttle squats 3x10 4.5 bands   SL shuttle squats 3x10 2.5 bands B  Patient education on frequency of exercises and HEP    NP today:  SL clams 2x10 Green TB  Prone quad stretch 30s x3  Standing TKE Blue TB 2x10        Veronique received the following manual therapy techniques: Joint mobilizations and Soft tissue Mobilization were applied to the: knee for 10 minutes, including:  Hinged knee extension mobilization  Patella mobs   Tibial ER   Hinged knee extension     Veronique participated in neuromuscular re-education activities to improve: for 20 minutes. The following activities were included:    Standing hip abd yellow sports loop 3 x 10 B (chair support for  "balance)  18" box squat + blue airex 3x8 c 8lb DB  deadlift off 8" box 20lbs 3 x 10 (cues for hip hinge)  SL heel raise 3 x 8 B  Split squat at chair 3 x 6 B      NP today:  Lateral step down 6" 3 x 8 B  Standing hip abd yellow sports loop 3 x 10 B (chair support for balance)  Standing hip extension yellow sports loop 3 x 10 B  Tandem stance 20 sec x 3  Step up 2x10 8" step: np  SL stance 20 sec x 2 (B):np    Veronique participated in dynamic functional therapeutic activities to improve functional performance for   minutes, including:    Home Exercises Provided and Patient Education Provided     Education provided:   - HEP, POC    Written Home Exercises Provided: Patient instructed to cont prior HEP.  Exercises were reviewed and Veronique was able to demonstrate them prior to the end of the session.  Veronique demonstrated good  understanding of the education provided.     See EMR under Patient Instructions for exercises provided 11/8/2021.    Assessment     Patient continues to progress with LE strengthening, demonstrating completion of tasks with increased difficulty such as split squats today. Minor knee pain at the beginning of session that decreased by the end. Patient educated on progressions to HEP and decreasing activity level when knee is painful.        Veronique is progressing well towards her goals.   Pt prognosis is Good.     Pt will continue to benefit from skilled outpatient physical therapy to address the deficits listed in the problem list box on initial evaluation, provide pt/family education and to maximize pt's level of independence in the home and community environment.     Pt's spiritual, cultural and educational needs considered and pt agreeable to plan of care and goals.     Anticipated barriers to physical therapy: none    Goals  GOALS: 8 weeks  1. Report decreased R knee pain  <  / =  5/10 at worst to increase tolerance for prolonged standing.  2. Pt will report 50% improvement in ability to walk long " distances since start of care to indicate improved functional mobility.   3. Pt will ambulate with equal B step length with ambulation to indicate improved gait pattern.   4. Pt will tolerate walking on treadmill for 5 minutes without pain to indicate improved walking endurance.   5. Pt to tolerate HEP to improve ROM and independence with ADL's.     Long Term Goals: 12 weeks  1. Report decreased R knee pain  <  / =  3/10 at worst to increase tolerance for prolonged standing.  2. Pt will report 80% improvement in ability to walk long distances since start of care to indicate improved functional mobility.   3. Pt will ambulate with proper heel-to-toe gait pattern to indicate improved gait pattern.   4. Pt will tolerate walking on treadmill for 10 minutes without pain to indicate improved walking endurance.   5. Pt to be Independent with HEP to improve ROM and independence with ADL's.      Plan     Plan of care Certification: 11/4/2021 to 1/27/22.     Outpatient Physical Therapy 2 times weekly for 12 weeks to include the following interventions: Gait Training, Manual Therapy, Moist Heat/ Ice, Neuromuscular Re-ed, Patient Education, Self Care, Therapeutic Activites and Therapeutic Exercise.       Hitesh Purdy, PT

## 2022-01-27 RX ORDER — AMLODIPINE BESYLATE 5 MG/1
5 TABLET ORAL DAILY
Qty: 90 TABLET | Refills: 3 | Status: SHIPPED | OUTPATIENT
Start: 2022-01-27 | End: 2023-02-14

## 2022-01-27 NOTE — TELEPHONE ENCOUNTER
Refill Authorization Note   Veronique Johnson  is requesting a refill authorization.  Brief Assessment and Rationale for Refill:  Approve     Medication Therapy Plan:       Medication Reconciliation Completed: No   Comments:   --->Care Gap information included below if applicable.       Requested Prescriptions   Pending Prescriptions Disp Refills    amLODIPine (NORVASC) 5 MG tablet [Pharmacy Med Name: AMLODIPINE BESYLATE 5 MG TA 5 Tablet] 90 tablet 3     Sig: TAKE 1 TABLET (5 MG TOTAL) BY MOUTH ONCE DAILY.       Cardiovascular:  Calcium Channel Blockers Passed - 1/19/2022  9:02 AM        Passed - Patient is at least 18 years old        Passed - Last BP in normal range within 360 days     BP Readings from Last 1 Encounters:   01/13/22 134/62               Passed - Valid encounter within last 15 months     Recent Visits  Date Type Provider Dept   01/13/22 Office Visit Becky Song MD [facility]   10/13/21 Office Visit Becky Song MD [facility]   08/09/21 Office Visit Becky Song MD [facility]   08/05/21 Office Visit Becky Song MD [facility]   10/15/20 Office Visit Becky Song MD [facility]   07/15/20 Office Visit Becky Song MD [facility]   Showing recent visits within past 720 days and meeting all other requirements  Future Appointments  No visits were found meeting these conditions.  Showing future appointments within next 150 days and meeting all other requirements      Future Appointments              Tomorrow Juana Alcantara, PT [facility]    In 5 days Hitesh Purdy, PT [facility]    In 1 week [facility] DEXA1 [facility], [facility]    In 1 week Hitesh Purdy, PT [facility]    In 2 weeks [facility]    [facility] months [facility]    In 9 months MD [facility] -  [facility]                    Appointments  past 12m or future 3m with PCP    Date Provider   Last Visit   1/13/2022 Becky Song MD   Next Visit   7/13/2022 Becky Song MD   ED visits in past 90 days: 0     Note composed:12:46 PM 01/27/2022

## 2022-01-28 ENCOUNTER — CLINICAL SUPPORT (OUTPATIENT)
Dept: REHABILITATION | Facility: HOSPITAL | Age: 77
End: 2022-01-28
Attending: ORTHOPAEDIC SURGERY
Payer: MEDICARE

## 2022-01-28 DIAGNOSIS — R26.89 BALANCE PROBLEM: ICD-10-CM

## 2022-01-28 DIAGNOSIS — Z74.09 IMPAIRED FUNCTIONAL MOBILITY, BALANCE, GAIT, AND ENDURANCE: ICD-10-CM

## 2022-01-28 PROCEDURE — 97110 THERAPEUTIC EXERCISES: CPT | Mod: KX,PO | Performed by: PHYSICAL THERAPIST

## 2022-01-28 PROCEDURE — 97112 NEUROMUSCULAR REEDUCATION: CPT | Mod: KX,PO | Performed by: PHYSICAL THERAPIST

## 2022-01-28 NOTE — PROGRESS NOTES
"  Physical Therapy Daily Treatment Note     Name: Veronique Johnson  Clinic Number: [id number]    Therapy Diagnosis:   Encounter Diagnoses   Name Primary?    Balance problem     Impaired functional mobility, balance, gait, and endurance      Physician: Marifer Guerra PA-C    Visit Date: 1/28/2022  Physician Orders: PT Eval and Treat   Medical Diagnosis from Referral: Meniere's disease  Evaluation Date: 12/14/2021  Authorization Period Expiration: 12/31/22  Plan of Care Expiration: 2/14/22  Reassessment 1/28/21  Visit # / Visits authorized: 3/ 20 (+ 3 prior auth)    Time In: 8:30AM  Time Out: 9:15M  Total Billable Time: 45 minutes    Precautions: Standard and Fall        Subjective     Pt reports: she feels she is much steadier with walking and turning. Still has some loss of balance with eyes closed. She is doing exercises regularly and feels she is improving and questioned need for continued therapy.  She was compliant with home exercise program.  Response to previous treatment: no difficulty  Functional change: able to recover displaced COG    Pain: N/A for vestibular/balance problem  Location: core and LEs           Objective      Reassessment 1/28/22:  Oculomotor:  Spontaneous Nystagmus    negative  Smooth Pursuits         negative  Saccades        negative  Convergence    negative     VOR: no dizziness. Minimal displacement of COG with head turns          Static Balance:  Rhomberg EO 30 sec, able, steady  Romberg(EC 30") able, min sway, but recovers without HHA  Tandem (EO 30") able, min sway and min use of hands  Tandem EC 30 sec- needs use of hands  Single Leg Stance: (EO) 15 seconds without use of hands     Dynamic Balance:  Functional Reach Test: 10 in  Walking with head turns, much improved with slight deviations only        Special Testing: BPPV  Loaded Clif-Hallpike Test:(Anterior or Posterior Canal) negative        SFMA: Single Leg Stance  Eyes open <10 seconds able  Eyes closed <10 seconds unable , " needs HHA     Solorio Balance Scale     Solorio Balance Scale    1. SITTING TO STANDIN - able to stand without using hands and stabilize independently   2. STANDING UNSUPPORTED: 4 - able to stand safely for 2 minutes   3. SITTING WITH BACK UNSUPPORTED BUT FEET SUPPORTED ON FLOOR OR ON A STOOL: 4 - able to sit safely and securely for 2 minutes   4. STANDING TO SITTIN - sits safely with minimal use of hands   5. TRANSFERS: 4 - able to transfer safely with minor use of hands   6. STANDING UNSUPPORTED WITH EYES CLOSED: 4 - able to stand 10 seconds safely   7. STANDING UNSUPPORTED WITH FEET TOGETHER: 4 - able to place feet together independently and stand 1 minute safely   8. REACHING FORWARD WITH OUTSTRETCHED ARM WHILE STANDIN - can reach forward confidently 25 cm (10 inches)   9.  OBJECT FROM THE FLOOR FROM A STANDING POSITION: 4 - able to  slipper safely and easily   10. TURNING TO LOOK BEHIND OVER LEFT AND RIGHT SHOULDERS WHILE STANDIN - looks behind from both sides and weight shifts well   11. TURN 360 DEGREES: 4 - able to turn 360 degrees safely in 4 seconds or less   12. PLACE ALTERNATE FOOT ON STEP OR STOOL WHILE STANDING UNSUPPORTED: 4 - able to stand independently and safely and complete 8 steps in 20 seconds   13. STANDING UNSUPPORTED ONE FOOT IN FRONT: 4 - able to place foot tandem independently and hold 30 seconds   14. STANDING ON ONE LE - able to lift leg independently and hold > 10 seconds     TOTAL SCORE: 56/56    41-56 = low fall risk  21-40 = moderate fall risk  0 -20 = high fall risk        Tinetti:  BALANCE ASSESSMENT  1. Sitting Balance: 1 - steady, safe  2. Rises from Chair: 2 - able to rise without using arms to help  3. Attempts to Rise: 2 - able to rise, requires one attempt  4. Immediate Standing Balance: 2 - steady without walker or other support  5. Standing Balance: 2 - narrow stance without support  6. Nudged: 2 - steady  7. Eyes Closed: 1 - steady  8. Turning  360 Degrees: 1 - continuous steps  9.Sitting Down: 2 - safe, smooth motion     Balance Score: 16/16    GAIT ASSESSMENT  10. Indication of Gait: 1 - no hesitancy  11. Step Length and Height: 1 - step through right and 1 - step through left  12. Foot Clearance: 1 - right foot clears the floor and 1 - left foot clears the floor  13. Step Symmetry: 1 - right and left step length appear equal  14. Step Continuity: 1 - steps appear continuous  15. Path: 1 - mild/moderate deviations or uses AD  16. Trunk: 1 - no sway, flexes knees/back/uses arms to balance  17. Walking Time: 0 - heels apart     Gait Score: 9/12   TOTAL SCORE: 25 /28     24+ = low fall risk   19-23 = moderate fall risk  >19 = high fall risk                         Veronique received therapeutic exercises to develop strength and core stabilization for 10 minutes including:  Posterior pelvic tilt 3 sec hold x20   Lumbar stabilization supine marching x20   Lumbar stabilization leg extension 2/10 ea   Bridging with PPT 5 sec hold x20   Paloff press RTB x20 ea with stagger stance    Veronique participated in neuromuscular re-education activities to improve: Balance, Coordination, Kinesthetic, Sense, Proprioception and Posture for 35 minutes. The following activities were included:    Rhomberg stance EO, EC, on airex EO, EC 30 sec ea  Tandem stance EO, EC, on airex 30 sec ea way  Single leg stance EO 30 sec  Marching EO, EC 30 sec ea  Heel raises x20  Toe raises x20  Rhomberg stance with head turns with focus on target EO, on airex 30 sec ea  Perturbations in standing all directions 2 min  Perturbations in stagger stance 1 min ea  Ankle strategy x20  Hip strategy x20  Step strategy x20  Head circles 10x CW/CCW    Dynamic:  Weave in/out of cones 25 ft x4 np   cones x10 np np  Step over 1/2 rolls x2 and onto airex, 8x   Walk along straight line 25'x4 np  Walk backwards 50'x4  Square walk x4  Y toe taps x10 ea np  Walk stop suddenly 25'x4- forward/backward- np  Walk  and turn 25'x4-np  Sit to stand turn around 5x ea  Turn shift backwards x10 ea- np      Home Exercises Provided and Patient Education Provided     Education provided:   - Instructed pt in HEP for static balance and core strengthening    Written Home Exercises Provided: yes.  Exercises were reviewed and Veronique was able to demonstrate them prior to the end of the session.  Veronique demonstrated good  understanding of the education provided.     See EMR under Patient Instructions for exercises provided 12/22/2021.    Assessment     Much improved steadiness with gait and balance activities. Mild deviations with walking with head turns. Needs some light contact with eyes closed activities. Improved core stabilization and quicker response time with perturbations. Pt plans to continue with HEP and discharge from PT for vestibular training.  Veronique Is progressing well towards her goals.   Pt prognosis is Excellent.     Pt will continue to benefit from skilled outpatient physical therapy to address the deficits listed in the problem list box on initial evaluation, provide pt/family education and to maximize pt's level of independence in the home and community environment.     Pt's spiritual, cultural and educational needs considered and pt agreeable to plan of care and goals.    Anticipated barriers to physical therapy: none    Goals:   Short Term Goals: 4 weeks   1. Patient independent in HEP of balance and head-eye coordination.  Exercises to be done Twice a day. MET 1/12/22  2. Pt will improve core strength by 1/3 grade and engage core to prevent loss of balance with displacement of center of gravity. MET 1/12/22   3. Pt will be able to tandem stance 30 sec without HHA and single leg stance 15 sec on each LE without HHA. MET 1/12/22  4. Pt will have no falls in next 4 weeks. MET 1/12/22     Long Term Goals: 8 weeks   1. Patient able to ambulate in home and community safely without assistive device, on varied terrainwith head  movement, without LOB or c/o dizziness. MET 1/28/22  2. Patient able to perform walking with head turns without LOB MET 1/28/22  3. Pt to be I with self management of condition and progression vestibular program for maintenance. MET 1/28/22  4. Pt will have no falls in second 4 weeks of PT. MET 1/28/22    Plan     Conyinue with HEP. Discharge from PT for vestibular/balance dysfunction.      Juana Alcantara PT    [facility]   Discharge Note    [id number]    Therapy Diagnosis:   Encounter Diagnoses   Name Primary?    Balance problem     Impaired functional mobility, balance, gait, and endurance      Physician: Marifer Guerra PA-C    Physician Orders: PT Eval and Treat   Medical Diagnosis from Referral: Meniere's disease  Evaluation Date: 12/14/2021        Date of Last visit: 1/28/22  Total Visits Received: 6    ASSESSMENT      See above.    Discharge reason: Other:  Pt is independent with sx management with HEP and has met goals.      Goals: Met as above    PLAN   This patient is discharged from Physical Therapy      Juana Alcantara PT

## 2022-01-31 ENCOUNTER — EXTERNAL CHRONIC CARE MANAGEMENT (OUTPATIENT)
Dept: PRIMARY CARE CLINIC | Facility: CLINIC | Age: 77
End: 2022-01-31
Payer: MEDICARE

## 2022-01-31 PROCEDURE — 99490 CHRNC CARE MGMT STAFF 1ST 20: CPT | Mod: PBBFAC,PO | Performed by: FAMILY MEDICINE

## 2022-01-31 PROCEDURE — 99490 PR CHRONIC CARE MGMT, 1ST 20 MIN: ICD-10-PCS | Mod: S$PBB,,, | Performed by: FAMILY MEDICINE

## 2022-01-31 PROCEDURE — 99490 CHRNC CARE MGMT STAFF 1ST 20: CPT | Mod: S$PBB,,, | Performed by: FAMILY MEDICINE

## 2022-02-01 ENCOUNTER — CLINICAL SUPPORT (OUTPATIENT)
Dept: REHABILITATION | Facility: HOSPITAL | Age: 77
End: 2022-02-01
Attending: ORTHOPAEDIC SURGERY
Payer: MEDICARE

## 2022-02-01 DIAGNOSIS — Z74.09 IMPAIRED FUNCTIONAL MOBILITY, BALANCE, GAIT, AND ENDURANCE: ICD-10-CM

## 2022-02-01 DIAGNOSIS — M25.661 DECREASED RANGE OF MOTION OF RIGHT KNEE: ICD-10-CM

## 2022-02-01 PROCEDURE — 97110 THERAPEUTIC EXERCISES: CPT | Mod: KX,PO

## 2022-02-01 PROCEDURE — 97112 NEUROMUSCULAR REEDUCATION: CPT | Mod: KX,PO

## 2022-02-01 NOTE — PROGRESS NOTES
Physical Therapy Daily Treatment Note     Name: Veronique Johnson  Clinic Number: [id number]    Therapy Diagnosis:   Encounter Diagnoses   Name Primary?    Decreased range of motion of right knee     Impaired functional mobility, balance, gait, and endurance      Physician: Shelton Le,*    Visit Date: 2/1/2022  Physician: Shelton Le,*     Physician Orders: PT Eval and Treat   Medical Diagnosis from Referral: Other tear of medial meniscus, current injury, right knee, subsequent encounter [S83.241D]  Evaluation Date: 11/4/2021  Authorization Period Expiration: 12/31/21  Plan of Care Expiration: 1/27/22  Visit # / Visits authorized: 5/20  FOTO #2 11/29/21: 57/100=43% limited    Time In: 8:47am  Time Out: 9:43am  Total Billable Time: (8:47-9:15am and 9:30-9:43am) 3 units     Precautions: Standard    Subjective     Pt reports: her knee is flared up today, she doesn't recall what increased her pain.      She was compliant with home exercise program.  Response to previous treatment: no adverse effect  Functional change: improve gait     Pain: 1/10  Location: right knee      Objective     Veronique received therapeutic exercises to develop strength, ROM and flexibility for 23 minutes including:    Upright bike x 6 minutes, level 3   SLR 2 x 10 2#  SL hip ABD 3 x 10 B 2#   DL shuttle squats 3x10 4.5 bands   SL shuttle squats 3x10 2.5 bands B  Patient education on frequency of exercises and HEP    NP today:  SL clams 2x10 Green TB  Prone quad stretch 30s x3  Standing TKE Blue TB 2x10        Veronique received the following manual therapy techniques: Joint mobilizations and Soft tissue Mobilization were applied to the: knee for 6 minutes, including:  Hinged knee extension mobilization  Patella mobs   Tibial ER   Hinged knee extension     Veronique participated in neuromuscular re-education activities to improve: for 27 minutes. The following activities were included:    Obturator nerve glide x 15  Chair DL squat 3  "x 8  Addie step overs 3 x 10  TKE 2 x 10  SL heel raise 3 x 8 B  Split squat at chair 3 x 10 B    NP today:  deadlift off 8" box 20lbs 3 x 10 (cues for hip hinge)      Veronique participated in dynamic functional therapeutic activities to improve functional performance for   minutes, including:    Home Exercises Provided and Patient Education Provided     Education provided:   - HEP, POC    Written Home Exercises Provided: Patient instructed to cont prior HEP.  Exercises were reviewed and Veronique was able to demonstrate them prior to the end of the session.  Veronique demonstrated good  understanding of the education provided.     See EMR under Patient Instructions for exercises provided 11/8/2021.    Assessment     Patient presented to therapy session with 3/10 medial R knee pain. Was able to decrease pain to 1/10 following exercise and movement today. Educated patient on specific sitting postures to avoid at home as it may have been stressing her medial knee and obturator nerve. Continue to progress LE strengthening as she tolerates to decrease fall risk and improve ADLs.        Veronique is progressing well towards her goals.   Pt prognosis is Good.     Pt will continue to benefit from skilled outpatient physical therapy to address the deficits listed in the problem list box on initial evaluation, provide pt/family education and to maximize pt's level of independence in the home and community environment.     Pt's spiritual, cultural and educational needs considered and pt agreeable to plan of care and goals.     Anticipated barriers to physical therapy: none    Goals  GOALS: 8 weeks  1. Report decreased R knee pain  <  / =  5/10 at worst to increase tolerance for prolonged standing.  2. Pt will report 50% improvement in ability to walk long distances since start of care to indicate improved functional mobility.   3. Pt will ambulate with equal B step length with ambulation to indicate improved gait pattern.   4. Pt will " tolerate walking on treadmill for 5 minutes without pain to indicate improved walking endurance.   5. Pt to tolerate HEP to improve ROM and independence with ADL's.     Long Term Goals: 12 weeks  1. Report decreased R knee pain  <  / =  3/10 at worst to increase tolerance for prolonged standing.  2. Pt will report 80% improvement in ability to walk long distances since start of care to indicate improved functional mobility.   3. Pt will ambulate with proper heel-to-toe gait pattern to indicate improved gait pattern.   4. Pt will tolerate walking on treadmill for 10 minutes without pain to indicate improved walking endurance.   5. Pt to be Independent with HEP to improve ROM and independence with ADL's.      Plan     Plan of care Certification: 11/4/2021 to 1/27/22.     Outpatient Physical Therapy 2 times weekly for 12 weeks to include the following interventions: Gait Training, Manual Therapy, Moist Heat/ Ice, Neuromuscular Re-ed, Patient Education, Self Care, Therapeutic Activites and Therapeutic Exercise.       Hitseh Purdy, PT

## 2022-02-08 ENCOUNTER — HOSPITAL ENCOUNTER (OUTPATIENT)
Dept: RADIOLOGY | Facility: HOSPITAL | Age: 77
Discharge: HOME | End: 2022-02-08
Attending: FAMILY MEDICINE
Payer: MEDICARE

## 2022-02-08 ENCOUNTER — CLINICAL SUPPORT (OUTPATIENT)
Dept: REHABILITATION | Facility: HOSPITAL | Age: 77
End: 2022-02-08
Attending: ORTHOPAEDIC SURGERY
Payer: MEDICARE

## 2022-02-08 DIAGNOSIS — M25.661 DECREASED RANGE OF MOTION OF RIGHT KNEE: ICD-10-CM

## 2022-02-08 DIAGNOSIS — Z78.0 POST-MENOPAUSAL: ICD-10-CM

## 2022-02-08 DIAGNOSIS — Z74.09 IMPAIRED FUNCTIONAL MOBILITY, BALANCE, GAIT, AND ENDURANCE: ICD-10-CM

## 2022-02-08 PROCEDURE — 77080 DXA BONE DENSITY AXIAL: CPT | Mod: 26,,, | Performed by: RADIOLOGY

## 2022-02-08 PROCEDURE — 77080 DXA BONE DENSITY AXIAL: CPT | Mod: TC,PO

## 2022-02-08 PROCEDURE — 97110 THERAPEUTIC EXERCISES: CPT | Mod: PO

## 2022-02-08 PROCEDURE — 97112 NEUROMUSCULAR REEDUCATION: CPT | Mod: PO

## 2022-02-08 PROCEDURE — 77080 DEXA BONE DENSITY SPINE HIP: ICD-10-PCS | Mod: 26,,, | Performed by: RADIOLOGY

## 2022-02-08 RX ORDER — ALENDRONATE SODIUM 70 MG/1
70 TABLET ORAL
Qty: 4 TABLET | Refills: 11 | Status: SHIPPED | OUTPATIENT
Start: 2022-02-08 | End: 2022-03-15

## 2022-02-08 NOTE — PROGRESS NOTES
Physical Therapy Daily Treatment Note     Name: Veronique Johnson  Clinic Number: [id number]    Therapy Diagnosis:   Encounter Diagnoses   Name Primary?    Decreased range of motion of right knee     Impaired functional mobility, balance, gait, and endurance      Physician: Shelton Le,*    Visit Date: 2/8/2022  Physician: Shelton Le,*     Physician Orders: PT Eval and Treat   Medical Diagnosis from Referral: Other tear of medial meniscus, current injury, right knee, subsequent encounter [S83.241D]  Evaluation Date: 11/4/2021  Authorization Period Expiration: 12/31/21  Plan of Care Expiration: 1/27/22  Visit # / Visits authorized: 5/20  FOTO #2 11/29/21: 57/100=43% limited    Time In: 10:00am  Time Out: 10:54am  Total Billable Time: (10:10-10:25 and 10:40-10:52) 2 units     Precautions: Standard    Subjective     Pt reports: her knee isn't doing any better. Says the pain is more of an annoyance right now.       She was compliant with home exercise program.  Response to previous treatment: no adverse effect  Functional change: improve gait     Pain: 1/10  Location: right knee      Objective     Veronique received therapeutic exercises to develop strength, ROM and flexibility for 24 minutes including:    Upright bike x 6 minutes, level 3   SLR 3 x 10   Clams YTB 3 x 10 B  DL shuttle squats 2x10 4.5 bands   SL shuttle squats 3x10 2.5 bands B  Patient education on frequency of exercises and HEP    NP today:  Prone quad stretch 30s x3  Standing TKE Blue TB 2x10    Next session: quad and hamstring stretch     Veronique received the following manual therapy techniques: Joint mobilizations and Soft tissue Mobilization were applied to the: knee for 5 minutes, including:  Hinged knee extension mobilization  Patella mobs   Tibial ER   Hinged knee extension     Veronique participated in neuromuscular re-education activities to improve: for 25 minutes. The following activities were included:    Obturator nerve glide x  "15  Chair DL squat 3 x 8 (1 set c 8lbs)  TKE 3 x 15 red power band   SL heel raise 3 x 8 B    NP today:  Addie step overs 3 x 10  Split squat at chair 3 x 10 B  deadlift off 8" box 20lbs 3 x 10 (cues for hip hinge)      Veronique participated in dynamic functional therapeutic activities to improve functional performance for   minutes, including:    Home Exercises Provided and Patient Education Provided     Education provided:   - HEP, POC    Written Home Exercises Provided: Patient instructed to cont prior HEP.  Exercises were reviewed and Veronique was able to demonstrate them prior to the end of the session.  Veronique demonstrated good  understanding of the education provided.     See EMR under Patient Instructions for exercises provided 11/8/2021.    Assessment     Patient had increased tissue irritability upon arrival to therapy today. Focus of tx session was exercising within tolerance and not increasing medial knee symptoms. Able to complete all exercises today with decreased range but no reported knee discomfort. Continue to monitor status and regress/progress as tolerated.        Veronique is progressing well towards her goals.   Pt prognosis is Good.     Pt will continue to benefit from skilled outpatient physical therapy to address the deficits listed in the problem list box on initial evaluation, provide pt/family education and to maximize pt's level of independence in the home and community environment.     Pt's spiritual, cultural and educational needs considered and pt agreeable to plan of care and goals.     Anticipated barriers to physical therapy: none    Goals  GOALS: 8 weeks  1. Report decreased R knee pain  <  / =  5/10 at worst to increase tolerance for prolonged standing.  2. Pt will report 50% improvement in ability to walk long distances since start of care to indicate improved functional mobility.   3. Pt will ambulate with equal B step length with ambulation to indicate improved gait pattern.   4. Pt " will tolerate walking on treadmill for 5 minutes without pain to indicate improved walking endurance.   5. Pt to tolerate HEP to improve ROM and independence with ADL's.     Long Term Goals: 12 weeks  1. Report decreased R knee pain  <  / =  3/10 at worst to increase tolerance for prolonged standing.  2. Pt will report 80% improvement in ability to walk long distances since start of care to indicate improved functional mobility.   3. Pt will ambulate with proper heel-to-toe gait pattern to indicate improved gait pattern.   4. Pt will tolerate walking on treadmill for 10 minutes without pain to indicate improved walking endurance.   5. Pt to be Independent with HEP to improve ROM and independence with ADL's.      Plan     Plan of care Certification: 11/4/2021 to 1/27/22.     Outpatient Physical Therapy 2 times weekly for 12 weeks to include the following interventions: Gait Training, Manual Therapy, Moist Heat/ Ice, Neuromuscular Re-ed, Patient Education, Self Care, Therapeutic Activites and Therapeutic Exercise.       Hitesh Purdy, PT

## 2022-02-16 ENCOUNTER — OFFICE VISIT (OUTPATIENT)
Dept: ORTHOPEDICS | Facility: CLINIC | Age: 77
End: 2022-02-16
Payer: MEDICARE

## 2022-02-16 ENCOUNTER — TELEPHONE (OUTPATIENT)
Dept: FAMILY MEDICINE | Facility: CLINIC | Age: 77
End: 2022-02-16
Payer: MEDICARE

## 2022-02-16 ENCOUNTER — CLINICAL SUPPORT (OUTPATIENT)
Dept: REHABILITATION | Facility: HOSPITAL | Age: 77
End: 2022-02-16
Attending: ORTHOPAEDIC SURGERY
Payer: MEDICARE

## 2022-02-16 VITALS — HEIGHT: 61 IN | WEIGHT: 131 LBS | BODY MASS INDEX: 24.73 KG/M2 | RESPIRATION RATE: 18 BRPM

## 2022-02-16 DIAGNOSIS — S83.241D ACUTE MEDIAL MENISCAL TEAR, RIGHT, SUBSEQUENT ENCOUNTER: Primary | ICD-10-CM

## 2022-02-16 DIAGNOSIS — M25.661 DECREASED RANGE OF MOTION OF RIGHT KNEE: ICD-10-CM

## 2022-02-16 DIAGNOSIS — Z74.09 IMPAIRED FUNCTIONAL MOBILITY, BALANCE, GAIT, AND ENDURANCE: ICD-10-CM

## 2022-02-16 DIAGNOSIS — Z01.818 PRE-OP TESTING: ICD-10-CM

## 2022-02-16 DIAGNOSIS — S83.241A ACUTE MEDIAL MENISCAL TEAR, RIGHT, INITIAL ENCOUNTER: ICD-10-CM

## 2022-02-16 PROCEDURE — 99999 PR PBB SHADOW E&M-EST. PATIENT-LVL III: ICD-10-PCS | Mod: PBBFAC,,, | Performed by: ORTHOPAEDIC SURGERY

## 2022-02-16 PROCEDURE — 97112 NEUROMUSCULAR REEDUCATION: CPT | Mod: PO

## 2022-02-16 PROCEDURE — 99213 OFFICE O/P EST LOW 20 MIN: CPT | Mod: PBBFAC,PN | Performed by: ORTHOPAEDIC SURGERY

## 2022-02-16 PROCEDURE — 99214 PR OFFICE/OUTPT VISIT, EST, LEVL IV, 30-39 MIN: ICD-10-PCS | Mod: S$PBB,,, | Performed by: ORTHOPAEDIC SURGERY

## 2022-02-16 PROCEDURE — 97110 THERAPEUTIC EXERCISES: CPT | Mod: PO

## 2022-02-16 PROCEDURE — 99999 PR PBB SHADOW E&M-EST. PATIENT-LVL III: CPT | Mod: PBBFAC,,, | Performed by: ORTHOPAEDIC SURGERY

## 2022-02-16 PROCEDURE — 99214 OFFICE O/P EST MOD 30 MIN: CPT | Mod: S$PBB,,, | Performed by: ORTHOPAEDIC SURGERY

## 2022-02-16 RX ORDER — CLINDAMYCIN PHOSPHATE 900 MG/50ML
900 INJECTION, SOLUTION INTRAVENOUS
Status: CANCELLED | OUTPATIENT
Start: 2022-02-16
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
## 2022-02-16 NOTE — PROGRESS NOTES
Physical Therapy Daily Treatment Note     Name: Veronique Johnson  Clinic Number: [id number]    Therapy Diagnosis:   Encounter Diagnoses   Name Primary?    Decreased range of motion of right knee     Impaired functional mobility, balance, gait, and endurance      Physician: Shelton Le,*    Visit Date: 2/16/2022  Physician: Shelton Le,*     Physician Orders: PT Eval and Treat   Medical Diagnosis from Referral: Other tear of medial meniscus, current injury, right knee, subsequent encounter [S83.241D]  Evaluation Date: 11/4/2021  Authorization Period Expiration: 12/31/21  Plan of Care Expiration: 1/27/22  Visit # / Visits authorized: 7/20  FOTO #2 11/29/21: 57/100=43% limited    Time In: 10:01am  Time Out: 10:59am  Total Billable Time: 58 minutes     Precautions: Standard    Subjective     Pt reports: coming from visit with her MD. Says she is getting surgery on March 11 for an arthoscopic clean out.       She was compliant with home exercise program.  Response to previous treatment: no adverse effect  Functional change: improve gait     Pain: 1/10  Location: right knee      Objective     Veronique received therapeutic exercises to develop strength, ROM and flexibility for 25 minutes including:    Upright bike x 6 minutes, level 3   LLLD heel prop x 3 min 2lbs above and below knee   Hamstring stretch 3 x 20   SLR 3 x 10 2lbs  Clams YTB 3 x 10 B  DL shuttle squats 2x10 4.5 bands   SL shuttle squats 3x10 2.5 bands B  Patient education on frequency of exercises and HEP    NP today:  Prone quad stretch 30s x3  Standing TKE Blue TB 2x10    Next session: quad and hamstring stretch     Veronique received the following manual therapy techniques: Joint mobilizations and Soft tissue Mobilization were applied to the: knee for 3 minutes, including:  Hinged knee extension mobilization  Patella mobs   Tibial ER   Hinged knee extension     Veronique participated in neuromuscular re-education activities to improve: for 30  "minutes. The following activities were included:    Box squat 3 x 10 c 10lb  deadlifts from 6" box 25lbs 3 x 8  Single arm farmers carry 15 lbs x 6 laps     NP today:  SL heel raise 3 x 8 B  Addie step overs 3 x 10  Split squat at chair 3 x 10 B      Veronique participated in dynamic functional therapeutic activities to improve functional performance for   minutes, including:    Home Exercises Provided and Patient Education Provided     Education provided:   - HEP, POC    Written Home Exercises Provided: Patient instructed to cont prior HEP.  Exercises were reviewed and Veronique was able to demonstrate them prior to the end of the session.  Veronique demonstrated good  understanding of the education provided.     See EMR under Patient Instructions for exercises provided 11/8/2021.    Assessment     Patient had increased tissue irritability upon arrival to therapy today. Focus of tx session was exercising within tolerance and not increasing medial knee symptoms. Able to complete all exercises today with decreased range but no reported knee discomfort. Continue to monitor status and regress/progress as tolerated.        Veronique is progressing well towards her goals.   Pt prognosis is Good.     Pt will continue to benefit from skilled outpatient physical therapy to address the deficits listed in the problem list box on initial evaluation, provide pt/family education and to maximize pt's level of independence in the home and community environment.     Pt's spiritual, cultural and educational needs considered and pt agreeable to plan of care and goals.     Anticipated barriers to physical therapy: none    Goals  GOALS: 8 weeks  1. Report decreased R knee pain  <  / =  5/10 at worst to increase tolerance for prolonged standing.  2. Pt will report 50% improvement in ability to walk long distances since start of care to indicate improved functional mobility.   3. Pt will ambulate with equal B step length with ambulation to indicate " improved gait pattern.   4. Pt will tolerate walking on treadmill for 5 minutes without pain to indicate improved walking endurance.   5. Pt to tolerate HEP to improve ROM and independence with ADL's.     Long Term Goals: 12 weeks  1. Report decreased R knee pain  <  / =  3/10 at worst to increase tolerance for prolonged standing.  2. Pt will report 80% improvement in ability to walk long distances since start of care to indicate improved functional mobility.   3. Pt will ambulate with proper heel-to-toe gait pattern to indicate improved gait pattern.   4. Pt will tolerate walking on treadmill for 10 minutes without pain to indicate improved walking endurance.   5. Pt to be Independent with HEP to improve ROM and independence with ADL's.      Plan     Plan of care Certification: 11/4/2021 to 1/27/22.     Outpatient Physical Therapy 2 times weekly for 12 weeks to include the following interventions: Gait Training, Manual Therapy, Moist Heat/ Ice, Neuromuscular Re-ed, Patient Education, Self Care, Therapeutic Activites and Therapeutic Exercise.       Hitesh Purdy, PT

## 2022-02-16 NOTE — TELEPHONE ENCOUNTER
----- Message from James Mayen sent at 2/16/2022  9:36 AM CST -----  Regarding: pre op clearance  Patient was seen today by Dr. Le and consented for outpatient right knee surgery.  Surgery has been scheduled for 3/11/22.  Please advise on pre op medical clearance for this patient.      Thank you,     Asa

## 2022-02-18 ENCOUNTER — PATIENT MESSAGE (OUTPATIENT)
Dept: GASTROENTEROLOGY | Facility: CLINIC | Age: 77
End: 2022-02-18
Payer: MEDICARE

## 2022-02-18 DIAGNOSIS — K86.89 PANCREATIC INSUFFICIENCY: ICD-10-CM

## 2022-02-18 RX ORDER — PANCRELIPASE 36000; 180000; 114000 [USP'U]/1; [USP'U]/1; [USP'U]/1
CAPSULE, DELAYED RELEASE PELLETS ORAL
Qty: 120 CAPSULE | Refills: 1 | OUTPATIENT
Start: 2022-02-18

## 2022-02-18 RX ORDER — PANCRELIPASE 36000; 180000; 114000 [USP'U]/1; [USP'U]/1; [USP'U]/1
CAPSULE, DELAYED RELEASE PELLETS ORAL
Qty: 120 CAPSULE | Refills: 1 | Status: SHIPPED | OUTPATIENT
Start: 2022-02-18 | End: 2022-09-04 | Stop reason: SDUPTHER

## 2022-02-20 ENCOUNTER — PATIENT MESSAGE (OUTPATIENT)
Dept: GASTROENTEROLOGY | Facility: CLINIC | Age: 77
End: 2022-02-20
Payer: MEDICARE

## 2022-02-21 ENCOUNTER — LAB VISIT (OUTPATIENT)
Dept: LAB | Facility: HOSPITAL | Age: 77
End: 2022-02-21
Attending: ORTHOPAEDIC SURGERY
Payer: MEDICARE

## 2022-02-21 DIAGNOSIS — S83.241D ACUTE MEDIAL MENISCAL TEAR, RIGHT, SUBSEQUENT ENCOUNTER: ICD-10-CM

## 2022-02-21 LAB
ANION GAP SERPL CALC-SCNC: 9 MMOL/L (ref 8–16)
BASOPHILS # BLD AUTO: 0.06 K/UL (ref 0–0.2)
BASOPHILS NFR BLD: 0.7 % (ref 0–1.9)
BUN SERPL-MCNC: 15 MG/DL (ref 8–23)
CALCIUM SERPL-MCNC: 10.1 MG/DL (ref 8.7–10.5)
CHLORIDE SERPL-SCNC: 100 MMOL/L (ref 95–110)
CO2 SERPL-SCNC: 30 MMOL/L (ref 23–29)
CREAT SERPL-MCNC: 0.8 MG/DL (ref 0.5–1.4)
DIFFERENTIAL METHOD: ABNORMAL
EOSINOPHIL # BLD AUTO: 0.3 K/UL (ref 0–0.5)
EOSINOPHIL NFR BLD: 3.9 % (ref 0–8)
ERYTHROCYTE [DISTWIDTH] IN BLOOD BY AUTOMATED COUNT: 13.3 % (ref 11.5–14.5)
EST. GFR  (AFRICAN AMERICAN): >60 ML/MIN/1.73 M^2
EST. GFR  (NON AFRICAN AMERICAN): >60 ML/MIN/1.73 M^2
GLUCOSE SERPL-MCNC: 85 MG/DL (ref 70–110)
HCT VFR BLD AUTO: 42.8 % (ref 37–48.5)
HGB BLD-MCNC: 13.2 G/DL (ref 12–16)
IMM GRANULOCYTES # BLD AUTO: 0.03 K/UL (ref 0–0.04)
IMM GRANULOCYTES NFR BLD AUTO: 0.3 % (ref 0–0.5)
LYMPHOCYTES # BLD AUTO: 1.5 K/UL (ref 1–4.8)
LYMPHOCYTES NFR BLD: 17.2 % (ref 18–48)
MCH RBC QN AUTO: 29 PG (ref 27–31)
MCHC RBC AUTO-ENTMCNC: 30.8 G/DL (ref 32–36)
MCV RBC AUTO: 94 FL (ref 82–98)
MONOCYTES # BLD AUTO: 0.8 K/UL (ref 0.3–1)
MONOCYTES NFR BLD: 9.2 % (ref 4–15)
NEUTROPHILS # BLD AUTO: 6 K/UL (ref 1.8–7.7)
NEUTROPHILS NFR BLD: 68.7 % (ref 38–73)
NRBC BLD-RTO: 0 /100 WBC
PLATELET # BLD AUTO: 280 K/UL (ref 150–450)
PMV BLD AUTO: 10.8 FL (ref 9.2–12.9)
POTASSIUM SERPL-SCNC: 4 MMOL/L (ref 3.5–5.1)
RBC # BLD AUTO: 4.55 M/UL (ref 4–5.4)
SODIUM SERPL-SCNC: 139 MMOL/L (ref 136–145)
WBC # BLD AUTO: 8.76 K/UL (ref 3.9–12.7)

## 2022-02-21 PROCEDURE — 85025 COMPLETE CBC W/AUTO DIFF WBC: CPT | Performed by: ORTHOPAEDIC SURGERY

## 2022-02-21 PROCEDURE — 80048 BASIC METABOLIC PNL TOTAL CA: CPT | Performed by: ORTHOPAEDIC SURGERY

## 2022-02-21 PROCEDURE — 36415 COLL VENOUS BLD VENIPUNCTURE: CPT | Mod: PO | Performed by: ORTHOPAEDIC SURGERY

## 2022-02-23 ENCOUNTER — CLINICAL SUPPORT (OUTPATIENT)
Dept: REHABILITATION | Facility: HOSPITAL | Age: 77
End: 2022-02-23
Attending: ORTHOPAEDIC SURGERY
Payer: MEDICARE

## 2022-02-23 DIAGNOSIS — Z74.09 IMPAIRED FUNCTIONAL MOBILITY, BALANCE, GAIT, AND ENDURANCE: ICD-10-CM

## 2022-02-23 DIAGNOSIS — M25.669 DECREASED RANGE OF MOTION OF KNEE, UNSPECIFIED LATERALITY: Primary | ICD-10-CM

## 2022-02-23 PROCEDURE — 97110 THERAPEUTIC EXERCISES: CPT | Mod: PO

## 2022-02-23 PROCEDURE — 97530 THERAPEUTIC ACTIVITIES: CPT | Mod: PO

## 2022-02-23 PROCEDURE — 97112 NEUROMUSCULAR REEDUCATION: CPT | Mod: PO

## 2022-02-23 NOTE — PROGRESS NOTES
Physical Therapy Daily Treatment Note     Name: Veronique Johnson  Clinic Number: [id number]    Therapy Diagnosis:   Encounter Diagnoses   Name Primary?    Decreased range of motion of knee, unspecified laterality Yes    Impaired functional mobility, balance, gait, and endurance      Physician: Shelton Le,*    Visit Date: 2/23/2022  Physician: Shelton Le,*     Physician Orders: PT Eval and Treat   Medical Diagnosis from Referral: Other tear of medial meniscus, current injury, right knee, subsequent encounter [S83.241D]  Evaluation Date: 11/4/2021  Authorization Period Expiration: 12/31/21  Plan of Care Expiration: 1/27/22  Visit # / Visits authorized: 8/20  FOTO #2 11/29/21: 57/100=43% limited    Time In: 9:55am  Time Out: 10:48am  Total Billable Time: 53 minutes     Precautions: Standard    Subjective     Pt reports: her knee is feeling better today as she didn't wake up with any pain.      She was compliant with home exercise program.  Response to previous treatment: no adverse effect  Functional change: improve gait     Pain: 0/10  Location: right knee      Objective     Veronique received therapeutic exercises to develop strength, ROM and flexibility for 30 minutes including:    Upright bike x 6 minutes, level 3   SLR 3 x 10 2lbs  Clams GTB 3 x 10 B  DL shuttle squats 3x10 4 bands   SL shuttle squats 3x10 2 bands B  Patient education on frequency of exercises and HEP    NP today:  LLLD heel prop x 3 min 2lbs above and below knee   Prone quad stretch 30s x3  Standing TKE Blue TB 2x10      Veronique received the following manual therapy techniques: Joint mobilizations and Soft tissue Mobilization were applied to the: knee for 3 minutes, including:  Hinged knee extension mobilization  Patella mobs   Tibial ER   Hinged knee extension     Veronique participated in neuromuscular re-education activities to improve: for 10 minutes. The following activities were included:    Single arm farmers carry 15 lbs x  "6 laps   DL heel raise x 30    NP today:  Addie step overs 3 x 10  Split squat at chair 3 x 10 B      Veronique participated in dynamic functional therapeutic activities to improve functional performance for 13 minutes, including:    Forward step down 6" 3 x 8 B   BW squat to 18in box c 15lbs 3 x 10    Home Exercises Provided and Patient Education Provided     Education provided:   - HEP, POC    Written Home Exercises Provided: Patient instructed to cont prior HEP.  Exercises were reviewed and Veronique was able to demonstrate them prior to the end of the session.  Veronique demonstrated good  understanding of the education provided.     See EMR under Patient Instructions for exercises provided 11/8/2021.    Assessment     Patient presented with decreased irritability in medial R knee today. Tolerated all exercise well except SL heel raise which caused some discomfort in R knee. Modified to a DL heel raise which didn't cause any discomfort. Patient has appropriate range of motion and quad activation/strength leading up to surgery on 3/11/22.         Veronique is progressing well towards her goals.   Pt prognosis is Good.     Pt will continue to benefit from skilled outpatient physical therapy to address the deficits listed in the problem list box on initial evaluation, provide pt/family education and to maximize pt's level of independence in the home and community environment.     Pt's spiritual, cultural and educational needs considered and pt agreeable to plan of care and goals.     Anticipated barriers to physical therapy: none    Goals  GOALS: 8 weeks  1. Report decreased R knee pain  <  / =  5/10 at worst to increase tolerance for prolonged standing.  2. Pt will report 50% improvement in ability to walk long distances since start of care to indicate improved functional mobility.   3. Pt will ambulate with equal B step length with ambulation to indicate improved gait pattern.   4. Pt will tolerate walking on treadmill for 5 " minutes without pain to indicate improved walking endurance.   5. Pt to tolerate HEP to improve ROM and independence with ADL's.     Long Term Goals: 12 weeks  1. Report decreased R knee pain  <  / =  3/10 at worst to increase tolerance for prolonged standing.  2. Pt will report 80% improvement in ability to walk long distances since start of care to indicate improved functional mobility.   3. Pt will ambulate with proper heel-to-toe gait pattern to indicate improved gait pattern.   4. Pt will tolerate walking on treadmill for 10 minutes without pain to indicate improved walking endurance.   5. Pt to be Independent with HEP to improve ROM and independence with ADL's.      Plan     Plan of care Certification: 11/4/2021 to 1/27/22.     Outpatient Physical Therapy 2 times weekly for 12 weeks to include the following interventions: Gait Training, Manual Therapy, Moist Heat/ Ice, Neuromuscular Re-ed, Patient Education, Self Care, Therapeutic Activites and Therapeutic Exercise.       Hitesh Purdy, PT

## 2022-02-28 ENCOUNTER — PATIENT MESSAGE (OUTPATIENT)
Dept: PSYCHIATRY | Facility: CLINIC | Age: 77
End: 2022-02-28
Payer: MEDICARE

## 2022-02-28 ENCOUNTER — EXTERNAL CHRONIC CARE MANAGEMENT (OUTPATIENT)
Dept: PRIMARY CARE CLINIC | Facility: CLINIC | Age: 77
End: 2022-02-28
Payer: MEDICARE

## 2022-02-28 PROCEDURE — 99490 CHRNC CARE MGMT STAFF 1ST 20: CPT | Mod: S$PBB,,, | Performed by: FAMILY MEDICINE

## 2022-02-28 PROCEDURE — 99490 CHRNC CARE MGMT STAFF 1ST 20: CPT | Mod: PBBFAC,PO | Performed by: FAMILY MEDICINE

## 2022-02-28 PROCEDURE — 99490 PR CHRONIC CARE MGMT, 1ST 20 MIN: ICD-10-PCS | Mod: S$PBB,,, | Performed by: FAMILY MEDICINE

## 2022-03-02 ENCOUNTER — TELEPHONE (OUTPATIENT)
Dept: FAMILY MEDICINE | Facility: CLINIC | Age: 77
End: 2022-03-02

## 2022-03-02 ENCOUNTER — CLINICAL SUPPORT (OUTPATIENT)
Dept: REHABILITATION | Facility: HOSPITAL | Age: 77
End: 2022-03-02
Attending: ORTHOPAEDIC SURGERY
Payer: MEDICARE

## 2022-03-02 ENCOUNTER — OFFICE VISIT (OUTPATIENT)
Dept: FAMILY MEDICINE | Facility: CLINIC | Age: 77
End: 2022-03-02
Payer: MEDICARE

## 2022-03-02 VITALS
SYSTOLIC BLOOD PRESSURE: 132 MMHG | DIASTOLIC BLOOD PRESSURE: 70 MMHG | BODY MASS INDEX: 24.85 KG/M2 | WEIGHT: 131.63 LBS | HEIGHT: 61 IN | HEART RATE: 94 BPM | OXYGEN SATURATION: 99 %

## 2022-03-02 DIAGNOSIS — K21.9 GASTROESOPHAGEAL REFLUX DISEASE WITHOUT ESOPHAGITIS: ICD-10-CM

## 2022-03-02 DIAGNOSIS — G89.29 CHRONIC PAIN OF RIGHT KNEE: ICD-10-CM

## 2022-03-02 DIAGNOSIS — G35 MULTIPLE SCLEROSIS: Primary | ICD-10-CM

## 2022-03-02 DIAGNOSIS — M25.561 CHRONIC PAIN OF RIGHT KNEE: ICD-10-CM

## 2022-03-02 DIAGNOSIS — Z74.09 IMPAIRED FUNCTIONAL MOBILITY, BALANCE, GAIT, AND ENDURANCE: ICD-10-CM

## 2022-03-02 DIAGNOSIS — I67.1 CEREBRAL ANEURYSM: ICD-10-CM

## 2022-03-02 DIAGNOSIS — J44.9 CHRONIC OBSTRUCTIVE PULMONARY DISEASE, UNSPECIFIED COPD TYPE: ICD-10-CM

## 2022-03-02 DIAGNOSIS — I10 ESSENTIAL HYPERTENSION: ICD-10-CM

## 2022-03-02 DIAGNOSIS — M25.661 DECREASED RANGE OF MOTION OF RIGHT KNEE: Primary | ICD-10-CM

## 2022-03-02 PROCEDURE — 97110 THERAPEUTIC EXERCISES: CPT | Mod: PO

## 2022-03-02 PROCEDURE — 99999 PR PBB SHADOW E&M-EST. PATIENT-LVL IV: CPT | Mod: PBBFAC,,, | Performed by: PHYSICIAN ASSISTANT

## 2022-03-02 PROCEDURE — 99214 OFFICE O/P EST MOD 30 MIN: CPT | Mod: S$PBB,,, | Performed by: PHYSICIAN ASSISTANT

## 2022-03-02 PROCEDURE — 99214 PR OFFICE/OUTPT VISIT, EST, LEVL IV, 30-39 MIN: ICD-10-PCS | Mod: S$PBB,,, | Performed by: PHYSICIAN ASSISTANT

## 2022-03-02 PROCEDURE — 99999 PR PBB SHADOW E&M-EST. PATIENT-LVL IV: ICD-10-PCS | Mod: PBBFAC,,, | Performed by: PHYSICIAN ASSISTANT

## 2022-03-02 PROCEDURE — 97112 NEUROMUSCULAR REEDUCATION: CPT | Mod: PO

## 2022-03-02 PROCEDURE — 99214 OFFICE O/P EST MOD 30 MIN: CPT | Mod: PBBFAC,PO | Performed by: PHYSICIAN ASSISTANT

## 2022-03-02 NOTE — TELEPHONE ENCOUNTER
Hi I saw this patient today, she has not started fosamax because she read about the CI for esophageal problems, she has history of stricture.  DO you want her to take it or do boniva instead?

## 2022-03-02 NOTE — PROGRESS NOTES
Subjective:       Patient ID: Veronique Johnson is a 76 y.o. female.    Chief Complaint: Pre-op Exam    HPI   PCP:  Dr. Song  Past medical history:  Hypertension, COPD, anxiety, MS, Meniere's disease , cerebral artery aneurysm.    The patient presents today for preop evaluation.  She is planning a right knee arthroscopy to evaluate for a recurrent medial meniscal tear with Dr. Mayorga on 3/11/2022.      Cerebral Artery Aneurysm- stable, followed by Neurosurgery, Dr Smith, last brain MRA on 5/25/21  Hypertension: stable  MS: stable  Osteopenia:  She did not start Fosamax because she has a history of esophageal stenosis and GERD and has had to have EGD done to stretch her esophagus in the past.  She has trouble swallowing medications and food occasionally.   History of falls:  She has been doing PT for her core strength and stability and she feels it has helped     Vitals:    03/02/22 0922   BP: 132/70   Pulse:      Past Medical History:   Diagnosis Date    Anemia     Anxiety     Chronic bronchitis with COPD (chronic obstructive pulmonary disease)     Esophageal spasm     Essential tremor     GERD (gastroesophageal reflux disease)     Headache     High cholesterol     Hypertension     Meniere disease     Multiple sclerosis     Muscle spasm     Pancreatic insufficiency      Lab Results   Component Value Date    WBC 8.76 02/21/2022    HGB 13.2 02/21/2022    HCT 42.8 02/21/2022    MCV 94 02/21/2022     02/21/2022     CMP  Sodium   Date Value Ref Range Status   02/21/2022 139 136 - 145 mmol/L Final   05/30/2019 144 134 - 144 mmol/L      Potassium   Date Value Ref Range Status   02/21/2022 4.0 3.5 - 5.1 mmol/L Final     Chloride   Date Value Ref Range Status   02/21/2022 100 95 - 110 mmol/L Final   05/30/2019 110 98 - 110 mmol/L      CO2   Date Value Ref Range Status   02/21/2022 30 (H) 23 - 29 mmol/L Final     Glucose   Date Value Ref Range Status   02/21/2022 85 70 - 110 mg/dL Final   05/30/2019 123  (H) 70 - 99 mg/dL      BUN   Date Value Ref Range Status   02/21/2022 15 8 - 23 mg/dL Final     Creatinine   Date Value Ref Range Status   02/21/2022 0.8 0.5 - 1.4 mg/dL Final   05/30/2019 0.74 0.60 - 1.40 mg/dL      Calcium   Date Value Ref Range Status   02/21/2022 10.1 8.7 - 10.5 mg/dL Final     Total Protein   Date Value Ref Range Status   12/02/2021 7.0 6.0 - 8.4 g/dL Final     Albumin   Date Value Ref Range Status   12/02/2021 4.1 3.5 - 5.2 g/dL Final   05/30/2019 3.8 2.9 - 4.4 g/dL    05/30/2019 4.1 3.1 - 4.7 g/dL      Total Bilirubin   Date Value Ref Range Status   12/02/2021 0.5 0.2 - 1.3 mg/dL Final     Alkaline Phosphatase   Date Value Ref Range Status   12/02/2021 87 38 - 145 U/L Final     AST   Date Value Ref Range Status   12/02/2021 42 (H) 14 - 36 U/L Final     ALT   Date Value Ref Range Status   12/02/2021 23 0 - 35 U/L Final     Anion Gap   Date Value Ref Range Status   02/21/2022 9 8 - 16 mmol/L Final     eGFR if    Date Value Ref Range Status   02/21/2022 >60.0 >60 mL/min/1.73 m^2 Final     eGFR if non    Date Value Ref Range Status   02/21/2022 >60.0 >60 mL/min/1.73 m^2 Final     Comment:     Calculation used to obtain the estimated glomerular filtration  rate (eGFR) is the CKD-EPI equation.        Lab Results   Component Value Date    HGBA1C 5.4 12/02/2021     Last EKG was on 10/15/21, no cardiac clearance needed    Review of Systems   Constitutional: Negative for activity change, chills and fever.   HENT: Negative for congestion and sore throat.    Eyes: Negative for visual disturbance.   Respiratory: Negative for cough and shortness of breath.    Cardiovascular: Negative for chest pain and palpitations.   Gastrointestinal: Negative for abdominal pain, diarrhea, nausea and vomiting.   Endocrine: Negative for polydipsia and polyuria.   Musculoskeletal: Positive for arthralgias (knee pain). Negative for myalgias.   Skin: Negative for rash.   Neurological: Negative  for headaches.   Psychiatric/Behavioral: The patient is not nervous/anxious.        Objective:      Physical Exam  Constitutional:       General: She is not in acute distress.     Appearance: She is well-developed. She is not diaphoretic.   HENT:      Head: Normocephalic and atraumatic.   Eyes:      Pupils: Pupils are equal, round, and reactive to light.   Cardiovascular:      Rate and Rhythm: Normal rate and regular rhythm.      Heart sounds: Normal heart sounds. No murmur heard.    No friction rub. No gallop.   Pulmonary:      Effort: Pulmonary effort is normal. No respiratory distress.      Breath sounds: Normal breath sounds. No wheezing or rales.   Chest:      Chest wall: No tenderness.   Abdominal:      General: Bowel sounds are normal. There is no distension.      Palpations: Abdomen is soft. There is no mass.      Tenderness: There is no abdominal tenderness. There is no guarding.   Musculoskeletal:         General: Normal range of motion.      Cervical back: Normal range of motion and neck supple.   Skin:     General: Skin is warm and dry.      Findings: No rash.   Neurological:      Mental Status: She is alert and oriented to person, place, and time.   Psychiatric:         Behavior: Behavior normal.         Thought Content: Thought content normal.         Judgment: Judgment normal.         Assessment & Plan:       Multiple sclerosis  - Stable    Chronic obstructive pulmonary disease, unspecified COPD type  - Stable    Essential hypertension  - Stable    Gastroesophageal reflux disease without esophagitis  - stable    Chronic pain of right knee  -continue orthopedics    Cerebral aneurysm  - continue observation with neurosurgery        Follow up in about 19 weeks (around 7/13/2022) for annual physical.    RCRI risk factors include: no known RCRI risk factors.     Overall this patient can be considered low risk for this low risk procedure. No further cardiac testing is recommended at this time.    We  discussed the benefits of early mobilization and deep breathing after surgery.  Screened patient for alcohol misuse, use of illicit drugs, and personal or family history of anesthetic complications or bleeding diathesis and no substantial concerns were identified.     All current medications were reviewed and at this time no changes to medications are recommended prior to surgery.     I recommend use of standard pre-op and post-op precautions for this patient. In my opinion, she is medically optimized for this procedure, and can proceed without further evaluation.

## 2022-03-03 NOTE — PROGRESS NOTES
Physical Therapy Daily Treatment Note     Name: Veronique Johnson  Clinic Number: [id number]    Therapy Diagnosis:   Encounter Diagnoses   Name Primary?    Decreased range of motion of right knee Yes    Impaired functional mobility, balance, gait, and endurance      Physician: Shelton Le,*    Visit Date: 3/2/2022  Physician: Shelton Le,*     Physician Orders: PT Eval and Treat   Medical Diagnosis from Referral: Other tear of medial meniscus, current injury, right knee, subsequent encounter [S83.241D]  Evaluation Date: 11/4/2021  Authorization Period Expiration: 12/31/21  Plan of Care Expiration: 1/27/22  Visit # / Visits authorized: 9/20  FOTO #2 11/29/21: 57/100=43% limited    Time In: 10:00am  Time Out: 10:55am  Total Billable Time: 30 minutes     Precautions: Standard    Subjective     Pt reports: her knee is feeling better today as she didn't wake up with any pain.      She was compliant with home exercise program.  Response to previous treatment: no adverse effect  Functional change: improve gait     Pain: 0/10  Location: right knee      Objective     Veronique received therapeutic exercises to develop strength, ROM and flexibility for 23 minutes including:    Upright bike x 6 minutes, level 3   SLR 3 x 10 2lbs  Clams GTB 3 x 10 B  Bridges x 30  DL shuttle squats 3x10 4 bands   SL shuttle squats 3x10 2 bands B  Patient education on frequency of exercises and HEP    NP today:  LLLD heel prop x 3 min 2lbs above and below knee   Prone quad stretch 30s x3  Standing TKE Blue TB 2x10      Veronique received the following manual therapy techniques: Joint mobilizations and Soft tissue Mobilization were applied to the: knee for 00 minutes, including:  Hinged knee extension mobilization  Patella mobs   Tibial ER   Hinged knee extension     Veronique participated in neuromuscular re-education activities to improve: for 32 minutes. The following activities were included:    Single arm farmers carry 15 lbs x 6  "laps   DL heel raise x 30  Lateral lunge c handheld assist 3 x 6 B  deadlifts from 6" box 3 x 10 25lbs   TKE red powerband x 30    NP today:  Addie step overs 3 x 10  Split squat at chair 3 x 10 B      Veronique participated in dynamic functional therapeutic activities to improve functional performance for 00 minutes, including:    NP today:  Forward step down 6" 3 x 8 B   BW squat to 18in box c 15lbs 3 x 10    Home Exercises Provided and Patient Education Provided     Education provided:   - HEP, POC    Written Home Exercises Provided: Patient instructed to cont prior HEP.  Exercises were reviewed and Veronique was able to demonstrate them prior to the end of the session.  Veronique demonstrated good  understanding of the education provided.     See EMR under Patient Instructions for exercises provided 11/8/2021.    Assessment     Patient tolerated all exercise well today. Focus was mainly DL exercises with a few single leg motor control exercises mixed in. Patient has done well with pre-hab leading up to her surgery which is scheduled for 3/11/2022. She has full range of motion and appropriate quad strength setting up for success with physical therapy after surgery.      Nati Rm, Therapy Tech, utilized throughout treatment today.      Veronique is progressing well towards her goals.   Pt prognosis is Good.     Pt will continue to benefit from skilled outpatient physical therapy to address the deficits listed in the problem list box on initial evaluation, provide pt/family education and to maximize pt's level of independence in the home and community environment.     Pt's spiritual, cultural and educational needs considered and pt agreeable to plan of care and goals.     Anticipated barriers to physical therapy: none    Goals  GOALS: 8 weeks  1. Report decreased R knee pain  <  / =  5/10 at worst to increase tolerance for prolonged standing.  2. Pt will report 50% improvement in ability to walk long distances since start of " care to indicate improved functional mobility.   3. Pt will ambulate with equal B step length with ambulation to indicate improved gait pattern.   4. Pt will tolerate walking on treadmill for 5 minutes without pain to indicate improved walking endurance.   5. Pt to tolerate HEP to improve ROM and independence with ADL's.     Long Term Goals: 12 weeks  1. Report decreased R knee pain  <  / =  3/10 at worst to increase tolerance for prolonged standing.  2. Pt will report 80% improvement in ability to walk long distances since start of care to indicate improved functional mobility.   3. Pt will ambulate with proper heel-to-toe gait pattern to indicate improved gait pattern.   4. Pt will tolerate walking on treadmill for 10 minutes without pain to indicate improved walking endurance.   5. Pt to be Independent with HEP to improve ROM and independence with ADL's.      Plan     Plan of care Certification: 11/4/2021 to 1/27/22.     Outpatient Physical Therapy 2 times weekly for 12 weeks to include the following interventions: Gait Training, Manual Therapy, Moist Heat/ Ice, Neuromuscular Re-ed, Patient Education, Self Care, Therapeutic Activites and Therapeutic Exercise.       Hitesh Purdy, PT

## 2022-03-06 NOTE — TELEPHONE ENCOUNTER
In her case should be better to the injections like Prolia every 6 months.  We can discuss this at her next office visit.  Thank you.

## 2022-03-08 ENCOUNTER — LAB VISIT (OUTPATIENT)
Dept: FAMILY MEDICINE | Facility: CLINIC | Age: 77
End: 2022-03-08
Payer: MEDICARE

## 2022-03-08 ENCOUNTER — PATIENT MESSAGE (OUTPATIENT)
Dept: FAMILY MEDICINE | Facility: CLINIC | Age: 77
End: 2022-03-08
Payer: MEDICARE

## 2022-03-08 ENCOUNTER — PATIENT MESSAGE (OUTPATIENT)
Dept: FAMILY MEDICINE | Facility: CLINIC | Age: 77
End: 2022-03-08

## 2022-03-08 ENCOUNTER — PATIENT MESSAGE (OUTPATIENT)
Dept: SURGERY | Facility: HOSPITAL | Age: 77
End: 2022-03-08
Payer: MEDICARE

## 2022-03-08 DIAGNOSIS — S83.241D ACUTE MEDIAL MENISCAL TEAR, RIGHT, SUBSEQUENT ENCOUNTER: ICD-10-CM

## 2022-03-08 PROCEDURE — U0003 INFECTIOUS AGENT DETECTION BY NUCLEIC ACID (DNA OR RNA); SEVERE ACUTE RESPIRATORY SYNDROME CORONAVIRUS 2 (SARS-COV-2) (CORONAVIRUS DISEASE [COVID-19]), AMPLIFIED PROBE TECHNIQUE, MAKING USE OF HIGH THROUGHPUT TECHNOLOGIES AS DESCRIBED BY CMS-2020-01-R: HCPCS | Performed by: ORTHOPAEDIC SURGERY

## 2022-03-08 PROCEDURE — U0005 INFEC AGEN DETEC AMPLI PROBE: HCPCS | Performed by: ORTHOPAEDIC SURGERY

## 2022-03-09 ENCOUNTER — OFFICE VISIT (OUTPATIENT)
Dept: FAMILY MEDICINE | Facility: CLINIC | Age: 77
End: 2022-03-09
Payer: MEDICARE

## 2022-03-09 VITALS
SYSTOLIC BLOOD PRESSURE: 128 MMHG | TEMPERATURE: 98 F | DIASTOLIC BLOOD PRESSURE: 60 MMHG | WEIGHT: 129.19 LBS | HEART RATE: 98 BPM | BODY MASS INDEX: 24.39 KG/M2 | HEIGHT: 61 IN | OXYGEN SATURATION: 96 %

## 2022-03-09 DIAGNOSIS — J30.2 SEASONAL ALLERGIC RHINITIS, UNSPECIFIED TRIGGER: Primary | ICD-10-CM

## 2022-03-09 DIAGNOSIS — F41.9 ANXIETY: ICD-10-CM

## 2022-03-09 PROBLEM — Z74.09 IMPAIRED FUNCTIONAL MOBILITY, BALANCE, GAIT, AND ENDURANCE: Status: RESOLVED | Noted: 2021-11-08 | Resolved: 2022-01-28

## 2022-03-09 PROBLEM — R26.89 BALANCE PROBLEM: Status: RESOLVED | Noted: 2021-12-14 | Resolved: 2022-01-28

## 2022-03-09 LAB
SARS-COV-2 RNA RESP QL NAA+PROBE: NOT DETECTED
SARS-COV-2- CYCLE NUMBER: NORMAL

## 2022-03-09 PROCEDURE — 99999 PR PBB SHADOW E&M-EST. PATIENT-LVL IV: CPT | Mod: PBBFAC,,, | Performed by: PHYSICIAN ASSISTANT

## 2022-03-09 PROCEDURE — 96372 THER/PROPH/DIAG INJ SC/IM: CPT | Mod: PBBFAC,PO

## 2022-03-09 PROCEDURE — 99214 PR OFFICE/OUTPT VISIT, EST, LEVL IV, 30-39 MIN: ICD-10-PCS | Mod: S$PBB,,, | Performed by: PHYSICIAN ASSISTANT

## 2022-03-09 PROCEDURE — 99999 PR PBB SHADOW E&M-EST. PATIENT-LVL IV: ICD-10-PCS | Mod: PBBFAC,,, | Performed by: PHYSICIAN ASSISTANT

## 2022-03-09 PROCEDURE — 99214 OFFICE O/P EST MOD 30 MIN: CPT | Mod: S$PBB,,, | Performed by: PHYSICIAN ASSISTANT

## 2022-03-09 PROCEDURE — 99214 OFFICE O/P EST MOD 30 MIN: CPT | Mod: PBBFAC,PO | Performed by: PHYSICIAN ASSISTANT

## 2022-03-09 RX ORDER — BETAMETHASONE SODIUM PHOSPHATE AND BETAMETHASONE ACETATE 3; 3 MG/ML; MG/ML
6 INJECTION, SUSPENSION INTRA-ARTICULAR; INTRALESIONAL; INTRAMUSCULAR; SOFT TISSUE ONCE
Status: COMPLETED | OUTPATIENT
Start: 2022-03-09 | End: 2022-03-09

## 2022-03-09 RX ORDER — MONTELUKAST SODIUM 10 MG/1
10 TABLET ORAL NIGHTLY
Qty: 30 TABLET | Refills: 2 | Status: SHIPPED | OUTPATIENT
Start: 2022-03-09 | End: 2022-04-08

## 2022-03-09 RX ORDER — BENZONATATE 200 MG/1
200 CAPSULE ORAL 3 TIMES DAILY PRN
Qty: 30 CAPSULE | Refills: 0 | Status: SHIPPED | OUTPATIENT
Start: 2022-03-09 | End: 2022-03-19

## 2022-03-09 RX ADMIN — BETAMETHASONE ACETATE AND BETAMETHASONE SODIUM PHOSPHATE 6 MG: 3; 3 INJECTION, SUSPENSION INTRA-ARTICULAR; INTRALESIONAL; INTRAMUSCULAR; SOFT TISSUE at 11:03

## 2022-03-09 NOTE — TELEPHONE ENCOUNTER
No new care gaps identified.  Powered by [employer]. Reference number: [id number].   3/09/2022 2:42:12 PM CST

## 2022-03-09 NOTE — PROGRESS NOTES
Subjective:       Patient ID: Veronique Johnson is a 76 y.o. female       Chief Complaint: Cough (congestion)    HPI  Patient's  PCP: Becky Song MD.  The patient's last visit with me was on 3/2/2022  Patient's past medical history includes: Hypertension, COPD, anxiety, MS, Meniere's disease , cerebral artery aneurysm.     The patient is planning right knee arthroscopy with Dr. Mayorga on a on 03/11/2022.  She presents today complaining of a cough and congestion.  She has a history of allergic rhinitis and has not been taking her singulair.  She has started back on Singulair for the past 2 days.     Review of Systems   Constitutional: Negative for activity change, chills and fever.   HENT: Positive for congestion, postnasal drip, rhinorrhea and voice change. Negative for sore throat.    Eyes: Negative for visual disturbance.   Respiratory: Positive for cough. Negative for shortness of breath.    Cardiovascular: Negative for chest pain and palpitations.   Gastrointestinal: Negative for abdominal pain, diarrhea, nausea and vomiting.   Endocrine: Negative for polydipsia and polyuria.   Musculoskeletal: Negative for myalgias.   Skin: Negative for rash.   Neurological: Negative for headaches.   Psychiatric/Behavioral: The patient is not nervous/anxious.         Objective:   Physical Exam  Constitutional:       Appearance: She is well-developed.   HENT:      Head: Normocephalic and atraumatic.      Right Ear: External ear normal.      Left Ear: External ear normal.      Nose: Nose normal.      Mouth/Throat:      Pharynx: Posterior oropharyngeal erythema (in the posterior pharynx from PND) present. No oropharyngeal exudate.   Eyes:      Conjunctiva/sclera: Conjunctivae normal.      Pupils: Pupils are equal, round, and reactive to light.   Neck:      Thyroid: No thyromegaly.   Cardiovascular:      Rate and Rhythm: Normal rate and regular rhythm.      Heart sounds: Normal heart sounds. No murmur heard.    No friction rub.  No gallop.   Pulmonary:      Effort: Pulmonary effort is normal. No respiratory distress.      Breath sounds: Normal breath sounds. No wheezing or rales.   Musculoskeletal:         General: Normal range of motion.      Cervical back: Normal range of motion and neck supple.   Lymphadenopathy:      Cervical: No cervical adenopathy.   Skin:     General: Skin is warm and dry.      Findings: No rash.   Neurological:      Mental Status: She is alert and oriented to person, place, and time.      Deep Tendon Reflexes: Reflexes are normal and symmetric.   Psychiatric:         Behavior: Behavior normal.         Thought Content: Thought content normal.         Judgment: Judgment normal.          Assessment:       1. Seasonal allergic rhinitis, unspecified trigger  betamethasone acetate-betamethasone sodium phosphate injection 6 mg    montelukast (SINGULAIR) 10 mg tablet    benzonatate (TESSALON) 200 MG capsule        Plan:       Seasonal allergic rhinitis, unspecified trigger  -     betamethasone acetate-betamethasone sodium phosphate injection 6 mg  -     montelukast (SINGULAIR) 10 mg tablet; Take 1 tablet (10 mg total) by mouth every evening.  Dispense: 30 tablet; Refill: 2  -     benzonatate (TESSALON) 200 MG capsule; Take 1 capsule (200 mg total) by mouth 3 (three) times daily as needed for Cough.  Dispense: 30 capsule; Refill: 0         No follow-ups on file.       Marifer Guerra PA-C   03/09/2022   11:09 AM

## 2022-03-10 ENCOUNTER — TELEPHONE (OUTPATIENT)
Dept: ORTHOPEDICS | Facility: CLINIC | Age: 77
End: 2022-03-10
Payer: MEDICARE

## 2022-03-10 DIAGNOSIS — Z01.818 PRE-OP TESTING: Primary | ICD-10-CM

## 2022-03-10 RX ORDER — ALPRAZOLAM 0.25 MG/1
0.25 TABLET ORAL 2 TIMES DAILY PRN
Qty: 60 TABLET | Refills: 2 | OUTPATIENT
Start: 2022-03-10 | End: 2022-06-08

## 2022-03-10 NOTE — TELEPHONE ENCOUNTER
Called and informed pt of what was going on she understood and lalita that she will move her surgery to April 8th in [place]. Informed surgery to move her surgery to that date.

## 2022-03-10 NOTE — OR NURSING
Spoke with patient. Patient is having respiratory symptoms including cough, runny nose, and congestion. Patient states she is currently on breathing treatments. Spoke with anesthesia regarding this patient. Per Dr. Berry, patient should be rescheduled once her symptoms have resolved. Message sent to Dr. Le's office to reach out to patient to reschedule procedure when she is no longer having respiratory symptoms.

## 2022-03-10 NOTE — TELEPHONE ENCOUNTER
----- Message from Jaimie Roberson RN sent at 3/10/2022  9:55 AM CST -----  Spoke with patient. Patient is having respiratory symptoms including cough, runny nose, and congestion. She states she is currently using breathing treatments prescribed to her yesterday by her primary care provider. Spoke with anesthesia regarding this patient and her symptoms. Per Dr. Berry, patient should be rescheduled once her symptoms have resolved. Please reach out to patient to reschedule procedure when she is no longer having respiratory symptoms. Thank you!

## 2022-03-13 ENCOUNTER — PATIENT MESSAGE (OUTPATIENT)
Dept: FAMILY MEDICINE | Facility: CLINIC | Age: 77
End: 2022-03-13
Payer: MEDICARE

## 2022-03-15 ENCOUNTER — OFFICE VISIT (OUTPATIENT)
Dept: FAMILY MEDICINE | Facility: CLINIC | Age: 77
End: 2022-03-15
Payer: MEDICARE

## 2022-03-15 ENCOUNTER — PATIENT MESSAGE (OUTPATIENT)
Dept: FAMILY MEDICINE | Facility: CLINIC | Age: 77
End: 2022-03-15

## 2022-03-15 DIAGNOSIS — M85.89 OSTEOPENIA OF MULTIPLE SITES: ICD-10-CM

## 2022-03-15 DIAGNOSIS — I10 ESSENTIAL HYPERTENSION: ICD-10-CM

## 2022-03-15 DIAGNOSIS — J44.1 COPD EXACERBATION: Primary | ICD-10-CM

## 2022-03-15 PROCEDURE — 99214 PR OFFICE/OUTPT VISIT, EST, LEVL IV, 30-39 MIN: ICD-10-PCS | Mod: 95,,, | Performed by: FAMILY MEDICINE

## 2022-03-15 PROCEDURE — 99214 OFFICE O/P EST MOD 30 MIN: CPT | Mod: 95,,, | Performed by: FAMILY MEDICINE

## 2022-03-15 RX ORDER — DOXYCYCLINE 100 MG/1
100 CAPSULE ORAL EVERY 12 HOURS
Qty: 20 CAPSULE | Refills: 0 | Status: SHIPPED | OUTPATIENT
Start: 2022-03-15 | End: 2022-03-25

## 2022-03-15 RX ORDER — FLUTICASONE FUROATE AND VILANTEROL 100; 25 UG/1; UG/1
1 POWDER RESPIRATORY (INHALATION) DAILY
Qty: 90 EACH | Refills: 1 | Status: SHIPPED | OUTPATIENT
Start: 2022-03-15 | End: 2024-03-27

## 2022-03-15 RX ORDER — PREDNISONE 20 MG/1
20 TABLET ORAL DAILY
Qty: 7 TABLET | Refills: 0 | Status: SHIPPED | OUTPATIENT
Start: 2022-03-15 | End: 2022-04-27

## 2022-03-15 NOTE — PROGRESS NOTES
Subjective:       Patient ID: Veronique Johnson is a 76 y.o. female.    Chief Complaint:  Cough    HPI     The patient location is:  [place]  The chief complaint leading to consultation is:  Cough    Visit type:  Audiovisual    Face to Face time with patient: 12 minutes  Twelve minutes of total time spent on the encounter, which includes face to face time and non-face to face time preparing to see the patient (eg, review of tests), Obtaining and/or reviewing separately obtained history, Documenting clinical information in the electronic or other health record, Independently interpreting results (not separately reported) and communicating results to the patient/family/caregiver, or Care coordination (not separately reported).         Each patient to whom he or she provides medical services by telemedicine is:  (1) informed of the relationship between the physician and patient and the respective role of any other health care provider with respect to management of the patient; and (2) notified that he or she may decline to receive medical services by telemedicine and may withdraw from such care at any time.    Notes:     Cough:  The patient complains of shortness of breath, cough with worsening amount of sputum which is white and the patient stated that she is choking on, the patient stated that she received a cortisone injection in the office but did not help, the patient surgery was canceled because she was still sick.  The patient has COPD, currently not smoking any cigarettes.    Hypertension:  The patient has been taking her blood pressure medicines as directed, denies any side effects of the medication.    Osteopenia:  The patient was placed on Fosamax but stated that she cannot take this medicine because she had problems with the esophagus.    Past medical history, past social history was reviewed and discussed with the patient.    Review of Systems   Constitutional: Negative for activity change, appetite  change, chills and fever.   HENT: Positive for postnasal drip. Negative for congestion, ear discharge, ear pain, rhinorrhea and sore throat.    Eyes: Negative for discharge and itching.   Respiratory: Positive for cough, shortness of breath and wheezing. Negative for choking and chest tightness.    Cardiovascular: Negative for chest pain, palpitations and leg swelling.   Gastrointestinal: Negative for abdominal distention, abdominal pain and constipation.   Endocrine: Negative for cold intolerance and heat intolerance.   Genitourinary: Negative for dysuria and flank pain.   Musculoskeletal: Positive for myalgias. Negative for arthralgias and back pain.   Skin: Negative for pallor and rash.   Allergic/Immunologic: Negative for environmental allergies and food allergies.   Neurological: Positive for headaches. Negative for dizziness and facial asymmetry.   Hematological: Negative for adenopathy. Does not bruise/bleed easily.   Psychiatric/Behavioral: Negative for agitation, confusion, decreased concentration and sleep disturbance.       Objective:      Physical Exam  Constitutional:       Appearance: Normal appearance. She is normal weight.   HENT:      Head: Normocephalic and atraumatic.   Neurological:      Mental Status: She is alert.   Psychiatric:         Mood and Affect: Mood normal.         Behavior: Behavior normal.         Thought Content: Thought content normal.         Judgment: Judgment normal.         Assessment:       1. COPD exacerbation    2. Osteopenia of multiple sites    3. Essential hypertension        Plan:       COPD exacerbation:  New problem workup needed  -     predniSONE (DELTASONE) 20 MG tablet; Take 1 tablet (20 mg total) by mouth once daily.  Dispense: 7 tablet; Refill: 0  -     doxycycline (MONODOX) 100 MG capsule; Take 1 capsule (100 mg total) by mouth every 12 (twelve) hours. for 10 days  Dispense: 20 capsule; Refill: 0  -     fluticasone furoate-vilanteroL (BREO) 100-25 mcg/dose diskus  inhaler; Inhale 1 puff into the lungs once daily. Controller  Dispense: 90 each; Refill: 1    Osteopenia of multiple sites:  Stable    Essential hypertension:  Stable      Prednisone, doxycycline, was recommended breathing treatments 4 times daily, if the symptoms get worse, will order chest x-ray.  The patient was advised to start taking vitamin C daily.  She also takes Mucinex over-the-counter.   Patient agreed with assessment and plan. Patient verbalized understanding.

## 2022-03-16 VITALS — HEART RATE: 70 BPM | RESPIRATION RATE: 18 BRPM | DIASTOLIC BLOOD PRESSURE: 70 MMHG | SYSTOLIC BLOOD PRESSURE: 127 MMHG

## 2022-03-22 ENCOUNTER — PATIENT MESSAGE (OUTPATIENT)
Dept: FAMILY MEDICINE | Facility: CLINIC | Age: 77
End: 2022-03-22
Payer: MEDICARE

## 2022-03-22 DIAGNOSIS — J44.9 CHRONIC OBSTRUCTIVE PULMONARY DISEASE, UNSPECIFIED COPD TYPE: Primary | ICD-10-CM

## 2022-03-23 ENCOUNTER — HOSPITAL ENCOUNTER (OUTPATIENT)
Dept: RADIOLOGY | Facility: HOSPITAL | Age: 77
Discharge: HOME | End: 2022-03-23
Attending: FAMILY MEDICINE
Payer: MEDICARE

## 2022-03-23 ENCOUNTER — PATIENT MESSAGE (OUTPATIENT)
Dept: FAMILY MEDICINE | Facility: CLINIC | Age: 77
End: 2022-03-23
Payer: MEDICARE

## 2022-03-23 DIAGNOSIS — J44.9 CHRONIC OBSTRUCTIVE PULMONARY DISEASE, UNSPECIFIED COPD TYPE: ICD-10-CM

## 2022-03-23 PROCEDURE — 71046 X-RAY EXAM CHEST 2 VIEWS: CPT | Mod: 26,,, | Performed by: RADIOLOGY

## 2022-03-23 PROCEDURE — 71046 XR CHEST PA AND LATERAL: ICD-10-PCS | Mod: 26,,, | Performed by: RADIOLOGY

## 2022-03-23 PROCEDURE — 71046 X-RAY EXAM CHEST 2 VIEWS: CPT | Mod: TC,FY,PO

## 2022-03-29 ENCOUNTER — PATIENT MESSAGE (OUTPATIENT)
Dept: ORTHOPEDICS | Facility: CLINIC | Age: 77
End: 2022-03-29
Payer: MEDICARE

## 2022-03-30 DIAGNOSIS — F41.9 ANXIETY: ICD-10-CM

## 2022-03-30 NOTE — TELEPHONE ENCOUNTER
No new care gaps identified.  Powered by [employer]. Reference number: [id number].   3/30/2022 3:26:21 PM CDT

## 2022-03-31 ENCOUNTER — EXTERNAL CHRONIC CARE MANAGEMENT (OUTPATIENT)
Dept: PRIMARY CARE CLINIC | Facility: CLINIC | Age: 77
End: 2022-03-31
Payer: MEDICARE

## 2022-03-31 PROCEDURE — 99490 CHRNC CARE MGMT STAFF 1ST 20: CPT | Mod: PBBFAC,PO | Performed by: FAMILY MEDICINE

## 2022-03-31 PROCEDURE — 99490 PR CHRONIC CARE MGMT, 1ST 20 MIN: ICD-10-PCS | Mod: S$PBB,,, | Performed by: FAMILY MEDICINE

## 2022-03-31 PROCEDURE — 99490 CHRNC CARE MGMT STAFF 1ST 20: CPT | Mod: S$PBB,,, | Performed by: FAMILY MEDICINE

## 2022-03-31 RX ORDER — ALPRAZOLAM 0.25 MG/1
0.25 TABLET ORAL 2 TIMES DAILY PRN
Qty: 60 TABLET | Refills: 2 | Status: SHIPPED | OUTPATIENT
Start: 2022-03-31 | End: 2022-05-25

## 2022-04-05 ENCOUNTER — LAB VISIT (OUTPATIENT)
Dept: FAMILY MEDICINE | Facility: CLINIC | Age: 77
End: 2022-04-05
Payer: MEDICARE

## 2022-04-05 DIAGNOSIS — Z01.818 PRE-OP TESTING: ICD-10-CM

## 2022-04-05 LAB
SARS-COV-2 RNA RESP QL NAA+PROBE: NOT DETECTED
SARS-COV-2- CYCLE NUMBER: NORMAL

## 2022-04-05 PROCEDURE — U0003 INFECTIOUS AGENT DETECTION BY NUCLEIC ACID (DNA OR RNA); SEVERE ACUTE RESPIRATORY SYNDROME CORONAVIRUS 2 (SARS-COV-2) (CORONAVIRUS DISEASE [COVID-19]), AMPLIFIED PROBE TECHNIQUE, MAKING USE OF HIGH THROUGHPUT TECHNOLOGIES AS DESCRIBED BY CMS-2020-01-R: HCPCS | Performed by: ORTHOPAEDIC SURGERY

## 2022-04-05 PROCEDURE — U0005 INFEC AGEN DETEC AMPLI PROBE: HCPCS | Performed by: ORTHOPAEDIC SURGERY

## 2022-04-08 ENCOUNTER — ANESTHESIA (OUTPATIENT)
Dept: SURGERY | Facility: HOSPITAL | Age: 77
End: 2022-04-08
Payer: MEDICARE

## 2022-04-08 ENCOUNTER — HOSPITAL ENCOUNTER (OUTPATIENT)
Facility: HOSPITAL | Age: 77
Discharge: HOME | End: 2022-04-08
Attending: ORTHOPAEDIC SURGERY | Admitting: ORTHOPAEDIC SURGERY
Payer: MEDICARE

## 2022-04-08 ENCOUNTER — ANESTHESIA EVENT (OUTPATIENT)
Dept: SURGERY | Facility: HOSPITAL | Age: 77
End: 2022-04-08
Payer: MEDICARE

## 2022-04-08 DIAGNOSIS — Z01.818 PRE-OP TESTING: ICD-10-CM

## 2022-04-08 DIAGNOSIS — S83.241D ACUTE MEDIAL MENISCAL TEAR, RIGHT, SUBSEQUENT ENCOUNTER: ICD-10-CM

## 2022-04-08 DIAGNOSIS — S83.241A ACUTE MEDIAL MENISCAL TEAR, RIGHT, INITIAL ENCOUNTER: ICD-10-CM

## 2022-04-08 PROCEDURE — 37000008 HC ANESTHESIA 1ST 15 MINUTES: Mod: PO | Performed by: ORTHOPAEDIC SURGERY

## 2022-04-08 PROCEDURE — 37000009 HC ANESTHESIA EA ADD 15 MINS: Mod: PO | Performed by: ORTHOPAEDIC SURGERY

## 2022-04-08 PROCEDURE — 29881 PR KNEE SCOPE SINGLE MENISECECTOMY: ICD-10-PCS | Mod: RT,,, | Performed by: ORTHOPAEDIC SURGERY

## 2022-04-08 PROCEDURE — 27201423 OPTIME MED/SURG SUP & DEVICES STERILE SUPPLY: Mod: PO | Performed by: ORTHOPAEDIC SURGERY

## 2022-04-08 PROCEDURE — 36000710: Mod: PO | Performed by: ORTHOPAEDIC SURGERY

## 2022-04-08 PROCEDURE — D9220A PRA ANESTHESIA: ICD-10-PCS | Mod: CRNA,,, | Performed by: NURSE ANESTHETIST, CERTIFIED REGISTERED

## 2022-04-08 PROCEDURE — 29881 ARTHRS KNE SRG MNISECTMY M/L: CPT | Mod: RT,,, | Performed by: ORTHOPAEDIC SURGERY

## 2022-04-08 PROCEDURE — 25000003 PHARM REV CODE 250: Mod: PO | Performed by: ORTHOPAEDIC SURGERY

## 2022-04-08 PROCEDURE — 27200651 HC AIRWAY, LMA: Mod: PO | Performed by: ANESTHESIOLOGY

## 2022-04-08 PROCEDURE — D9220A PRA ANESTHESIA: Mod: CRNA,,, | Performed by: NURSE ANESTHETIST, CERTIFIED REGISTERED

## 2022-04-08 PROCEDURE — 25000003 PHARM REV CODE 250: Mod: PO | Performed by: ANESTHESIOLOGY

## 2022-04-08 PROCEDURE — D9220A PRA ANESTHESIA: Mod: ANES,,, | Performed by: ANESTHESIOLOGY

## 2022-04-08 PROCEDURE — 71000033 HC RECOVERY, INTIAL HOUR: Mod: PO | Performed by: ORTHOPAEDIC SURGERY

## 2022-04-08 PROCEDURE — 63600175 PHARM REV CODE 636 W HCPCS: Mod: PO | Performed by: NURSE ANESTHETIST, CERTIFIED REGISTERED

## 2022-04-08 PROCEDURE — 36000711: Mod: PO | Performed by: ORTHOPAEDIC SURGERY

## 2022-04-08 PROCEDURE — D9220A PRA ANESTHESIA: ICD-10-PCS | Mod: ANES,,, | Performed by: ANESTHESIOLOGY

## 2022-04-08 PROCEDURE — 25000003 PHARM REV CODE 250: Mod: PO | Performed by: NURSE ANESTHETIST, CERTIFIED REGISTERED

## 2022-04-08 RX ORDER — ONDANSETRON 2 MG/ML
INJECTION INTRAMUSCULAR; INTRAVENOUS
Status: DISCONTINUED | OUTPATIENT
Start: 2022-04-08 | End: 2022-04-08

## 2022-04-08 RX ORDER — CLINDAMYCIN PHOSPHATE 900 MG/50ML
900 INJECTION, SOLUTION INTRAVENOUS
Status: COMPLETED | OUTPATIENT
Start: 2022-04-08 | End: 2022-04-08

## 2022-04-08 RX ORDER — HYDROCODONE BITARTRATE AND ACETAMINOPHEN 5; 325 MG/1; MG/1
1 TABLET ORAL EVERY 4 HOURS PRN
Status: DISCONTINUED | OUTPATIENT
Start: 2022-04-08 | End: 2022-04-08 | Stop reason: HOSPADM

## 2022-04-08 RX ORDER — MIDAZOLAM HYDROCHLORIDE 1 MG/ML
INJECTION INTRAMUSCULAR; INTRAVENOUS
Status: DISCONTINUED | OUTPATIENT
Start: 2022-04-08 | End: 2022-04-08

## 2022-04-08 RX ORDER — FENTANYL CITRATE 50 UG/ML
INJECTION, SOLUTION INTRAMUSCULAR; INTRAVENOUS
Status: DISCONTINUED | OUTPATIENT
Start: 2022-04-08 | End: 2022-04-08

## 2022-04-08 RX ORDER — PROPOFOL 10 MG/ML
VIAL (ML) INTRAVENOUS
Status: DISCONTINUED | OUTPATIENT
Start: 2022-04-08 | End: 2022-04-08

## 2022-04-08 RX ORDER — OXYCODONE HYDROCHLORIDE 5 MG/1
5 TABLET ORAL ONCE
Status: COMPLETED | OUTPATIENT
Start: 2022-04-08 | End: 2022-04-08

## 2022-04-08 RX ORDER — KETOROLAC TROMETHAMINE 30 MG/ML
INJECTION, SOLUTION INTRAMUSCULAR; INTRAVENOUS
Status: DISCONTINUED | OUTPATIENT
Start: 2022-04-08 | End: 2022-04-08

## 2022-04-08 RX ORDER — ONDANSETRON 4 MG/1
4 TABLET, ORALLY DISINTEGRATING ORAL EVERY 8 HOURS PRN
Qty: 30 TABLET | Refills: 0 | Status: SHIPPED | OUTPATIENT
Start: 2022-04-08 | End: 2022-06-01

## 2022-04-08 RX ORDER — IBUPROFEN 600 MG/1
600 TABLET ORAL EVERY 6 HOURS PRN
Qty: 30 TABLET | Refills: 0 | Status: SHIPPED | OUTPATIENT
Start: 2022-04-08 | End: 2022-06-01

## 2022-04-08 RX ORDER — DEXAMETHASONE SODIUM PHOSPHATE 4 MG/ML
INJECTION, SOLUTION INTRA-ARTICULAR; INTRALESIONAL; INTRAMUSCULAR; INTRAVENOUS; SOFT TISSUE
Status: DISCONTINUED | OUTPATIENT
Start: 2022-04-08 | End: 2022-04-08

## 2022-04-08 RX ORDER — BUPIVACAINE HYDROCHLORIDE AND EPINEPHRINE 2.5; 5 MG/ML; UG/ML
INJECTION, SOLUTION EPIDURAL; INFILTRATION; INTRACAUDAL; PERINEURAL
Status: DISCONTINUED | OUTPATIENT
Start: 2022-04-08 | End: 2022-04-08 | Stop reason: HOSPADM

## 2022-04-08 RX ORDER — OXYCODONE AND ACETAMINOPHEN 5; 325 MG/1; MG/1
1 TABLET ORAL EVERY 6 HOURS PRN
Qty: 15 TABLET | Refills: 0 | Status: SHIPPED | OUTPATIENT
Start: 2022-04-08 | End: 2022-04-21 | Stop reason: SDUPTHER

## 2022-04-08 RX ORDER — LIDOCAINE HCL/PF 100 MG/5ML
SYRINGE (ML) INTRAVENOUS
Status: DISCONTINUED | OUTPATIENT
Start: 2022-04-08 | End: 2022-04-08

## 2022-04-08 RX ORDER — CYCLOBENZAPRINE HCL 10 MG
10 TABLET ORAL 3 TIMES DAILY PRN
Qty: 30 TABLET | Refills: 0 | Status: SHIPPED | OUTPATIENT
Start: 2022-04-08 | End: 2022-06-01

## 2022-04-08 RX ORDER — ACETAMINOPHEN 10 MG/ML
INJECTION, SOLUTION INTRAVENOUS
Status: DISCONTINUED | OUTPATIENT
Start: 2022-04-08 | End: 2022-04-08

## 2022-04-08 RX ADMIN — OXYCODONE 5 MG: 5 TABLET ORAL at 01:04

## 2022-04-08 RX ADMIN — MIDAZOLAM HYDROCHLORIDE 2 MG: 1 INJECTION, SOLUTION INTRAMUSCULAR; INTRAVENOUS at 12:04

## 2022-04-08 RX ADMIN — PROPOFOL 150 MG: 10 INJECTION, EMULSION INTRAVENOUS at 12:04

## 2022-04-08 RX ADMIN — LIDOCAINE HYDROCHLORIDE 75 MG: 20 INJECTION, SOLUTION INTRAVENOUS at 12:04

## 2022-04-08 RX ADMIN — SODIUM CHLORIDE, SODIUM LACTATE, POTASSIUM CHLORIDE, AND CALCIUM CHLORIDE: .6; .31; .03; .02 INJECTION, SOLUTION INTRAVENOUS at 11:04

## 2022-04-08 RX ADMIN — CLINDAMYCIN IN 5 PERCENT DEXTROSE 900 MG: 18 INJECTION, SOLUTION INTRAVENOUS at 11:04

## 2022-04-08 RX ADMIN — SODIUM CHLORIDE, SODIUM LACTATE, POTASSIUM CHLORIDE, AND CALCIUM CHLORIDE: .6; .31; .03; .02 INJECTION, SOLUTION INTRAVENOUS at 01:04

## 2022-04-08 RX ADMIN — ACETAMINOPHEN 1000 MG: 10 INJECTION, SOLUTION INTRAVENOUS at 01:04

## 2022-04-08 RX ADMIN — DEXAMETHASONE SODIUM PHOSPHATE 8 MG: 4 INJECTION, SOLUTION INTRAMUSCULAR; INTRAVENOUS at 01:04

## 2022-04-08 RX ADMIN — FENTANYL CITRATE 25 MCG: 50 INJECTION, SOLUTION INTRAMUSCULAR; INTRAVENOUS at 01:04

## 2022-04-08 RX ADMIN — KETOROLAC TROMETHAMINE 15 MG: 30 INJECTION, SOLUTION INTRAMUSCULAR at 01:04

## 2022-04-08 RX ADMIN — FENTANYL CITRATE 50 MCG: 50 INJECTION, SOLUTION INTRAMUSCULAR; INTRAVENOUS at 12:04

## 2022-04-08 RX ADMIN — ONDANSETRON 4 MG: 2 INJECTION INTRAMUSCULAR; INTRAVENOUS at 01:04

## 2022-04-08 NOTE — ANESTHESIA PREPROCEDURE EVALUATION
04/08/2022  Veronique Johnson is a 76 y.o., female.      Pre-op Assessment    I have reviewed the NPO Status.   I have reviewed the Medications.     Review of Systems  Anesthesia Hx:  No problems with previous Anesthesia    Social:  Non-Smoker    Cardiovascular:   Hypertension CAD   Angina    Pulmonary:   COPD    Hepatic/GI:   GERD, well controlled    Neurological:   Neuromuscular Disease, Headaches Multiple sclerosis   Endocrine:  Endocrine Normal        Physical Exam  General: Well nourished    Airway:  Mallampati: II   Mouth Opening: Normal  TM Distance: Normal  Tongue: Normal  Neck ROM: Normal ROM    Dental:  Intact    Chest/Lungs:  Clear to auscultation, Normal Respiratory Rate        Anesthesia Plan  Type of Anesthesia, risks & benefits discussed:    Anesthesia Type: Gen Supraglottic Airway  Intra-op Monitoring Plan: Standard ASA Monitors  Post Op Pain Control Plan: multimodal analgesia and IV/PO Opioids PRN  Induction:  IV  Airway Plan: Direct  Informed Consent: Informed consent signed with the Patient and all parties understand the risks and agree with anesthesia plan.  All questions answered. Patient consented to blood products? No  ASA Score: 3    Ready For Surgery From Anesthesia Perspective.     .

## 2022-04-08 NOTE — PATIENT INSTRUCTIONS
1.Diet: Ice chips, clear liquids, and then diet as tolerated. Drink plenty of liquids.  2.Ice the area at least three times a day (20 minutes per session).  3.Elevate the extremity above the level of the heart to help reduce swelling.  4.Pain medication can be taken every four to six hours as needed. It is helpful to take pain medication prior to physical therapy.  Use Tylenol or anti-inflammatories for pain as much as possible.  Pain medication is for pain not responsive to over-the-counter medications.  5.Any activity that requires precise thinking or accuracy should be avoided for a minimum of 72 hours after surgery and while on narcotic pain medication. This includes operating machinery and/or driving a vehicle.  6.All sutures/staples will be removed approximately 14 days from the time of surgery. Leave steri-strips (skin tapes) in place until sutures are removed.  7. If skin glue is used instead of stitches, do not apply ointments or solutions to the incision. Keep the incision dry. The skin glue will peel off in 3-4 weeks.  8. Change dressing on the first post-op day. Use gauze for the first 3 days, then start using Band-Aids over the incision sites.   9. All casts, splints, braces, slings, crutches, abduction pillows, etc... Are to be worn as instructed. Crutches are just to be used as needed. If not needed for soreness or balance, may weight bear as tolerated without the crutches.  10. Keep the incision dry for 10-14 days. A waterproof dressing (purchase at [employer], etc) can be used to shower. No bath, pool, hot tub until instructed.  11. Call [phone] with any questions or concerns.

## 2022-04-08 NOTE — DISCHARGE INSTRUCTIONS
KNEE ARTHROSCOPY    DOs   Keep leg elevated while at rest until return appointment.   Use crutches as needed, if at all.   Apply ice to knee next 2 days for 20 min intervals.  Remove ace 2 days after surgery, then shower, pat dry and place band aids and rewrap knee with ace bandage.   Check circulation frequently in toes by pressing down on nail.  Nail should turn white then pink. NOT bluish gray      DON'T   Do not soak in tub.   Do not take additional tylenol while taking narcotic pain medicine. Take one or the other.       CALL PHYSICIAN FOR:   Bleeding or signs of infection (redness, swelling, draining pus or warm to touch)   Fever greater than 101.   Persistent pain not relieved by pain medication.    RETURN APPOINTMENT AS INSTRUCTED  CALL [phone] for doctor.
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
## 2022-04-08 NOTE — DISCHARGE SUMMARY
[facility] - Surgery  Discharge Note  Short Stay    Procedure(s) (LRB):  ARTHROSCOPY, KNEE, WITH MENISCECTOMY (Right)    OUTCOME: Patient tolerated treatment/procedure well without complication and is now ready for discharge.    DISPOSITION: Home or Self Care    FINAL DIAGNOSIS:  <principal problem not specified>    FOLLOWUP: In clinic    DISCHARGE INSTRUCTIONS:    Discharge Procedure Orders   Diet general     Keep surgical extremity elevated     Ice to affected area   Order Comments: using barrier between ice and skin (specify duration&frequency)     Remove dressing in 24 hours     Change dressing (specify)   Order Comments: Dressing change: as needed with waterproof bandaids     Call MD for:  temperature >100.4     Call MD for:  persistent nausea and vomiting     Call MD for:  severe uncontrolled pain     Call MD for:  difficulty breathing, headache or visual disturbances     Call MD for:  redness, tenderness, or signs of infection (pain, swelling, redness, odor or green/yellow discharge around incision site)     Call MD for:  hives     Call MD for:  persistent dizziness or light-headedness     Call MD for:  extreme fatigue     Activity as tolerated     Shower on day dressing removed (No bath)        TIME SPENT ON DISCHARGE: 5 minutes

## 2022-04-08 NOTE — ANESTHESIA POSTPROCEDURE EVALUATION
Anesthesia Post Evaluation    Patient: Veronique Johnson    Procedure(s) Performed: Procedure(s) (LRB):  ARTHROSCOPY, KNEE, WITH MENISCECTOMY (Right)    Final Anesthesia Type: general      Patient location during evaluation: PACU  Patient participation: Yes- Able to Participate  Level of consciousness: awake and alert and oriented  Post-procedure vital signs: reviewed and stable  Pain management: adequate  Airway patency: patent  HELEN mitigation strategies: Multimodal analgesia and Extubation while patient is awake  PONV status at discharge: No PONV  Anesthetic complications: no      Cardiovascular status: blood pressure returned to baseline  Respiratory status: unassisted, spontaneous ventilation and room air  Hydration status: euvolemic  Follow-up not needed.          Vitals Value Taken Time   /73 04/08/22 1345   Temp 36.5 04/08/22 1348   Pulse 79 04/08/22 1345   Resp 15 04/08/22 1345   SpO2 95 % 04/08/22 1345         No case tracking events are documented in the log.      Pain/Haylie Score: Haylie Score: 10 (4/8/2022  1:37 PM)

## 2022-04-11 VITALS
HEIGHT: 61 IN | HEART RATE: 78 BPM | TEMPERATURE: 98 F | BODY MASS INDEX: 24.17 KG/M2 | OXYGEN SATURATION: 96 % | DIASTOLIC BLOOD PRESSURE: 76 MMHG | RESPIRATION RATE: 14 BRPM | WEIGHT: 128 LBS | SYSTOLIC BLOOD PRESSURE: 160 MMHG

## 2022-04-18 ENCOUNTER — PES CALL (OUTPATIENT)
Dept: ADMINISTRATIVE | Facility: CLINIC | Age: 77
End: 2022-04-18
Payer: MEDICARE

## 2022-04-21 DIAGNOSIS — S83.241D ACUTE MEDIAL MENISCAL TEAR, RIGHT, SUBSEQUENT ENCOUNTER: Primary | ICD-10-CM

## 2022-04-21 RX ORDER — OXYCODONE AND ACETAMINOPHEN 5; 325 MG/1; MG/1
1 TABLET ORAL EVERY 6 HOURS PRN
Qty: 28 TABLET | Refills: 0 | Status: SHIPPED | OUTPATIENT
Start: 2022-04-21 | End: 2022-06-01

## 2022-04-21 NOTE — TELEPHONE ENCOUNTER
----- Message from Wesly Contreras sent at 4/21/2022  8:03 AM CDT -----  Regarding: post Sx questions, call pt   Contact: pt   post Sx questions, call pt

## 2022-04-27 ENCOUNTER — OFFICE VISIT (OUTPATIENT)
Dept: ORTHOPEDICS | Facility: CLINIC | Age: 77
End: 2022-04-27
Payer: MEDICARE

## 2022-04-27 VITALS — WEIGHT: 128 LBS | BODY MASS INDEX: 24.17 KG/M2 | RESPIRATION RATE: 18 BRPM | HEIGHT: 61 IN

## 2022-04-27 DIAGNOSIS — M17.10 ARTHRITIS OF KNEE: Primary | ICD-10-CM

## 2022-04-27 DIAGNOSIS — S83.241D ACUTE MEDIAL MENISCAL TEAR, RIGHT, SUBSEQUENT ENCOUNTER: ICD-10-CM

## 2022-04-27 PROCEDURE — 99213 OFFICE O/P EST LOW 20 MIN: CPT | Mod: PBBFAC,PN | Performed by: ORTHOPAEDIC SURGERY

## 2022-04-27 PROCEDURE — 99999 PR PBB SHADOW E&M-EST. PATIENT-LVL III: CPT | Mod: PBBFAC,,, | Performed by: ORTHOPAEDIC SURGERY

## 2022-04-27 PROCEDURE — 99024 POSTOP FOLLOW-UP VISIT: CPT | Mod: POP,,, | Performed by: ORTHOPAEDIC SURGERY

## 2022-04-27 PROCEDURE — 99999 PR PBB SHADOW E&M-EST. PATIENT-LVL III: ICD-10-PCS | Mod: PBBFAC,,, | Performed by: ORTHOPAEDIC SURGERY

## 2022-04-27 PROCEDURE — 99024 PR POST-OP FOLLOW-UP VISIT: ICD-10-PCS | Mod: POP,,, | Performed by: ORTHOPAEDIC SURGERY

## 2022-04-27 NOTE — PROGRESS NOTES
Past Medical History:   Diagnosis Date    Anemia     Anxiety     Chronic bronchitis with COPD (chronic obstructive pulmonary disease)     Esophageal spasm     Essential tremor     GERD (gastroesophageal reflux disease)     Headache     High cholesterol     Hypertension     Meniere disease     Multiple sclerosis     Muscle spasm     Pancreatic insufficiency        Past Surgical History:   Procedure Laterality Date    CATARACT EXTRACTION, BILATERAL  2006    COLONOSCOPY  09/05/2018    Dr. Leija in procedures: diverticulosis, 4 colon polyps removed, adenomatous polyps x3 & benign mucosal polyps    COLONOSCOPY N/A 4/22/2021    Procedure: COLONOSCOPY;  Surgeon: Dwayne Santoyo MD;  Location: [facility] ENDO;  Service: Endoscopy;  Laterality: N/A;    CORONARY ANGIOGRAPHY N/A 10/14/2021    Procedure: ANGIOGRAM, CORONARY ARTERY;  Surgeon: Mustapha Manzo MD;  Location: [facility];  Service: Cardiology;  Laterality: N/A;    CYSTOSCOPY WITH HYDRODISTENSION OF BLADDER N/A 6/5/2019    Procedure: CYSTOSCOPY, WITH BLADDER HYDRODISTENSION;  Surgeon: Marko Burton MD;  [facility];  Service: OB/GYN;  Laterality: N/A;    DILATION AND CURETTAGE OF UTERUS  1966    ESOPHAGOGASTRODUODENOSCOPY  09/05/2018    kala Burr procedures: gastritis; biopsy: duodenum unremarkable, with focal benign gastric metaplasia, stomach- minimal chronic gastritis, negative for h pylori    ESOPHAGOGASTRODUODENOSCOPY N/A 4/22/2021    Procedure: EGD (ESOPHAGOGASTRODUODENOSCOPY);  Surgeon: Dwayne Santoyo MD;  Location: [facility];  Service: Endoscopy;  Laterality: N/A;    EYE SURGERY      HEMORRHOID SURGERY  1997    KNEE ARTHROSCOPY W/ MENISCECTOMY Right 8/13/2021    Procedure: ARTHROSCOPY, KNEE, WITH MENISCECTOMY;  Surgeon: Shelton Le MD;  Location: [facility];  Service: Orthopedics;  Laterality: Right;    KNEE ARTHROSCOPY W/ MENISCECTOMY Right 4/8/2022    Procedure: ARTHROSCOPY, KNEE, WITH MENISCECTOMY;  Surgeon:  Shelton Le MD;  Location: [facility] OR;  Service: Orthopedics;  Laterality: Right;  medial meniscectomy    LEFT HEART CATHETERIZATION Right 10/14/2021    Procedure: Left heart cath;  Surgeon: Mustapha Manzo MD;  Location: [facility] CATH;  Service: Cardiology;  Laterality: Right;    TONSILLECTOMY  1954    TUBAL LIGATION  1990       Current Outpatient Medications   Medication Sig    albuterol (PROVENTIL/VENTOLIN HFA) 90 mcg/actuation inhaler Inhale 2 puffs into the lungs 3 (three) times daily. Rescue    ALPRAZolam (XANAX) 0.25 MG tablet Take 1 tablet (0.25 mg total) by mouth 2 (two) times daily as needed for Anxiety.    amLODIPine (NORVASC) 5 MG tablet TAKE 1 TABLET (5 MG TOTAL) BY MOUTH ONCE DAILY.    buPROPion (WELLBUTRIN XL) 300 MG 24 hr tablet Take 1 tablet (300 mg total) by mouth once daily.    cyanocobalamin 500 MCG tablet Take 500 mcg by mouth once daily.    cyclobenzaprine (FLEXERIL) 10 MG tablet Take 1 tablet (10 mg total) by mouth 3 (three) times daily as needed for Muscle spasms.    fluticasone furoate-vilanteroL (BREO) 100-25 mcg/dose diskus inhaler Inhale 1 puff into the lungs once daily. Controller    ibuprofen (ADVIL,MOTRIN) 600 MG tablet Take 1 tablet (600 mg total) by mouth every 6 (six) hours as needed for Pain.    lipase-protease-amylase (CREON) 36,000-114,000- 180,000 unit CpDR TAKE 1 CAPSULE BY MOUTH 3 (THREE) TIMES DAILY WITH MEALS. &amp; 1 CAPSULE WITH SNACKS    loperamide (IMODIUM) 1 mg/5 mL solution Take by mouth every 6 (six) hours as needed for Diarrhea.    multivitamin capsule Take 1 capsule by mouth.    nitroGLYCERIN (NITROSTAT) 0.4 MG SL tablet Place 1 tablet (0.4 mg total) under the tongue every 5 (five) minutes as needed for Chest pain.    omeprazole (PRILOSEC) 20 MG capsule Take 1 capsule (20 mg total) by mouth once daily.    rosuvastatin (CRESTOR) 40 MG Tab TAKE 1 TABLET (40 MG TOTAL) BY MOUTH EVERY EVENING.    aspirin (ECOTRIN) 81 MG EC tablet Take 1 tablet (81 mg  total) by mouth 2 (two) times daily. (Patient not taking: No sig reported)    ondansetron (ZOFRAN-ODT) 4 MG TbDL Take 1 tablet (4 mg total) by mouth every 8 (eight) hours as needed (nausea). (Patient not taking: Reported on 2022)    oxyCODONE-acetaminophen (PERCOCET) 5-325 mg per tablet Take 1 tablet by mouth every 6 (six) hours as needed for Pain. (Patient not taking: Reported on 2022)     No current facility-administered medications for this visit.       Review of patient's allergies indicates:   Allergen Reactions    Cephalosporins Anaphylaxis    Iodinated contrast media Hives and Swelling    Penicillins Rash       Family History   Problem Relation Age of Onset    Coronary artery disease Mother     Heart disease Mother 60        Bypass twice    Miscarriages / Stillbirths Mother         Stillbirths    Vision loss Mother         Macular Degeration    Heart attack Father     Coronary artery disease Father     Heart disease Father 55        Several Heart Attack    Alcohol abuse Father     Early death Father         Heart Attack 55    Coronary artery disease Sister     Heart disease Sister 65        CAD    Celiac disease Neg Hx     Colon cancer Neg Hx     Crohn's disease Neg Hx     Ulcerative colitis Neg Hx        Social History     Socioeconomic History    Marital status:    Tobacco Use    Smoking status: Former Smoker     Packs/day: 2.00     Years: 35.00     Pack years: 70.00     Types: Cigarettes     Start date: 1964     Quit date:      Years since quittin.3    Smokeless tobacco: Never Used   Substance and Sexual Activity    Alcohol use: Yes     Alcohol/week: 7.0 standard drinks     Types: 7 Glasses of wine per week    Drug use: Never    Sexual activity: Yes     Partners: Male     Birth control/protection: Post-menopausal     Social Determinants of Health     Financial Resource Strain: Low Risk     Difficulty of Paying Living Expenses: Not hard at all   Food  Insecurity: No Food Insecurity    Worried About Running Out of Food in the Last Year: Never true    Ran Out of Food in the Last Year: Never true   Transportation Needs: No Transportation Needs    Lack of Transportation (Medical): No    Lack of Transportation (Non-Medical): No   Physical Activity: Insufficiently Active    Days of Exercise per Week: 1 day    Minutes of Exercise per Session: 40 min   Stress: Stress Concern Present    Feeling of Stress : Rather much   Social Connections: Unknown    Frequency of Communication with Friends and Family: Patient refused    Frequency of Social Gatherings with Friends and Family: Patient refused    Attends Tenriism Services: More than 4 times per year    Active Member of Clubs or Organizations: Patient refused    Attends Club or Organization Meetings: Patient refused    Marital Status:    Housing Stability: Low Risk     Unable to Pay for Housing in the Last Year: No    Number of Places Lived in the Last Year: 1    Unstable Housing in the Last Year: No       Chief Complaint:   Chief Complaint   Patient presents with    Post-op Evaluation     S/P right knee PMM. dos 4/8/22       Date of surgery:  April 8, 2022    History of present illness:  76-year-old female underwent right knee arthroscopy with partial medial meniscectomy.  Patient also had some underlying grade 4 medial compartment wear.  She still having pretty significant medial compartment pain.  Pain is a 9/10.  Difficulty going up and down stairs.      Review of Systems:    Musculoskeletal:  See HPI        Physical Examination:    Vital Signs:    Vitals:    04/27/22 0750   Resp: 18       Body mass index is 24.19 kg/m².    This a well-developed, well nourished patient in no acute distress.  They are alert and oriented and cooperative to examination.  Pt. walks without an antalgic gait.      Examination the right knee shows well-healing surgical portals.  No erythema or drainage.  No significant  joint effusion.  Range of motion is 0-125.  Tender over the medial compartment.  No calf pain.    X-rays:  None     Assessment::  Status post right partial medial meniscectomy  Severe right knee anteromedial arthritis    Plan:  Reviewed the findings with her today.  We talked about the possibility of her benefiting from a unicompartmental knee replacement.  I gave her a postop exercise guide to start to work on.  We also get her measured and fitted for medial  brace.  Follow-up in a month.    This note was created using [employer] voice recognition software that occasionally misinterpreted phrases or words.

## 2022-04-30 ENCOUNTER — EXTERNAL CHRONIC CARE MANAGEMENT (OUTPATIENT)
Dept: PRIMARY CARE CLINIC | Facility: CLINIC | Age: 77
End: 2022-04-30
Payer: MEDICARE

## 2022-04-30 ENCOUNTER — PATIENT MESSAGE (OUTPATIENT)
Dept: ORTHOPEDICS | Facility: CLINIC | Age: 77
End: 2022-04-30
Payer: MEDICARE

## 2022-04-30 PROCEDURE — 99490 PR CHRONIC CARE MGMT, 1ST 20 MIN: ICD-10-PCS | Mod: S$PBB,,, | Performed by: FAMILY MEDICINE

## 2022-04-30 PROCEDURE — 99490 CHRNC CARE MGMT STAFF 1ST 20: CPT | Mod: PBBFAC,PO | Performed by: FAMILY MEDICINE

## 2022-04-30 PROCEDURE — 99490 CHRNC CARE MGMT STAFF 1ST 20: CPT | Mod: S$PBB,,, | Performed by: FAMILY MEDICINE

## 2022-05-02 ENCOUNTER — PATIENT MESSAGE (OUTPATIENT)
Dept: ORTHOPEDICS | Facility: CLINIC | Age: 77
End: 2022-05-02
Payer: MEDICARE

## 2022-05-02 RX ORDER — OMEPRAZOLE 20 MG/1
20 CAPSULE, DELAYED RELEASE ORAL DAILY
Qty: 30 CAPSULE | Refills: 3 | Status: SHIPPED | OUTPATIENT
Start: 2022-05-02 | End: 2022-08-13

## 2022-05-02 NOTE — TELEPHONE ENCOUNTER
No new care gaps identified.  Powered by [employer]. Reference number: [id number].   5/02/2022 12:20:30 PM CDT

## 2022-05-02 NOTE — TELEPHONE ENCOUNTER
Refill Routing Note   Medication(s) are not appropriate for processing by [facility] for the following reason(s):      - Patient has been seen in the ED/Hospital since the last PCP visit  - Required indication for medication not on problem list (gerd)    [facility] action(s):  Defer          Medication reconciliation completed: No     Appointments  past 12m or future 3m with PCP    Date Provider   Last Visit   3/15/2022 Becky Song MD   Next Visit   7/13/2022 Becky Song MD   ED visits in past 90 days: 0        Note composed:3:35 PM 05/02/2022

## 2022-05-03 ENCOUNTER — TELEPHONE (OUTPATIENT)
Dept: ORTHOPEDICS | Facility: CLINIC | Age: 77
End: 2022-05-03
Payer: MEDICARE

## 2022-05-03 NOTE — TELEPHONE ENCOUNTER
Pt called to talk about special order brace to see cost of brace. Notified pt that she doesn't have a copay and that we are just waiting for the brace to come in. Pt verbalized understanding and had no further questions.

## 2022-05-09 ENCOUNTER — PATIENT MESSAGE (OUTPATIENT)
Dept: SMOKING CESSATION | Facility: CLINIC | Age: 77
End: 2022-05-09
Payer: MEDICARE

## 2022-05-25 DIAGNOSIS — F41.9 ANXIETY: ICD-10-CM

## 2022-05-25 RX ORDER — ALPRAZOLAM 0.25 MG/1
0.25 TABLET ORAL 2 TIMES DAILY PRN
Qty: 60 TABLET | Refills: 2 | Status: SHIPPED | OUTPATIENT
Start: 2022-05-25 | End: 2022-10-03 | Stop reason: SDUPTHER

## 2022-05-25 NOTE — TELEPHONE ENCOUNTER
No new care gaps identified.  [facility] Embedded Care Gaps. Reference number: [id number]. 5/25/2022   9:00:50 AM CDT

## 2022-05-31 ENCOUNTER — EXTERNAL CHRONIC CARE MANAGEMENT (OUTPATIENT)
Dept: PRIMARY CARE CLINIC | Facility: CLINIC | Age: 77
End: 2022-05-31
Payer: MEDICARE

## 2022-05-31 ENCOUNTER — TELEPHONE (OUTPATIENT)
Dept: ORTHOPEDICS | Facility: CLINIC | Age: 77
End: 2022-05-31
Payer: MEDICARE

## 2022-05-31 PROCEDURE — 99490 PR CHRONIC CARE MGMT, 1ST 20 MIN: ICD-10-PCS | Mod: S$PBB,,, | Performed by: FAMILY MEDICINE

## 2022-05-31 PROCEDURE — 99490 CHRNC CARE MGMT STAFF 1ST 20: CPT | Mod: S$PBB,,, | Performed by: FAMILY MEDICINE

## 2022-05-31 PROCEDURE — 99490 CHRNC CARE MGMT STAFF 1ST 20: CPT | Mod: PBBFAC,PO | Performed by: FAMILY MEDICINE

## 2022-05-31 NOTE — TELEPHONE ENCOUNTER
----- Message from Ez Donald sent at 5/31/2022 10:13 AM CDT -----  Contact: pt  Who Called: PT  Regarding: The pt is returning the nurse call in regards to her brace and is requesting a callback.   Would the patient rather a call back or a response via [facility]? Call back  Best Call Back Number: [phone]  Additional Information:

## 2022-06-01 ENCOUNTER — OFFICE VISIT (OUTPATIENT)
Dept: ORTHOPEDICS | Facility: CLINIC | Age: 77
End: 2022-06-01
Payer: MEDICARE

## 2022-06-01 VITALS — HEIGHT: 61 IN | BODY MASS INDEX: 24.17 KG/M2 | RESPIRATION RATE: 18 BRPM | WEIGHT: 128 LBS

## 2022-06-01 DIAGNOSIS — S83.241D ACUTE MEDIAL MENISCAL TEAR, RIGHT, SUBSEQUENT ENCOUNTER: ICD-10-CM

## 2022-06-01 DIAGNOSIS — M17.10 ARTHRITIS OF KNEE: ICD-10-CM

## 2022-06-01 DIAGNOSIS — M17.10 ARTHRITIS OF KNEE: Primary | ICD-10-CM

## 2022-06-01 DIAGNOSIS — S83.241D ACUTE MEDIAL MENISCAL TEAR, RIGHT, SUBSEQUENT ENCOUNTER: Primary | ICD-10-CM

## 2022-06-01 PROCEDURE — 99024 PR POST-OP FOLLOW-UP VISIT: ICD-10-PCS | Mod: POP,,, | Performed by: ORTHOPAEDIC SURGERY

## 2022-06-01 PROCEDURE — 99999 PR PBB SHADOW E&M-EST. PATIENT-LVL III: CPT | Mod: PBBFAC,,, | Performed by: ORTHOPAEDIC SURGERY

## 2022-06-01 PROCEDURE — 97760 ORTHOTIC MGMT&TRAING 1ST ENC: CPT | Mod: S$PBB,GP,, | Performed by: ORTHOPAEDIC SURGERY

## 2022-06-01 PROCEDURE — 99024 POSTOP FOLLOW-UP VISIT: CPT | Mod: POP,,, | Performed by: ORTHOPAEDIC SURGERY

## 2022-06-01 PROCEDURE — 20610 LARGE JOINT ASPIRATION/INJECTION: R KNEE: ICD-10-PCS | Mod: S$PBB,58,RT, | Performed by: ORTHOPAEDIC SURGERY

## 2022-06-01 PROCEDURE — 97760 PR ORTHOTIC MGMT&TRAINJ INITIAL ENC EA 15 MINS: ICD-10-PCS | Mod: S$PBB,GP,, | Performed by: ORTHOPAEDIC SURGERY

## 2022-06-01 PROCEDURE — 20610 DRAIN/INJ JOINT/BURSA W/O US: CPT | Mod: PBBFAC,PN,58,RT | Performed by: ORTHOPAEDIC SURGERY

## 2022-06-01 PROCEDURE — 99213 OFFICE O/P EST LOW 20 MIN: CPT | Mod: PBBFAC,PN,25 | Performed by: ORTHOPAEDIC SURGERY

## 2022-06-01 PROCEDURE — 99999 PR PBB SHADOW E&M-EST. PATIENT-LVL III: ICD-10-PCS | Mod: PBBFAC,,, | Performed by: ORTHOPAEDIC SURGERY

## 2022-06-01 RX ORDER — TRIAMCINOLONE ACETONIDE 40 MG/ML
40 INJECTION, SUSPENSION INTRA-ARTICULAR; INTRAMUSCULAR
Status: DISCONTINUED | OUTPATIENT
Start: 2022-06-01 | End: 2022-06-01 | Stop reason: HOSPADM

## 2022-06-01 RX ADMIN — TRIAMCINOLONE ACETONIDE 40 MG: 40 INJECTION, SUSPENSION INTRA-ARTICULAR; INTRAMUSCULAR at 08:06

## 2022-06-01 NOTE — PROCEDURES
Large Joint Aspiration/Injection: R knee    Date/Time: 6/1/2022 8:15 AM  Performed by: Shelton Le MD  Authorized by: Shelton Le MD     Consent Done?:  Yes (Verbal)  Indications:  Pain  Site marked: the procedure site was marked    Timeout: prior to procedure the correct patient, procedure, and site was verified    Local anesthetic:  Lidocaine 1% without epinephrine and bupivacaine 0.25% without epinephrine  Anesthetic total (ml):  6      Details:  Needle Size:  20 G  Approach:  Anterolateral  Location:  Knee  Site:  R knee  Medications:  40 mg triamcinolone acetonide 40 mg/mL  Patient tolerance:  Patient tolerated the procedure well with no immediate complications

## 2022-06-01 NOTE — PROGRESS NOTES
Past Medical History:   Diagnosis Date    Anemia     Anxiety     Chronic bronchitis with COPD (chronic obstructive pulmonary disease)     Esophageal spasm     Essential tremor     GERD (gastroesophageal reflux disease)     Headache     High cholesterol     Hypertension     Meniere disease     Multiple sclerosis     Muscle spasm     Pancreatic insufficiency        Past Surgical History:   Procedure Laterality Date    CATARACT EXTRACTION, BILATERAL  2006    COLONOSCOPY  09/05/2018    Dr. Leija in procedures: diverticulosis, 4 colon polyps removed, adenomatous polyps x3 & benign mucosal polyps    COLONOSCOPY N/A 4/22/2021    Procedure: COLONOSCOPY;  Surgeon: Dwayne Santoyo MD;  Location: [facility] ENDO;  Service: Endoscopy;  Laterality: N/A;    CORONARY ANGIOGRAPHY N/A 10/14/2021    Procedure: ANGIOGRAM, CORONARY ARTERY;  Surgeon: Mustapha Manzo MD;  Location: [facility];  Service: Cardiology;  Laterality: N/A;    CYSTOSCOPY WITH HYDRODISTENSION OF BLADDER N/A 6/5/2019    Procedure: CYSTOSCOPY, WITH BLADDER HYDRODISTENSION;  Surgeon: Marko Burton MD;  [facility]: [facility];  Service: OB/GYN;  Laterality: N/A;    DILATION AND CURETTAGE OF UTERUS  1966    ESOPHAGOGASTRODUODENOSCOPY  09/05/2018    kala Burr procedures: gastritis; biopsy: duodenum unremarkable, with focal benign gastric metaplasia, stomach- minimal chronic gastritis, negative for h pylori    ESOPHAGOGASTRODUODENOSCOPY N/A 4/22/2021    Procedure: EGD (ESOPHAGOGASTRODUODENOSCOPY);  Surgeon: Dwayne Santoyo MD;  Location: [facility];  Service: Endoscopy;  Laterality: N/A;    EYE SURGERY      HEMORRHOID SURGERY  1997    KNEE ARTHROSCOPY W/ MENISCECTOMY Right 8/13/2021    Procedure: ARTHROSCOPY, KNEE, WITH MENISCECTOMY;  Surgeon: Shelton Le MD;  Location: [facility];  Service: Orthopedics;  Laterality: Right;    KNEE ARTHROSCOPY W/ MENISCECTOMY Right 4/8/2022    Procedure: ARTHROSCOPY, KNEE, WITH MENISCECTOMY;  Surgeon:  Shelton Le MD;  Location: [facility] OR;  Service: Orthopedics;  Laterality: Right;  medial meniscectomy    LEFT HEART CATHETERIZATION Right 10/14/2021    Procedure: Left heart cath;  Surgeon: Mustapha Manzo MD;  [facility] CATH;  Service: Cardiology;  Laterality: Right;    TONSILLECTOMY  1954    TUBAL LIGATION  1990       Current Outpatient Medications   Medication Sig    albuterol (PROVENTIL/VENTOLIN HFA) 90 mcg/actuation inhaler Inhale 2 puffs into the lungs 3 (three) times daily. Rescue    ALPRAZolam (XANAX) 0.25 MG tablet TAKE 1 TABLET (0.25 MG TOTAL) BY MOUTH 2 (TWO) TIMES DAILY AS NEEDED FOR ANXIETY.    amLODIPine (NORVASC) 5 MG tablet TAKE 1 TABLET (5 MG TOTAL) BY MOUTH ONCE DAILY.    aspirin (ECOTRIN) 81 MG EC tablet Take 1 tablet (81 mg total) by mouth 2 (two) times daily.    buPROPion (WELLBUTRIN XL) 300 MG 24 hr tablet Take 1 tablet (300 mg total) by mouth once daily.    cyanocobalamin 500 MCG tablet Take 500 mcg by mouth once daily.    fluticasone furoate-vilanteroL (BREO) 100-25 mcg/dose diskus inhaler Inhale 1 puff into the lungs once daily. Controller    lipase-protease-amylase (CREON) 36,000-114,000- 180,000 unit CpDR TAKE 1 CAPSULE BY MOUTH 3 (THREE) TIMES DAILY WITH MEALS. & 1 CAPSULE WITH SNACKS    loperamide (IMODIUM) 1 mg/5 mL solution Take by mouth every 6 (six) hours as needed for Diarrhea.    multivitamin capsule Take 1 capsule by mouth.    nitroGLYCERIN (NITROSTAT) 0.4 MG SL tablet Place 1 tablet (0.4 mg total) under the tongue every 5 (five) minutes as needed for Chest pain.    omeprazole (PRILOSEC) 20 MG capsule Take 1 capsule (20 mg total) by mouth once daily.    rosuvastatin (CRESTOR) 40 MG Tab TAKE 1 TABLET (40 MG TOTAL) BY MOUTH EVERY EVENING.    cyclobenzaprine (FLEXERIL) 10 MG tablet Take 1 tablet (10 mg total) by mouth 3 (three) times daily as needed for Muscle spasms.    ibuprofen (ADVIL,MOTRIN) 600 MG tablet Take 1 tablet (600 mg total) by mouth every  6 (six) hours as needed for Pain.    ondansetron (ZOFRAN-ODT) 4 MG TbDL Take 1 tablet (4 mg total) by mouth every 8 (eight) hours as needed (nausea).    oxyCODONE-acetaminophen (PERCOCET) 5-325 mg per tablet Take 1 tablet by mouth every 6 (six) hours as needed for Pain.     No current facility-administered medications for this visit.       Review of patient's allergies indicates:   Allergen Reactions    Cephalosporins Anaphylaxis    Iodinated contrast media Hives and Swelling    Penicillins Rash       Family History   Problem Relation Age of Onset    Coronary artery disease Mother     Heart disease Mother 60        Bypass twice    Miscarriages / Stillbirths Mother         Stillbirths    Vision loss Mother         Macular Degeration    Heart attack Father     Coronary artery disease Father     Heart disease Father 55        Several Heart Attack    Alcohol abuse Father     Early death Father         Heart Attack 55    Coronary artery disease Sister     Heart disease Sister 65        CAD    Celiac disease Neg Hx     Colon cancer Neg Hx     Crohn's disease Neg Hx     Ulcerative colitis Neg Hx        Social History     Socioeconomic History    Marital status:    Tobacco Use    Smoking status: Former Smoker     Packs/day: 2.00     Years: 35.00     Pack years: 70.00     Types: Cigarettes     Start date: 1964     Quit date:      Years since quittin.4    Smokeless tobacco: Never Used   Substance and Sexual Activity    Alcohol use: Yes     Alcohol/week: 7.0 standard drinks     Types: 7 Glasses of wine per week    Drug use: Never    Sexual activity: Yes     Partners: Male     Birth control/protection: Post-menopausal     Social Determinants of Health     Financial Resource Strain: Low Risk     Difficulty of Paying Living Expenses: Not hard at all   Food Insecurity: No Food Insecurity    Worried About Running Out of Food in the Last Year: Never true    Ran Out of Food in the Last  Year: Never true   Transportation Needs: No Transportation Needs    Lack of Transportation (Medical): No    Lack of Transportation (Non-Medical): No   Physical Activity: Insufficiently Active    Days of Exercise per Week: 1 day    Minutes of Exercise per Session: 40 min   Stress: Stress Concern Present    Feeling of Stress : Rather much   Social Connections: Unknown    Frequency of Communication with Friends and Family: Patient refused    Frequency of Social Gatherings with Friends and Family: Patient refused    Attends Hindu Services: More than 4 times per year    Active Member of Clubs or Organizations: Patient refused    Attends Club or Organization Meetings: Patient refused    Marital Status:    Housing Stability: Low Risk     Unable to Pay for Housing in the Last Year: No    Number of Places Lived in the Last Year: 1    Unstable Housing in the Last Year: No       Chief Complaint:   Chief Complaint   Patient presents with    Right Knee - Post-op Evaluation       Date of surgery:  April 8, 2022    History of present illness:  76-year-old female underwent right knee arthroscopy with partial medial meniscectomy.  Patient also had some underlying grade 4 medial compartment wear.  She still having pretty significant medial compartment pain.  Pain is a 4/10.  She has done home exercises.  Gets occasional swelling.  Not as far along as she was hoping.      Review of Systems:    Musculoskeletal:  See HPI        Physical Examination:    Vital Signs:    Vitals:    06/01/22 0804   Resp: 18       Body mass index is 24.19 kg/m².    This a well-developed, well nourished patient in no acute distress.  They are alert and oriented and cooperative to examination.  Pt. walks without an antalgic gait.      Examination the right knee shows healed urgical portals.  No erythema or drainage.  No significant joint effusion.  Range of motion is 0-125.  Tender over the medial compartment.  No calf  pain.    X-rays:  None     Assessment::  Status post right partial medial meniscectomy  Severe right knee anteromedial arthritis    Plan:  Reviewed the findings with her today.  We talked about the possibility of her benefiting from a unicompartmental knee replacement.  We will get her into some formal physical therapy.  Patient was placed in her  brace today.  I also agreed to inject her right knee.  Follow-up in 6 weeks.    We performed a custom orthotic/brace fitting, adjusting and training with the patient. The patient demonstrated understanding and proper care. This was performed for 15 minutes.          This note was created using [employer] voice recognition software that occasionally misinterpreted phrases or words.

## 2022-06-07 ENCOUNTER — TELEPHONE (OUTPATIENT)
Dept: ORTHOPEDICS | Facility: CLINIC | Age: 77
End: 2022-06-07
Payer: MEDICARE

## 2022-06-07 NOTE — TELEPHONE ENCOUNTER
----- Message from Wesly Contreras sent at 6/7/2022  9:03 AM CDT -----  Regarding: [facility] needs orders faxed , Yamilex   Contact: Yamilex Cho   [facility] needs orders faxed , [employer]

## 2022-06-24 ENCOUNTER — PATIENT MESSAGE (OUTPATIENT)
Dept: PSYCHIATRY | Facility: CLINIC | Age: 77
End: 2022-06-24
Payer: MEDICARE

## 2022-06-30 ENCOUNTER — EXTERNAL CHRONIC CARE MANAGEMENT (OUTPATIENT)
Dept: PRIMARY CARE CLINIC | Facility: CLINIC | Age: 77
End: 2022-06-30
Payer: MEDICARE

## 2022-06-30 PROCEDURE — 99490 CHRNC CARE MGMT STAFF 1ST 20: CPT | Mod: S$PBB,,, | Performed by: FAMILY MEDICINE

## 2022-06-30 PROCEDURE — 99490 CHRNC CARE MGMT STAFF 1ST 20: CPT | Mod: PBBFAC,PO | Performed by: FAMILY MEDICINE

## 2022-06-30 PROCEDURE — 99490 PR CHRONIC CARE MGMT, 1ST 20 MIN: ICD-10-PCS | Mod: S$PBB,,, | Performed by: FAMILY MEDICINE

## 2022-07-01 ENCOUNTER — IMMUNIZATION (OUTPATIENT)
Dept: PHARMACY | Facility: CLINIC | Age: 77
End: 2022-07-01
Payer: MEDICARE

## 2022-07-01 DIAGNOSIS — Z23 NEED FOR VACCINATION: Primary | ICD-10-CM

## 2022-07-13 ENCOUNTER — HOSPITAL ENCOUNTER (OUTPATIENT)
Dept: RADIOLOGY | Facility: HOSPITAL | Age: 77
Discharge: HOME | End: 2022-07-13
Attending: ORTHOPAEDIC SURGERY
Payer: MEDICARE

## 2022-07-13 ENCOUNTER — PATIENT MESSAGE (OUTPATIENT)
Dept: FAMILY MEDICINE | Facility: CLINIC | Age: 77
End: 2022-07-13

## 2022-07-13 ENCOUNTER — OFFICE VISIT (OUTPATIENT)
Dept: FAMILY MEDICINE | Facility: CLINIC | Age: 77
End: 2022-07-13
Payer: MEDICARE

## 2022-07-13 ENCOUNTER — OFFICE VISIT (OUTPATIENT)
Dept: ORTHOPEDICS | Facility: CLINIC | Age: 77
End: 2022-07-13
Payer: MEDICARE

## 2022-07-13 VITALS — HEIGHT: 61 IN | BODY MASS INDEX: 24.73 KG/M2 | RESPIRATION RATE: 18 BRPM | WEIGHT: 131 LBS

## 2022-07-13 VITALS
HEART RATE: 95 BPM | BODY MASS INDEX: 24.79 KG/M2 | OXYGEN SATURATION: 100 % | WEIGHT: 131.19 LBS | DIASTOLIC BLOOD PRESSURE: 68 MMHG | SYSTOLIC BLOOD PRESSURE: 128 MMHG

## 2022-07-13 DIAGNOSIS — M17.10 ARTHRITIS OF KNEE: Primary | ICD-10-CM

## 2022-07-13 DIAGNOSIS — M17.10 ARTHRITIS OF KNEE: ICD-10-CM

## 2022-07-13 DIAGNOSIS — E78.49 OTHER HYPERLIPIDEMIA: Primary | ICD-10-CM

## 2022-07-13 DIAGNOSIS — F41.9 ANXIETY: ICD-10-CM

## 2022-07-13 DIAGNOSIS — I10 ESSENTIAL HYPERTENSION: ICD-10-CM

## 2022-07-13 PROCEDURE — 99213 OFFICE O/P EST LOW 20 MIN: CPT | Mod: PBBFAC,27,PN | Performed by: ORTHOPAEDIC SURGERY

## 2022-07-13 PROCEDURE — 99214 OFFICE O/P EST MOD 30 MIN: CPT | Mod: 57,S$PBB,, | Performed by: ORTHOPAEDIC SURGERY

## 2022-07-13 PROCEDURE — 99999 PR PBB SHADOW E&M-EST. PATIENT-LVL III: CPT | Mod: PBBFAC,,, | Performed by: ORTHOPAEDIC SURGERY

## 2022-07-13 PROCEDURE — 99999 PR PBB SHADOW E&M-EST. PATIENT-LVL III: CPT | Mod: PBBFAC,,, | Performed by: FAMILY MEDICINE

## 2022-07-13 PROCEDURE — 99214 PR OFFICE/OUTPT VISIT, EST, LEVL IV, 30-39 MIN: ICD-10-PCS | Mod: S$PBB,,, | Performed by: FAMILY MEDICINE

## 2022-07-13 PROCEDURE — 99213 OFFICE O/P EST LOW 20 MIN: CPT | Mod: PBBFAC,PO | Performed by: FAMILY MEDICINE

## 2022-07-13 PROCEDURE — 99999 PR PBB SHADOW E&M-EST. PATIENT-LVL III: ICD-10-PCS | Mod: PBBFAC,,, | Performed by: FAMILY MEDICINE

## 2022-07-13 PROCEDURE — 73562 XR KNEE ORTHO RIGHT WITH FLEXION: ICD-10-PCS | Mod: 26,LT,, | Performed by: RADIOLOGY

## 2022-07-13 PROCEDURE — 73562 X-RAY EXAM OF KNEE 3: CPT | Mod: TC,PO,59,LT

## 2022-07-13 PROCEDURE — 73562 X-RAY EXAM OF KNEE 3: CPT | Mod: 26,LT,, | Performed by: RADIOLOGY

## 2022-07-13 PROCEDURE — 73564 XR KNEE ORTHO RIGHT WITH FLEXION: ICD-10-PCS | Mod: 26,RT,, | Performed by: RADIOLOGY

## 2022-07-13 PROCEDURE — 73564 X-RAY EXAM KNEE 4 OR MORE: CPT | Mod: 26,RT,, | Performed by: RADIOLOGY

## 2022-07-13 PROCEDURE — 99999 PR PBB SHADOW E&M-EST. PATIENT-LVL III: ICD-10-PCS | Mod: PBBFAC,,, | Performed by: ORTHOPAEDIC SURGERY

## 2022-07-13 PROCEDURE — 99214 OFFICE O/P EST MOD 30 MIN: CPT | Mod: S$PBB,,, | Performed by: FAMILY MEDICINE

## 2022-07-13 PROCEDURE — 99214 PR OFFICE/OUTPT VISIT, EST, LEVL IV, 30-39 MIN: ICD-10-PCS | Mod: 57,S$PBB,, | Performed by: ORTHOPAEDIC SURGERY

## 2022-07-13 NOTE — PROGRESS NOTES
Past Medical History:   Diagnosis Date    Anemia     Anxiety     Chronic bronchitis with COPD (chronic obstructive pulmonary disease)     Esophageal spasm     Essential tremor     GERD (gastroesophageal reflux disease)     Headache     High cholesterol     Hypertension     Meniere disease     Multiple sclerosis     Muscle spasm     Pancreatic insufficiency        Past Surgical History:   Procedure Laterality Date    CATARACT EXTRACTION, BILATERAL  2006    COLONOSCOPY  09/05/2018    Dr. Leija in procedures: diverticulosis, 4 colon polyps removed, adenomatous polyps x3 & benign mucosal polyps    COLONOSCOPY N/A 4/22/2021    Procedure: COLONOSCOPY;  Surgeon: Dwayne Santoyo MD;  Location: [facility] ENDO;  Service: Endoscopy;  Laterality: N/A;    CORONARY ANGIOGRAPHY N/A 10/14/2021    Procedure: ANGIOGRAM, CORONARY ARTERY;  Surgeon: Mustapha Manzo MD;  Location: [facility];  Service: Cardiology;  Laterality: N/A;    CYSTOSCOPY WITH HYDRODISTENSION OF BLADDER N/A 6/5/2019    Procedure: CYSTOSCOPY, WITH BLADDER HYDRODISTENSION;  Surgeon: Marko Burton MD;  [facility];  Service: OB/GYN;  Laterality: N/A;    DILATION AND CURETTAGE OF UTERUS  1966    ESOPHAGOGASTRODUODENOSCOPY  09/05/2018    kala Burr procedures: gastritis; biopsy: duodenum unremarkable, with focal benign gastric metaplasia, stomach- minimal chronic gastritis, negative for h pylori    ESOPHAGOGASTRODUODENOSCOPY N/A 4/22/2021    Procedure: EGD (ESOPHAGOGASTRODUODENOSCOPY);  Surgeon: Dwayne Santoyo MD;  Location: [facility];  Service: Endoscopy;  Laterality: N/A;    EYE SURGERY      HEMORRHOID SURGERY  1997    KNEE ARTHROSCOPY W/ MENISCECTOMY Right 8/13/2021    Procedure: ARTHROSCOPY, KNEE, WITH MENISCECTOMY;  Surgeon: Shelton Le MD;  Location: [facility];  Service: Orthopedics;  Laterality: Right;    KNEE ARTHROSCOPY W/ MENISCECTOMY Right 4/8/2022    Procedure: ARTHROSCOPY, KNEE, WITH MENISCECTOMY;  Surgeon:  Shelton Le MD;  Location: [facility] OR;  Service: Orthopedics;  Laterality: Right;  medial meniscectomy    LEFT HEART CATHETERIZATION Right 10/14/2021    Procedure: Left heart cath;  Surgeon: Mustapha Manzo MD;  Location: [facility] CATH;  Service: Cardiology;  Laterality: Right;    TONSILLECTOMY  1954    TUBAL LIGATION  1990       Current Outpatient Medications   Medication Sig    albuterol (PROVENTIL/VENTOLIN HFA) 90 mcg/actuation inhaler Inhale 2 puffs into the lungs 3 (three) times daily. Rescue    ALPRAZolam (XANAX) 0.25 MG tablet TAKE 1 TABLET (0.25 MG TOTAL) BY MOUTH 2 (TWO) TIMES DAILY AS NEEDED FOR ANXIETY.    amLODIPine (NORVASC) 5 MG tablet TAKE 1 TABLET (5 MG TOTAL) BY MOUTH ONCE DAILY.    aspirin (ECOTRIN) 81 MG EC tablet Take 1 tablet (81 mg total) by mouth 2 (two) times daily.    buPROPion (WELLBUTRIN XL) 300 MG 24 hr tablet Take 1 tablet (300 mg total) by mouth once daily.    cyanocobalamin 500 MCG tablet Take 500 mcg by mouth once daily.    fluticasone furoate-vilanteroL (BREO) 100-25 mcg/dose diskus inhaler Inhale 1 puff into the lungs once daily. Controller    lipase-protease-amylase (CREON) 36,000-114,000- 180,000 unit CpDR TAKE 1 CAPSULE BY MOUTH 3 (THREE) TIMES DAILY WITH MEALS. & 1 CAPSULE WITH SNACKS    loperamide (IMODIUM) 1 mg/5 mL solution Take by mouth every 6 (six) hours as needed for Diarrhea.    multivitamin capsule Take 1 capsule by mouth.    nitroGLYCERIN (NITROSTAT) 0.4 MG SL tablet Place 1 tablet (0.4 mg total) under the tongue every 5 (five) minutes as needed for Chest pain.    omeprazole (PRILOSEC) 20 MG capsule Take 1 capsule (20 mg total) by mouth once daily.    rosuvastatin (CRESTOR) 40 MG Tab TAKE 1 TABLET (40 MG TOTAL) BY MOUTH EVERY EVENING.     No current facility-administered medications for this visit.       Review of patient's allergies indicates:   Allergen Reactions    Cephalosporins Anaphylaxis    Iodinated contrast media Hives and Swelling     Penicillins Rash       Family History   Problem Relation Age of Onset    Coronary artery disease Mother     Heart disease Mother 60        Bypass twice    Miscarriages / Stillbirths Mother         Stillbirths    Vision loss Mother         Macular Degeration    Heart attack Father     Coronary artery disease Father     Heart disease Father 55        Several Heart Attack    Alcohol abuse Father     Early death Father         Heart Attack 55    Coronary artery disease Sister     Heart disease Sister 65        CAD    Celiac disease Neg Hx     Colon cancer Neg Hx     Crohn's disease Neg Hx     Ulcerative colitis Neg Hx        Social History     Socioeconomic History    Marital status:    Tobacco Use    Smoking status: Former Smoker     Packs/day: 2.00     Years: 35.00     Pack years: 70.00     Types: Cigarettes     Start date: 1964     Quit date:      Years since quittin.5    Smokeless tobacco: Never Used   Substance and Sexual Activity    Alcohol use: Yes     Alcohol/week: 7.0 standard drinks     Types: 7 Glasses of wine per week    Drug use: Never    Sexual activity: Yes     Partners: Male     Birth control/protection: Post-menopausal     Social Determinants of Health     Financial Resource Strain: Low Risk     Difficulty of Paying Living Expenses: Not hard at all   Food Insecurity: No Food Insecurity    Worried About Running Out of Food in the Last Year: Never true    Ran Out of Food in the Last Year: Never true   Transportation Needs: No Transportation Needs    Lack of Transportation (Medical): No    Lack of Transportation (Non-Medical): No   Physical Activity: Unknown    Days of Exercise per Week: Patient refused    Minutes of Exercise per Session: 40 min   Stress: Stress Concern Present    Feeling of Stress : Rather much   Social Connections: Unknown    Frequency of Communication with Friends and Family: More than three times a week    Frequency of Social  Gatherings with Friends and Family: Patient refused    Attends Taoism Services: More than 4 times per year    Active Member of Clubs or Organizations: Patient refused    Attends Club or Organization Meetings: Patient refused    Marital Status:    Housing Stability: Low Risk     Unable to Pay for Housing in the Last Year: No    Number of Places Lived in the Last Year: 1    Unstable Housing in the Last Year: No       Chief Complaint:   Chief Complaint   Patient presents with    Post-op Evaluation     s/p right knee PMM 4/8/22.        Date of surgery:  April 8, 2022    History of present illness:  76-year-old female underwent right knee arthroscopy with partial medial meniscectomy.  Patient also had some underlying grade 4 medial compartment wear.  She still having pretty significant medial compartment pain.  Pain is a 4/10.  She has done home exercises.  Gets occasional swelling.  Not as far along as she was hoping.  We tried a cortisone injection back in June and she did formal physical therapy without significant improvement.      Review of Systems:    Musculoskeletal:  See HPI        Physical Examination:    Vital Signs:    Vitals:    07/13/22 0754   Resp: 18       Body mass index is 24.75 kg/m².    This a well-developed, well nourished patient in no acute distress.  They are alert and oriented and cooperative to examination.  Pt. walks without an antalgic gait.      Examination the right knee shows healed urgical portals.  No erythema or drainage.  No significant joint effusion.  Range of motion is 0-125.  Tender over the medial compartment.  No calf pain.    Heart is regular rate without obvious murmurs   Normal respiratory effort without audible wheezing  Abdomen is soft and nontender     X-rays:  Four views of the right knee are ordered and reviewed which show moderate to severe medial joint space narrowing.     Assessment::  Status post right partial medial meniscectomy  Severe right knee  anteromedial arthritis    Plan:  Reviewed the findings with her today.  We talked about the possibility of her benefiting from a unicompartmental knee replacement.  She would like to go ahead and get this set up.  Plan is for robotic assisted partial versus total knee replacement.  Risks, benefits, and alternatives to the procedure were explained to the patient including but not limited to damage to nerves, arteries, blood vessels, bones, tendons, ligaments, stiffness, instability, infection, DVT, PE, as well as general anesthetic complications including seizure, stroke, heart attack and even death. The patient understood these risks and wished to proceed and signed the informed consent.           This note was created using [employer] voice recognition software that occasionally misinterpreted phrases or words.

## 2022-07-13 NOTE — TELEPHONE ENCOUNTER
----- Message from James Mayen sent at 7/13/2022  9:42 AM CDT -----  Regarding: pre op clearance  Patient was seen today by Dr. Le and consented for right knee surgery.  Please advise on pre op medical clearance for this patient.  Once clearance has been received we will schedule the patient for the procedure.      Thank you,     Asa

## 2022-07-13 NOTE — PROGRESS NOTES
Assessment:       1. Other hyperlipidemia    2. Essential hypertension    3. Anxiety        Plan:       Other hyperlipidemia:  Stable  -     Comprehensive Metabolic Panel; Future; Expected date: 07/13/2022  -     Lipid Panel; Future; Expected date: 07/13/2022    Essential hypertension:  Stable  -     Comprehensive Metabolic Panel; Future; Expected date: 07/13/2022  -     Lipid Panel; Future; Expected date: 07/13/2022    Anxiety:  Stable  -     TSH; Future; Expected date: 07/13/2022      The patient will contact us when she needs a new prescription for Xanax, [place] prescription monitoring program was checked and okay.   Patient agreed with assessment and plan. Patient verbalized understanding.     Addendum 07/27/2022:  The patient is clear for surgery.  Cardiologist also clear the patient.    Subjective:       Patient ID: Veronique Johnson is a 76 y.o. female.    Chief Complaint: Anxiety    HPI     Anxiety:  The patient is currently taking Wellbutrin, taking Xanax, the patient stated that she does not have any more refills but she still having some tablets, she will contact us when she needs refills.  The patient denies any side effects of the medication, symptoms of chest pain or worsening symptoms of shortness of breath.    Hypertension:  The patient is currently taking amlodipine 5 mg daily, she denies any side effects of the medication, the patient did not bring a log of the blood pressure readings from home.  She states that is going to have surgery again for her right knee secondary to worsening pain and not able to improve the symptoms with physical therapy.  The patient will be scheduled for a possible knee replacement.  She has an appointment to see the orthopedic specialist today.  The patient will contact the cardiologist also for heart clearance.    Hyperlipidemia:  The last cholesterol levels were therapeutic, the patient is taking cholesterol medication, she denies any side effects of the  medication.    Past medical history, past social history was reviewed and discussed with the patient.    Review of Systems   Constitutional: Negative for activity change and appetite change.   HENT: Negative for congestion and ear discharge.    Eyes: Negative for discharge and itching.   Respiratory: Negative for choking and chest tightness.    Cardiovascular: Negative for chest pain and leg swelling.   Gastrointestinal: Negative for abdominal distention and abdominal pain.   Endocrine: Negative for cold intolerance and heat intolerance.   Genitourinary: Negative for dysuria and flank pain.   Musculoskeletal: Positive for arthralgias. Negative for back pain.   Skin: Negative for pallor and rash.   Allergic/Immunologic: Negative for environmental allergies and food allergies.   Neurological: Negative for dizziness and facial asymmetry.   Hematological: Negative for adenopathy. Does not bruise/bleed easily.   Psychiatric/Behavioral: Negative for agitation, confusion and sleep disturbance.       Objective:      Physical Exam  Vitals and nursing note reviewed.   Constitutional:       General: She is not in acute distress.     Appearance: Normal appearance. She is well-developed and normal weight. She is not diaphoretic.   HENT:      Head: Normocephalic and atraumatic.      Right Ear: External ear normal.      Left Ear: External ear normal.      Nose: Nose normal.   Eyes:      General: No scleral icterus.        Right eye: No discharge.         Left eye: No discharge.      Conjunctiva/sclera: Conjunctivae normal.   Cardiovascular:      Rate and Rhythm: Normal rate and regular rhythm.      Heart sounds: Normal heart sounds.   Pulmonary:      Effort: Pulmonary effort is normal. No respiratory distress.      Breath sounds: Normal breath sounds. No wheezing.   Musculoskeletal:         General: Tenderness (Patient is wearing a brace on the right knee) present. No deformity.      Cervical back: Neck supple.   Skin:      Coloration: Skin is not pale.   Neurological:      Mental Status: She is alert.   Psychiatric:         Behavior: Behavior normal.         Thought Content: Thought content normal.         Judgment: Judgment normal.

## 2022-07-14 ENCOUNTER — PATIENT MESSAGE (OUTPATIENT)
Dept: CARDIOLOGY | Facility: CLINIC | Age: 77
End: 2022-07-14
Payer: MEDICARE

## 2022-07-14 ENCOUNTER — TELEPHONE (OUTPATIENT)
Dept: CARDIOLOGY | Facility: CLINIC | Age: 77
End: 2022-07-14
Payer: MEDICARE

## 2022-07-14 NOTE — TELEPHONE ENCOUNTER
----- Message from Anne Smith sent at 7/14/2022 11:14 AM CDT -----  Patient Call Back    Who Called: PT     What is the reqeust in detail:pt calling to speak with someone regarding her getting a cardiac clearance before her surgery on August 16. Pls advise.    Can the clinic reply by [facility]?    Best Call Back Number: [phone]

## 2022-07-18 ENCOUNTER — PATIENT MESSAGE (OUTPATIENT)
Dept: CARDIOLOGY | Facility: CLINIC | Age: 77
End: 2022-07-18
Payer: MEDICARE

## 2022-07-26 ENCOUNTER — PATIENT MESSAGE (OUTPATIENT)
Dept: ORTHOPEDICS | Facility: CLINIC | Age: 77
End: 2022-07-26
Payer: MEDICARE

## 2022-07-26 ENCOUNTER — OFFICE VISIT (OUTPATIENT)
Dept: CARDIOLOGY | Facility: CLINIC | Age: 77
End: 2022-07-26
Payer: MEDICARE

## 2022-07-26 VITALS
SYSTOLIC BLOOD PRESSURE: 153 MMHG | BODY MASS INDEX: 24.18 KG/M2 | HEIGHT: 61 IN | WEIGHT: 128.06 LBS | HEART RATE: 110 BPM | DIASTOLIC BLOOD PRESSURE: 81 MMHG

## 2022-07-26 DIAGNOSIS — J44.9 CHRONIC OBSTRUCTIVE PULMONARY DISEASE, UNSPECIFIED COPD TYPE: ICD-10-CM

## 2022-07-26 DIAGNOSIS — I10 ESSENTIAL HYPERTENSION: ICD-10-CM

## 2022-07-26 DIAGNOSIS — I25.10 CORONARY ARTERY DISEASE INVOLVING NATIVE CORONARY ARTERY OF NATIVE HEART WITHOUT ANGINA PECTORIS: ICD-10-CM

## 2022-07-26 DIAGNOSIS — E78.5 HYPERLIPIDEMIA, UNSPECIFIED HYPERLIPIDEMIA TYPE: ICD-10-CM

## 2022-07-26 DIAGNOSIS — Z01.810 PREOP CARDIOVASCULAR EXAM: ICD-10-CM

## 2022-07-26 DIAGNOSIS — I70.0 ATHEROSCLEROSIS OF AORTA: Primary | ICD-10-CM

## 2022-07-26 DIAGNOSIS — G35 MULTIPLE SCLEROSIS: ICD-10-CM

## 2022-07-26 PROBLEM — I25.119 ATHEROSCLEROSIS OF NATIVE CORONARY ARTERY OF NATIVE HEART WITH ANGINA PECTORIS: Status: RESOLVED | Noted: 2022-01-13 | Resolved: 2022-07-26

## 2022-07-26 PROBLEM — I24.9 ACS (ACUTE CORONARY SYNDROME): Status: RESOLVED | Noted: 2021-10-13 | Resolved: 2022-07-26

## 2022-07-26 PROCEDURE — 99999 PR PBB SHADOW E&M-EST. PATIENT-LVL III: ICD-10-PCS | Mod: PBBFAC,,, | Performed by: INTERNAL MEDICINE

## 2022-07-26 PROCEDURE — 99214 OFFICE O/P EST MOD 30 MIN: CPT | Mod: S$PBB,,, | Performed by: INTERNAL MEDICINE

## 2022-07-26 PROCEDURE — 99214 PR OFFICE/OUTPT VISIT, EST, LEVL IV, 30-39 MIN: ICD-10-PCS | Mod: S$PBB,,, | Performed by: INTERNAL MEDICINE

## 2022-07-26 PROCEDURE — 99999 PR PBB SHADOW E&M-EST. PATIENT-LVL III: CPT | Mod: PBBFAC,,, | Performed by: INTERNAL MEDICINE

## 2022-07-26 PROCEDURE — 99213 OFFICE O/P EST LOW 20 MIN: CPT | Mod: PBBFAC,PO | Performed by: INTERNAL MEDICINE

## 2022-07-26 NOTE — PROGRESS NOTES
Subjective:    Patient ID:  Veronique Johnson is a 76 y.o. female who presents for evaluation of Cardiac Clearance Right Knee      HPI 75 yo WF needing preop clearance. No hx of MI, CHF or abnormal heart rhythm. Had cath last year with non-obstructive disease. Denies chest pain, SOB, or edema    Summary    Procedures performed:  Left heart catheterization  Coronary angiography  Fractional flow reserve the LAD.     Angiographic findings:  Left main coronary-medium size vessel mild calcific disease less than 15%.  Left anterior descending-normal size vessel diffuse calcific disease proximal midportion with at most 50% stenosis at the takeoff the 1st diagonal.  First diagonal small vessel with a 50% ostial stenosis.  Mid LAD is small medium size vessel diffuse disease all less than 30%.  Circumflex-medium size vessel goes to the obtuse marginal.  Circumflex and its branches have mild disease less than 20%.  Right coronary artery-medium size dominant vessel diffuse disease all less than 30%.     Left ventricular end-diastolic pressure is 12 mmHg  Fractional flow reserve of the LAD was 0.84.     Impression:  Moderate calcific disease of the LAD verified FFR.     Plan:  Aggressive risk factor modification blood pressure control keep LDL less than 70.  No further cardiac workup needed.  Strict systolic blood pressure control.           The procedure log was documented by No documenter listed and verified by Mustapha Manzo MD.     Date: 10/14/2021  Time: 1:37 PM      Review of Systems   Cardiovascular: Negative for chest pain, claudication, cyanosis, dyspnea on exertion, irregular heartbeat, leg swelling, near-syncope, orthopnea, palpitations, paroxysmal nocturnal dyspnea and syncope.   Musculoskeletal: Positive for arthritis and joint pain.        Objective:    Physical Exam  Vitals and nursing note reviewed.   Constitutional:       Appearance: She is well-developed.      Comments: BP (!) 153/81 (BP Location: Right arm,  "Patient Position: Sitting, BP Method: Small (Automatic))   Pulse 110   Ht 5' 1" (1.549 m)   Wt 58.1 kg (128 lb 1.4 oz)   BMI 24.20 kg/m²      HENT:      Head: Normocephalic and atraumatic.      Right Ear: External ear normal.      Left Ear: External ear normal.      Nose: Nose normal.   Eyes:      General: Lids are normal. No scleral icterus.        Right eye: No discharge.         Left eye: No discharge.      Conjunctiva/sclera: Conjunctivae normal.      Right eye: No hemorrhage.     Pupils: Pupils are equal, round, and reactive to light.   Neck:      Thyroid: No thyromegaly.      Vascular: No JVD.      Trachea: No tracheal deviation.   Cardiovascular:      Rate and Rhythm: Normal rate and regular rhythm.      Pulses: Intact distal pulses.      Heart sounds: Normal heart sounds. No murmur heard.    No friction rub. No gallop.   Pulmonary:      Effort: Pulmonary effort is normal. No respiratory distress.      Breath sounds: Normal breath sounds. No wheezing or rales.   Chest:      Chest wall: No tenderness.   Breasts: Breasts are symmetrical.       Abdominal:      General: Bowel sounds are normal. There is no distension.      Palpations: Abdomen is soft. There is no hepatomegaly or mass.      Tenderness: There is no abdominal tenderness. There is no guarding or rebound.   Musculoskeletal:         General: No tenderness. Normal range of motion.      Cervical back: Normal range of motion and neck supple.   Lymphadenopathy:      Cervical: No cervical adenopathy.   Skin:     General: Skin is warm and dry.      Coloration: Skin is not pale.      Findings: No erythema or rash.   Neurological:      Mental Status: She is alert and oriented to person, place, and time.      Cranial Nerves: No cranial nerve deficit.      Coordination: Coordination normal.      Deep Tendon Reflexes: Reflexes normal.   Psychiatric:         Behavior: Behavior normal.         Thought Content: Thought content normal.         Judgment: Judgment " normal.           Assessment:       1. Atherosclerosis of aorta    2. Coronary artery disease involving native coronary artery of native heart without angina pectoris    3. Essential hypertension    4. Hyperlipidemia, unspecified hyperlipidemia type    5. Chronic obstructive pulmonary disease, unspecified COPD type    6. Multiple sclerosis    7. Preop cardiovascular exam         Plan:     There are no absolute contraindications to planned surgery from a cardiac standpoint. Will defer non-cardiac medical issues to patient's other physicians. Cardiac and hemodynamic monitoring during surgery and post operative period consistent with regions standard of care should be initiated. Patient made aware that all surgeries carry risk and unpredictable cardiac events may still occur.    If antiplatelet medications are contraindicated for planned procedure there is slight increase risk of cardiac events while off medication. We normally recommend continuing ASA if possible. If medications have to be stopped holding the meds for 5 to 7 days are the normal recommendations for most procedures and should be restarted when surgeon or  feels it is safe.     No orders of the defined types were placed in this encounter.    Follow up in about 1 year (around 7/26/2023).

## 2022-07-27 ENCOUNTER — PATIENT MESSAGE (OUTPATIENT)
Dept: FAMILY MEDICINE | Facility: CLINIC | Age: 77
End: 2022-07-27
Payer: MEDICARE

## 2022-07-29 ENCOUNTER — TELEPHONE (OUTPATIENT)
Dept: ORTHOPEDICS | Facility: CLINIC | Age: 77
End: 2022-07-29
Payer: MEDICARE

## 2022-07-29 NOTE — TELEPHONE ENCOUNTER
----- Message from Aileen Mejia LPN sent at 7/29/2022 12:02 PM CDT -----  Good afternoon,   Please advise if patient is clear for for surgery with Dr. Le.  Aileen

## 2022-07-29 NOTE — TELEPHONE ENCOUNTER
----- Message from Wesly Contreras sent at 7/29/2022 10:04 AM CDT -----  Regarding: pre Sx questions, call pt   Contact: pt   pre Sx questions, call pt

## 2022-07-29 NOTE — TELEPHONE ENCOUNTER
Called patient and informed her we have not received Dr. Song's clearance note yet. Advised patient that [facility] camp is about a week and half prior to surgery. Pt verbalized understanding.

## 2022-07-31 ENCOUNTER — EXTERNAL CHRONIC CARE MANAGEMENT (OUTPATIENT)
Dept: PRIMARY CARE CLINIC | Facility: CLINIC | Age: 77
End: 2022-07-31
Payer: MEDICARE

## 2022-07-31 PROCEDURE — 99490 CHRNC CARE MGMT STAFF 1ST 20: CPT | Mod: S$PBB,,, | Performed by: FAMILY MEDICINE

## 2022-07-31 PROCEDURE — 99490 CHRNC CARE MGMT STAFF 1ST 20: CPT | Mod: PBBFAC,PO | Performed by: FAMILY MEDICINE

## 2022-07-31 PROCEDURE — 99490 PR CHRONIC CARE MGMT, 1ST 20 MIN: ICD-10-PCS | Mod: S$PBB,,, | Performed by: FAMILY MEDICINE

## 2022-08-02 ENCOUNTER — TELEPHONE (OUTPATIENT)
Dept: ORTHOPEDICS | Facility: CLINIC | Age: 77
End: 2022-08-02
Payer: MEDICARE

## 2022-08-02 ENCOUNTER — PATIENT MESSAGE (OUTPATIENT)
Dept: SURGERY | Facility: HOSPITAL | Age: 77
End: 2022-08-02
Payer: MEDICARE

## 2022-08-02 ENCOUNTER — PATIENT MESSAGE (OUTPATIENT)
Dept: ORTHOPEDICS | Facility: CLINIC | Age: 77
End: 2022-08-02
Payer: MEDICARE

## 2022-08-02 DIAGNOSIS — M17.10 ARTHRITIS OF KNEE: Primary | ICD-10-CM

## 2022-08-02 DIAGNOSIS — Z01.818 PRE-OP TESTING: ICD-10-CM

## 2022-08-02 DIAGNOSIS — M17.11 PRIMARY OSTEOARTHRITIS OF RIGHT KNEE: ICD-10-CM

## 2022-08-02 RX ORDER — CLINDAMYCIN PHOSPHATE 900 MG/50ML
900 INJECTION, SOLUTION INTRAVENOUS
Status: CANCELLED | OUTPATIENT
Start: 2022-08-02

## 2022-08-02 RX ORDER — MUPIROCIN 20 MG/G
OINTMENT TOPICAL
Status: CANCELLED | OUTPATIENT
Start: 2022-08-02

## 2022-08-02 NOTE — TELEPHONE ENCOUNTER
----- Message from Wesly Contreras sent at 8/2/2022  9:34 AM CDT -----  Regarding: calling Karolina back about pre op, call pt   Contact: pt   calling Karolina back about pre op, call pt

## 2022-08-04 ENCOUNTER — PATIENT MESSAGE (OUTPATIENT)
Dept: FAMILY MEDICINE | Facility: CLINIC | Age: 77
End: 2022-08-04
Payer: MEDICARE

## 2022-08-04 ENCOUNTER — LAB VISIT (OUTPATIENT)
Dept: LAB | Facility: HOSPITAL | Age: 77
End: 2022-08-04
Attending: FAMILY MEDICINE
Payer: MEDICARE

## 2022-08-04 DIAGNOSIS — N34.2 INFECTIVE URETHRITIS: ICD-10-CM

## 2022-08-04 DIAGNOSIS — N34.2 INFECTIVE URETHRITIS: Primary | ICD-10-CM

## 2022-08-04 DIAGNOSIS — R30.0 DYSURIA: ICD-10-CM

## 2022-08-04 LAB
BACTERIA #/AREA URNS HPF: ABNORMAL /HPF
BILIRUB UR QL STRIP: ABNORMAL
CLARITY UR: ABNORMAL
COLOR UR: ABNORMAL
GLUCOSE UR QL STRIP: ABNORMAL
HGB UR QL STRIP: ABNORMAL
KETONES UR QL STRIP: NEGATIVE
LEUKOCYTE ESTERASE UR QL STRIP: ABNORMAL
MICROSCOPIC COMMENT: ABNORMAL
NITRITE UR QL STRIP: ABNORMAL
PH UR STRIP: ABNORMAL [PH] (ref 5–8)
PROT UR QL STRIP: ABNORMAL
RBC #/AREA URNS HPF: 1 /HPF (ref 0–4)
SP GR UR STRIP: 1.02 (ref 1–1.03)
URN SPEC COLLECT METH UR: ABNORMAL
WBC #/AREA URNS HPF: 50 /HPF (ref 0–5)

## 2022-08-04 PROCEDURE — 87186 SC STD MICRODIL/AGAR DIL: CPT | Performed by: FAMILY MEDICINE

## 2022-08-04 PROCEDURE — 87184 SC STD DISK METHOD PER PLATE: CPT | Performed by: FAMILY MEDICINE

## 2022-08-04 PROCEDURE — 81000 URINALYSIS NONAUTO W/SCOPE: CPT | Mod: PO | Performed by: FAMILY MEDICINE

## 2022-08-04 PROCEDURE — 87088 URINE BACTERIA CULTURE: CPT | Performed by: FAMILY MEDICINE

## 2022-08-04 PROCEDURE — 87086 URINE CULTURE/COLONY COUNT: CPT | Performed by: FAMILY MEDICINE

## 2022-08-04 PROCEDURE — 87077 CULTURE AEROBIC IDENTIFY: CPT | Performed by: FAMILY MEDICINE

## 2022-08-04 NOTE — TELEPHONE ENCOUNTER
"COLOR is related to AZO.   Abnormal Urinalysis  Flagged for "many" bacteria, a culture is underway.    PCP is out of office  "

## 2022-08-05 ENCOUNTER — PATIENT MESSAGE (OUTPATIENT)
Dept: FAMILY MEDICINE | Facility: CLINIC | Age: 77
End: 2022-08-05
Payer: MEDICARE

## 2022-08-05 RX ORDER — NITROFURANTOIN 25; 75 MG/1; MG/1
100 CAPSULE ORAL 2 TIMES DAILY
Qty: 14 CAPSULE | Refills: 0 | Status: SHIPPED | OUTPATIENT
Start: 2022-08-05 | End: 2022-08-12

## 2022-08-08 ENCOUNTER — HOSPITAL ENCOUNTER (OUTPATIENT)
Dept: RADIOLOGY | Facility: HOSPITAL | Age: 77
Discharge: HOME | End: 2022-08-08
Attending: ORTHOPAEDIC SURGERY
Payer: MEDICARE

## 2022-08-08 ENCOUNTER — HOSPITAL ENCOUNTER (OUTPATIENT)
Dept: PREADMISSION TESTING | Facility: HOSPITAL | Age: 77
Discharge: HOME | End: 2022-08-08
Attending: ORTHOPAEDIC SURGERY
Payer: MEDICARE

## 2022-08-08 VITALS — BODY MASS INDEX: 24.17 KG/M2 | WEIGHT: 128 LBS | HEIGHT: 61 IN

## 2022-08-08 DIAGNOSIS — M17.11 PRIMARY OSTEOARTHRITIS OF RIGHT KNEE: Primary | ICD-10-CM

## 2022-08-08 DIAGNOSIS — M17.10 ARTHRITIS OF KNEE: ICD-10-CM

## 2022-08-08 DIAGNOSIS — Z01.818 PREOP TESTING: Primary | ICD-10-CM

## 2022-08-08 DIAGNOSIS — Z01.818 PRE-OP TESTING: ICD-10-CM

## 2022-08-08 LAB
ABO + RH BLD: NORMAL
ANION GAP SERPL CALC-SCNC: 12 MMOL/L (ref 8–16)
BACTERIA UR CULT: ABNORMAL
BASOPHILS # BLD AUTO: 0.05 K/UL (ref 0–0.2)
BASOPHILS NFR BLD: 1 % (ref 0–1.9)
BLD GP AB SCN CELLS X3 SERPL QL: NORMAL
BUN SERPL-MCNC: 8 MG/DL (ref 8–23)
CALCIUM SERPL-MCNC: 10.7 MG/DL (ref 8.7–10.5)
CHLORIDE SERPL-SCNC: 102 MMOL/L (ref 95–110)
CO2 SERPL-SCNC: 29 MMOL/L (ref 23–29)
CREAT SERPL-MCNC: 0.7 MG/DL (ref 0.5–1.4)
DIFFERENTIAL METHOD: NORMAL
EOSINOPHIL # BLD AUTO: 0.3 K/UL (ref 0–0.5)
EOSINOPHIL NFR BLD: 5.2 % (ref 0–8)
ERYTHROCYTE [DISTWIDTH] IN BLOOD BY AUTOMATED COUNT: 13.2 % (ref 11.5–14.5)
EST. GFR  (NO RACE VARIABLE): >60 ML/MIN/1.73 M^2
GLUCOSE SERPL-MCNC: 99 MG/DL (ref 70–110)
HCT VFR BLD AUTO: 42.3 % (ref 37–48.5)
HGB BLD-MCNC: 14.1 G/DL (ref 12–16)
IMM GRANULOCYTES # BLD AUTO: 0.01 K/UL (ref 0–0.04)
IMM GRANULOCYTES NFR BLD AUTO: 0.2 % (ref 0–0.5)
LYMPHOCYTES # BLD AUTO: 1.2 K/UL (ref 1–4.8)
LYMPHOCYTES NFR BLD: 23.7 % (ref 18–48)
MCH RBC QN AUTO: 29.4 PG (ref 27–31)
MCHC RBC AUTO-ENTMCNC: 33.3 G/DL (ref 32–36)
MCV RBC AUTO: 88 FL (ref 82–98)
MONOCYTES # BLD AUTO: 0.5 K/UL (ref 0.3–1)
MONOCYTES NFR BLD: 8.6 % (ref 4–15)
NEUTROPHILS # BLD AUTO: 3.2 K/UL (ref 1.8–7.7)
NEUTROPHILS NFR BLD: 61.3 % (ref 38–73)
NRBC BLD-RTO: 0 /100 WBC
PLATELET # BLD AUTO: 280 K/UL (ref 150–450)
PMV BLD AUTO: 9.4 FL (ref 9.2–12.9)
POTASSIUM SERPL-SCNC: 3.6 MMOL/L (ref 3.5–5.1)
RBC # BLD AUTO: 4.8 M/UL (ref 4–5.4)
SODIUM SERPL-SCNC: 143 MMOL/L (ref 136–145)
WBC # BLD AUTO: 5.24 K/UL (ref 3.9–12.7)

## 2022-08-08 PROCEDURE — 36415 COLL VENOUS BLD VENIPUNCTURE: CPT | Performed by: ORTHOPAEDIC SURGERY

## 2022-08-08 PROCEDURE — 93010 EKG 12-LEAD: ICD-10-PCS | Mod: ,,, | Performed by: INTERNAL MEDICINE

## 2022-08-08 PROCEDURE — 86900 BLOOD TYPING SEROLOGIC ABO: CPT | Performed by: ORTHOPAEDIC SURGERY

## 2022-08-08 PROCEDURE — 99900104 DSU ONLY-NO CHARGE-EA ADD'L HR (STAT)

## 2022-08-08 PROCEDURE — 73700 CT KNEE WITHOUT CONTRAST RIGHT W/MAKO PROTOCOL: ICD-10-PCS | Mod: 26,RT,, | Performed by: RADIOLOGY

## 2022-08-08 PROCEDURE — 80048 BASIC METABOLIC PNL TOTAL CA: CPT | Performed by: ORTHOPAEDIC SURGERY

## 2022-08-08 PROCEDURE — 99900103 DSU ONLY-NO CHARGE-INITIAL HR (STAT)

## 2022-08-08 PROCEDURE — 93005 ELECTROCARDIOGRAM TRACING: CPT

## 2022-08-08 PROCEDURE — 85025 COMPLETE CBC W/AUTO DIFF WBC: CPT | Performed by: ORTHOPAEDIC SURGERY

## 2022-08-08 PROCEDURE — 73700 CT LOWER EXTREMITY W/O DYE: CPT | Mod: 26,RT,, | Performed by: RADIOLOGY

## 2022-08-08 PROCEDURE — 73700 CT LOWER EXTREMITY W/O DYE: CPT | Mod: TC,RT

## 2022-08-08 PROCEDURE — 86850 RBC ANTIBODY SCREEN: CPT | Performed by: ORTHOPAEDIC SURGERY

## 2022-08-08 PROCEDURE — 93010 ELECTROCARDIOGRAM REPORT: CPT | Mod: ,,, | Performed by: INTERNAL MEDICINE

## 2022-08-08 PROCEDURE — 87081 CULTURE SCREEN ONLY: CPT | Performed by: ORTHOPAEDIC SURGERY

## 2022-08-08 NOTE — PRE ADMISSION SCREENING
"               [facility] Risk Assessment Scale    Patient Name: Veronique Johnson  YOB: 1945  MRN: [id number]            RIsk Factor Measure Recommendation Patient Data Scale/Score   BMI >40 Reconsider surgery, weight loss   Estimated body mass index is 24.19 kg/m² as calculated from the following:    Height as of this encounter: 5' 1" (1.549 m).    Weight as of this encounter: 58.1 kg (128 lb).   [x] 0 = 1 - 24.9  [] 1 = 25-29.9  [] 2 = 30-34.9  [] 3 = 35-39.9  [] 4 = 40-44.9  [] 5 = 45-99.9   Hemoglobin AIC (if applicable) >9 Delay surgery until DM under control  Refer for:  · Nutrition Therapy  · Exercise   · Medication    Lab Results   Component Value Date    HGBA1C 5.4 12/02/2021       Lab Results   Component Value Date    GLU 99 08/08/2022      [x] 0 = 4.0-5.6  [] 1 = 5.7-6.4  [] 2 = 6.5-6.9  [] 3 = 7.0-7.9  [] 4 = 8.0-8.9  [] 5 = 9.0-12   Hemoglobin (Anemia) <9 Delay surgery   Correct anemia Lab Results   Component Value Date    HGB 14.1 08/08/2022    [] 20 - <9.0                    Albumin <3 Delay surgery &Workup Lab Results   Component Value Date    ALBUMIN 4.2 07/13/2022    [] 20 - <3.0   Smoking Cessation >4 Weeks Delay Surgery  Refer to OP Cessation Class    Former Smoker  Quit: 2001 [] 20 - current smoker                                _____ PPD                    Hx of MI, PE, Arrhythmia, CVA, DVT <30 Days Delay Surgery    N/A [] 20      Infection Variable Delay surgery and re-evaluate   N/A [] 20 - recent/current infection     Depression (PHQ) >10 out of 27 Delay Surgery and re-evaluate   Medication   Counseling              [x] 0     []1     []2     []3      []4      [] 5                    (1-4)      (5-9)  (10-14)  (15-19)   (20-27)     Memory Impairment & Memory loss (Mini-Cog Screening Tool) Advanced dementia and/or Parkinson's Reconsider surgery     [x] 0     []1     []2     []3     []4     [] 5     Physical Conditioning (Modified AM-PAC Per Physical Therapy at [facility]) Unable to " ambulate on day of surgery Delay surgery and re-evaluate  Pre-Rehabilitation   (PT evaluation)       [x]  0   []4       []8     []12        []16     []20       (<20%)   (<40%)   (<60%)   (<80% )    (>80%)     Home Environment/Caregiver support  (Per /Navigator Interview)    Availability of basic services and/or approprate assistance during post-operative period Delay surgery and re-evaluate   Safe home environment   Health   1 week post-surgery   Transportation  availability   Ability to obtain DME/Medications post-op    [x] 0     []1     []2     []3     []4     [] 5  [x] 0     []1     []2     []3     []4     [] 5  [x] 0     []1     []2     []3     []4     [] 5  [x] 0     []1     []2     []3     []4     [] 5         MD Contact: Dr. Le Comments:  Total Score:  0

## 2022-08-08 NOTE — PRE ADMISSION SCREENING
Patient Name: Veronique Johnson  YOB: 1945   MRN: [id number]     [facility]   Basic Mobility Inpatient Short Form 6 Clicks         How much difficulty does the patient currently have  Unable  A Lot  A Little  None      1. Turning over in bed (including adjusting bedclothes, sheets and blankets)?     1 []    2 []    3 []    4 [x]        2. Sitting down on and standing up from a chair with arms (e.g., wheelchair, bedside commode, etc.)     1 []  2 []  3 [x]     4 []      3. Moving from lying on back to sitting on the side of the bed?     1 []  2 []  3 []    4 [x]    How much help from another person does the patient currently need  Total  A Lot  A Little  None      4. Moving to and from a bed to a chair (including a wheelchair)?    1 []  2 []  3 []    4 [x]      5. Need to walk in hospital room?    1 []  2 []  3 []    4 [x]      6. Climbing 3-5 steps with a railing?    1 []  2 []  3 []    4 [x]       Raw Score:      23            CMS 0-100% Score:  11.20          %   Standardized Score:    56.93           CMS Modifier:     CI                                     [facility]   Basic Mobility Inpatient Short Form 6 Clicks Score Conversion Table*         *Use this form to convert -PAC Basic Mobility Inpatient Raw Scores.   [facility] Inpatient Basic Mobility Short Form Scoring Example   1. Add the number values associated with the response to each item. For example, items totals yield a Raw Score of 21.   2. Match the raw score to the t-Scale scores (t-Scale score = 50.25, SE = 4.69).   3. Find the associated CMS % (CMS % = 28.97%).   4. Locate the correct CMS Functional Modifier Code, or G Code (G code = CJ)     NOTE: Each -PAC Short Form has a separate conversion table. Make sure that you use the correct conversion table.       Instruction Manual - page 45 contains conversion table

## 2022-08-08 NOTE — PRE ADMISSION SCREENING
JOINT CAMP ASSESSMENT    Name Veronique Johnson   MRN [id number]    Age/Sex 76 y.o. female    Surgeon Dr. Shelton Le   Joint Camp Date 8/8/2022   Surgery Date 8/16/2022   Procedure Right Knee Arthroplasty   Insurance Payor: MEDICARE / Plan: MEDICARE PART A & B / Product Type: Government /    Care Team Patient Care Team:  Becky Song MD as PCP - General (Family Medicine)  Eliseo Friend LPN as Care Coordinator (Family Medicine)    Pharmacy   [facility] [address]  [address] [address]  Phone: [phone] Fax: [phone]    [facility] [place] [facility] [address] [address] [facility] [address]  Phone: [phone] Fax: [phone]     AM-PAC Score   23   Risk Assessment Score 0     Past Medical History:   Diagnosis Date    Anemia     Anxiety     Chronic bronchitis with COPD (chronic obstructive pulmonary disease)     Esophageal spasm     Essential tremor     GERD (gastroesophageal reflux disease)     Headache     High cholesterol     Hypertension     Meniere disease     Multiple sclerosis     Muscle spasm     Pancreatic insufficiency        Past Surgical History:   Procedure Laterality Date    CATARACT EXTRACTION, BILATERAL  2006    COLONOSCOPY  09/05/2018    Dr. Leija, in procedures: diverticulosis, 4 colon polyps removed, adenomatous polyps x3 & benign mucosal polyps    COLONOSCOPY N/A 4/22/2021    Procedure: COLONOSCOPY;  Surgeon: Dwayne Santoyo MD;  Location: [facility] ENDO;  Service: Endoscopy;  Laterality: N/A;    CORONARY ANGIOGRAPHY N/A 10/14/2021    Procedure: ANGIOGRAM, CORONARY ARTERY;  Surgeon: Mustapha Manzo MD;  Location: [facility] CATH;  Service: Cardiology;  Laterality: N/A;    CYSTOSCOPY WITH HYDRODISTENSION OF BLADDER N/A 6/5/2019    Procedure: CYSTOSCOPY, WITH BLADDER HYDRODISTENSION;  Surgeon: Marko Burton MD;  Location: [facility] OR;  Service: OB/GYN;  Laterality:  N/A;    DILATION AND CURETTAGE OF UTERUS  1966    ESOPHAGOGASTRODUODENOSCOPY  09/05/2018    Dr. Leija, in procedures: gastritis; biopsy: duodenum unremarkable, with focal benign gastric metaplasia, stomach- minimal chronic gastritis, negative for h pylori    ESOPHAGOGASTRODUODENOSCOPY N/A 4/22/2021    Procedure: EGD (ESOPHAGOGASTRODUODENOSCOPY);  Surgeon: Dwayne Santoyo MD;  Location: [facility] ENDO;  Service: Endoscopy;  Laterality: N/A;    EYE SURGERY      HEMORRHOID SURGERY  1997    KNEE ARTHROSCOPY W/ MENISCECTOMY Right 8/13/2021    Procedure: ARTHROSCOPY, KNEE, WITH MENISCECTOMY;  Surgeon: Shelton Le MD;  Location: [facility] OR;  Service: Orthopedics;  Laterality: Right;    KNEE ARTHROSCOPY W/ MENISCECTOMY Right 4/8/2022    Procedure: ARTHROSCOPY, KNEE, WITH MENISCECTOMY;  Surgeon: Shelton Le MD;  Location: [facility] OR;  Service: Orthopedics;  Laterality: Right;  medial meniscectomy    LEFT HEART CATHETERIZATION Right 10/14/2021    Procedure: Left heart cath;  Surgeon: Mustapha Manzo MD;  [facility];  Service: Cardiology;  Laterality: Right;    TONSILLECTOMY  1954    TUBAL LIGATION  1990         Home Enviroment     Living Arrangement: Lives with spouse  Home Environment: 1-story house/ trailer, tub-shower  Home Safety Concerns: Pets in the home: dogs (1).    DISCHARGE CAREGIVER/SUPPORT SYSTEM     Identified post-op caregiver: Patient has spouse / significant other.  Patient's caregiver(s) will be able to provide physical assistance. Patient will have someone to assist overnight.      Caregiver present at pre-op interview:  No      PRE-OPERATIVE FUNCTIONAL STATUS     Employment: Retired    Pre-op Functional Status: Patient is independent with mobility/ambulation, transfers, ADL's, IADL's.    Use of assistive device for ambulation: none  ADL: self care  ADL Limitations: difficulty with walking  Medical Restrictions: Unstable ambulation and Decreased range of motions in  extremities    POTENTIAL BARRIERS TO DISCHARGE/POTENTIAL POST-OP COMPLICATIONS     Patient with hx of anemia, COPD, HTN, pancreatic insufficiency. POSSIBLE SAME DAY DISCHARGE.    DISCHARGE PLAN     Expected LOS of 1 days or less for joint replacement discussed with patient. We also discussed a discharge path of HH for approximately two weeks with a transition to outpatient PT on the third week given no post-op complications.      Patient in agreement with discharge plan: Yes    Discharge to: Discharge home with home health (PT/OT) x2 weeks with transition to outpatient PT     HH:  [facility] ([place], [place]).  Patient disclosure form completed and sent to case management for upload to the medical record.      OP PT: [facility] ([place], [place])     Home DME: bedside commode and shower chair    Needed DME at D/C: rolling walker     Rx: Per Dr. Le at discharge     Meds to Beds: Yes  Patient expected to discharge on Aspirin 81mg by mouth twice daily for DVT prophylaxis.

## 2022-08-08 NOTE — DISCHARGE INSTRUCTIONS
To confirm, Your doctor has instructed you that surgery is scheduled for: 8/16/22    PAULA [phone]    Please report to [facility] the morning of surgery. You must check-in and receive a wristband before going to your procedure.    Pre-Op will call the afternoon prior to surgery between 1:00 and 6:00 PM with the final arrival time.  Phone number: [phone]    PLEASE NOTE:  The surgery schedule has many variables which may affect the time of your surgery case.  Family members should be available if your surgery time changes.  Plan to be here the day of your procedure between 4-6 hours.    MEDICATIONS:  TAKE ONLY THESE MEDICATIONS WITH A SMALL SIP OF WATER THE MORNING OF YOUR PROCEDURE:    SEE LIST    DO NOT TAKE THESE MEDICATIONS 5-7 DAYS PRIOR to your procedure or per your surgeon's request:   ASPIRIN, ALEVE, ADVIL, IBUPROFEN, FISH OIL VITAMIN E((MULTIVITAMIN), HERBALS    (May take Tylenol)    ONLY if you are prescribed any types of blood thinners such as:  Aspirin, Coumadin, Plavix, Pradaxa, Xarelto, Aggrenox, Effient, Eliquis, Savasya, Brilinta, or any other, ask your surgeon whether you should stop taking them and how long before surgery you should stop.  You may also need to verify with the prescribing physician if it is ok to stop your medication.      INSTRUCTIONS IMPORTANT!!  Do not eat or drink anything between midnight and the time of your procedure- this includes gum, mints, and candy.  Do not smoke or drink alcoholic beverages 24 hours prior to your procedure.  Shower the night before AND the morning of your procedure with a Chlorhexidine wash such as Hibiclens or Dial antibacterial soap from the neck down.  Do not get it on your face or in your eyes.  You may use your own shampoo and face wash. This helps your skin to be as bacteria free as possible.    If you wear contact lenses, dentures, hearing aids or glasses, bring a container to put them in during  surgery and give to a family member for safe keeping.  Please leave all jewelry, piercing's and valuables at home.   DO NOT remove hair from the surgery site.  Do not shave the incision site unless you are given specific instructions to do so.    ONLY if you have been diagnosed with sleep apnea please bring your C-PAP machine.  ONLY if you wear home oxygen please bring your portable oxygen tank the day of your procedure.  ONLY if you have a history of OPEN HEART SURGERY you will need a clearance from your Cardiologist per Anesthesia.      ONLY for patients requiring bowel prep, written instructions will be given by your doctor's office.  ONLY if you have a neuro stimulator, please bring the controller with you the morning of surgery  ONLY if a type and screen test is needed before surgery, please return:  If your doctor has scheduled you for an overnight stay, bring a small overnight bag with any personal items you need.  Make arrangements in advance for transportation home by a responsible adult.  It is not safe to drive a vehicle during the 24 hours after anesthesia.       [facility] Visitor Policy  Effective July 25, 2022    [facility] will make masks available at entrances and will enforce mask wearing in all common and patient  care areas for employees, patients, and visitors. Masks must be worn properly, ensuring that the nose and mouth  are covered. Children under 2 years of age are excluded from this requirement.    [facility] will continue routine visitation for COVID-19 negative patients, including inpatients, outpatients, and  procedural areas. Visitors will be asked to leave if they exhibit symptoms of respiratory infection, have tested  positive for COVID -19 in the last ten days, have a pending COVID-19 test for symptoms, are unable or refuse  to wear a mask OR do not comply with current policy    All [facility] facilities and properties are tobacco free.  Smoking is NOT allowed.   If you have any  questions about these instructions, call Pre-Op Admit  Nursing at [phone] or the Pre-Op Day Surgery Unit at [phone].

## 2022-08-10 LAB — MRSA SPEC QL CULT: NORMAL

## 2022-08-11 DIAGNOSIS — M17.11 PRIMARY OSTEOARTHRITIS OF RIGHT KNEE: Primary | ICD-10-CM

## 2022-08-13 RX ORDER — OMEPRAZOLE 20 MG/1
20 CAPSULE, DELAYED RELEASE ORAL DAILY
Qty: 90 CAPSULE | Refills: 3 | Status: SHIPPED | OUTPATIENT
Start: 2022-08-13 | End: 2023-09-14

## 2022-08-13 NOTE — TELEPHONE ENCOUNTER
Refill Decision Note   Veronique Johnson  is requesting a refill authorization.  Brief Assessment and Rationale for Refill:  Approve     Medication Therapy Plan:       Medication Reconciliation Completed: No   Comments:     No Care Gaps recommended.     Note composed:1:56 PM 08/13/2022

## 2022-08-13 NOTE — TELEPHONE ENCOUNTER
No new care gaps identified.  [facility] Embedded Care Gaps. Reference number: [id number]. 8/13/2022   8:49:22 AM ABEBAT

## 2022-08-15 ENCOUNTER — ANESTHESIA EVENT (OUTPATIENT)
Dept: SURGERY | Facility: HOSPITAL | Age: 77
End: 2022-08-15
Payer: MEDICARE

## 2022-08-15 DIAGNOSIS — M17.11 PRIMARY OSTEOARTHRITIS OF RIGHT KNEE: Primary | ICD-10-CM

## 2022-08-15 RX ORDER — IBUPROFEN 600 MG/1
600 TABLET ORAL 3 TIMES DAILY
Qty: 30 TABLET | Refills: 0 | Status: SHIPPED | OUTPATIENT
Start: 2022-08-15 | End: 2022-09-13

## 2022-08-15 RX ORDER — SODIUM CHLORIDE, SODIUM LACTATE, POTASSIUM CHLORIDE, CALCIUM CHLORIDE 600; 310; 30; 20 MG/100ML; MG/100ML; MG/100ML; MG/100ML
500 INJECTION, SOLUTION INTRAVENOUS ONCE
Status: CANCELLED | OUTPATIENT
Start: 2022-08-15 | End: 2022-08-15

## 2022-08-15 RX ORDER — ACETAMINOPHEN 500 MG
1000 TABLET ORAL EVERY 8 HOURS PRN
Qty: 30 TABLET | Refills: 0 | Status: SHIPPED | OUTPATIENT
Start: 2022-08-15 | End: 2022-09-13

## 2022-08-15 RX ORDER — CYCLOBENZAPRINE HCL 10 MG
10 TABLET ORAL 3 TIMES DAILY PRN
Qty: 30 TABLET | Refills: 0 | Status: SHIPPED | OUTPATIENT
Start: 2022-08-15 | End: 2022-08-25

## 2022-08-15 RX ORDER — ASPIRIN 81 MG/1
81 TABLET ORAL 2 TIMES DAILY
Qty: 56 TABLET | Refills: 0 | Status: SHIPPED | OUTPATIENT
Start: 2022-08-15 | End: 2022-11-09

## 2022-08-15 RX ORDER — ONDANSETRON 4 MG/1
4 TABLET, FILM COATED ORAL EVERY 6 HOURS PRN
Qty: 30 TABLET | Refills: 0 | Status: SHIPPED | OUTPATIENT
Start: 2022-08-15 | End: 2022-09-13

## 2022-08-15 RX ORDER — SODIUM CHLORIDE, SODIUM LACTATE, POTASSIUM CHLORIDE, CALCIUM CHLORIDE 600; 310; 30; 20 MG/100ML; MG/100ML; MG/100ML; MG/100ML
10 INJECTION, SOLUTION INTRAVENOUS CONTINUOUS
Status: CANCELLED | OUTPATIENT
Start: 2022-08-15

## 2022-08-15 RX ORDER — OXYCODONE AND ACETAMINOPHEN 5; 325 MG/1; MG/1
1 TABLET ORAL EVERY 6 HOURS PRN
Qty: 28 TABLET | Refills: 0 | Status: SHIPPED | OUTPATIENT
Start: 2022-08-15 | End: 2022-08-23 | Stop reason: SDUPTHER

## 2022-08-16 ENCOUNTER — ANESTHESIA (OUTPATIENT)
Dept: SURGERY | Facility: HOSPITAL | Age: 77
End: 2022-08-16
Payer: MEDICARE

## 2022-08-16 ENCOUNTER — HOSPITAL ENCOUNTER (OUTPATIENT)
Facility: HOSPITAL | Age: 77
Discharge: HOME | End: 2022-08-16
Attending: ORTHOPAEDIC SURGERY | Admitting: ORTHOPAEDIC SURGERY
Payer: MEDICARE

## 2022-08-16 DIAGNOSIS — Z01.818 PRE-OP TESTING: ICD-10-CM

## 2022-08-16 DIAGNOSIS — M17.10 ARTHRITIS OF KNEE: ICD-10-CM

## 2022-08-16 DIAGNOSIS — M17.11 PRIMARY OSTEOARTHRITIS OF RIGHT KNEE: ICD-10-CM

## 2022-08-16 PROCEDURE — 36000712 HC OR TIME LEV V 1ST 15 MIN: Performed by: ORTHOPAEDIC SURGERY

## 2022-08-16 PROCEDURE — C1713 ANCHOR/SCREW BN/BN,TIS/BN: HCPCS | Performed by: ORTHOPAEDIC SURGERY

## 2022-08-16 PROCEDURE — 63600175 PHARM REV CODE 636 W HCPCS: Performed by: ORTHOPAEDIC SURGERY

## 2022-08-16 PROCEDURE — 63600175 PHARM REV CODE 636 W HCPCS: Performed by: NURSE ANESTHETIST, CERTIFIED REGISTERED

## 2022-08-16 PROCEDURE — 20985 CPTR-ASST DIR MS PX: CPT | Mod: ,,, | Performed by: ORTHOPAEDIC SURGERY

## 2022-08-16 PROCEDURE — 64447 PR NERVE BLOCK INJ, ANES/STEROID, FEMORAL, INCL IMAG GUIDANCE: ICD-10-PCS | Mod: 59,RT,, | Performed by: ANESTHESIOLOGY

## 2022-08-16 PROCEDURE — 25000003 PHARM REV CODE 250: Performed by: ANESTHESIOLOGY

## 2022-08-16 PROCEDURE — 37000008 HC ANESTHESIA 1ST 15 MINUTES: Performed by: ORTHOPAEDIC SURGERY

## 2022-08-16 PROCEDURE — 71000015 HC POSTOP RECOV 1ST HR: Performed by: ORTHOPAEDIC SURGERY

## 2022-08-16 PROCEDURE — 97162 PT EVAL MOD COMPLEX 30 MIN: CPT

## 2022-08-16 PROCEDURE — 27446 REVISION OF KNEE JOINT: CPT | Mod: RT,,, | Performed by: ORTHOPAEDIC SURGERY

## 2022-08-16 PROCEDURE — 25000003 PHARM REV CODE 250: Performed by: ORTHOPAEDIC SURGERY

## 2022-08-16 PROCEDURE — 37000009 HC ANESTHESIA EA ADD 15 MINS: Performed by: ORTHOPAEDIC SURGERY

## 2022-08-16 PROCEDURE — 97116 GAIT TRAINING THERAPY: CPT

## 2022-08-16 PROCEDURE — 63600175 PHARM REV CODE 636 W HCPCS: Performed by: ANESTHESIOLOGY

## 2022-08-16 PROCEDURE — 76942 ECHO GUIDE FOR BIOPSY: CPT | Performed by: ANESTHESIOLOGY

## 2022-08-16 PROCEDURE — 20985 PR CPTR-ASST SURGICAL NAVIGATION IMAGE-LESS: ICD-10-PCS | Mod: ,,, | Performed by: ORTHOPAEDIC SURGERY

## 2022-08-16 PROCEDURE — D9220A PRA ANESTHESIA: ICD-10-PCS | Mod: CRNA,,, | Performed by: NURSE ANESTHETIST, CERTIFIED REGISTERED

## 2022-08-16 PROCEDURE — 71000033 HC RECOVERY, INTIAL HOUR: Performed by: ORTHOPAEDIC SURGERY

## 2022-08-16 PROCEDURE — 76942 ECHO GUIDE FOR BIOPSY: CPT | Mod: 26,,, | Performed by: ANESTHESIOLOGY

## 2022-08-16 PROCEDURE — 25000003 PHARM REV CODE 250: Performed by: NURSE ANESTHETIST, CERTIFIED REGISTERED

## 2022-08-16 PROCEDURE — 27201423 OPTIME MED/SURG SUP & DEVICES STERILE SUPPLY: Performed by: ORTHOPAEDIC SURGERY

## 2022-08-16 PROCEDURE — 27200688 HC TRAY, SPINAL-HYPER/ ISOBARIC: Performed by: ANESTHESIOLOGY

## 2022-08-16 PROCEDURE — 99900103 DSU ONLY-NO CHARGE-INITIAL HR (STAT): Performed by: ORTHOPAEDIC SURGERY

## 2022-08-16 PROCEDURE — 99900104 DSU ONLY-NO CHARGE-EA ADD'L HR (STAT): Performed by: ORTHOPAEDIC SURGERY

## 2022-08-16 PROCEDURE — D9220A PRA ANESTHESIA: ICD-10-PCS | Mod: ANES,,, | Performed by: ANESTHESIOLOGY

## 2022-08-16 PROCEDURE — 64447 NJX AA&/STRD FEMORAL NRV IMG: CPT | Mod: 59,RT,, | Performed by: ANESTHESIOLOGY

## 2022-08-16 PROCEDURE — 71000016 HC POSTOP RECOV ADDL HR: Performed by: ORTHOPAEDIC SURGERY

## 2022-08-16 PROCEDURE — 97110 THERAPEUTIC EXERCISES: CPT

## 2022-08-16 PROCEDURE — C1776 JOINT DEVICE (IMPLANTABLE): HCPCS | Performed by: ORTHOPAEDIC SURGERY

## 2022-08-16 PROCEDURE — 76942 PR U/S GUIDANCE FOR NEEDLE GUIDANCE: ICD-10-PCS | Mod: 26,,, | Performed by: ANESTHESIOLOGY

## 2022-08-16 PROCEDURE — 71000039 HC RECOVERY, EACH ADD'L HOUR: Performed by: ORTHOPAEDIC SURGERY

## 2022-08-16 PROCEDURE — 27200750 HC INSULATED NEEDLE/ STIMUPLEX: Performed by: ANESTHESIOLOGY

## 2022-08-16 PROCEDURE — D9220A PRA ANESTHESIA: Mod: CRNA,,, | Performed by: NURSE ANESTHETIST, CERTIFIED REGISTERED

## 2022-08-16 PROCEDURE — 27446 PR PLASTY KNEE,MED OR LAT COMPARTMT: ICD-10-PCS | Mod: RT,,, | Performed by: ORTHOPAEDIC SURGERY

## 2022-08-16 PROCEDURE — C9290 INJ, BUPIVACAINE LIPOSOME: HCPCS | Performed by: ANESTHESIOLOGY

## 2022-08-16 PROCEDURE — 63600175 PHARM REV CODE 636 W HCPCS

## 2022-08-16 PROCEDURE — D9220A PRA ANESTHESIA: Mod: ANES,,, | Performed by: ANESTHESIOLOGY

## 2022-08-16 PROCEDURE — 36000713 HC OR TIME LEV V EA ADD 15 MIN: Performed by: ORTHOPAEDIC SURGERY

## 2022-08-16 DEVICE — MCK ONLAY TIBIA BASEPLATE
Type: IMPLANTABLE DEVICE | Site: KNEE | Status: FUNCTIONAL
Brand: RESTORIS

## 2022-08-16 DEVICE — TOBRA FULL DOSE ANTIBIOTIC BONE CEMENT, 10 PACK CATALOG NUMBER IS 6197-9-010
Type: IMPLANTABLE DEVICE | Site: KNEE | Status: FUNCTIONAL
Brand: SIMPLEX

## 2022-08-16 DEVICE — MCK FEMORAL COMPONENT
Type: IMPLANTABLE DEVICE | Site: KNEE | Status: FUNCTIONAL
Brand: RESTORIS

## 2022-08-16 DEVICE — MCK ONLAY TIBIAL INSERT
Type: IMPLANTABLE DEVICE | Site: KNEE | Status: FUNCTIONAL
Brand: RESTORIS

## 2022-08-16 RX ORDER — DIPHENHYDRAMINE HYDROCHLORIDE 50 MG/ML
12.5 INJECTION INTRAMUSCULAR; INTRAVENOUS ONCE AS NEEDED
Status: DISCONTINUED | OUTPATIENT
Start: 2022-08-16 | End: 2022-08-16 | Stop reason: HOSPADM

## 2022-08-16 RX ORDER — FENTANYL CITRATE 50 UG/ML
INJECTION, SOLUTION INTRAMUSCULAR; INTRAVENOUS
Status: DISCONTINUED | OUTPATIENT
Start: 2022-08-16 | End: 2022-08-16

## 2022-08-16 RX ORDER — LIDOCAINE HYDROCHLORIDE 20 MG/ML
INJECTION INTRAVENOUS
Status: DISCONTINUED | OUTPATIENT
Start: 2022-08-16 | End: 2022-08-16

## 2022-08-16 RX ORDER — ACETAMINOPHEN 10 MG/ML
INJECTION, SOLUTION INTRAVENOUS
Status: DISCONTINUED | OUTPATIENT
Start: 2022-08-16 | End: 2022-08-16

## 2022-08-16 RX ORDER — HYDROMORPHONE HYDROCHLORIDE 2 MG/ML
0.2 INJECTION, SOLUTION INTRAMUSCULAR; INTRAVENOUS; SUBCUTANEOUS EVERY 5 MIN PRN
Status: DISCONTINUED | OUTPATIENT
Start: 2022-08-16 | End: 2022-08-16 | Stop reason: HOSPADM

## 2022-08-16 RX ORDER — MUPIROCIN 20 MG/G
OINTMENT TOPICAL
Status: DISCONTINUED | OUTPATIENT
Start: 2022-08-16 | End: 2022-08-16 | Stop reason: HOSPADM

## 2022-08-16 RX ORDER — DEXAMETHASONE SODIUM PHOSPHATE 4 MG/ML
INJECTION, SOLUTION INTRA-ARTICULAR; INTRALESIONAL; INTRAMUSCULAR; INTRAVENOUS; SOFT TISSUE
Status: DISCONTINUED | OUTPATIENT
Start: 2022-08-16 | End: 2022-08-16

## 2022-08-16 RX ORDER — PHENYLEPHRINE HYDROCHLORIDE 10 MG/ML
INJECTION INTRAVENOUS
Status: DISCONTINUED | OUTPATIENT
Start: 2022-08-16 | End: 2022-08-16

## 2022-08-16 RX ORDER — FENTANYL CITRATE 50 UG/ML
25 INJECTION, SOLUTION INTRAMUSCULAR; INTRAVENOUS EVERY 5 MIN PRN
Status: DISCONTINUED | OUTPATIENT
Start: 2022-08-16 | End: 2022-08-16 | Stop reason: HOSPADM

## 2022-08-16 RX ORDER — BUPIVACAINE HYDROCHLORIDE 5 MG/ML
INJECTION, SOLUTION EPIDURAL; INTRACAUDAL
Status: COMPLETED | OUTPATIENT
Start: 2022-08-16 | End: 2022-08-16

## 2022-08-16 RX ORDER — TRANEXAMIC ACID 100 MG/ML
1000 INJECTION, SOLUTION INTRAVENOUS
Status: DISCONTINUED | OUTPATIENT
Start: 2022-08-16 | End: 2022-08-16 | Stop reason: HOSPADM

## 2022-08-16 RX ORDER — NAPROXEN SODIUM 220 MG/1
81 TABLET, FILM COATED ORAL 2 TIMES DAILY
Status: DISCONTINUED | OUTPATIENT
Start: 2022-08-16 | End: 2022-08-16 | Stop reason: HOSPADM

## 2022-08-16 RX ORDER — PROPOFOL 10 MG/ML
VIAL (ML) INTRAVENOUS CONTINUOUS PRN
Status: DISCONTINUED | OUTPATIENT
Start: 2022-08-16 | End: 2022-08-16

## 2022-08-16 RX ORDER — LIDOCAINE HYDROCHLORIDE 10 MG/ML
1 INJECTION, SOLUTION EPIDURAL; INFILTRATION; INTRACAUDAL; PERINEURAL ONCE
Status: DISCONTINUED | OUTPATIENT
Start: 2022-08-16 | End: 2022-08-16

## 2022-08-16 RX ORDER — CLINDAMYCIN PHOSPHATE 900 MG/50ML
900 INJECTION, SOLUTION INTRAVENOUS
Status: COMPLETED | OUTPATIENT
Start: 2022-08-16 | End: 2022-08-16

## 2022-08-16 RX ORDER — ONDANSETRON 2 MG/ML
4 INJECTION INTRAMUSCULAR; INTRAVENOUS ONCE AS NEEDED
Status: DISCONTINUED | OUTPATIENT
Start: 2022-08-16 | End: 2022-08-16 | Stop reason: HOSPADM

## 2022-08-16 RX ORDER — ONDANSETRON 2 MG/ML
INJECTION INTRAMUSCULAR; INTRAVENOUS
Status: DISCONTINUED | OUTPATIENT
Start: 2022-08-16 | End: 2022-08-16

## 2022-08-16 RX ORDER — SODIUM CHLORIDE 0.9 % (FLUSH) 0.9 %
3 SYRINGE (ML) INJECTION EVERY 8 HOURS
Status: DISCONTINUED | OUTPATIENT
Start: 2022-08-16 | End: 2022-08-16 | Stop reason: HOSPADM

## 2022-08-16 RX ORDER — MIDAZOLAM HYDROCHLORIDE 1 MG/ML
INJECTION, SOLUTION INTRAMUSCULAR; INTRAVENOUS
Status: DISCONTINUED | OUTPATIENT
Start: 2022-08-16 | End: 2022-08-16

## 2022-08-16 RX ORDER — MEPERIDINE HYDROCHLORIDE 50 MG/ML
12.5 INJECTION INTRAMUSCULAR; INTRAVENOUS; SUBCUTANEOUS ONCE AS NEEDED
Status: DISCONTINUED | OUTPATIENT
Start: 2022-08-16 | End: 2022-08-16 | Stop reason: HOSPADM

## 2022-08-16 RX ORDER — OXYCODONE HYDROCHLORIDE 5 MG/1
5 TABLET ORAL ONCE AS NEEDED
Status: COMPLETED | OUTPATIENT
Start: 2022-08-16 | End: 2022-08-16

## 2022-08-16 RX ORDER — TRANEXAMIC ACID 100 MG/ML
INJECTION, SOLUTION INTRAVENOUS
Status: DISCONTINUED | OUTPATIENT
Start: 2022-08-16 | End: 2022-08-16

## 2022-08-16 RX ORDER — PROCHLORPERAZINE EDISYLATE 5 MG/ML
5 INJECTION INTRAMUSCULAR; INTRAVENOUS EVERY 30 MIN PRN
Status: DISCONTINUED | OUTPATIENT
Start: 2022-08-16 | End: 2022-08-16 | Stop reason: HOSPADM

## 2022-08-16 RX ADMIN — CLINDAMYCIN PHOSPHATE 900 MG: 18 INJECTION, SOLUTION INTRAVENOUS at 09:08

## 2022-08-16 RX ADMIN — PROPOFOL 50 MCG/KG/MIN: 10 INJECTION, EMULSION INTRAVENOUS at 09:08

## 2022-08-16 RX ADMIN — SODIUM CHLORIDE, SODIUM GLUCONATE, SODIUM ACETATE, POTASSIUM CHLORIDE, MAGNESIUM CHLORIDE, SODIUM PHOSPHATE, DIBASIC, AND POTASSIUM PHOSPHATE: .53; .5; .37; .037; .03; .012; .00082 INJECTION, SOLUTION INTRAVENOUS at 10:08

## 2022-08-16 RX ADMIN — BUPIVACAINE 20 ML: 13.3 INJECTION, SUSPENSION, LIPOSOMAL INFILTRATION at 06:08

## 2022-08-16 RX ADMIN — MIDAZOLAM 2 MG: 1 INJECTION INTRAMUSCULAR; INTRAVENOUS at 06:08

## 2022-08-16 RX ADMIN — LIDOCAINE HYDROCHLORIDE 20 MG: 20 INJECTION, SOLUTION INTRAVENOUS at 09:08

## 2022-08-16 RX ADMIN — ACETAMINOPHEN 1000 MG: 10 INJECTION, SOLUTION INTRAVENOUS at 09:08

## 2022-08-16 RX ADMIN — TRANEXAMIC ACID 600 MG: 100 INJECTION, SOLUTION INTRAVENOUS at 09:08

## 2022-08-16 RX ADMIN — OXYCODONE 5 MG: 5 TABLET ORAL at 12:08

## 2022-08-16 RX ADMIN — MUPIROCIN: 20 OINTMENT TOPICAL at 06:08

## 2022-08-16 RX ADMIN — SODIUM CHLORIDE, SODIUM GLUCONATE, SODIUM ACETATE, POTASSIUM CHLORIDE, MAGNESIUM CHLORIDE, SODIUM PHOSPHATE, DIBASIC, AND POTASSIUM PHOSPHATE: .53; .5; .37; .037; .03; .012; .00082 INJECTION, SOLUTION INTRAVENOUS at 06:08

## 2022-08-16 RX ADMIN — TRANEXAMIC ACID 600 MG: 100 INJECTION, SOLUTION INTRAVENOUS at 10:08

## 2022-08-16 RX ADMIN — MEPIVACAINE HYDROCHLORIDE 3 ML: 15 INJECTION, SOLUTION EPIDURAL; INFILTRATION at 09:08

## 2022-08-16 RX ADMIN — FENTANYL CITRATE 50 MCG: 50 INJECTION, SOLUTION INTRAMUSCULAR; INTRAVENOUS at 09:08

## 2022-08-16 RX ADMIN — DEXAMETHASONE SODIUM PHOSPHATE 8 MG: 4 INJECTION, SOLUTION INTRA-ARTICULAR; INTRALESIONAL; INTRAMUSCULAR; INTRAVENOUS; SOFT TISSUE at 09:08

## 2022-08-16 RX ADMIN — FENTANYL CITRATE 50 MCG: 50 INJECTION, SOLUTION INTRAMUSCULAR; INTRAVENOUS at 06:08

## 2022-08-16 RX ADMIN — PHENYLEPHRINE HYDROCHLORIDE 100 MCG: 10 INJECTION INTRAVENOUS at 10:08

## 2022-08-16 RX ADMIN — ONDANSETRON 4 MG: 2 INJECTION INTRAMUSCULAR; INTRAVENOUS at 09:08

## 2022-08-16 RX ADMIN — BUPIVACAINE HYDROCHLORIDE 10 ML: 5 INJECTION, SOLUTION EPIDURAL; INTRACAUDAL; PERINEURAL at 06:08

## 2022-08-16 RX ADMIN — PHENYLEPHRINE HYDROCHLORIDE 100 MCG: 10 INJECTION INTRAVENOUS at 09:08

## 2022-08-16 NOTE — PT/OT/SLP EVAL
Physical Therapy Evaluation    Patient Name:  Veronique Johnson   MRN:  [id number]    Recommendations:     Discharge Recommendations:  home health PT   Discharge Equipment Recommendations: none (has RW)   Barriers to discharge: None    Assessment:     Veronique Johnson is a 76 y.o. female admitted with a medical diagnosis of <principal problem not specified>.  She presents with the following impairments/functional limitations:  weakness, impaired endurance, impaired sensation, impaired functional mobility, gait instability, decreased lower extremity function, decreased ROM, orthopedic precautions .    Pt seen at 8383-3960 for eval and thera ex. Presents with RLE weakness and inability to complete leg lift with knee giveaway. Pt then allowed more time to recover from block.  PT re attempted at 1441 - 1516. Per nurse Tania pt continued to have RLE hypoesthesia and weakness. A knee immobilizer was then obtained from surgery and donned. Pt was able to progress to standing and gait to hallways 200ft with own RW with slow jigna and chair following. Pt used bathroom and voided successfuly. Pt and sister Bryanna educated on donning and doffing of knee immobilizer.  Pt to benefit from HHPT and 24 hr supervision for safety.    Rehab Prognosis: Fair; patient would benefit from acute skilled PT services to address these deficits and reach maximum level of function.    Recent Surgery: Procedure(s) (LRB):  ROBOTIC ARTHROPLASTY, KNEE, UNICOMPARTMENTAL (Right) Day of Surgery    Plan:     During this hospitalization, patient to be seen daily to address the identified rehab impairments via gait training, therapeutic activities, therapeutic exercises and progress toward the following goals:    · Plan of Care Expires:  08/30/22    Subjective   Pt stated is normally active  Pt stated, was told that the block lasts 36 hours  Pt stated will have good support from spouse and sister  Chief Complaint: RLE numbness and weakness  Patient/Family  Comments/goals: get back to walking  Pain/Comfort:  · Pain Rating 1: 0/10    Patients cultural, spiritual, [employer] conflicts given the current situation:      Living Environment:  Home with spouse ( has dementia and forgetful)  Prior to admission, patients level of function was ambulatory and active.  Equipment used at home: none.  DME owned (not currently used): bedside commode.  Upon discharge, patient will have assistance from family.    Objective:     Communicated with nurse Elke prior to session.  Patient found on stretcher with cryotherapy  upon PT entry to room.    General Precautions: Standard, fall   Orthopedic Precautions:RLE weight bearing as tolerated   Braces: Knee immobilizer (knee immobilizer provided for gait training due to RK weakness)  Respiratory Status: Room air    Exams:  · Postural Exam:  Patient presented with the following abnormalities:    · -       Rounded shoulders  · -       Forward head  · -       BMI 24  · RLE ROM: Deficits: RK flexion ~80*  · RLE Strength: Deficits: 3-/5  · LLE ROM: WFL  · LLE Strength: WFL    Functional Mobility:  · Bed Mobility:     · Scooting: minimum assistance  · Supine to Sit: minimum assistance  · Transfers:     · Sit to Stand:  minimum assistance with rolling walker  · Bed to Chair: minimum assistance with  rolling walker  using  Stand Pivot  · Toilet Transfer: minimum assistance with  rolling walker  using  Stand Pivot  · Gait: 200ft with RW min assist with slow jigna and knee immobilizer    Therapeutic Activities and Exercises:   Patient was educated on the importance of OOB activity and functional mobility to negate negative effects of prolonged bed rest during hospitalization, safe transfers and ambulation, and D/C planning   thera ex with AP,QS/GS, assisted SLR  Gait initiated at second visit  Up in wheelchair post PT  Pt and Bryanna educated on use of knee immobilizer    AM-PAC 6 CLICK MOBILITY  Total Score:16     Patient left up in chair with all  lines intact, call button in reach and nurse Tania and sister Bryanna present.    GOALS:   Multidisciplinary Problems     Physical Therapy Goals        Problem: Physical Therapy    Goal Priority Disciplines Outcome Goal Variances Interventions   Physical Therapy Goal     PT, PT/OT Ongoing, Progressing     Description: Goals to be met by: 2022     Patient will increase functional independence with mobility by performin. Supine to sit with Contact Guard Assistance  2. Sit to stand transfer with Contact Guard Assistance  3. Bed to chair transfer with Contact Guard Assistance using Rolling Walker  4. Gait  x 250 feet with Contact Guard Assistance using Rolling Walker.   5. Lower extremity exercise program x20 reps                     History:     Past Medical History:   Diagnosis Date    Anemia     Anxiety     Chronic bronchitis with COPD (chronic obstructive pulmonary disease)     Esophageal spasm     Essential tremor     GERD (gastroesophageal reflux disease)     Headache     High cholesterol     Hypertension     Meniere disease     Multiple sclerosis     Muscle spasm     Pancreatic insufficiency        Past Surgical History:   Procedure Laterality Date    CATARACT EXTRACTION, BILATERAL      COLONOSCOPY  2018    Dr. Leija, in procedures: diverticulosis, 4 colon polyps removed, adenomatous polyps x3 & benign mucosal polyps    COLONOSCOPY N/A 2021    Procedure: COLONOSCOPY;  Surgeon: Dwayne Santoyo MD;  Location: [facility] ENDO;  Service: Endoscopy;  Laterality: N/A;    CORONARY ANGIOGRAPHY N/A 10/14/2021    Procedure: ANGIOGRAM, CORONARY ARTERY;  Surgeon: Mustapha Manzo MD;  Location: [facility] CATH;  Service: Cardiology;  Laterality: N/A;    CYSTOSCOPY WITH HYDRODISTENSION OF BLADDER N/A 2019    Procedure: CYSTOSCOPY, WITH BLADDER HYDRODISTENSION;  Surgeon: Marko Burton MD;  Location: [facility] OR;  Service: OB/GYN;  Laterality: N/A;    DILATION AND CURETTAGE OF UTERUS  1966     ESOPHAGOGASTRODUODENOSCOPY  09/05/2018    Dr. Leija, in procedures: gastritis; biopsy: duodenum unremarkable, with focal benign gastric metaplasia, stomach- minimal chronic gastritis, negative for h pylori    ESOPHAGOGASTRODUODENOSCOPY N/A 4/22/2021    Procedure: EGD (ESOPHAGOGASTRODUODENOSCOPY);  Surgeon: Dwayne Santoyo MD;  Location: [facility];  Service: Endoscopy;  Laterality: N/A;    EYE SURGERY      HEMORRHOID SURGERY  1997    KNEE ARTHROSCOPY W/ MENISCECTOMY Right 8/13/2021    Procedure: ARTHROSCOPY, KNEE, WITH MENISCECTOMY;  Surgeon: Shelton Le MD;  Location: [facility] OR;  Service: Orthopedics;  Laterality: Right;    KNEE ARTHROSCOPY W/ MENISCECTOMY Right 4/8/2022    Procedure: ARTHROSCOPY, KNEE, WITH MENISCECTOMY;  Surgeon: Shelton Le MD;  Location: [facility] OR;  Service: Orthopedics;  Laterality: Right;  medial meniscectomy    LEFT HEART CATHETERIZATION Right 10/14/2021    Procedure: Left heart cath;  Surgeon: Mustapha Manzo MD;  Location: [facility] CATH;  Service: Cardiology;  Laterality: Right;    TONSILLECTOMY  1954    TUBAL LIGATION  1990       Time Tracking:     PT Received On: 08/16/22  PT Start Time: 7466   1243  PT Stop Time: 1237    1302    PT Total Time (min): 35 min   19 min    Billable Minutes: Evaluation 10, Gait Training 35 and Therapeutic Exercise 9      08/16/2022

## 2022-08-16 NOTE — PLAN OF CARE
Patient transferred to postop at this time.  AAOX3.  NAD noted.  Tolerating po intake well with no complaints of nausea/vomiting.  Pain 0/10.  Dressing remains clean, dry and intact to right knee.  Ice man transferred with the patient.

## 2022-08-16 NOTE — DISCHARGE INSTRUCTIONS
"Discharge Instructions: After Your Surgery/Procedure  Youve just had surgery. During surgery you were given medicine called anesthesia to keep you relaxed and free of pain. After surgery you may have some pain or nausea. This is common. Here are some tips for feeling better and getting well after surgery.     Stay on schedule with your medication.   Going home  Your doctor or nurse will show you how to take care of yourself when you go home. He or she will also answer your questions. Have an adult family member or friend drive you home.      For your safety we recommend these precaution for the first 24 hours after your procedure:  Do not drive or use heavy equipment.  Do not make important decisions or sign legal papers.  Do not drink alcohol.  Have someone stay with you, if needed. He or she can watch for problems and help keep you safe.  Your concentration, balance, coordination, and judgement may be impaired for many hours after anesthesia.  Use caution when ambulating or standing up.     You may feel weak and "washed out" after anesthesia and surgery.      Subtle residual effects of general anesthesia or sedation with regional / local anesthesia can last more than 24 hours.  Rest for the remainder of the day or longer if your Doctor/Surgeon has advised you to do so.  Although you may feel normal within the first 24 hours, your reflexes and mental ability may be impaired without you realizing it.  You may feel dizzy, lightheaded or sleepy for 24 hours or longer.      Be sure to go to all follow-up visits with your doctor. And rest after your surgery for as long as your doctor tells you to.  Coping with pain  If you have pain after surgery, pain medicine will help you feel better. Take it as told, before pain becomes severe. Also, ask your doctor or pharmacist about other ways to control pain. This might be with heat, ice, or relaxation. And follow any other instructions your surgeon or nurse gives you.  Tips " for taking pain medicine  To get the best relief possible, remember these points:  Pain medicines can upset your stomach. Taking them with a little food may help.  Most pain relievers taken by mouth need at least 20 to 30 minutes to start to work.  Taking medicine on a schedule can help you remember to take it. Try to time your medicine so that you can take it before starting an activity. This might be before you get dressed, go for a walk, or sit down for dinner.  Constipation is a common side effect of pain medicines. Call your doctor before taking any medicines such as laxatives or stool softeners to help ease constipation. Also ask if you should skip any foods. Drinking lots of fluids and eating foods such as fruits and vegetables that are high in fiber can also help. Remember, do not take laxatives unless your surgeon has prescribed them.  Drinking alcohol and taking pain medicine can cause dizziness and slow your breathing. It can even be deadly. Do not drink alcohol while taking pain medicine.  Pain medicine can make you react more slowly to things. Do not drive or run machinery while taking pain medicine.  Your health care provider may tell you to take acetaminophen to help ease your pain. Ask him or her how much you are supposed to take each day. Acetaminophen or other pain relievers may interact with your prescription medicines or other over-the-counter (OTC) drugs. Some prescription medicines have acetaminophen and other ingredients. Using both prescription and OTC acetaminophen for pain can cause you to overdose. Read the labels on your OTC medicines with care. This will help you to clearly know the list of ingredients, how much to take, and any warnings. It may also help you not take too much acetaminophen. If you have questions or do not understand the information, ask your pharmacist or health care provider to explain it to you before you take the OTC medicine.  Managing nausea  Some people have an  upset stomach after surgery. This is often because of anesthesia, pain, or pain medicine, or the stress of surgery. These tips will help you handle nausea and eat healthy foods as you get better. If you were on a special food plan before surgery, ask your doctor if you should follow it while you get better. These tips may help:  Do not push yourself to eat. Your body will tell you when to eat and how much.  Start off with clear liquids and soup. They are easier to digest.  Next try semi-solid foods, such as mashed potatoes, applesauce, and gelatin, as you feel ready.  Slowly move to solid foods. Dont eat fatty, rich, or spicy foods at first.  Do not force yourself to have 3 large meals a day. Instead eat smaller amounts more often.  Take pain medicines with a small amount of solid food, such as crackers or toast, to avoid nausea.     Call your surgeon if  You still have pain an hour after taking medicine. The medicine may not be strong enough.  You feel too sleepy, dizzy, or groggy. The medicine may be too strong.  You have side effects like nausea, vomiting, or skin changes, such as rash, itching, or hives.       If you have obstructive sleep apnea  You were given anesthesia medicine during surgery to keep you comfortable and free of pain. After surgery, you may have more apnea spells because of this medicine and other medicines you were given. The spells may last longer than usual.   At home:  Keep using the continuous positive airway pressure (CPAP) device when you sleep. Unless your health care provider tells you not to, use it when you sleep, day or night. CPAP is a common device used to treat obstructive sleep apnea.  Talk with your provider before taking any pain medicine, muscle relaxants, or sedatives. Your provider will tell you about the possible dangers of taking these medicines.  © 5103-4624 [employer]. [address] [place] [address], [place] [address]. All rights reserved. This information is  not intended as a substitute for professional medical care. Always follow your healthcare professional's instructions.         Post op instructions for prevention of DVT  What is deep vein thrombosis?  Deep vein thrombosis (DVT) is the medical term for blood clots in the deep veins of the leg.  These blood clots can be dangerous.  A DVT can block a blood vessel and keep blood from getting where it needs to go.  Another problem is that the clot can travel to other parts of the body such as the lungs.  A clot that travels to the lungs is called a pulmonary embolus (PE) and can cause serious problems with breathing which can lead to death.  Am I at risk for DVT/PE?  If you are not very active, you are at risk of DVT.  Anyone confined to bed, sitting for long periods of time, recovering from surgery, etc. increases the risk of DVT.  Other risk factors are cancer diagnosis, certain medications, estrogen replacement in any form,older age, obesity, pregnancy, smoking, history of clotting disorders, and dehydration.  How will I know if I have a DVT?  Swelling in the lower leg  Pain  Warmth, redness, hardness or bulging of the vein  If you have any of these symptoms, call your doctors office right away.  Some people will not have any symptoms until the clot moves to the lungs.  What are the symptoms of a PE?  Panting, shortness of breath, or trouble breathing  Sharp, knife-like chest pain when you breathe  Coughing or coughing up blood  Rapid heartbeat  If you have any of these symptoms or get worse quickly, call 911 for emergency treatment.  How can I prevent a DVT?  Avoid long periods of inactivity and dont cross your legs--get up and walk around every hour or so.  Stay active--walking after surgery is highly encouraged.  This means you should get out of the house and walk in the neighborhood.  Going up and down stairs will not impair healing (unless advised against such activity by your doctor).    Drink plenty of  noncaffeinated, nonalcoholic fluids each day to prevent dehydration.  Wear special support stockings, if they have been advised by your doctor.  If you travel, stop at least once an hour and walk around.  Avoid smoking (assistance with stopping is available through your healthcare provider)  Always notify your doctor if you are not able to follow the post operative instructions that are given to you at the time of discharge.  It may be necessary to prescribe one of the medications available to prevent DVT.         Using an Incentive Spirometer    An incentive spirometer is a device that helps you do deep breathing exercises. These exercises expand your lungs, aid in circulation, and help prevent pneumonia. Deep breathing exercises also help you breathe better and improve the function of your lungs by:  Keeping your lungs clear  Strengthening your breathing muscles  Helping prevent respiratory complications or problems  The incentive spirometer gives you a way to take an active part in recover. A nurse or therapist will teach you breathing exercises. To do these exercises, you will breathe in through your mouth and not your nose. The incentive spirometer only works correctly if you breathe in through your mouth.  Steps to clear lungs  Step 1. Exhale normally. Then, inhale normally.  Relax and breathe out.  Step 2. Place your lips tightly around the mouthpiece.  Make sure the device is upright and not tilted.  Step 3. Inhale as much air as you can through the mouthpiece (don't breath through your nose).  Inhale slowly and deeply.  Hold your breath long enough to keep the balls or disk raised for at least 3 to 5 seconds, or as instructed by your healthcare provider.  Some spirometers have an indicator to let you know that you are breathing in too fast. If the indicator goes off, breathe in more slowly.  Step 4. Repeat the exercise regularly.  Do this exercise every hour while you're awake, or as instructed by your  healthcare provider.  If you were taught deep breathing and coughing exercises, do them regularly as instructed by your healthcare provider.

## 2022-08-16 NOTE — ANESTHESIA POSTPROCEDURE EVALUATION
Anesthesia Post Evaluation    Patient: Veronique Johnson    Procedure(s) Performed: Procedure(s) (LRB):  ROBOTIC ARTHROPLASTY, KNEE, UNICOMPARTMENTAL (Right)    Final Anesthesia Type: spinal      Patient location during evaluation: PACU  Patient participation: Yes- Able to Participate  Level of consciousness: awake and alert  Post-procedure vital signs: reviewed and stable  Pain management: adequate  Airway patency: patent    PONV status at discharge: No PONV  Anesthetic complications: no      Cardiovascular status: hemodynamically stable  Respiratory status: unassisted and room air  Hydration status: euvolemic  Follow-up not needed.          Vitals Value Taken Time   /60 08/16/22 1253   Temp 36.6 °C (97.9 °F) 08/16/22 1245   Pulse 86 08/16/22 1253   Resp 16 08/16/22 1253   SpO2 96 % 08/16/22 1253         Event Time   Out of Recovery 12:53:00         Pain/Haylie Score: Pain Rating Prior to Med Admin: 2 (8/16/2022 12:00 PM)  Pain Rating Post Med Admin: 0 (8/16/2022 12:15 PM)  Haylie Score: 10 (8/16/2022 12:45 PM)  Modified Haylie Score: 20 (8/16/2022 12:53 PM)

## 2022-08-16 NOTE — ANESTHESIA PREPROCEDURE EVALUATION
08/16/2022  Veronique Johnson is a 76 y.o., female.      Pre-op Assessment    I have reviewed the Patient Summary Reports.     I have reviewed the Nursing Notes. I have reviewed the NPO Status.   I have reviewed the Medications.     Review of Systems  Anesthesia Hx:  No problems with previous Anesthesia    Social:  Non-Smoker, Alcohol Use    Cardiovascular:   Hypertension CAD   Angina    Pulmonary:   COPD    Hepatic/GI:   GERD, well controlled    Musculoskeletal:   Arthritis  Right knee arthritis    Neurological:   Neuromuscular Disease, Headaches Multiple sclerosis   Endocrine:  Endocrine Normal        Physical Exam  General: Well nourished    Airway:  Mallampati: II   Mouth Opening: Normal  TM Distance: Normal  Tongue: Normal  Neck ROM: Normal ROM    Dental:  Intact    Chest/Lungs:  Clear to auscultation, Normal Respiratory Rate        Anesthesia Plan  Type of Anesthesia, risks & benefits discussed:    Anesthesia Type: Spinal  Intra-op Monitoring Plan: Standard ASA Monitors  Post Op Pain Control Plan: multimodal analgesia, IV/PO Opioids PRN and peripheral nerve block  Informed Consent: Informed consent signed with the Patient and all parties understand the risks and agree with anesthesia plan.  All questions answered. Patient consented to blood products? No  ASA Score: 3  Day of Surgery Review of History & Physical: H&P Update referred to the surgeon/provider.    Ready For Surgery From Anesthesia Perspective.     .

## 2022-08-16 NOTE — DISCHARGE SUMMARY
[facility]  Discharge Note  Short Stay    Procedure(s) (LRB):  ROBOTIC ARTHROPLASTY, KNEE, UNICOMPARTMENTAL (Right)    OUTCOME: Patient tolerated treatment/procedure well without complication and is now ready for discharge.    DISPOSITION: Home or Self Care    FINAL DIAGNOSIS:  <principal problem not specified>    FOLLOWUP: In clinic    DISCHARGE INSTRUCTIONS:    Discharge Procedure Orders   Diet general     Leave dressing on - Keep it clean, dry, and intact until clinic visit     Change dressing (specify)   Order Comments: Dressing change: If dressing loses its seal, change daily.     Call MD for:  temperature >100.4     Call MD for:  persistent nausea and vomiting     Call MD for:  severe uncontrolled pain     Call MD for:  difficulty breathing, headache or visual disturbances     Call MD for:  redness, tenderness, or signs of infection (pain, swelling, redness, odor or green/yellow discharge around incision site)     Call MD for:  hives     Call MD for:  persistent dizziness or light-headedness     Call MD for:  extreme fatigue     Weight bearing as tolerated        TIME SPENT ON DISCHARGE: 5 minutes

## 2022-08-16 NOTE — PATIENT INSTRUCTIONS
1.Diet: Ice chips, clear liquids, and then diet as tolerated. Drink plenty of liquids.  2.Ice the area at least three times a day (20 minutes per session).  3.Elevate the extremity above the level of the heart to help reduce swelling.  4.Pain medication can be taken every four to six hours as needed. It is helpful to take pain medication prior to physical therapy.  Use Tylenol or anti-inflammatories for pain as much as possible.  Pain medication is for pain not responsive to over-the-counter medications.  5.Any activity that requires precise thinking or accuracy should be avoided for a minimum of 72 hours after surgery and while on narcotic pain medication. This includes operating machinery and/or driving a vehicle.  6.All sutures/staples will be removed approximately 14 days from the time of surgery. Leave steri-strips (skin tapes) in place until sutures are removed.  7. If skin glue is used instead of stitches, do not apply ointments or solutions to the incision. Keep the incision dry. The skin glue will peel off in 3-4 weeks.  8. Change dressing on the first post-op day. Use gauze for the first 3 days, then start using Band-Aids over the incision sites.   9. All casts, splints, braces, slings, crutches, abduction pillows, etc... Are to be worn as instructed. Crutches are just to be used as needed. If not needed for soreness or balance, may weight bear as tolerated without the crutches.  10. Keep the incision dry for 10-14 days. A waterproof dressing (purchase at [employer], [employer], etc) can be used to shower. No bath, pool, hot tub until instructed.  11. Call [phone] with any questions or concerns.

## 2022-08-16 NOTE — PLAN OF CARE
Pre-op complete.  at bedside. Text notifications initiated. Pt denies need for safe. Belongings in locker.

## 2022-08-16 NOTE — TRANSFER OF CARE
"Anesthesia Transfer of Care Note    Patient: Veronique Johnson    Procedure(s) Performed: Procedure(s) (LRB):  ROBOTIC ARTHROPLASTY, KNEE, UNICOMPARTMENTAL (Right)    Patient location: PACU    Anesthesia Type: spinal    Transport from OR: Transported from OR on room air with adequate spontaneous ventilation    Post pain: adequate analgesia    Post assessment: no apparent anesthetic complications and tolerated procedure well    Post vital signs: stable    Level of consciousness: awake, alert and oriented    Nausea/Vomiting: no nausea/vomiting    Complications: none    Transfer of care protocol was followed      Last vitals:   Visit Vitals  /68   Pulse 85   Temp 36.6 °C (97.9 °F) (Skin)   Resp 13   Ht 5' 1" (1.549 m)   Wt 58.1 kg (128 lb)   SpO2 98%   Breastfeeding No   BMI 24.19 kg/m²     "

## 2022-08-16 NOTE — PLAN OF CARE
Patient and sister given discharge instructions.  All meds given via meds- to bed in post-op. Educated on post-anesthesia precautions, dressing care and when to notify MD. Verbalized understanding. Peripheral IV removed with catheter intact. Dressing to knee clean, dry and intact. All belongings given back to patient. Patient transferred to car via wheelchair  and left with spouse to home.

## 2022-08-16 NOTE — H&P
Past Medical History:   Diagnosis Date    Anemia     Anxiety     Chronic bronchitis with COPD (chronic obstructive pulmonary disease)     Esophageal spasm     Essential tremor     GERD (gastroesophageal reflux disease)     Headache     High cholesterol     Hypertension     Meniere disease     Multiple sclerosis     Muscle spasm     Pancreatic insufficiency            Past Surgical History:   Procedure Laterality Date    CATARACT EXTRACTION, BILATERAL  2006    COLONOSCOPY  09/05/2018    Dr. Leija in procedures: diverticulosis, 4 colon polyps removed, adenomatous polyps x3 & benign mucosal polyps    COLONOSCOPY N/A 4/22/2021    Procedure: COLONOSCOPY; Surgeon: Dwayne Santoyo MD; Location: [facility] ENDO; Service: Endoscopy; Laterality: N/A;    CORONARY ANGIOGRAPHY N/A 10/14/2021    Procedure: ANGIOGRAM, CORONARY ARTERY; Surgeon: Mustapha Manzo MD; Location: [facility]; Service: Cardiology; Laterality: N/A;    CYSTOSCOPY WITH HYDRODISTENSION OF BLADDER N/A 6/5/2019    Procedure: CYSTOSCOPY, WITH BLADDER HYDRODISTENSION; Surgeon: Marko Burton MD; Location: [facility] OR; Service: OB/GYN; Laterality: N/A;    DILATION AND CURETTAGE OF UTERUS  1966    ESOPHAGOGASTRODUODENOSCOPY  09/05/2018    kala Burr procedures: gastritis; biopsy: duodenum unremarkable, with focal benign gastric metaplasia, stomach- minimal chronic gastritis, negative for h pylori    ESOPHAGOGASTRODUODENOSCOPY N/A 4/22/2021    Procedure: EGD (ESOPHAGOGASTRODUODENOSCOPY); Surgeon: Dwayne Santoyo MD; Location: [facility]; Service: Endoscopy; Laterality: N/A;    EYE SURGERY      HEMORRHOID SURGERY  1997    KNEE ARTHROSCOPY W/ MENISCECTOMY Right 8/13/2021    Procedure: ARTHROSCOPY, KNEE, WITH MENISCECTOMY; Surgeon: Shelton Le MD; Location: [facility]; Service: Orthopedics; Laterality: Right;    KNEE ARTHROSCOPY W/ MENISCECTOMY Right 4/8/2022    Procedure: ARTHROSCOPY, KNEE, WITH MENISCECTOMY; Surgeon: Shelton Ceballos  MD Rey; Location: [facility] OR; Service: Orthopedics; Laterality: Right; medial meniscectomy    LEFT HEART CATHETERIZATION Right 10/14/2021    Procedure: Left heart cath; Surgeon: Mustapha Manzo MD; Location[facility] C[facility]; Service: Cardiology; Laterality: Right;    TONSILLECTOMY  1954    TUBAL LIGATION  1990          Current Outpatient Medications   Medication Sig    albuterol (PROVENTIL/VENTOLIN HFA) 90 mcg/actuation inhaler Inhale 2 puffs into the lungs 3 (three) times daily. Rescue    ALPRAZolam (XANAX) 0.25 MG tablet TAKE 1 TABLET (0.25 MG TOTAL) BY MOUTH 2 (TWO) TIMES DAILY AS NEEDED FOR ANXIETY.    amLODIPine (NORVASC) 5 MG tablet TAKE 1 TABLET (5 MG TOTAL) BY MOUTH ONCE DAILY.    aspirin (ECOTRIN) 81 MG EC tablet Take 1 tablet (81 mg total) by mouth 2 (two) times daily.    buPROPion (WELLBUTRIN XL) 300 MG 24 hr tablet Take 1 tablet (300 mg total) by mouth once daily.    cyanocobalamin 500 MCG tablet Take 500 mcg by mouth once daily.    fluticasone furoate-vilanteroL (BREO) 100-25 mcg/dose diskus inhaler Inhale 1 puff into the lungs once daily. Controller    lipase-protease-amylase (CREON) 36,000-114,000- 180,000 unit CpDR TAKE 1 CAPSULE BY MOUTH 3 (THREE) TIMES DAILY WITH MEALS. & 1 CAPSULE WITH SNACKS    loperamide (IMODIUM) 1 mg/5 mL solution Take by mouth every 6 (six) hours as needed for Diarrhea.    multivitamin capsule Take 1 capsule by mouth.    nitroGLYCERIN (NITROSTAT) 0.4 MG SL tablet Place 1 tablet (0.4 mg total) under the tongue every 5 (five) minutes as needed for Chest pain.    omeprazole (PRILOSEC) 20 MG capsule Take 1 capsule (20 mg total) by mouth once daily.    rosuvastatin (CRESTOR) 40 MG Tab TAKE 1 TABLET (40 MG TOTAL) BY MOUTH EVERY EVENING.     No current facility-administered medications for this visit.          Review of patient's allergies indicates:   Allergen Reactions    Cephalosporins Anaphylaxis    Iodinated contrast media Hives and Swelling    Penicillins Rash            Family History   Problem Relation Age of Onset    Coronary artery disease Mother     Heart disease Mother 60    Bypass twice    Miscarriages / Stillbirths Mother     Stillbirths    Vision loss Mother     Macular Degeration    Heart attack Father     Coronary artery disease Father     Heart disease Father 55    Several Heart Attack    Alcohol abuse Father     Early death Father     Heart Attack 55    Coronary artery disease Sister     Heart disease Sister 65    CAD    Celiac disease Neg Hx     Colon cancer Neg Hx     Crohn's disease Neg Hx     Ulcerative colitis Neg Hx      Social History                                                                                                                                                                                                                                                                                           Chief Complaint:        Chief Complaint   Patient presents with    Post-op Evaluation     s/p right knee PMM 4/8/22.      Date of surgery: April 8, 2022   History of present illness: 76-year-old female underwent right knee arthroscopy with partial medial meniscectomy. Patient also had some underlying grade 4 medial compartment wear. She still having pretty significant medial compartment pain. Pain is a 4/10. She has done home exercises. Gets occasional swelling. Not as far along as she was hoping. We tried a cortisone injection back in June and she did formal physical therapy without significant improvement.   Review of Systems:   Musculoskeletal: See HPI   Physical Examination:   Vital Signs:       Vitals:    07/13/22 0754   Resp: 18     Body mass index is 24.75 kg/m².   This a well-developed, well nourished patient in no acute distress. They are alert and oriented and cooperative to examination. Pt. walks without an antalgic gait.     Examination the right knee shows healed urgical portals. No erythema or drainage. No  significant joint effusion. Range of motion is 0-125. Tender over the medial compartment. No calf pain.     Heart is regular rate without obvious murmurs   Normal respiratory effort without audible wheezing   Abdomen is soft and nontender     X-rays: Four views of the right knee are ordered and reviewed which show moderate to severe medial joint space narrowing.     Assessment:: Status post right partial medial meniscectomy   Severe right knee anteromedial arthritis     Plan: Reviewed the findings with her today. We talked about the possibility of her benefiting from a unicompartmental knee replacement. She would like to go ahead and get this set up. Plan is for robotic assisted partial versus total knee replacement. Risks, benefits, and alternatives to the procedure were explained to the patient including but not limited to damage to nerves, arteries, blood vessels, bones, tendons, ligaments, stiffness, instability, infection, DVT, PE, as well as general anesthetic complications including seizure, stroke, heart attack and even death. The patient understood these risks and wished to proceed and signed the informed consent.   This note was created using M Modal voice recognition software that occasionally misinterpreted phrases or words.

## 2022-08-16 NOTE — PLAN OF CARE
Problem: Physical Therapy  Goal: Physical Therapy Goal  Description: Goals to be met by: 2022     Patient will increase functional independence with mobility by performin. Supine to sit with Contact Guard Assistance  2. Sit to stand transfer with Contact Guard Assistance  3. Bed to chair transfer with Contact Guard Assistance using Rolling Walker  4. Gait  x 250 feet with Contact Guard Assistance using Rolling Walker.   5. Lower extremity exercise program x20 reps    Outcome: Ongoing, Progressing   PT eval and treat at pre op 1243 Pt seen for thera ex- pt unable to complete R leg lift with giveaway.  Second visit at 1441- pt still with RLE weakness- a knee immobilizer was applied and pt able to ambulate 200ft with RW min assist. Bathroom use to void then up to wheelchair. Pt and sister Bryanna educated on donning and doffing of knee immobilizer. Needs HHPT

## 2022-08-16 NOTE — ANESTHESIA PROCEDURE NOTES
Peripheral Block    Patient location during procedure: pre-op   Block not for primary anesthetic.  Reason for block: at surgeon's request and post-op pain management   Post-op Pain Location: right knee   Start time: 8/16/2022 6:50 AM  Timeout: 8/16/2022 6:50 AM   End time: 8/16/2022 6:55 AM    Staffing  Authorizing Provider: Aaron Reyes MD  Performing Provider: Aaron Reyes MD    Preanesthetic Checklist  Completed: patient identified, IV checked, site marked, risks and benefits discussed, surgical consent, monitors and equipment checked, pre-op evaluation and timeout performed  Peripheral Block  Patient position: supine  Prep: ChloraPrep  Patient monitoring: heart rate, cardiac monitor, continuous pulse ox, continuous capnometry and frequent blood pressure checks  Block type: adductor canal  Laterality: right  Injection technique: single shot  Needle  Needle type: Stimuplex   Needle gauge: 21 G  Needle length: 4 in  Needle localization: anatomical landmarks and ultrasound guidance   -ultrasound image captured on disc.  Assessment  Injection assessment: negative aspiration, negative parasthesia and local visualized surrounding nerve  Paresthesia pain: none  Heart rate change: no  Slow fractionated injection: yes    Medications:    Medications: bupivacaine (pf) (MARCAINE) injection 0.5% - Perineural   10 mL - 8/16/2022 6:55:00 AM    Additional Notes  VSS.  [facility] RN monitoring vitals throughout procedure.  Patient tolerated procedure well.

## 2022-08-16 NOTE — OP NOTE
[facility]  Orthopedic Surgery  Operative Note    SUMMARY     Date of Procedure: 8/16/2022     Procedure: Procedure(s) (LRB):  ROBOTIC ARTHROPLASTY, KNEE, UNICOMPARTMENTAL (Right)       Surgeon(s) and Role:     * Shelton Le MD - Primary    Assistant: James Mayen    Pre-Operative Diagnosis: Arthritis of knee [M17.10] Right  Pre-op testing [Z01.818]    Post-Operative Diagnosis: Post-Op Diagnosis Codes:     * Arthritis of knee [M17.10] Right     * Pre-op testing [Z01.818]    Anesthesia: Spinal    Complications: No    Estimated Blood Loss (EBL): 20ml  Implants:   Implant Name Type Inv. Item Serial No.  Lot No. LRB No. Used Action   PIN BONE 4 X 140MM STERILE - [id number]  PIN BONE 4 X 140MM STERILE  [employer]GICAL [id number] Right 1 Implanted and Explanted   PIN BONE [id number] - [id number]  PIN BONE [id number]  [employer] TERESA. [id number] Right 1 Implanted and Explanted   CEMENT BONE ANTIBIO SIMPLEX P - [id number]  CEMENT BONE ANTIBIO SIMPLEX P  [employer] TERESA. [id number] Right 1 Implanted   TIBIAL ONLAY INSRT MCK SZ2 8MM - [id number]  TIBIAL ONLAY INSRT MCK SZ2 8MM  [facility] SURGICAL [id number] Right 1 Implanted   FEMORAL MCK RM-LL SZ 2 - [id number]  FEMORAL MCK RM-LL SZ 2  [facility] SURGICAL [id number] Right 1 Implanted   TIBIAL BASEPLATE MCK RM/RL SZ2 - [id number]  TIBIAL BASEPLATE MCK RM/RL SZ2  [facility] SURGICAL [id number] Right 1 Implanted       Tourniquet time: 52min at 300mmHg    Specimens:   Specimen (24h ago, onward)            None                  Condition: Good    Disposition: PACU - hemodynamically stable.    Attestation: I was present and scrubbed for the entire procedure.      INDICATIONS FOR THE PROCEDURE: A 76-year-old female with a history of worsening medial arthritis, had a valgus stress   x-ray, which showed maintenance of the lateral compartment. After discussion,   the patient wished to proceed with partial versus total knee arthroplasty.    PROCEDURE IN DETAIL:  Risks, benefits and alternatives of the procedure were   explained to the patient including, but not limited to damage to nerves,   arteries or blood vessels. I also explained risk of possible deterioration   lateral compartment warranting total knee arthroplasty as well as infection,   DVT, PE as well as anesthetic complications including seizure, stroke, heart   attack and death, understood this and signed informed consent. The patient's   Right knee was marked prior to coming to the Operating Room. Once a formal   timeout was done in which correct patient, procedure and op site were all   correctly identified and confirmed by the entire operating team, 900mg Clinda given prior to surgical incision. General endotracheal anesthesia   was induced. The patient's Rightlower extremity was prepped and draped in   normal sterile fashion. Leg was exsanguinated. Tourniquet was inflated up 300   MmHg. A medial parapatellar arthrotomy was   made and the remnant of medial meniscus was removed as well as some of the fat   pad. Lateral compartment was inspected and showed no cartilage wear.  Trochlea was well maintained as well. The ACL was intact, so  we decide to proceed with partial knee replacement and retractors were placed.  We then made a small incision and placed our 2 tibial pins 4 fingerbreadths below the tibial tubercle.  We then made a midline skin incision 3 fingerbreadths above the superior pole of the patella and while placing the knee in flexion spread down to the anterior femur.  Two pins were placed in the femur.  Trackers and a rays were then placed.  Checkpoints were placed in the medial epicondyle of the femur and on the proximal tibia.  We then did our bone mapping of 1st the femur and then the tibia.  We then planned out the case and once we liked our overall plan, the robot was brought in.  We started off with our proximal tibial cut and distal femoral cut.  We then exchanged the saw for our bur and  burred the rest of our femur as well as our medial tibia.  We then burred for our holes for our implants.  We then spent some time removing any excess bone and cleaning up our cuts.  We then trialed with a size 2 tibia and a size 2 femur with a 2 x 8 millimeter insert.  This was good in both flexion and extension in both varus and valgus.  We then started mixing our cement in preparing for final implantation.  Tracking was good as well.  Bone surface was copiously irrigated and dried.  Implants were then cemented in place and the cement was allowed to cure.    .Arthrotomy was closed using 0 Stratafix, subcutaneous tissue was   closed using 2-0 Vicryl and the skin was closed using a 3-0 Stratafix and   Dermabond. Sterile dressing was applied as well as a Cryo/Cuff. He was   extubated, awakened, transferred from the Operating Room to the Recovery Room in  stable condition.

## 2022-08-16 NOTE — ANESTHESIA PROCEDURE NOTES
Spinal    Diagnosis: osteoarthritis  Patient location during procedure: OR  Start time: 8/16/2022 9:24 AM  Timeout: 8/16/2022 9:24 AM  End time: 8/16/2022 9:29 AM    Staffing  Authorizing Provider: Aaron Reyes MD  Performing Provider: Aaron Reyes MD    Preanesthetic Checklist  Completed: patient identified, IV checked, site marked, risks and benefits discussed, surgical consent, monitors and equipment checked, pre-op evaluation and timeout performed  Spinal Block  Patient position: sitting  Prep: ChloraPrep  Patient monitoring: heart rate, cardiac monitor, continuous pulse ox, continuous capnometry and frequent blood pressure checks  Approach: midline  Location: L4-5  Injection technique: single shot  CSF Fluid: clear free-flowing CSF  Needle  Needle type: pencil-tip   Needle gauge: 25 G  Needle length: 3.5 in  Additional Documentation: incremental injection, negative aspiration for heme and no paresthesia on injection  Needle localization: anatomical landmarks  Assessment  Sensory level: T10   Dermatomal levels determined by alcohol wipe  Ease of block: easy  Patient's tolerance of the procedure: comfortable throughout block and no complaints  Medications:    Medications: mepivacaine (CARBOCAINE) injection 15 mg/mL (1.5%) - Other   3 mL - 8/16/2022 9:29:00 AM

## 2022-08-17 PROCEDURE — G0180 PR HOME HEALTH MD CERTIFICATION: ICD-10-PCS | Mod: ,,, | Performed by: ORTHOPAEDIC SURGERY

## 2022-08-17 PROCEDURE — G0180 MD CERTIFICATION HHA PATIENT: HCPCS | Mod: ,,, | Performed by: ORTHOPAEDIC SURGERY

## 2022-08-18 ENCOUNTER — PATIENT MESSAGE (OUTPATIENT)
Dept: ORTHOPEDICS | Facility: CLINIC | Age: 77
End: 2022-08-18
Payer: MEDICARE

## 2022-08-18 VITALS
RESPIRATION RATE: 16 BRPM | WEIGHT: 128 LBS | BODY MASS INDEX: 24.17 KG/M2 | HEIGHT: 61 IN | TEMPERATURE: 98 F | HEART RATE: 86 BPM | SYSTOLIC BLOOD PRESSURE: 118 MMHG | DIASTOLIC BLOOD PRESSURE: 58 MMHG | OXYGEN SATURATION: 97 %

## 2022-08-18 NOTE — PROGRESS NOTES
VERy pleased with care given, but she did not get a walker delivered to her house or in hospital. Does not want to be charged.  April in Case Mgmt. Notified.

## 2022-08-23 ENCOUNTER — PATIENT MESSAGE (OUTPATIENT)
Dept: ORTHOPEDICS | Facility: CLINIC | Age: 77
End: 2022-08-23
Payer: MEDICARE

## 2022-08-23 DIAGNOSIS — M17.11 PRIMARY OSTEOARTHRITIS OF RIGHT KNEE: ICD-10-CM

## 2022-08-23 RX ORDER — OXYCODONE AND ACETAMINOPHEN 5; 325 MG/1; MG/1
1 TABLET ORAL EVERY 6 HOURS PRN
Qty: 28 TABLET | Refills: 0 | Status: SHIPPED | OUTPATIENT
Start: 2022-08-23 | End: 2022-08-31 | Stop reason: ALTCHOICE

## 2022-08-23 NOTE — TELEPHONE ENCOUNTER
Received call from patient concerning DME from knee arthroplasty on 08/16/2022. Patient stated that she did not receive her rolling walker. Informed patient that I would reach out to case management and also [facility] to ensure that she was not charged for this item.

## 2022-08-24 DIAGNOSIS — M17.11 PRIMARY OSTEOARTHRITIS OF RIGHT KNEE: Primary | ICD-10-CM

## 2022-08-29 ENCOUNTER — TELEPHONE (OUTPATIENT)
Dept: ORTHOPEDICS | Facility: CLINIC | Age: 77
End: 2022-08-29
Payer: MEDICARE

## 2022-08-29 DIAGNOSIS — Z96.651 S/P RIGHT UNICOMPARTMENTAL KNEE REPLACEMENT: Primary | ICD-10-CM

## 2022-08-29 NOTE — TELEPHONE ENCOUNTER
----- Message from Madhu Laureano MA sent at 8/29/2022 11:06 AM CDT -----  Contact: [facility] called in stated patients last day for in home PT was today & patient is requesting outpatient PT at [facility].  Patient has an appointment with Dr. Le on 8/31/22.  Patient is complaining that knee is still hurting her and was not using her walker.  Patient was pain level was a 3.    [facility] call back number is [phone]

## 2022-08-31 ENCOUNTER — OFFICE VISIT (OUTPATIENT)
Dept: ORTHOPEDICS | Facility: CLINIC | Age: 77
End: 2022-08-31
Payer: MEDICARE

## 2022-08-31 ENCOUNTER — EXTERNAL CHRONIC CARE MANAGEMENT (OUTPATIENT)
Dept: PRIMARY CARE CLINIC | Facility: CLINIC | Age: 77
End: 2022-08-31
Payer: MEDICARE

## 2022-08-31 ENCOUNTER — HOSPITAL ENCOUNTER (OUTPATIENT)
Dept: RADIOLOGY | Facility: HOSPITAL | Age: 77
Discharge: HOME | End: 2022-08-31
Attending: ORTHOPAEDIC SURGERY
Payer: MEDICARE

## 2022-08-31 VITALS — HEIGHT: 61 IN | WEIGHT: 128 LBS | RESPIRATION RATE: 18 BRPM | BODY MASS INDEX: 24.17 KG/M2

## 2022-08-31 DIAGNOSIS — M17.11 PRIMARY OSTEOARTHRITIS OF RIGHT KNEE: ICD-10-CM

## 2022-08-31 DIAGNOSIS — Z96.651 S/P RIGHT UNICOMPARTMENTAL KNEE REPLACEMENT: Primary | ICD-10-CM

## 2022-08-31 PROCEDURE — 99439 PR CHRONIC CARE MGMT, EA ADDTL 20 MIN: ICD-10-PCS | Mod: S$PBB,,, | Performed by: FAMILY MEDICINE

## 2022-08-31 PROCEDURE — 99999 PR PBB SHADOW E&M-EST. PATIENT-LVL III: ICD-10-PCS | Mod: PBBFAC,,, | Performed by: ORTHOPAEDIC SURGERY

## 2022-08-31 PROCEDURE — 99490 CHRNC CARE MGMT STAFF 1ST 20: CPT | Mod: PBBFAC,PO | Performed by: FAMILY MEDICINE

## 2022-08-31 PROCEDURE — 99439 CHRNC CARE MGMT STAF EA ADDL: CPT | Mod: PBBFAC,PO | Performed by: FAMILY MEDICINE

## 2022-08-31 PROCEDURE — 99490 PR CHRONIC CARE MGMT, 1ST 20 MIN: ICD-10-PCS | Mod: S$PBB,,, | Performed by: FAMILY MEDICINE

## 2022-08-31 PROCEDURE — 73560 X-RAY EXAM OF KNEE 1 OR 2: CPT | Mod: TC,PO,RT

## 2022-08-31 PROCEDURE — 73560 X-RAY EXAM OF KNEE 1 OR 2: CPT | Mod: 26,RT,, | Performed by: RADIOLOGY

## 2022-08-31 PROCEDURE — 99024 PR POST-OP FOLLOW-UP VISIT: ICD-10-PCS | Mod: POP,,, | Performed by: ORTHOPAEDIC SURGERY

## 2022-08-31 PROCEDURE — 99999 PR PBB SHADOW E&M-EST. PATIENT-LVL III: CPT | Mod: PBBFAC,,, | Performed by: ORTHOPAEDIC SURGERY

## 2022-08-31 PROCEDURE — 73560 XR KNEE 1 OR 2 VIEW RIGHT: ICD-10-PCS | Mod: 26,RT,, | Performed by: RADIOLOGY

## 2022-08-31 PROCEDURE — 99439 CHRNC CARE MGMT STAF EA ADDL: CPT | Mod: S$PBB,,, | Performed by: FAMILY MEDICINE

## 2022-08-31 PROCEDURE — 99024 POSTOP FOLLOW-UP VISIT: CPT | Mod: POP,,, | Performed by: ORTHOPAEDIC SURGERY

## 2022-08-31 PROCEDURE — 99213 OFFICE O/P EST LOW 20 MIN: CPT | Mod: PBBFAC,PN,25 | Performed by: ORTHOPAEDIC SURGERY

## 2022-08-31 PROCEDURE — 99490 CHRNC CARE MGMT STAFF 1ST 20: CPT | Mod: S$PBB,,, | Performed by: FAMILY MEDICINE

## 2022-08-31 RX ORDER — HYDROCODONE BITARTRATE AND ACETAMINOPHEN 5; 325 MG/1; MG/1
1 TABLET ORAL EVERY 6 HOURS PRN
Qty: 28 TABLET | Refills: 0 | Status: SHIPPED | OUTPATIENT
Start: 2022-08-31 | End: 2022-09-13

## 2022-08-31 NOTE — PROGRESS NOTES
Past Medical History:   Diagnosis Date    Anemia     Anxiety     Chronic bronchitis with COPD (chronic obstructive pulmonary disease)     Esophageal spasm     Essential tremor     GERD (gastroesophageal reflux disease)     Headache     High cholesterol     Hypertension     Meniere disease     Multiple sclerosis     Muscle spasm     Pancreatic insufficiency        Past Surgical History:   Procedure Laterality Date    CATARACT EXTRACTION, BILATERAL  2006    COLONOSCOPY  09/05/2018    Dr. Leija in procedures: diverticulosis, 4 colon polyps removed, adenomatous polyps x3 & benign mucosal polyps    COLONOSCOPY N/A 4/22/2021    Procedure: COLONOSCOPY;  Surgeon: Dwayne Santoyo MD;  Location: [facility] ENDO;  Service: Endoscopy;  Laterality: N/A;    CORONARY ANGIOGRAPHY N/A 10/14/2021    Procedure: ANGIOGRAM, CORONARY ARTERY;  Surgeon: Mustapha Manzo MD;  Location: [facility];  Service: Cardiology;  Laterality: N/A;    CYSTOSCOPY WITH HYDRODISTENSION OF BLADDER N/A 6/5/2019    Procedure: CYSTOSCOPY, WITH BLADDER HYDRODISTENSION;  Surgeon: Marko Burton MD;  Location: [facility] OR;  Service: OB/GYN;  Laterality: N/A;    DILATION AND CURETTAGE OF UTERUS  1966    ESOPHAGOGASTRODUODENOSCOPY  09/05/2018    kala Burr procedures: gastritis; biopsy: duodenum unremarkable, with focal benign gastric metaplasia, stomach- minimal chronic gastritis, negative for h pylori    ESOPHAGOGASTRODUODENOSCOPY N/A 4/22/2021    Procedure: EGD (ESOPHAGOGASTRODUODENOSCOPY);  Surgeon: Dwayne Santoyo MD;  Location: [facility];  Service: Endoscopy;  Laterality: N/A;    EYE SURGERY      HEMORRHOID SURGERY  1997    KNEE ARTHROSCOPY W/ MENISCECTOMY Right 8/13/2021    Procedure: ARTHROSCOPY, KNEE, WITH MENISCECTOMY;  Surgeon: Shelton Le MD;  Location: [facility];  Service: Orthopedics;  Laterality: Right;    KNEE ARTHROSCOPY W/ MENISCECTOMY Right 4/8/2022    Procedure: ARTHROSCOPY, KNEE, WITH MENISCECTOMY;  Surgeon: Shelton Le,  MD;  Location: [facility] OR;  Service: Orthopedics;  Laterality: Right;  medial meniscectomy    LEFT HEART CATHETERIZATION Right 10/14/2021    Procedure: Left heart cath;  Surgeon: Mustapha Manzo MD;  Location: [facility] CATH;  Service: Cardiology;  Laterality: Right;    ROBOTIC ARTHROPLASTY, KNEE, UNICOMPARTMENTAL Right 8/16/2022    Procedure: ROBOTIC ARTHROPLASTY, KNEE, UNICOMPARTMENTAL;  Surgeon: Shelton Le MD;  Location: [facility] OR;  Service: Orthopedics;  Laterality: Right;    TONSILLECTOMY  1954    TUBAL LIGATION  1990       Current Outpatient Medications   Medication Sig    acetaminophen (TYLENOL) 500 MG tablet Take 2 tablets (1,000 mg total) by mouth every 8 (eight) hours as needed for Pain.    albuterol (PROVENTIL/VENTOLIN HFA) 90 mcg/actuation inhaler Inhale 2 puffs into the lungs 3 (three) times daily. Rescue    amLODIPine (NORVASC) 5 MG tablet TAKE 1 TABLET (5 MG TOTAL) BY MOUTH ONCE DAILY.    aspirin (ECOTRIN) 81 MG EC tablet Take 1 tablet (81 mg total) by mouth 2 (two) times a day.    buPROPion (WELLBUTRIN XL) 300 MG 24 hr tablet Take 1 tablet (300 mg total) by mouth once daily.    cyanocobalamin 500 MCG tablet Take 500 mcg by mouth once daily.    fluticasone furoate-vilanteroL (BREO) 100-25 mcg/dose diskus inhaler Inhale 1 puff into the lungs once daily. Controller    ibuprofen (ADVIL,MOTRIN) 600 MG tablet Take 1 tablet (600 mg total) by mouth 3 (three) times daily.    lipase-protease-amylase (CREON) 36,000-114,000- 180,000 unit CpDR TAKE 1 CAPSULE BY MOUTH 3 (THREE) TIMES DAILY WITH MEALS. &amp; 1 CAPSULE WITH SNACKS    loperamide (IMODIUM) 1 mg/5 mL solution Take by mouth every 6 (six) hours as needed for Diarrhea.    multivitamin capsule Take 1 capsule by mouth.    nitroGLYCERIN (NITROSTAT) 0.4 MG SL tablet Place 1 tablet (0.4 mg total) under the tongue every 5 (five) minutes as needed for Chest pain.    omeprazole (PRILOSEC) 20 MG capsule TAKE 1 CAPSULE (20 MG TOTAL) BY MOUTH ONCE DAILY.     oxyCODONE-acetaminophen (PERCOCET) 5-325 mg per tablet Take 1 tablet by mouth every 6 (six) hours as needed for Pain.    rosuvastatin (CRESTOR) 40 MG Tab TAKE 1 TABLET (40 MG TOTAL) BY MOUTH EVERY EVENING.    ALPRAZolam (XANAX) 0.25 MG tablet TAKE 1 TABLET (0.25 MG TOTAL) BY MOUTH 2 (TWO) TIMES DAILY AS NEEDED FOR ANXIETY.    ondansetron (ZOFRAN) 4 MG tablet Take 1 tablet (4 mg total) by mouth every 6 (six) hours as needed for Nausea. (Patient not taking: Reported on 2022)     No current facility-administered medications for this visit.       Review of patient's allergies indicates:   Allergen Reactions    Cephalosporins Anaphylaxis    Iodinated contrast media Hives and Swelling    Penicillins Rash       Family History   Problem Relation Age of Onset    Coronary artery disease Mother     Heart disease Mother 60        Bypass twice    Miscarriages / Stillbirths Mother         Stillbirths    Vision loss Mother         Macular Degeration    Heart attack Father     Coronary artery disease Father     Heart disease Father 55        Several Heart Attack    Alcohol abuse Father     Early death Father         Heart Attack 55    Coronary artery disease Sister     Heart disease Sister 65        CAD    Celiac disease Neg Hx     Colon cancer Neg Hx     Crohn's disease Neg Hx     Ulcerative colitis Neg Hx        Social History     Socioeconomic History    Marital status:    Tobacco Use    Smoking status: Former     Packs/day: 2.00     Years: 35.00     Pack years: 70.00     Types: Cigarettes     Start date: 1964     Quit date:      Years since quittin.6    Smokeless tobacco: Never   Substance and Sexual Activity    Alcohol use: Yes     Alcohol/week: 7.0 standard drinks     Types: 7 Glasses of wine per week    Drug use: Never    Sexual activity: Yes     Partners: Male     Birth control/protection: Post-menopausal     Social Determinants of Health     Financial Resource Strain: Low Risk     Difficulty of  Paying Living Expenses: Not hard at all   Food Insecurity: No Food Insecurity    Worried About Running Out of Food in the Last Year: Never true    Ran Out of Food in the Last Year: Never true   Transportation Needs: No Transportation Needs    Lack of Transportation (Medical): No    Lack of Transportation (Non-Medical): No   Physical Activity: Insufficiently Active    Days of Exercise per Week: 1 day    Minutes of Exercise per Session: 30 min   Stress: Stress Concern Present    Feeling of Stress : Very much   Social Connections: Unknown    Frequency of Communication with Friends and Family: More than three times a week    Frequency of Social Gatherings with Friends and Family: Once a week    Active Member of Clubs or Organizations: Yes    Attends Club or Organization Meetings: More than 4 times per year    Marital Status:    Housing Stability: Low Risk     Unable to Pay for Housing in the Last Year: No    Number of Places Lived in the Last Year: 1    Unstable Housing in the Last Year: No       Chief Complaint:   Chief Complaint   Patient presents with    Right Knee - Post-op Evaluation       Date of surgery:  August 16, 2022    History of present illness:  76-year-old female underwent right robot assisted unicompartmental knee replacement.  Patient doing very well.  Pain is a 2/10.  Using a walker but it is mainly due to her balance issues.      Review of Systems:    Musculoskeletal:  See HPI        Physical Examination:    Vital Signs:    Vitals:    08/31/22 1018   Resp: 18       Body mass index is 24.19 kg/m².    This a well-developed, well nourished patient in no acute distress.  They are alert and oriented and cooperative to examination.  Pt. walks without an antalgic gait.      Examination of the right knee shows well-healing surgical incisions.  No erythema drainage.  Mild swelling.  Range of motion 0-130 degrees.  No calf pain.  Negative Homans sign.    X-rays:  Two views of the right knee are ordered  and reviewed which show well-aligned unicompartmental knee Replacement without complication     Assessment::  Status post right unicompartmental knee replacement    Plan: I reviewed the x-rays with her today.  We will transition to outpatient physical therapy.  Decreased her from Percocet to [place].  Follow-up in 4 weeks.    This note was created using [employer] voice recognition software that occasionally misinterpreted phrases or words.

## 2022-09-04 ENCOUNTER — TELEPHONE (OUTPATIENT)
Dept: GASTROENTEROLOGY | Facility: CLINIC | Age: 77
End: 2022-09-04
Payer: MEDICARE

## 2022-09-04 DIAGNOSIS — K86.89 PANCREATIC INSUFFICIENCY: ICD-10-CM

## 2022-09-06 RX ORDER — PANCRELIPASE 36000; 180000; 114000 [USP'U]/1; [USP'U]/1; [USP'U]/1
CAPSULE, DELAYED RELEASE PELLETS ORAL
Qty: 120 CAPSULE | Refills: 0 | Status: SHIPPED | OUTPATIENT
Start: 2022-09-06 | End: 2022-10-28 | Stop reason: SDUPTHER

## 2022-09-06 NOTE — TELEPHONE ENCOUNTER
Please inform the patient that I refilled the prescription for creon and patient is due for a follow-up visit (last seen over a year ago) for continued evaluation and management.  Thanks  SEBASTIÁN

## 2022-09-08 ENCOUNTER — EXTERNAL HOME HEALTH (OUTPATIENT)
Dept: HOME HEALTH SERVICES | Facility: HOSPITAL | Age: 77
End: 2022-09-08
Payer: MEDICARE

## 2022-09-13 ENCOUNTER — PATIENT MESSAGE (OUTPATIENT)
Dept: FAMILY MEDICINE | Facility: CLINIC | Age: 77
End: 2022-09-13

## 2022-09-13 ENCOUNTER — E-VISIT (OUTPATIENT)
Dept: FAMILY MEDICINE | Facility: CLINIC | Age: 77
End: 2022-09-13
Payer: MEDICARE

## 2022-09-13 ENCOUNTER — LAB VISIT (OUTPATIENT)
Dept: LAB | Facility: HOSPITAL | Age: 77
End: 2022-09-13
Attending: FAMILY MEDICINE
Payer: MEDICARE

## 2022-09-13 DIAGNOSIS — N39.0 BACTERIAL UTI: ICD-10-CM

## 2022-09-13 DIAGNOSIS — R30.0 DYSURIA: ICD-10-CM

## 2022-09-13 DIAGNOSIS — R30.0 DYSURIA: Primary | ICD-10-CM

## 2022-09-13 DIAGNOSIS — A49.9 BACTERIAL UTI: ICD-10-CM

## 2022-09-13 LAB
BACTERIA #/AREA URNS HPF: ABNORMAL /HPF
BILIRUB UR QL STRIP: NEGATIVE
CLARITY UR: ABNORMAL
COLOR UR: YELLOW
GLUCOSE UR QL STRIP: NEGATIVE
HGB UR QL STRIP: ABNORMAL
HYALINE CASTS #/AREA URNS LPF: 0 /LPF
KETONES UR QL STRIP: NEGATIVE
LEUKOCYTE ESTERASE UR QL STRIP: ABNORMAL
MICROSCOPIC COMMENT: ABNORMAL
NITRITE UR QL STRIP: NEGATIVE
PH UR STRIP: 7 [PH] (ref 5–8)
PROT UR QL STRIP: ABNORMAL
RBC #/AREA URNS HPF: 8 /HPF (ref 0–4)
SP GR UR STRIP: 1.01 (ref 1–1.03)
SQUAMOUS #/AREA URNS HPF: 2 /HPF
URN SPEC COLLECT METH UR: ABNORMAL
WBC #/AREA URNS HPF: ABNORMAL /HPF (ref 0–5)

## 2022-09-13 PROCEDURE — 81000 URINALYSIS NONAUTO W/SCOPE: CPT | Mod: PO | Performed by: FAMILY MEDICINE

## 2022-09-13 PROCEDURE — 99213 PR OFFICE/OUTPT VISIT, EST, LEVL III, 20-29 MIN: ICD-10-PCS | Mod: S$PBB,,, | Performed by: FAMILY MEDICINE

## 2022-09-13 PROCEDURE — 99213 OFFICE O/P EST LOW 20 MIN: CPT | Mod: S$PBB,,, | Performed by: FAMILY MEDICINE

## 2022-09-13 PROCEDURE — 87086 URINE CULTURE/COLONY COUNT: CPT | Performed by: FAMILY MEDICINE

## 2022-09-13 RX ORDER — NITROFURANTOIN 25; 75 MG/1; MG/1
100 CAPSULE ORAL 2 TIMES DAILY
Qty: 14 CAPSULE | Refills: 0 | Status: SHIPPED | OUTPATIENT
Start: 2022-09-13 | End: 2022-09-20

## 2022-09-13 NOTE — PROGRESS NOTES
Assessment:       1. Dysuria    2. Bacterial UTI          Plan:       Dysuria:  New problem workup needed  -     Urinalysis; Future; Expected date: 09/13/2022  -     Urine culture; Future; Expected date: 09/13/2022  -     nitrofurantoin, macrocrystal-monohydrate, (MACROBID) 100 MG capsule; Take 1 capsule (100 mg total) by mouth 2 (two) times daily. for 7 days  Dispense: 14 capsule; Refill: 0    Bacterial UTI: New problem workup needed  -     nitrofurantoin, macrocrystal-monohydrate, (MACROBID) 100 MG capsule; Take 1 capsule (100 mg total) by mouth 2 (two) times daily. for 7 days  Dispense: 14 capsule; Refill: 0       Will start patient on Macrobid 100 mg twice daily for 7 days, the patient was advised to drink more water, take probiotics over-the-counter in order to avoid diarrhea, drink at least 64 oz of water every day.   Patient agreed with assessment and plan. Patient verbalized understanding.     Subjective:       Patient ID: Veronique Johnson is a 76 y.o. female.    Chief Complaint: Dysuria    Dysuria   This is a recurrent problem. The current episode started in the past 7 days. The problem has been rapidly worsening. There has been no fever. There is No history of pyelonephritis. Associated symptoms include frequency and urgency. Pertinent negatives include no chills, discharge or constipation. She has tried nothing for the symptoms. The treatment provided no relief.     Past medical history, past social history was reviewed and discussed with the patient.    Review of Systems   Constitutional:  Negative for activity change, chills and fever.   Respiratory:  Negative for cough and wheezing.    Gastrointestinal:  Negative for constipation.   Genitourinary:  Positive for dysuria, frequency and urgency.   Neurological:  Negative for dizziness.     Objective:      Physical Exam

## 2022-09-14 ENCOUNTER — DOCUMENT SCAN (OUTPATIENT)
Dept: HOME HEALTH SERVICES | Facility: HOSPITAL | Age: 77
End: 2022-09-14
Payer: MEDICARE

## 2022-09-14 LAB — BACTERIA UR CULT: NO GROWTH

## 2022-09-28 ENCOUNTER — OFFICE VISIT (OUTPATIENT)
Dept: ORTHOPEDICS | Facility: CLINIC | Age: 77
End: 2022-09-28
Payer: MEDICARE

## 2022-09-28 VITALS — RESPIRATION RATE: 18 BRPM | HEIGHT: 61 IN | WEIGHT: 128 LBS | BODY MASS INDEX: 24.17 KG/M2

## 2022-09-28 DIAGNOSIS — Z96.651 S/P RIGHT UNICOMPARTMENTAL KNEE REPLACEMENT: Primary | ICD-10-CM

## 2022-09-28 PROCEDURE — 99999 PR PBB SHADOW E&M-EST. PATIENT-LVL III: CPT | Mod: PBBFAC,,, | Performed by: ORTHOPAEDIC SURGERY

## 2022-09-28 PROCEDURE — 99024 POSTOP FOLLOW-UP VISIT: CPT | Mod: POP,,, | Performed by: ORTHOPAEDIC SURGERY

## 2022-09-28 PROCEDURE — 99213 OFFICE O/P EST LOW 20 MIN: CPT | Mod: PBBFAC,PN | Performed by: ORTHOPAEDIC SURGERY

## 2022-09-28 PROCEDURE — 99999 PR PBB SHADOW E&M-EST. PATIENT-LVL III: ICD-10-PCS | Mod: PBBFAC,,, | Performed by: ORTHOPAEDIC SURGERY

## 2022-09-28 PROCEDURE — 99024 PR POST-OP FOLLOW-UP VISIT: ICD-10-PCS | Mod: POP,,, | Performed by: ORTHOPAEDIC SURGERY

## 2022-09-28 NOTE — PROGRESS NOTES
Past Medical History:   Diagnosis Date    Anemia     Anxiety     Chronic bronchitis with COPD (chronic obstructive pulmonary disease)     Esophageal spasm     Essential tremor     GERD (gastroesophageal reflux disease)     Headache     High cholesterol     Hypertension     Meniere disease     Multiple sclerosis     Muscle spasm     Pancreatic insufficiency        Past Surgical History:   Procedure Laterality Date    CATARACT EXTRACTION, BILATERAL  2006    COLONOSCOPY  09/05/2018    Dr. Leija in procedures: diverticulosis, 4 colon polyps removed, adenomatous polyps x3 & benign mucosal polyps    COLONOSCOPY N/A 4/22/2021    Procedure: COLONOSCOPY;  Surgeon: Dwayne Santoyo MD;  Location: [facility] ENDO;  Service: Endoscopy;  Laterality: N/A;    CORONARY ANGIOGRAPHY N/A 10/14/2021    Procedure: ANGIOGRAM, CORONARY ARTERY;  Surgeon: Mustapha Manzo MD;  Location: [facility];  Service: Cardiology;  Laterality: N/A;    CYSTOSCOPY WITH HYDRODISTENSION OF BLADDER N/A 6/5/2019    Procedure: CYSTOSCOPY, WITH BLADDER HYDRODISTENSION;  Surgeon: Marko Burton MD;  Location: [facility] OR;  Service: OB/GYN;  Laterality: N/A;    DILATION AND CURETTAGE OF UTERUS  1966    ESOPHAGOGASTRODUODENOSCOPY  09/05/2018    kala Burr procedures: gastritis; biopsy: duodenum unremarkable, with focal benign gastric metaplasia, stomach- minimal chronic gastritis, negative for h pylori    ESOPHAGOGASTRODUODENOSCOPY N/A 4/22/2021    Procedure: EGD (ESOPHAGOGASTRODUODENOSCOPY);  Surgeon: Dwayne Santoyo MD;  Location: [facility];  Service: Endoscopy;  Laterality: N/A;    EYE SURGERY      HEMORRHOID SURGERY  1997    KNEE ARTHROSCOPY W/ MENISCECTOMY Right 8/13/2021    Procedure: ARTHROSCOPY, KNEE, WITH MENISCECTOMY;  Surgeon: Shelton Le MD;  Location: [facility];  Service: Orthopedics;  Laterality: Right;    KNEE ARTHROSCOPY W/ MENISCECTOMY Right 4/8/2022    Procedure: ARTHROSCOPY, KNEE, WITH MENISCECTOMY;  Surgeon: Shelton Le,  MD;  Location: [facility] OR;  Service: Orthopedics;  Laterality: Right;  medial meniscectomy    LEFT HEART CATHETERIZATION Right 10/14/2021    Procedure: Left heart cath;  Surgeon: Mustapha Manzo MD;  Location: [facility] CATH;  Service: Cardiology;  Laterality: Right;    ROBOTIC ARTHROPLASTY, KNEE, UNICOMPARTMENTAL Right 8/16/2022    Procedure: ROBOTIC ARTHROPLASTY, KNEE, UNICOMPARTMENTAL;  Surgeon: Shelton Le MD;  Location: [facility] OR;  Service: Orthopedics;  Laterality: Right;    TONSILLECTOMY  1954    TUBAL LIGATION  1990       Current Outpatient Medications   Medication Sig    albuterol (PROVENTIL/VENTOLIN HFA) 90 mcg/actuation inhaler Inhale 2 puffs into the lungs 3 (three) times daily. Rescue    amLODIPine (NORVASC) 5 MG tablet TAKE 1 TABLET (5 MG TOTAL) BY MOUTH ONCE DAILY.    cyanocobalamin 500 MCG tablet Take 500 mcg by mouth once daily.    lipase-protease-amylase (CREON) 36,000-114,000- 180,000 unit CpDR TAKE 1 CAPSULE BY MOUTH 3 (THREE) TIMES DAILY WITH MEALS. &amp; 1 CAPSULE WITH SNACKS    loperamide (IMODIUM) 1 mg/5 mL solution Take by mouth every 6 (six) hours as needed for Diarrhea.    multivitamin capsule Take 1 capsule by mouth.    nitroGLYCERIN (NITROSTAT) 0.4 MG SL tablet Place 1 tablet (0.4 mg total) under the tongue every 5 (five) minutes as needed for Chest pain.    omeprazole (PRILOSEC) 20 MG capsule TAKE 1 CAPSULE (20 MG TOTAL) BY MOUTH ONCE DAILY.    rosuvastatin (CRESTOR) 40 MG Tab TAKE 1 TABLET (40 MG TOTAL) BY MOUTH EVERY EVENING.    ALPRAZolam (XANAX) 0.25 MG tablet TAKE 1 TABLET (0.25 MG TOTAL) BY MOUTH 2 (TWO) TIMES DAILY AS NEEDED FOR ANXIETY.    aspirin (ECOTRIN) 81 MG EC tablet Take 1 tablet (81 mg total) by mouth 2 (two) times a day.    buPROPion (WELLBUTRIN XL) 300 MG 24 hr tablet Take 1 tablet (300 mg total) by mouth once daily.    fluticasone furoate-vilanteroL (BREO) 100-25 mcg/dose diskus inhaler Inhale 1 puff into the lungs once daily. Controller     No current  facility-administered medications for this visit.       Review of patient's allergies indicates:   Allergen Reactions    Cephalosporins Anaphylaxis    Iodinated contrast media Hives and Swelling    Penicillins Rash       Family History   Problem Relation Age of Onset    Coronary artery disease Mother     Heart disease Mother 60        Bypass twice    Miscarriages / Stillbirths Mother         Stillbirths    Vision loss Mother         Macular Degeration    Heart attack Father     Coronary artery disease Father     Heart disease Father 55        Several Heart Attack    Alcohol abuse Father     Early death Father         Heart Attack 55    Coronary artery disease Sister     Heart disease Sister 65        CAD    Celiac disease Neg Hx     Colon cancer Neg Hx     Crohn's disease Neg Hx     Ulcerative colitis Neg Hx        Social History     Socioeconomic History    Marital status:    Tobacco Use    Smoking status: Former     Packs/day: 2.00     Years: 35.00     Pack years: 70.00     Types: Cigarettes     Start date: 1964     Quit date:      Years since quittin.7    Smokeless tobacco: Never   Substance and Sexual Activity    Alcohol use: Yes     Alcohol/week: 7.0 standard drinks     Types: 7 Glasses of wine per week    Drug use: Never    Sexual activity: Yes     Partners: Male     Birth control/protection: Post-menopausal     Social Determinants of Health     Financial Resource Strain: Low Risk     Difficulty of Paying Living Expenses: Not hard at all   Food Insecurity: No Food Insecurity    Worried About Running Out of Food in the Last Year: Never true    Ran Out of Food in the Last Year: Never true   Transportation Needs: No Transportation Needs    Lack of Transportation (Medical): No    Lack of Transportation (Non-Medical): No   Physical Activity: Insufficiently Active    Days of Exercise per Week: 1 day    Minutes of Exercise per Session: 30 min   Stress: Stress Concern Present    Feeling of Stress :  Very much   Social Connections: Unknown    Frequency of Communication with Friends and Family: More than three times a week    Frequency of Social Gatherings with Friends and Family: Once a week    Active Member of Clubs or Organizations: Yes    Attends Club or Organization Meetings: More than 4 times per year    Marital Status:    Housing Stability: Low Risk     Unable to Pay for Housing in the Last Year: No    Number of Places Lived in the Last Year: 1    Unstable Housing in the Last Year: No       Chief Complaint:   Chief Complaint   Patient presents with    Post-op Evaluation     fu right knee post Uniarthroplasty, 8/16/22       Date of surgery:  August 16, 2022    History of present illness:  76-year-old female underwent right robot assisted unicompartmental knee replacement.  Patient doing very well.  Pain is a 2/10.        Review of Systems:    Musculoskeletal:  See HPI        Physical Examination:    Vital Signs:    Vitals:    09/28/22 1119   Resp: 18       Body mass index is 24.19 kg/m².    This a well-developed, well nourished patient in no acute distress.  They are alert and oriented and cooperative to examination.  Pt. walks without an antalgic gait.      Examination of the right knee shows well-healed surgical incisions.  No erythema drainage.  Range of motion 0-130 degrees.  No calf pain.  Negative Homans sign.    X-rays:  Two views of the right knee are  reviewed which show well-aligned unicompartmental knee Replacement without complication     Assessment::  Status post right unicompartmental knee replacement    Plan:  She is doing great.  Okay to finish up with formal physical therapy whenever she wants.  I will see her back in 6 weeks with 1 last x-ray of the right knee.    This note was created using [employer] voice recognition software that occasionally misinterpreted phrases or words.

## 2022-09-30 ENCOUNTER — EXTERNAL CHRONIC CARE MANAGEMENT (OUTPATIENT)
Dept: PRIMARY CARE CLINIC | Facility: CLINIC | Age: 77
End: 2022-09-30
Payer: MEDICARE

## 2022-09-30 PROCEDURE — 99490 CHRNC CARE MGMT STAFF 1ST 20: CPT | Mod: PBBFAC,PO | Performed by: FAMILY MEDICINE

## 2022-09-30 PROCEDURE — 99490 PR CHRONIC CARE MGMT, 1ST 20 MIN: ICD-10-PCS | Mod: S$PBB,,, | Performed by: FAMILY MEDICINE

## 2022-09-30 PROCEDURE — 99490 CHRNC CARE MGMT STAFF 1ST 20: CPT | Mod: S$PBB,,, | Performed by: FAMILY MEDICINE

## 2022-10-05 DIAGNOSIS — F41.9 ANXIETY: ICD-10-CM

## 2022-10-05 DIAGNOSIS — F43.10 PTSD (POST-TRAUMATIC STRESS DISORDER): ICD-10-CM

## 2022-10-05 RX ORDER — BUPROPION HYDROCHLORIDE 150 MG/1
150 TABLET ORAL DAILY
Qty: 30 TABLET | Refills: 11 | OUTPATIENT
Start: 2022-10-05 | End: 2023-10-05

## 2022-10-05 RX ORDER — BUPROPION HYDROCHLORIDE 300 MG/1
300 TABLET ORAL DAILY
Qty: 90 TABLET | Refills: 3 | Status: SHIPPED | OUTPATIENT
Start: 2022-10-05 | End: 2023-10-27

## 2022-10-05 NOTE — TELEPHONE ENCOUNTER
No new care gaps identified.  [facility] Embedded Care Gaps. Reference number: [id number]. 10/05/2022   9:47:11 AM ABEBAT

## 2022-10-05 NOTE — TELEPHONE ENCOUNTER
Pt walk in with Relatives. c/o Right Hip pain for 3 days. Unable to Bear weight. PMHx Non-Hodgkin's on Chemo pill everyday/ Chemo injection every Friday.   Denies Trauma weakness numbness on affected area. Refill Routing Note   Medication(s) are not appropriate for processing by [facility] for the following reason(s):      - Medication not previously prescribed by PCP    [facility] action(s):  Defer          Medication reconciliation completed: No     Appointments  past 12m or future 3m with PCP    Date Provider   Last Visit   7/13/2022 Becky Song MD   Next Visit   1/11/2023 Becky Song MD   ED visits in past 90 days: 0        Note composed:5:01 PM 10/05/2022

## 2022-10-05 NOTE — TELEPHONE ENCOUNTER
Quick DC. Inappropriate Request    Refill Authorization Note   Veronique Johnson  is requesting a refill authorization.  Brief Assessment and Rationale for Refill:  Quick Discontinue  Medication Therapy Plan:  D/C 9/9/21    Medication Reconciliation Completed:  No      Comments:     Note composed:2:04 PM 10/05/2022

## 2022-10-05 NOTE — TELEPHONE ENCOUNTER
No new care gaps identified.  [facility] Embedded Care Gaps. Reference number: [id number]. 10/05/2022   3:12:33 PM CDT

## 2022-10-14 ENCOUNTER — IMMUNIZATION (OUTPATIENT)
Dept: FAMILY MEDICINE | Facility: CLINIC | Age: 77
End: 2022-10-14
Payer: MEDICARE

## 2022-10-14 PROCEDURE — G0008 ADMIN INFLUENZA VIRUS VAC: HCPCS | Mod: PBBFAC,PO

## 2022-10-18 ENCOUNTER — HOSPITAL ENCOUNTER (OUTPATIENT)
Dept: RADIOLOGY | Facility: HOSPITAL | Age: 77
Discharge: HOME | End: 2022-10-18
Attending: FAMILY MEDICINE
Payer: MEDICARE

## 2022-10-18 DIAGNOSIS — E04.1 THYROID NODULE: ICD-10-CM

## 2022-10-18 PROCEDURE — 76536 US EXAM OF HEAD AND NECK: CPT | Mod: 26,,, | Performed by: RADIOLOGY

## 2022-10-18 PROCEDURE — 76536 US SOFT TISSUE HEAD NECK THYROID: ICD-10-PCS | Mod: 26,,, | Performed by: RADIOLOGY

## 2022-10-18 PROCEDURE — 76536 US EXAM OF HEAD AND NECK: CPT | Mod: TC,PO

## 2022-10-28 ENCOUNTER — OFFICE VISIT (OUTPATIENT)
Dept: GASTROENTEROLOGY | Facility: CLINIC | Age: 77
End: 2022-10-28
Payer: MEDICARE

## 2022-10-28 VITALS — BODY MASS INDEX: 22.23 KG/M2 | HEIGHT: 61 IN | WEIGHT: 117.75 LBS

## 2022-10-28 DIAGNOSIS — Z87.898 HISTORY OF DIARRHEA: ICD-10-CM

## 2022-10-28 DIAGNOSIS — R63.4 WEIGHT LOSS: ICD-10-CM

## 2022-10-28 DIAGNOSIS — Z87.19 HISTORY OF GASTROESOPHAGEAL REFLUX (GERD): ICD-10-CM

## 2022-10-28 DIAGNOSIS — R13.14 PHARYNGOESOPHAGEAL DYSPHAGIA: ICD-10-CM

## 2022-10-28 DIAGNOSIS — K86.89 PANCREATIC INSUFFICIENCY: ICD-10-CM

## 2022-10-28 DIAGNOSIS — Z79.899 ENCOUNTER FOR LONG-TERM (CURRENT) USE OF MEDICATIONS: Primary | ICD-10-CM

## 2022-10-28 PROCEDURE — 99214 OFFICE O/P EST MOD 30 MIN: CPT | Mod: PBBFAC,PO | Performed by: NURSE PRACTITIONER

## 2022-10-28 PROCEDURE — 99999 PR PBB SHADOW E&M-EST. PATIENT-LVL IV: ICD-10-PCS | Mod: PBBFAC,,, | Performed by: NURSE PRACTITIONER

## 2022-10-28 PROCEDURE — 99999 PR PBB SHADOW E&M-EST. PATIENT-LVL IV: CPT | Mod: PBBFAC,,, | Performed by: NURSE PRACTITIONER

## 2022-10-28 PROCEDURE — 99214 PR OFFICE/OUTPT VISIT, EST, LEVL IV, 30-39 MIN: ICD-10-PCS | Mod: S$PBB,,, | Performed by: NURSE PRACTITIONER

## 2022-10-28 PROCEDURE — 99214 OFFICE O/P EST MOD 30 MIN: CPT | Mod: S$PBB,,, | Performed by: NURSE PRACTITIONER

## 2022-10-28 RX ORDER — PANCRELIPASE 36000; 180000; 114000 [USP'U]/1; [USP'U]/1; [USP'U]/1
CAPSULE, DELAYED RELEASE PELLETS ORAL
Qty: 120 CAPSULE | Refills: 0 | Status: SHIPPED | OUTPATIENT
Start: 2022-10-28 | End: 2022-10-28 | Stop reason: SDUPTHER

## 2022-10-28 RX ORDER — PANCRELIPASE 36000; 180000; 114000 [USP'U]/1; [USP'U]/1; [USP'U]/1
CAPSULE, DELAYED RELEASE PELLETS ORAL
Qty: 120 CAPSULE | Refills: 11 | Status: SHIPPED | OUTPATIENT
Start: 2022-10-28 | End: 2022-11-30 | Stop reason: SDUPTHER

## 2022-10-28 NOTE — PATIENT INSTRUCTIONS
Information on Pancreatic Insufficiency:  [email or web address]    Discharge Instructions: Eating a Soft Diet  You have been prescribed a soft diet (also called gastrointestinal soft diet or bland diet). This reduces the amount of work your digestive tract has to do. It also reduces the chance that your digestive tract will be irritated by the food you eat. A soft diet is prescribed for people with digestive problems. The diet consists of foods that are tender, mildly seasoned, and easy to digest. While on this diet, you should not eat fried or spicy foods, or raw fruits and vegetables. Also avoid alcoholic beverages.  General guidelines  Eat in a calm, relaxed atmosphere. How you eat may be as important as what you eat. Dont rush while eating. Chew your food slowly and thoroughly, and swallow slowly.  Eat small frequent meals throughout the day, but dont eat within 2 hours of bedtime.  Avoid any foods that cause discomfort.  Dont use NSAIDs (nonsteroidal anti-inflammatory drugs), such as aspirin, and ibuprofen. Also avoid medicine that contain aspirin. NSAIDs can cause ulcers and delay or prevent ulcer healing.  Use antacids as needed, but keep in mind that magnesium-containing antacids may cause diarrhea.  Foods to eat  Cream of wheat and cream of rice  Cooked white rice  Mashed potatoes, and boiled potatoes without skin  Plain pasta and noodles  Plain white crackers (such as no-salt soda crackers)  White bread  Applesauce  Cooked fruits without skins or seeds  Mild juices, such as apple and grape  Bananas  Cooked or mashed vegetables without stems and seeds  Carrots  Summer squash (zucchini, yellow squash)  Winter squash (acorn, butternut, spaghetti squash)  Cottage cheese  Mild hard or soft cheeses  Custard  Yogurt without seeds or nuts  Milk (you may need lactose-free milk)  Ice cream without seeds or nuts  Smooth  peanut butter  Eggs  Fish, turkey, chicken, or other meat that is not tough or stringy  Tofu  Foods to avoid  Nuts and seeds  Snack foods, such as the following:  Chocolate-containing snacks, candy, pastries, or cakes.  Potato chips (plain, barbecued, or other flavors)  Taco chips or nachos  Corn chips  Popcorn, popcorn cakes, or rice cakes  Crackers with nuts, seeds, or spicy seasonings  French fries  Fried or greasy foods  Whole-grain breads, rolls, and crackers  Breads and rolls with nuts, seeds, or bran  Bran and granola cereals  Berries with seeds, such as strawberries, raspberries, and blackberries  Acidic fruits, such as oranges, grapefruits, oscar, limes, and pineapples  Raw vegetables  Mild or hot peppers  Sauerkraut and pickled vegetables  Tomatoes or tomato products, such as tomato paste, tomato sauce, and tomato juice  Barbecue sauce  Spicy or flavored cheeses, such as jalapeño and black pepper cheese  Crunchy peanut butter  Dried cooked beans, such as chandra, kidney, or navy beans  The following meats:  Fried or greasy meats  Processed, spicy meats, such as sausage, hays, ham, and lunch meats  Ribs and other meats with barbecue sauce  Tough or stringy meats, such as corned beef or beef jerky  Fluids to avoid  Alcoholic beverages  Coffee and regular teas  Michael and other drinks with caffeine  Cranberry, orange, pineapple, and grapefruit juice  Lemonade  Vegetable juice  Whole milk, if you are lactose intolerant  Follow-up  Make a follow-up appointment with a dietitian as directed by our staff.  Date Last Reviewed: 6/21/2015  © 4190-3723 [employer]. [address] [place] [address], [place] [address]. All rights reserved. This information is not intended as a substitute for professional medical care. Always follow your healthcare professional's instructions.

## 2022-10-28 NOTE — PROGRESS NOTES
Subjective:       Patient ID: Veronique Johnson is a 77 y.o. female Body mass index is 22.24 kg/m².    Chief Complaint: Medication Refill    This patient is established with Dr. Santoyo & myself.     Diarrhea   This is a chronic problem. The current episode started more than 1 year ago (chronic for several years). Episode frequency: bowel movements are 2 times a day of formed stool, rare loose stools. The problem has been resolved (since on creon). The patient states that diarrhea awakens her from sleep. Associated symptoms include weight loss (had knee surgery in 8/2022 and had decreased appetite afterwards, improving). Pertinent negatives include no abdominal pain, bloating, chills, coughing, fever, increased  flatus or vomiting. Associated symptoms comments: GERD controlled on prilosec 20 mg once daily. There are no known risk factors. She has tried anti-motility drug (CREON (36,000 dosage) 1 capsule with meals and snacks; imodium PRN, PAST: levsin PRN-esophageal spasms (has not taken recently)) for the symptoms. The treatment provided significant relief. Her past medical history is significant for irritable bowel syndrome. There is no history of inflammatory bowel disease.     Review of Systems   Constitutional:  Positive for weight loss (had knee surgery in 8/2022 and had decreased appetite afterwards, improving). Negative for appetite change, chills, fatigue and fever.   HENT:  Positive for trouble swallowing (occasional with rice; denies problems with liquids or pills; egd with dilation in the past helped). Negative for sore throat.    Respiratory:  Negative for cough, choking and shortness of breath.    Cardiovascular:  Negative for chest pain.   Gastrointestinal:  Negative for abdominal pain, anal bleeding, bloating, blood in stool, constipation, diarrhea, flatus, nausea, rectal pain and vomiting.   Genitourinary:  Negative for difficulty urinating, dysuria and flank pain.        Patient reports she has  done pelvic floor physicial therapy in the past which helped her   Neurological:  Negative for weakness.       No LMP recorded. Patient is postmenopausal.  Past Medical History:   Diagnosis Date    Anemia     Anxiety     Chronic bronchitis with COPD (chronic obstructive pulmonary disease)     Esophageal spasm     Essential tremor     GERD (gastroesophageal reflux disease)     Headache     High cholesterol     Hypertension     Meniere disease     Multiple sclerosis     Muscle spasm     Pancreatic insufficiency      Past Surgical History:   Procedure Laterality Date    CATARACT EXTRACTION, BILATERAL  2006    COLONOSCOPY  09/05/2018    Dr. Leija, in procedures: diverticulosis, 4 colon polyps removed, adenomatous polyps x3 & benign mucosal polyps    COLONOSCOPY N/A 4/22/2021    Procedure: COLONOSCOPY;  Surgeon: Dwayne Santoyo MD;  Location: [facility] ENDO;  Service: Endoscopy;  Laterality: N/A;    CORONARY ANGIOGRAPHY N/A 10/14/2021    Procedure: ANGIOGRAM, CORONARY ARTERY;  Surgeon: Mustapha Manzo MD;  Location: [facility];  Service: Cardiology;  Laterality: N/A;    CYSTOSCOPY WITH HYDRODISTENSION OF BLADDER N/A 6/5/2019    Procedure: CYSTOSCOPY, WITH BLADDER HYDRODISTENSION;  Surgeon: Marko Burton MD;  Location: [facility] OR;  Service: OB/GYN;  Laterality: N/A;    DILATION AND CURETTAGE OF UTERUS  1966    ESOPHAGOGASTRODUODENOSCOPY  09/05/2018    Dr. Leija in procedures: gastritis; biopsy: duodenum unremarkable, with focal benign gastric metaplasia, stomach- minimal chronic gastritis, negative for h pylori    ESOPHAGOGASTRODUODENOSCOPY N/A 4/22/2021    Procedure: EGD (ESOPHAGOGASTRODUODENOSCOPY);  Surgeon: Dwayne Santoyo MD;  Location: [facility] ENDO;  Service: Endoscopy;  Laterality: N/A;    EYE SURGERY      HEMORRHOID SURGERY  1997    KNEE ARTHROSCOPY W/ MENISCECTOMY Right 8/13/2021    Procedure: ARTHROSCOPY, KNEE, WITH MENISCECTOMY;  Surgeon: Shelton Le MD;  Location: [facility] OR;  Service: Orthopedics;   Laterality: Right;    KNEE ARTHROSCOPY W/ MENISCECTOMY Right 2022    Procedure: ARTHROSCOPY, KNEE, WITH MENISCECTOMY;  Surgeon: Shelton Le MD;  Location: [facility] OR;  Service: Orthopedics;  Laterality: Right;  medial meniscectomy    LEFT HEART CATHETERIZATION Right 10/14/2021    Procedure: Left heart cath;  Surgeon: Mustapha Manzo MD;  Location: [facility] CATH;  Service: Cardiology;  Laterality: Right;    ROBOTIC ARTHROPLASTY, KNEE, UNICOMPARTMENTAL Right 2022    Procedure: ROBOTIC ARTHROPLASTY, KNEE, UNICOMPARTMENTAL;  Surgeon: Shelton Le MD;  Location: [facility] OR;  Service: Orthopedics;  Laterality: Right;    TONSILLECTOMY      TUBAL LIGATION       Family History   Problem Relation Age of Onset    Coronary artery disease Mother     Heart disease Mother 60        Bypass twice    Miscarriages / Stillbirths Mother         Stillbirths    Vision loss Mother         Macular Degeration    Heart attack Father     Coronary artery disease Father     Heart disease Father 55        Several Heart Attack    Alcohol abuse Father     Early death Father         Heart Attack 55    Coronary artery disease Sister     Heart disease Sister 65        CAD    Celiac disease Neg Hx     Colon cancer Neg Hx     Crohn's disease Neg Hx     Ulcerative colitis Neg Hx      Social History     Tobacco Use    Smoking status: Former     Packs/day: 2.00     Years: 35.00     Pack years: 70.00     Types: Cigarettes     Start date: 1964     Quit date:      Years since quittin.8    Smokeless tobacco: Never   Substance Use Topics    Alcohol use: Yes     Alcohol/week: 7.0 standard drinks     Types: 7 Glasses of wine per week    Drug use: Never     Wt Readings from Last 10 Encounters:   10/28/22 53.4 kg (117 lb 11.6 oz)   22 58.1 kg (128 lb)   22 58.1 kg (128 lb)   22 58.1 kg (128 lb)   22 58.1 kg (128 lb)   22 58.1 kg (128 lb 1.4 oz)   22 59.4 kg (131 lb)   22 59.5 kg (131  lb 2.8 oz)   06/01/22 58.1 kg (128 lb)   04/27/22 58.1 kg (128 lb)     Lab Results   Component Value Date    WBC 5.24 08/08/2022    HGB 14.1 08/08/2022    HCT 42.3 08/08/2022    MCV 88 08/08/2022     08/08/2022     CMP  Sodium   Date Value Ref Range Status   08/08/2022 143 136 - 145 mmol/L Final   05/30/2019 144 134 - 144 mmol/L      Potassium   Date Value Ref Range Status   08/08/2022 3.6 3.5 - 5.1 mmol/L Final     Chloride   Date Value Ref Range Status   08/08/2022 102 95 - 110 mmol/L Final   05/30/2019 110 98 - 110 mmol/L      CO2   Date Value Ref Range Status   08/08/2022 29 23 - 29 mmol/L Final     Glucose   Date Value Ref Range Status   08/08/2022 99 70 - 110 mg/dL Final   05/30/2019 123 (H) 70 - 99 mg/dL      BUN   Date Value Ref Range Status   08/08/2022 8 8 - 23 mg/dL Final     Creatinine   Date Value Ref Range Status   08/08/2022 0.7 0.5 - 1.4 mg/dL Final   05/30/2019 0.74 0.60 - 1.40 mg/dL      Calcium   Date Value Ref Range Status   08/08/2022 10.7 (H) 8.7 - 10.5 mg/dL Final     Total Protein   Date Value Ref Range Status   07/13/2022 7.7 6.0 - 8.4 g/dL Final     Albumin   Date Value Ref Range Status   07/13/2022 4.2 3.5 - 5.2 g/dL Final   05/30/2019 3.8 2.9 - 4.4 g/dL    05/30/2019 4.1 3.1 - 4.7 g/dL      Total Bilirubin   Date Value Ref Range Status   07/13/2022 0.5 0.1 - 1.0 mg/dL Final     Comment:     For infants and newborns, interpretation of results should be based  on gestational age, weight and in agreement with clinical  observations.    Premature Infant recommended reference ranges:  Up to 24 hours.............<8.0 mg/dL  Up to 48 hours............<12.0 mg/dL  3-5 days..................<15.0 mg/dL  6-29 days.................<15.0 mg/dL       Alkaline Phosphatase   Date Value Ref Range Status   07/13/2022 124 55 - 135 U/L Final     AST   Date Value Ref Range Status   07/13/2022 40 10 - 40 U/L Final     ALT   Date Value Ref Range Status   07/13/2022 29 10 - 44 U/L Final     Anion Gap    Date Value Ref Range Status   08/08/2022 12 8 - 16 mmol/L Final     eGFR if    Date Value Ref Range Status   07/13/2022 >60.0 >60 mL/min/1.73 m^2 Final     eGFR if non    Date Value Ref Range Status   07/13/2022 >60.0 >60 mL/min/1.73 m^2 Final     Comment:     Calculation used to obtain the estimated glomerular filtration  rate (eGFR) is the CKD-EPI equation.        Lab Results   Component Value Date    TSH 0.873 12/01/2021     Lab Results   Component Value Date    [id number] 909 12/01/2021 12/2/2021 magnesium WNL  3/5/2021 stool studies reviewed (acidic & decreased elastase)  3/1/2021 celiac serology WNL    Reviewed prior medical records including radiology report of 3/20/2019 CT abdomen pelvis & endoscopy history (see surgical history).    Objective:      Physical Exam  Vitals and nursing note reviewed.   Constitutional:       General: She is not in acute distress.     Appearance: Normal appearance. She is well-developed. She is not diaphoretic.   HENT:      Mouth/Throat:      Lips: Pink. No lesions.      Mouth: Mucous membranes are moist. No oral lesions.      Tongue: No lesions.      Pharynx: Oropharynx is clear. No pharyngeal swelling or posterior oropharyngeal erythema.   Eyes:      General: No scleral icterus.     Conjunctiva/sclera: Conjunctivae normal.   Pulmonary:      Effort: Pulmonary effort is normal. No respiratory distress.      Breath sounds: Normal breath sounds. No wheezing.   Abdominal:      General: Bowel sounds are normal. There is no distension or abdominal bruit.      Palpations: Abdomen is soft. Abdomen is not rigid. There is no mass.      Tenderness: There is no abdominal tenderness. There is no guarding or rebound. Negative signs include Juan's sign and McBurney's sign.   Skin:     General: Skin is warm and dry.      Coloration: Skin is not jaundiced or pale.      Findings: No erythema or rash.   Neurological:      Mental Status: She is alert and  oriented to person, place, and time.   Psychiatric:         Behavior: Behavior normal.         Thought Content: Thought content normal.         Judgment: Judgment normal.       Assessment:       1. Encounter for long-term (current) use of medications    2. Pancreatic insufficiency    3. History of diarrhea    4. History of gastroesophageal reflux (GERD)    5. Pharyngoesophageal dysphagia    6. Weight loss          Plan:     Patient has allergy to contrast so CT ordered without contrast.    Encounter for long-term (current) use of medications, History of diarrhea, & Pancreatic insufficiency  -     CT Abdomen Pelvis  Without Contrast; Future; Expected date: 10/28/2022  -   REFILL  lipase-protease-amylase (CREON) 36,000-114,000- 180,000 unit CpDR; TAKE 1 CAPSULE BY MOUTH 3 (THREE) TIMES DAILY WITH MEALS. &amp; 1 CAPSULE WITH SNACKS  Dispense: 120 capsule; Refill: 11  - informed patient can take imodium as directed PRN but advised to take only sparingly, patient verbalized understanding  - Possible EUS pending results of testing and if symptoms persist    History of gastroesophageal reflux (GERD)  - CONTINUE PRILOSEC 20 MG ONCE DAILY AS DIRECTED    Pharyngoesophageal dysphagia  -     FL Esophagram With Barium Tablet; Future; Expected date: 10/28/2022  -     Manometry esophageal; Future  - educated patient to eat smaller more frequent meals and to eat slowly and advised to eat a soft diet.    Weight loss  -     CT Abdomen Pelvis  Without Contrast; Future; Expected date: 10/28/2022  - encouraged PO intake and daily calorie counts to ensure adequate nutrition is taken in, recommend at least 2,000 calories a day  - recommend nutritional drinks, such as Boost, Ensure or Glucerna, to supplement nutrition needs  Follow up in about 1 month (around 11/28/2022), or if symptoms worsen or fail to improve.      If no improvement in symptoms or symptoms worsen, call/follow-up at clinic or go to ER.        39 minutes of total time  spent on the encounter, which includes face to face time and non-face to face time preparing to see the patient (e.g., review of tests), Obtaining and/or reviewing separately obtained history, Documenting clinical information in the electronic or other health record, Independently interpreting results (not separately reported) and communicating results to the patient/family/caregiver, or Care coordination (not separately reported).

## 2022-10-31 ENCOUNTER — EXTERNAL CHRONIC CARE MANAGEMENT (OUTPATIENT)
Dept: PRIMARY CARE CLINIC | Facility: CLINIC | Age: 77
End: 2022-10-31
Payer: MEDICARE

## 2022-10-31 PROCEDURE — 99490 CHRNC CARE MGMT STAFF 1ST 20: CPT | Mod: PBBFAC,PO | Performed by: FAMILY MEDICINE

## 2022-10-31 PROCEDURE — 99490 PR CHRONIC CARE MGMT, 1ST 20 MIN: ICD-10-PCS | Mod: S$PBB,,, | Performed by: FAMILY MEDICINE

## 2022-10-31 PROCEDURE — 99490 CHRNC CARE MGMT STAFF 1ST 20: CPT | Mod: S$PBB,,, | Performed by: FAMILY MEDICINE

## 2022-11-01 DIAGNOSIS — Z96.651 S/P RIGHT UNICOMPARTMENTAL KNEE REPLACEMENT: Primary | ICD-10-CM

## 2022-11-07 ENCOUNTER — HOSPITAL ENCOUNTER (OUTPATIENT)
Dept: RADIOLOGY | Facility: HOSPITAL | Age: 77
Discharge: HOME | End: 2022-11-07
Attending: NURSE PRACTITIONER
Payer: MEDICARE

## 2022-11-07 DIAGNOSIS — K86.89 PANCREATIC INSUFFICIENCY: ICD-10-CM

## 2022-11-07 DIAGNOSIS — R63.4 WEIGHT LOSS: ICD-10-CM

## 2022-11-07 DIAGNOSIS — Z87.898 HISTORY OF DIARRHEA: ICD-10-CM

## 2022-11-07 PROCEDURE — 74176 CT ABDOMEN PELVIS WITHOUT CONTRAST: ICD-10-PCS | Mod: 26,,, | Performed by: RADIOLOGY

## 2022-11-07 PROCEDURE — 74176 CT ABD & PELVIS W/O CONTRAST: CPT | Mod: TC,PO

## 2022-11-07 PROCEDURE — 74176 CT ABD & PELVIS W/O CONTRAST: CPT | Mod: 26,,, | Performed by: RADIOLOGY

## 2022-11-08 ENCOUNTER — PATIENT MESSAGE (OUTPATIENT)
Dept: GASTROENTEROLOGY | Facility: CLINIC | Age: 77
End: 2022-11-08
Payer: MEDICARE

## 2022-11-08 DIAGNOSIS — R47.01 EXPRESSIVE APHASIA: Primary | ICD-10-CM

## 2022-11-09 ENCOUNTER — OFFICE VISIT (OUTPATIENT)
Dept: ORTHOPEDICS | Facility: CLINIC | Age: 77
End: 2022-11-09
Payer: MEDICARE

## 2022-11-09 ENCOUNTER — HOSPITAL ENCOUNTER (OUTPATIENT)
Dept: RADIOLOGY | Facility: HOSPITAL | Age: 77
Discharge: HOME | End: 2022-11-09
Attending: ORTHOPAEDIC SURGERY
Payer: MEDICARE

## 2022-11-09 VITALS — WEIGHT: 117 LBS | RESPIRATION RATE: 18 BRPM | BODY MASS INDEX: 22.09 KG/M2 | HEIGHT: 61 IN

## 2022-11-09 DIAGNOSIS — Z96.651 S/P RIGHT UNICOMPARTMENTAL KNEE REPLACEMENT: Primary | ICD-10-CM

## 2022-11-09 DIAGNOSIS — Z96.651 S/P RIGHT UNICOMPARTMENTAL KNEE REPLACEMENT: ICD-10-CM

## 2022-11-09 PROCEDURE — 99999 PR PBB SHADOW E&M-EST. PATIENT-LVL III: CPT | Mod: PBBFAC,,, | Performed by: ORTHOPAEDIC SURGERY

## 2022-11-09 PROCEDURE — 99024 PR POST-OP FOLLOW-UP VISIT: ICD-10-PCS | Mod: POP,,, | Performed by: ORTHOPAEDIC SURGERY

## 2022-11-09 PROCEDURE — 73562 X-RAY EXAM OF KNEE 3: CPT | Mod: 26,LT,, | Performed by: RADIOLOGY

## 2022-11-09 PROCEDURE — 99213 OFFICE O/P EST LOW 20 MIN: CPT | Mod: PBBFAC,PN | Performed by: ORTHOPAEDIC SURGERY

## 2022-11-09 PROCEDURE — 73564 XR KNEE ORTHO RIGHT WITH FLEXION: ICD-10-PCS | Mod: 26,RT,, | Performed by: RADIOLOGY

## 2022-11-09 PROCEDURE — 99999 PR PBB SHADOW E&M-EST. PATIENT-LVL III: ICD-10-PCS | Mod: PBBFAC,,, | Performed by: ORTHOPAEDIC SURGERY

## 2022-11-09 PROCEDURE — 99024 POSTOP FOLLOW-UP VISIT: CPT | Mod: POP,,, | Performed by: ORTHOPAEDIC SURGERY

## 2022-11-09 PROCEDURE — 73562 XR KNEE ORTHO RIGHT WITH FLEXION: ICD-10-PCS | Mod: 26,LT,, | Performed by: RADIOLOGY

## 2022-11-09 PROCEDURE — 73562 X-RAY EXAM OF KNEE 3: CPT | Mod: TC,59,PO,LT

## 2022-11-09 PROCEDURE — 73564 X-RAY EXAM KNEE 4 OR MORE: CPT | Mod: 26,RT,, | Performed by: RADIOLOGY

## 2022-11-09 RX ORDER — MELOXICAM 15 MG/1
15 TABLET ORAL DAILY
Qty: 30 TABLET | Refills: 2 | Status: SHIPPED | OUTPATIENT
Start: 2022-11-09 | End: 2023-07-13

## 2022-11-09 NOTE — PROGRESS NOTES
Past Medical History:   Diagnosis Date    Anemia     Anxiety     Chronic bronchitis with COPD (chronic obstructive pulmonary disease)     Esophageal spasm     Essential tremor     GERD (gastroesophageal reflux disease)     Headache     High cholesterol     Hypertension     Meniere disease     Multiple sclerosis     Muscle spasm     Pancreatic insufficiency        Past Surgical History:   Procedure Laterality Date    CATARACT EXTRACTION, BILATERAL  2006    COLONOSCOPY  09/05/2018    Dr. Leija in procedures: diverticulosis, 4 colon polyps removed, adenomatous polyps x3 & benign mucosal polyps    COLONOSCOPY N/A 4/22/2021    Procedure: COLONOSCOPY;  Surgeon: Dwayne Santoyo MD;  Location: [facility] ENDO;  Service: Endoscopy;  Laterality: N/A;    CORONARY ANGIOGRAPHY N/A 10/14/2021    Procedure: ANGIOGRAM, CORONARY ARTERY;  Surgeon: Mustapha Manzo MD;  Location: [facility];  Service: Cardiology;  Laterality: N/A;    CYSTOSCOPY WITH HYDRODISTENSION OF BLADDER N/A 6/5/2019    Procedure: CYSTOSCOPY, WITH BLADDER HYDRODISTENSION;  Surgeon: Marko Burton MD;  Location: [facility] OR;  Service: OB/GYN;  Laterality: N/A;    DILATION AND CURETTAGE OF UTERUS  1966    ESOPHAGOGASTRODUODENOSCOPY  09/05/2018    kala Burr procedures: gastritis; biopsy: duodenum unremarkable, with focal benign gastric metaplasia, stomach- minimal chronic gastritis, negative for h pylori    ESOPHAGOGASTRODUODENOSCOPY N/A 4/22/2021    Procedure: EGD (ESOPHAGOGASTRODUODENOSCOPY);  Surgeon: Dwayne Santoyo MD;  Location: [facility];  Service: Endoscopy;  Laterality: N/A;    EYE SURGERY      HEMORRHOID SURGERY  1997    KNEE ARTHROSCOPY W/ MENISCECTOMY Right 8/13/2021    Procedure: ARTHROSCOPY, KNEE, WITH MENISCECTOMY;  Surgeon: Shelton Le MD;  Location: [facility];  Service: Orthopedics;  Laterality: Right;    KNEE ARTHROSCOPY W/ MENISCECTOMY Right 4/8/2022    Procedure: ARTHROSCOPY, KNEE, WITH MENISCECTOMY;  Surgeon: Shelton Le,  MD;  Location: [facility] OR;  Service: Orthopedics;  Laterality: Right;  medial meniscectomy    LEFT HEART CATHETERIZATION Right 10/14/2021    Procedure: Left heart cath;  Surgeon: Mustapha Manzo MD;  Location: [facility] CATH;  Service: Cardiology;  Laterality: Right;    ROBOTIC ARTHROPLASTY, KNEE, UNICOMPARTMENTAL Right 8/16/2022    Procedure: ROBOTIC ARTHROPLASTY, KNEE, UNICOMPARTMENTAL;  Surgeon: Shelton Le MD;  Location: [facility] OR;  Service: Orthopedics;  Laterality: Right;    TONSILLECTOMY  1954    TUBAL LIGATION  1990       Current Outpatient Medications   Medication Sig    albuterol (PROVENTIL/VENTOLIN HFA) 90 mcg/actuation inhaler Inhale 2 puffs into the lungs 3 (three) times daily. Rescue    ALPRAZolam (XANAX) 0.25 MG tablet Take 1 tablet (0.25 mg total) by mouth 2 (two) times daily as needed for Anxiety.    amLODIPine (NORVASC) 5 MG tablet TAKE 1 TABLET (5 MG TOTAL) BY MOUTH ONCE DAILY.    aspirin (ECOTRIN) 81 MG EC tablet Take 1 tablet (81 mg total) by mouth 2 (two) times a day.    buPROPion (WELLBUTRIN XL) 300 MG 24 hr tablet TAKE 1 TABLET (300 MG TOTAL) BY MOUTH ONCE DAILY.    cyanocobalamin 500 MCG tablet Take 500 mcg by mouth once daily.    fluticasone furoate-vilanteroL (BREO) 100-25 mcg/dose diskus inhaler Inhale 1 puff into the lungs once daily. Controller    lipase-protease-amylase (CREON) 36,000-114,000- 180,000 unit CpDR TAKE 1 CAPSULE BY MOUTH 3 (THREE) TIMES DAILY WITH MEALS. &amp; 1 CAPSULE WITH SNACKS    loperamide (IMODIUM) 1 mg/5 mL solution Take by mouth every 6 (six) hours as needed for Diarrhea.    multivitamin capsule Take 1 capsule by mouth.    nitroGLYCERIN (NITROSTAT) 0.4 MG SL tablet Place 1 tablet (0.4 mg total) under the tongue every 5 (five) minutes as needed for Chest pain.    omeprazole (PRILOSEC) 20 MG capsule TAKE 1 CAPSULE (20 MG TOTAL) BY MOUTH ONCE DAILY.    rosuvastatin (CRESTOR) 40 MG Tab TAKE 1 TABLET (40 MG TOTAL) BY MOUTH EVERY EVENING.     No current  facility-administered medications for this visit.       Review of patient's allergies indicates:   Allergen Reactions    Cephalosporins Anaphylaxis    Iodinated contrast media Hives and Swelling    Penicillins Rash       Family History   Problem Relation Age of Onset    Coronary artery disease Mother     Heart disease Mother 60        Bypass twice    Miscarriages / Stillbirths Mother         Stillbirths    Vision loss Mother         Macular Degeration    Heart attack Father     Coronary artery disease Father     Heart disease Father 55        Several Heart Attack    Alcohol abuse Father     Early death Father         Heart Attack 55    Coronary artery disease Sister     Heart disease Sister 65        CAD    Celiac disease Neg Hx     Colon cancer Neg Hx     Crohn's disease Neg Hx     Ulcerative colitis Neg Hx        Social History     Socioeconomic History    Marital status:    Tobacco Use    Smoking status: Former     Packs/day: 2.00     Years: 35.00     Pack years: 70.00     Types: Cigarettes     Start date: 1964     Quit date:      Years since quittin.8    Smokeless tobacco: Never   Substance and Sexual Activity    Alcohol use: Yes     Alcohol/week: 7.0 standard drinks     Types: 7 Glasses of wine per week    Drug use: Never    Sexual activity: Yes     Partners: Male     Birth control/protection: Post-menopausal     Social Determinants of Health     Financial Resource Strain: Low Risk     Difficulty of Paying Living Expenses: Not hard at all   Food Insecurity: No Food Insecurity    Worried About Running Out of Food in the Last Year: Never true    Ran Out of Food in the Last Year: Never true   Transportation Needs: No Transportation Needs    Lack of Transportation (Medical): No    Lack of Transportation (Non-Medical): No   Physical Activity: Insufficiently Active    Days of Exercise per Week: 1 day    Minutes of Exercise per Session: 30 min   Stress: Stress Concern Present    Feeling of Stress :  Very much   Social Connections: Unknown    Frequency of Communication with Friends and Family: More than three times a week    Frequency of Social Gatherings with Friends and Family: Once a week    Active Member of Clubs or Organizations: Yes    Attends Club or Organization Meetings: More than 4 times per year    Marital Status:    Housing Stability: Low Risk     Unable to Pay for Housing in the Last Year: No    Number of Places Lived in the Last Year: 1    Unstable Housing in the Last Year: No       Chief Complaint:   Chief Complaint   Patient presents with    Post-op Evaluation       Date of surgery:  August 16, 2022    History of present illness:  76-year-old female underwent right robot assisted unicompartmental knee replacement.  Patient doing very well.  Pain is a 2/10.  There is some pain at night along the joint lines.      Review of Systems:    Musculoskeletal:  See HPI        Physical Examination:    Vital Signs:    Vitals:    11/09/22 0932   Resp: 18       Body mass index is 22.11 kg/m².    This a well-developed, well nourished patient in no acute distress.  They are alert and oriented and cooperative to examination.  Pt. walks without an antalgic gait.      Examination of the right knee shows well-healed surgical incisions.  No erythema drainage.  Range of motion 0-130 degrees.  No calf pain.  Negative Homans sign.    X-rays:  Four views of the right knee are  ordered and reviewed which show well-aligned unicompartmental knee Replacement without complication     Assessment::  Status post right unicompartmental knee replacement    Plan:  She is doing well.  Recommended a prescription of Mobic does help with some of the soreness that she has particularly at night.  Follow up in 3 months.    This note was created using [employer] voice recognition software that occasionally misinterpreted phrases or words.

## 2022-11-11 ENCOUNTER — PATIENT MESSAGE (OUTPATIENT)
Dept: FAMILY MEDICINE | Facility: CLINIC | Age: 77
End: 2022-11-11
Payer: MEDICARE

## 2022-11-28 ENCOUNTER — PATIENT MESSAGE (OUTPATIENT)
Dept: GASTROENTEROLOGY | Facility: CLINIC | Age: 77
End: 2022-11-28
Payer: MEDICARE

## 2022-11-28 ENCOUNTER — PATIENT MESSAGE (OUTPATIENT)
Dept: ORTHOPEDICS | Facility: CLINIC | Age: 77
End: 2022-11-28
Payer: MEDICARE

## 2022-11-28 ENCOUNTER — PATIENT MESSAGE (OUTPATIENT)
Dept: FAMILY MEDICINE | Facility: CLINIC | Age: 77
End: 2022-11-28
Payer: MEDICARE

## 2022-11-29 ENCOUNTER — TELEPHONE (OUTPATIENT)
Dept: ENDOSCOPY | Facility: HOSPITAL | Age: 77
End: 2022-11-29
Payer: MEDICARE

## 2022-11-29 ENCOUNTER — PATIENT MESSAGE (OUTPATIENT)
Dept: GASTROENTEROLOGY | Facility: CLINIC | Age: 77
End: 2022-11-29
Payer: MEDICARE

## 2022-11-29 DIAGNOSIS — Z96.651 S/P RIGHT UNICOMPARTMENTAL KNEE REPLACEMENT: Primary | ICD-10-CM

## 2022-11-29 DIAGNOSIS — K86.89 PANCREATIC INSUFFICIENCY: ICD-10-CM

## 2022-11-29 RX ORDER — GABAPENTIN 600 MG/1
600 TABLET ORAL 3 TIMES DAILY
Qty: 90 TABLET | Refills: 0 | Status: SHIPPED | OUTPATIENT
Start: 2022-11-29 | End: 2023-02-08 | Stop reason: SDUPTHER

## 2022-11-30 ENCOUNTER — TELEPHONE (OUTPATIENT)
Dept: GASTROENTEROLOGY | Facility: CLINIC | Age: 77
End: 2022-11-30
Payer: MEDICARE

## 2022-11-30 ENCOUNTER — EXTERNAL CHRONIC CARE MANAGEMENT (OUTPATIENT)
Dept: PRIMARY CARE CLINIC | Facility: CLINIC | Age: 77
End: 2022-11-30
Payer: MEDICARE

## 2022-11-30 ENCOUNTER — PATIENT MESSAGE (OUTPATIENT)
Dept: GASTROENTEROLOGY | Facility: CLINIC | Age: 77
End: 2022-11-30
Payer: MEDICARE

## 2022-11-30 VITALS — BODY MASS INDEX: 22.58 KG/M2 | HEIGHT: 60 IN | WEIGHT: 115 LBS

## 2022-11-30 DIAGNOSIS — K86.89 PANCREATIC INSUFFICIENCY: ICD-10-CM

## 2022-11-30 PROCEDURE — 99490 CHRNC CARE MGMT STAFF 1ST 20: CPT | Mod: S$PBB,,, | Performed by: FAMILY MEDICINE

## 2022-11-30 PROCEDURE — 99439 CHRNC CARE MGMT STAF EA ADDL: CPT | Mod: S$PBB,,, | Performed by: FAMILY MEDICINE

## 2022-11-30 PROCEDURE — 99439 PR CHRONIC CARE MGMT, EA ADDTL 20 MIN: ICD-10-PCS | Mod: S$PBB,,, | Performed by: FAMILY MEDICINE

## 2022-11-30 PROCEDURE — 99490 PR CHRONIC CARE MGMT, 1ST 20 MIN: ICD-10-PCS | Mod: S$PBB,,, | Performed by: FAMILY MEDICINE

## 2022-11-30 PROCEDURE — 99490 CHRNC CARE MGMT STAFF 1ST 20: CPT | Mod: PBBFAC,PO | Performed by: FAMILY MEDICINE

## 2022-11-30 PROCEDURE — 99439 CHRNC CARE MGMT STAF EA ADDL: CPT | Mod: PBBFAC,PO | Performed by: FAMILY MEDICINE

## 2022-11-30 RX ORDER — PANCRELIPASE 36000; 180000; 114000 [USP'U]/1; [USP'U]/1; [USP'U]/1
CAPSULE, DELAYED RELEASE PELLETS ORAL
Qty: 120 CAPSULE | Refills: 0 | OUTPATIENT
Start: 2022-11-30

## 2022-11-30 RX ORDER — PANCRELIPASE 36000; 180000; 114000 [USP'U]/1; [USP'U]/1; [USP'U]/1
CAPSULE, DELAYED RELEASE PELLETS ORAL
Qty: 120 CAPSULE | Refills: 11 | Status: SHIPPED | OUTPATIENT
Start: 2022-11-30 | End: 2023-06-05 | Stop reason: SDUPTHER

## 2022-11-30 NOTE — TELEPHONE ENCOUNTER
""  Canceled None Nida Bobby RN 11/30/22 1047   The associated case was canceled: Duplicate Order (scheduled at [facility])    "  "

## 2022-11-30 NOTE — TELEPHONE ENCOUNTER
"----- Message from Nida Bobby RN sent at 11/30/2022 10:47 AM CST -----  Regarding: Cancellation of Order # [id number]  Order number [id number] for the procedure MANOMETRY ESOPHAGEAL   [GI40] has been canceled by Nida Bobby RN [[id number]]. This   procedure was ordered by HARITHA Corley [[id number]] on Oct  28, 2022 for the patient Veronique Johnson [[id number]]. The reason   for cancellation was "None".    This was a future order.  "

## 2022-12-01 ENCOUNTER — PATIENT MESSAGE (OUTPATIENT)
Dept: PSYCHIATRY | Facility: CLINIC | Age: 77
End: 2022-12-01
Payer: MEDICARE

## 2022-12-12 ENCOUNTER — PATIENT MESSAGE (OUTPATIENT)
Dept: ORTHOPEDICS | Facility: CLINIC | Age: 77
End: 2022-12-12
Payer: MEDICARE

## 2022-12-12 ENCOUNTER — TELEPHONE (OUTPATIENT)
Dept: ENDOSCOPY | Facility: HOSPITAL | Age: 77
End: 2022-12-12
Payer: MEDICARE

## 2022-12-12 NOTE — TELEPHONE ENCOUNTER
Patient calling to get prep instructions and she states she has questions regarding the procedure   Pt would like a call back from RN

## 2022-12-12 NOTE — TELEPHONE ENCOUNTER
Returned patient's call regarding prep instructions and questions about upcoming Esophageal Manometry. Answered all questions, patient verbalized understanding.

## 2022-12-13 ENCOUNTER — TELEPHONE (OUTPATIENT)
Dept: ENDOSCOPY | Facility: HOSPITAL | Age: 77
End: 2022-12-13
Payer: MEDICARE

## 2022-12-13 ENCOUNTER — LAB VISIT (OUTPATIENT)
Dept: LAB | Facility: HOSPITAL | Age: 77
End: 2022-12-13
Attending: NURSE PRACTITIONER
Payer: MEDICARE

## 2022-12-13 ENCOUNTER — OFFICE VISIT (OUTPATIENT)
Dept: FAMILY MEDICINE | Facility: CLINIC | Age: 77
End: 2022-12-13
Payer: MEDICARE

## 2022-12-13 VITALS
WEIGHT: 117.75 LBS | OXYGEN SATURATION: 99 % | HEART RATE: 86 BPM | SYSTOLIC BLOOD PRESSURE: 122 MMHG | HEIGHT: 60 IN | BODY MASS INDEX: 23.12 KG/M2 | DIASTOLIC BLOOD PRESSURE: 66 MMHG

## 2022-12-13 DIAGNOSIS — J44.9 CHRONIC OBSTRUCTIVE PULMONARY DISEASE, UNSPECIFIED COPD TYPE: ICD-10-CM

## 2022-12-13 DIAGNOSIS — G35 MS (MULTIPLE SCLEROSIS): ICD-10-CM

## 2022-12-13 DIAGNOSIS — Z12.31 ENCOUNTER FOR SCREENING MAMMOGRAM FOR MALIGNANT NEOPLASM OF BREAST: ICD-10-CM

## 2022-12-13 DIAGNOSIS — E78.5 HYPERLIPIDEMIA, UNSPECIFIED HYPERLIPIDEMIA TYPE: ICD-10-CM

## 2022-12-13 DIAGNOSIS — D50.9 IRON DEFICIENCY ANEMIA, UNSPECIFIED IRON DEFICIENCY ANEMIA TYPE: ICD-10-CM

## 2022-12-13 DIAGNOSIS — Z00.00 ENCOUNTER FOR PREVENTIVE HEALTH EXAMINATION: Primary | ICD-10-CM

## 2022-12-13 DIAGNOSIS — R82.90 ABNORMAL URINE ODOR: ICD-10-CM

## 2022-12-13 DIAGNOSIS — I25.10 CORONARY ARTERY DISEASE INVOLVING NATIVE CORONARY ARTERY OF NATIVE HEART WITHOUT ANGINA PECTORIS: ICD-10-CM

## 2022-12-13 DIAGNOSIS — R35.0 URINE FREQUENCY: ICD-10-CM

## 2022-12-13 DIAGNOSIS — I70.0 ATHEROSCLEROSIS OF AORTA: ICD-10-CM

## 2022-12-13 DIAGNOSIS — I10 ESSENTIAL HYPERTENSION: ICD-10-CM

## 2022-12-13 LAB
BACTERIA #/AREA URNS HPF: ABNORMAL /HPF
BILIRUB UR QL STRIP: NEGATIVE
CLARITY UR: CLEAR
COLOR UR: YELLOW
GLUCOSE UR QL STRIP: NEGATIVE
HGB UR QL STRIP: ABNORMAL
KETONES UR QL STRIP: NEGATIVE
LEUKOCYTE ESTERASE UR QL STRIP: ABNORMAL
MICROSCOPIC COMMENT: ABNORMAL
NITRITE UR QL STRIP: NEGATIVE
NON-SQ EPI CELLS #/AREA URNS HPF: 1 /HPF
PH UR STRIP: 6 [PH] (ref 5–8)
PROT UR QL STRIP: NEGATIVE
RBC #/AREA URNS HPF: 0 /HPF (ref 0–4)
SP GR UR STRIP: 1.01 (ref 1–1.03)
SQUAMOUS #/AREA URNS HPF: 1 /HPF
URN SPEC COLLECT METH UR: ABNORMAL
WBC #/AREA URNS HPF: 15 /HPF (ref 0–5)

## 2022-12-13 PROCEDURE — 87077 CULTURE AEROBIC IDENTIFY: CPT | Performed by: NURSE PRACTITIONER

## 2022-12-13 PROCEDURE — 81000 URINALYSIS NONAUTO W/SCOPE: CPT | Mod: PO | Performed by: NURSE PRACTITIONER

## 2022-12-13 PROCEDURE — 99999 PR PBB SHADOW E&M-EST. PATIENT-LVL V: ICD-10-PCS | Mod: PBBFAC,,, | Performed by: NURSE PRACTITIONER

## 2022-12-13 PROCEDURE — 87086 URINE CULTURE/COLONY COUNT: CPT | Performed by: NURSE PRACTITIONER

## 2022-12-13 PROCEDURE — G0439 PR MEDICARE ANNUAL WELLNESS SUBSEQUENT VISIT: ICD-10-PCS | Mod: ,,, | Performed by: NURSE PRACTITIONER

## 2022-12-13 PROCEDURE — 99215 OFFICE O/P EST HI 40 MIN: CPT | Mod: PBBFAC,PO | Performed by: NURSE PRACTITIONER

## 2022-12-13 PROCEDURE — 99999 PR PBB SHADOW E&M-EST. PATIENT-LVL V: CPT | Mod: PBBFAC,,, | Performed by: NURSE PRACTITIONER

## 2022-12-13 PROCEDURE — 87186 SC STD MICRODIL/AGAR DIL: CPT | Performed by: NURSE PRACTITIONER

## 2022-12-13 PROCEDURE — 87088 URINE BACTERIA CULTURE: CPT | Performed by: NURSE PRACTITIONER

## 2022-12-13 PROCEDURE — G0439 PPPS, SUBSEQ VISIT: HCPCS | Mod: ,,, | Performed by: NURSE PRACTITIONER

## 2022-12-13 RX ORDER — MONTELUKAST SODIUM 10 MG/1
10 TABLET ORAL NIGHTLY
COMMUNITY
Start: 2022-08-13

## 2022-12-13 NOTE — PROGRESS NOTES
Veronique Johnson presented for a  Medicare AWV and comprehensive Health Risk Assessment today. The following components were reviewed and updated:    Medical history  Family History  Social history  Allergies and Current Medications  Health Risk Assessment  Health Maintenance  Care Team     ** See Completed Assessments for Annual Wellness Visit within the encounter summary.**     The following assessments were completed:  Living Situation  CAGE  Depression Screening  Timed Get Up and Go  Whisper Test  Cognitive Function Screening      Nutrition Screening  ADL Screening  PAQ Screening    Vitals:    12/13/22 0725   BP: 122/66   BP Location: Left arm   Patient Position: Sitting   BP Method: Medium (Manual)   Pulse: 86   SpO2: 99%   Weight: 53.4 kg (117 lb 11.6 oz)   Height: 5' (1.524 m)     Body mass index is 22.99 kg/m².  Physical Exam  Vitals reviewed.   Constitutional:       Appearance: Normal appearance.   Cardiovascular:      Rate and Rhythm: Normal rate and regular rhythm.      Pulses: Normal pulses.      Heart sounds: Normal heart sounds.   Pulmonary:      Effort: Pulmonary effort is normal. No respiratory distress.      Breath sounds: Normal breath sounds.   Skin:     General: Skin is warm and dry.   Neurological:      Mental Status: She is alert and oriented to person, place, and time.   Psychiatric:         Mood and Affect: Mood normal.         Behavior: Behavior normal.         Judgment: Judgment normal.     Diagnoses and health risks identified today and associated recommendations/orders:    1. Encounter for preventive health examination  Reviewed and discussed health maintenance.    - Ambulatory referral/consult to Gynecology; Future  - Mammo Digital Screening Bilat w/ [facility]; Future    2. MS (multiple sclerosis)  Does not follow with neurology. But doing well. No changes or complaints    3. Chronic obstructive pulmonary disease, unspecified COPD type  Stable- continue current treatment and follow up  routinely with PCP     4. Essential hypertension  Stable- continue current treatment and follow up routinely with PCP     5. Hyperlipidemia, unspecified hyperlipidemia type  Stable- continue current treatment and follow up routinely with PCP     6. Coronary artery disease involving native coronary artery of native heart without angina pectoris  Stable- continue current treatment and follow up routinely with PCP     7. Atherosclerosis of aorta  Stable- continue current treatment and follow up routinely with PCP     8. Iron deficiency anemia, unspecified iron deficiency anemia type  Stable- continue current treatment and follow up routinely with PCP     9. Abnormal urine odor  - Ambulatory referral/consult to Gynecology; Future  - Urinalysis; Future  - Urine culture; Future    10. Urine frequency  - Ambulatory referral/consult to Gynecology; Future  - Urinalysis; Future  - Urine culture; Future    11. Encounter for screening mammogram for malignant neoplasm of breast  - Mammo Digital Screening Bilat w/ [facility]; Future    Review for Opioid Screening: Pt does not have Rx for Opioids  Review for Substance Use Disorders: Patient does not use substance      Provided Veronique with a 5-10 year written screening schedule and personal prevention plan. Recommendations were developed using the USPSTF age appropriate recommendations. Education, counseling, and referrals were provided as needed. After Visit Summary printed and given to patient which includes a list of additional screenings\tests needed.    I offered to discuss advanced care planning, including how to pick a person who would make decisions for you if you were unable to make them for yourself, called a health care power of , and what kind of decisions you might make such as use of life sustaining treatments such as ventilators and tube feeding when faced with a life limiting illness recorded on a living will that they will need to know. (How you want to be cared  for as you near the end of your natural life)     X Patient is interested in learning more about how to make advanced directives.  I provided them paperwork and offered to discuss this with them.    Ceci Cook, NP

## 2022-12-13 NOTE — TELEPHONE ENCOUNTER
Patient is schedule for 12/14 and is concern about the weather tomorrow patient will like to speak to a nurse about her options and can be reach now to about 9;30 or after 11  please call patient at [phone]

## 2022-12-14 ENCOUNTER — PATIENT MESSAGE (OUTPATIENT)
Dept: ENDOSCOPY | Facility: HOSPITAL | Age: 77
End: 2022-12-14
Payer: MEDICARE

## 2022-12-15 LAB — BACTERIA UR CULT: ABNORMAL

## 2022-12-16 ENCOUNTER — TELEPHONE (OUTPATIENT)
Dept: FAMILY MEDICINE | Facility: CLINIC | Age: 77
End: 2022-12-16
Payer: MEDICARE

## 2022-12-16 DIAGNOSIS — N30.00 ACUTE CYSTITIS WITHOUT HEMATURIA: Primary | ICD-10-CM

## 2022-12-16 RX ORDER — CIPROFLOXACIN 500 MG/1
500 TABLET ORAL 2 TIMES DAILY
Qty: 14 TABLET | Refills: 0 | Status: SHIPPED | OUTPATIENT
Start: 2022-12-16 | End: 2022-12-23

## 2022-12-16 NOTE — TELEPHONE ENCOUNTER
Patient returned call - relayed results and informed of prescription and to follow up with PCP if necessary. Patient verbalized understanding.

## 2022-12-16 NOTE — TELEPHONE ENCOUNTER
Pls call patient let her know that her urine culture is positive for infection. Sent antibiotic erx. Follow up with pcp if no improvement

## 2022-12-31 ENCOUNTER — EXTERNAL CHRONIC CARE MANAGEMENT (OUTPATIENT)
Dept: PRIMARY CARE CLINIC | Facility: CLINIC | Age: 77
End: 2022-12-31
Payer: MEDICARE

## 2022-12-31 PROCEDURE — 99439 CHRNC CARE MGMT STAF EA ADDL: CPT | Mod: S$PBB,,, | Performed by: FAMILY MEDICINE

## 2022-12-31 PROCEDURE — 99439 PR CHRONIC CARE MGMT, EA ADDTL 20 MIN: ICD-10-PCS | Mod: S$PBB,,, | Performed by: FAMILY MEDICINE

## 2022-12-31 PROCEDURE — 99490 CHRNC CARE MGMT STAFF 1ST 20: CPT | Mod: PBBFAC,PO | Performed by: FAMILY MEDICINE

## 2022-12-31 PROCEDURE — 99439 CHRNC CARE MGMT STAF EA ADDL: CPT | Mod: PBBFAC,PO | Performed by: FAMILY MEDICINE

## 2022-12-31 PROCEDURE — 99490 CHRNC CARE MGMT STAFF 1ST 20: CPT | Mod: S$PBB,,, | Performed by: FAMILY MEDICINE

## 2022-12-31 PROCEDURE — 99490 PR CHRONIC CARE MGMT, 1ST 20 MIN: ICD-10-PCS | Mod: S$PBB,,, | Performed by: FAMILY MEDICINE

## 2023-01-04 ENCOUNTER — LAB VISIT (OUTPATIENT)
Dept: LAB | Facility: HOSPITAL | Age: 78
End: 2023-01-04
Attending: FAMILY MEDICINE
Payer: MEDICARE

## 2023-01-04 DIAGNOSIS — F41.9 ANXIETY: ICD-10-CM

## 2023-01-04 DIAGNOSIS — I10 ESSENTIAL HYPERTENSION: ICD-10-CM

## 2023-01-04 DIAGNOSIS — E78.49 OTHER HYPERLIPIDEMIA: ICD-10-CM

## 2023-01-04 LAB
CHOLEST SERPL-MCNC: 175 MG/DL (ref 120–199)
CHOLEST/HDLC SERPL: 2.7 {RATIO} (ref 2–5)
HDLC SERPL-MCNC: 66 MG/DL (ref 40–75)
HDLC SERPL: 37.7 % (ref 20–50)
LDLC SERPL CALC-MCNC: 89.8 MG/DL (ref 63–159)
NONHDLC SERPL-MCNC: 109 MG/DL
TRIGL SERPL-MCNC: 96 MG/DL (ref 30–150)
TSH SERPL DL<=0.005 MIU/L-ACNC: 2.82 UIU/ML (ref 0.4–4)

## 2023-01-04 PROCEDURE — 36415 COLL VENOUS BLD VENIPUNCTURE: CPT | Mod: PO | Performed by: FAMILY MEDICINE

## 2023-01-04 PROCEDURE — 84443 ASSAY THYROID STIM HORMONE: CPT | Performed by: FAMILY MEDICINE

## 2023-01-04 PROCEDURE — 80061 LIPID PANEL: CPT | Performed by: FAMILY MEDICINE

## 2023-01-06 ENCOUNTER — HOSPITAL ENCOUNTER (OUTPATIENT)
Facility: HOSPITAL | Age: 78
Discharge: HOME | End: 2023-01-06
Attending: INTERNAL MEDICINE | Admitting: INTERNAL MEDICINE
Payer: MEDICARE

## 2023-01-06 VITALS
WEIGHT: 112 LBS | HEIGHT: 60 IN | SYSTOLIC BLOOD PRESSURE: 172 MMHG | TEMPERATURE: 98 F | BODY MASS INDEX: 21.99 KG/M2 | DIASTOLIC BLOOD PRESSURE: 82 MMHG | OXYGEN SATURATION: 98 % | HEART RATE: 88 BPM | RESPIRATION RATE: 18 BRPM

## 2023-01-06 DIAGNOSIS — R13.10 DYSPHAGIA: ICD-10-CM

## 2023-01-06 PROCEDURE — 91037 ESOPH IMPED FUNCTION TEST: CPT | Mod: TC | Performed by: INTERNAL MEDICINE

## 2023-01-06 PROCEDURE — 91010 PR ESOPHAGEAL MOTILITY STUDY, MA2METRY: ICD-10-PCS | Mod: 26,,, | Performed by: INTERNAL MEDICINE

## 2023-01-06 PROCEDURE — 25000003 PHARM REV CODE 250: Performed by: INTERNAL MEDICINE

## 2023-01-06 PROCEDURE — 91037 ESOPH IMPED FUNCTION TEST: CPT | Mod: 26,,, | Performed by: INTERNAL MEDICINE

## 2023-01-06 PROCEDURE — 91010 ESOPHAGUS MOTILITY STUDY: CPT | Mod: TC | Performed by: INTERNAL MEDICINE

## 2023-01-06 PROCEDURE — 91037 PR GERD TST W/ NASAL IMPEDENCE ELECTROD: ICD-10-PCS | Mod: 26,,, | Performed by: INTERNAL MEDICINE

## 2023-01-06 PROCEDURE — 91010 ESOPHAGUS MOTILITY STUDY: CPT | Mod: 26,,, | Performed by: INTERNAL MEDICINE

## 2023-01-06 RX ORDER — LIDOCAINE HYDROCHLORIDE 20 MG/ML
JELLY TOPICAL ONCE
Status: COMPLETED | OUTPATIENT
Start: 2023-01-06 | End: 2023-01-06

## 2023-01-06 RX ADMIN — LIDOCAINE HYDROCHLORIDE 10 ML: 20 JELLY TOPICAL at 07:01

## 2023-01-06 NOTE — PROVATION PATIENT INSTRUCTIONS
Discharge Summary/Instructions after an Endoscopic Procedure  Patient Name: Veronique Johnson  Patient MRN: [id number]  Patient YOB: 1945 Friday, January 6, 2023  Vitaliy Hatfield MD  Dear patient,  As a result of recent federal legislation (The Federal Cures Act), you may   receive lab or pathology results from your procedure in your [facility]   account before your physician is able to contact you. Your physician or   their representative will relay the results to you with their   recommendations at their soonest availability.  Thank you,  RESTRICTIONS:  During your procedure today, you received medications for sedation.  These   medications may affect your judgment, balance and coordination.  Therefore,   for 24 hours, you have the following restrictions:   - DO NOT drive a car, operate machinery, make legal/financial decisions,   sign important papers or drink alcohol.    ACTIVITY:  Today: no heavy lifting, straining or running due to procedural   sedation/anesthesia.  The following day: return to full activity including work.  DIET:  Eat and drink normally unless instructed otherwise.     TREATMENT FOR COMMON SIDE EFFECTS:  - Mild abdominal pain, nausea, belching, bloating or excessive gas:  rest,   eat lightly and use a heating pad.  - Sore Throat: treat with throat lozenges and/or gargle with warm salt   water.  - Because air was used during the procedure, expelling large amounts of air   from your rectum or belching is normal.  - If a bowel prep was taken, you may not have a bowel movement for 1-3 days.    This is normal.  SYMPTOMS TO WATCH FOR AND REPORT TO YOUR PHYSICIAN:  1. Abdominal pain or bloating, other than gas cramps.  2. Chest pain.  3. Back pain.  4. Signs of infection such as: chills or fever occurring within 24 hours   after the procedure.  5. Rectal bleeding, which would show as bright red, maroon, or black stools.   (A tablespoon of blood from the rectum is not serious, especially if    hemorrhoids are present.)  6. Vomiting.  7. Weakness or dizziness.  GO DIRECTLY TO THE NEAREST EMERGENCY ROOM IF YOU HAVE ANY OF THE FOLLOWING:      Difficulty breathing              Chills and/or fever over 101 F   Persistent vomiting and/or vomiting blood   Severe abdominal pain   Severe chest pain   Black, tarry stools   Bleeding- more than one tablespoon   Any other symptom or condition that you feel may need urgent attention  Your doctor recommends these additional instructions:  If any biopsies were taken, your doctors clinic will contact you in 1 to 2   weeks with any results.  - Return to referring physician.   - Consider Timmed Barium Esophagram vs referral to esophageal motility   specialist for EGD with Endo Flip.  For questions, problems or results please call your physician - Vitaliy Hatfield MD at Work:  [phone].  [facility] [place], EMERGENCY ROOM PHONE NUMBER: [phone]  IF A COMPLICATION OR EMERGENCY SITUATION ARISES AND YOU ARE UNABLE TO REACH   YOUR PHYSICIAN - GO DIRECTLY TO THE EMERGENCY ROOM.  Vitaliy Hatfield MD  1/6/2023 4:18:34 PM  This report has been verified and signed electronically.  Dear patient,  As a result of recent federal legislation (The Federal Cures Act), you may   receive lab or pathology results from your procedure in your [facility]   account before your physician is able to contact you. Your physician or   their representative will relay the results to you with their   recommendations at their soonest availability.  Thank you,  PROVATION

## 2023-01-06 NOTE — PLAN OF CARE
Pt tolerated procedure well. Full protocol followed with 200cc NS bolus. Pt verbalized understanding of AVS

## 2023-01-11 ENCOUNTER — TELEPHONE (OUTPATIENT)
Dept: GASTROENTEROLOGY | Facility: CLINIC | Age: 78
End: 2023-01-11
Payer: MEDICARE

## 2023-01-11 ENCOUNTER — PATIENT MESSAGE (OUTPATIENT)
Dept: GASTROENTEROLOGY | Facility: CLINIC | Age: 78
End: 2023-01-11
Payer: MEDICARE

## 2023-01-11 DIAGNOSIS — K22.4 ESOPHAGEAL DYSMOTILITY: Primary | ICD-10-CM

## 2023-01-11 NOTE — TELEPHONE ENCOUNTER
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 Further testing is needed. Recommend seeing one of the esophageal motility specialist, Dr. Mclean or Dr. Arriaza for continued evaluation and management of this finding. Referral placed.   Continue with previous recommendations. If no improvement in symptoms or symptoms worsen, call/follow-up at clinic or go to ER.    Thanks,

## 2023-01-12 ENCOUNTER — HOSPITAL ENCOUNTER (OUTPATIENT)
Dept: RADIOLOGY | Facility: HOSPITAL | Age: 78
Discharge: HOME | End: 2023-01-12
Attending: NURSE PRACTITIONER
Payer: MEDICARE

## 2023-01-12 DIAGNOSIS — Z12.31 ENCOUNTER FOR SCREENING MAMMOGRAM FOR MALIGNANT NEOPLASM OF BREAST: ICD-10-CM

## 2023-01-12 DIAGNOSIS — Z00.00 ENCOUNTER FOR PREVENTIVE HEALTH EXAMINATION: ICD-10-CM

## 2023-01-12 PROCEDURE — 77063 MAMMO DIGITAL SCREENING BILAT WITH TOMO: ICD-10-PCS | Mod: 26,,, | Performed by: RADIOLOGY

## 2023-01-12 PROCEDURE — 77067 SCR MAMMO BI INCL CAD: CPT | Mod: TC,PO

## 2023-01-12 PROCEDURE — 77067 MAMMO DIGITAL SCREENING BILAT WITH TOMO: ICD-10-PCS | Mod: 26,,, | Performed by: RADIOLOGY

## 2023-01-12 PROCEDURE — 77067 SCR MAMMO BI INCL CAD: CPT | Mod: 26,,, | Performed by: RADIOLOGY

## 2023-01-12 PROCEDURE — 77063 BREAST TOMOSYNTHESIS BI: CPT | Mod: 26,,, | Performed by: RADIOLOGY

## 2023-01-13 ENCOUNTER — PATIENT MESSAGE (OUTPATIENT)
Dept: FAMILY MEDICINE | Facility: CLINIC | Age: 78
End: 2023-01-13
Payer: MEDICARE

## 2023-01-13 ENCOUNTER — LAB VISIT (OUTPATIENT)
Dept: LAB | Facility: HOSPITAL | Age: 78
End: 2023-01-13
Attending: FAMILY MEDICINE
Payer: MEDICARE

## 2023-01-13 ENCOUNTER — OFFICE VISIT (OUTPATIENT)
Dept: FAMILY MEDICINE | Facility: CLINIC | Age: 78
End: 2023-01-13
Payer: MEDICARE

## 2023-01-13 VITALS
OXYGEN SATURATION: 97 % | DIASTOLIC BLOOD PRESSURE: 72 MMHG | HEART RATE: 87 BPM | WEIGHT: 118.63 LBS | BODY MASS INDEX: 23.16 KG/M2 | SYSTOLIC BLOOD PRESSURE: 128 MMHG

## 2023-01-13 DIAGNOSIS — F41.9 ANXIETY: ICD-10-CM

## 2023-01-13 DIAGNOSIS — E83.52 HYPERCALCEMIA: ICD-10-CM

## 2023-01-13 DIAGNOSIS — E83.52 HYPERCALCEMIA: Primary | ICD-10-CM

## 2023-01-13 DIAGNOSIS — I70.0 ATHEROSCLEROSIS OF AORTA: ICD-10-CM

## 2023-01-13 DIAGNOSIS — G35 MS (MULTIPLE SCLEROSIS): ICD-10-CM

## 2023-01-13 DIAGNOSIS — J44.9 CHRONIC OBSTRUCTIVE PULMONARY DISEASE, UNSPECIFIED COPD TYPE: ICD-10-CM

## 2023-01-13 DIAGNOSIS — I72.0 CAROTID ARTERY ANEURYSM: Primary | ICD-10-CM

## 2023-01-13 LAB
ANION GAP SERPL CALC-SCNC: 11 MMOL/L (ref 8–16)
BUN SERPL-MCNC: 13 MG/DL (ref 8–23)
CALCIUM SERPL-MCNC: 10.1 MG/DL (ref 8.7–10.5)
CHLORIDE SERPL-SCNC: 101 MMOL/L (ref 95–110)
CO2 SERPL-SCNC: 27 MMOL/L (ref 23–29)
CREAT SERPL-MCNC: 0.7 MG/DL (ref 0.5–1.4)
EST. GFR  (NO RACE VARIABLE): >60 ML/MIN/1.73 M^2
GLUCOSE SERPL-MCNC: 84 MG/DL (ref 70–110)
POTASSIUM SERPL-SCNC: 3.7 MMOL/L (ref 3.5–5.1)
SODIUM SERPL-SCNC: 139 MMOL/L (ref 136–145)

## 2023-01-13 PROCEDURE — 99215 OFFICE O/P EST HI 40 MIN: CPT | Mod: PBBFAC,PO | Performed by: FAMILY MEDICINE

## 2023-01-13 PROCEDURE — 99214 PR OFFICE/OUTPT VISIT, EST, LEVL IV, 30-39 MIN: ICD-10-PCS | Mod: S$PBB,,, | Performed by: FAMILY MEDICINE

## 2023-01-13 PROCEDURE — 80048 BASIC METABOLIC PNL TOTAL CA: CPT | Performed by: FAMILY MEDICINE

## 2023-01-13 PROCEDURE — 99214 OFFICE O/P EST MOD 30 MIN: CPT | Mod: S$PBB,,, | Performed by: FAMILY MEDICINE

## 2023-01-13 PROCEDURE — 99999 PR PBB SHADOW E&M-EST. PATIENT-LVL V: CPT | Mod: PBBFAC,,, | Performed by: FAMILY MEDICINE

## 2023-01-13 PROCEDURE — 36415 COLL VENOUS BLD VENIPUNCTURE: CPT | Mod: PO | Performed by: FAMILY MEDICINE

## 2023-01-13 PROCEDURE — 99999 PR PBB SHADOW E&M-EST. PATIENT-LVL V: ICD-10-PCS | Mod: PBBFAC,,, | Performed by: FAMILY MEDICINE

## 2023-01-13 RX ORDER — ROSUVASTATIN CALCIUM 20 MG/1
20 TABLET, COATED ORAL NIGHTLY
Qty: 90 TABLET | Refills: 3 | Status: SHIPPED | OUTPATIENT
Start: 2023-01-13 | End: 2023-12-05

## 2023-01-13 RX ORDER — ALPRAZOLAM 0.25 MG/1
0.25 TABLET ORAL 2 TIMES DAILY PRN
Qty: 60 TABLET | Refills: 5 | Status: SHIPPED | OUTPATIENT
Start: 2023-01-13 | End: 2023-06-15

## 2023-01-13 NOTE — TELEPHONE ENCOUNTER
Discussed with the patient, MRA of the brain showed presence of aneurysm.  Will repeat a new study.  The patient will contact to schedule.

## 2023-01-16 NOTE — PROGRESS NOTES
Assessment:       1. Hypercalcemia    2. Anxiety    3. MS (multiple sclerosis)    4. Atherosclerosis of aorta    5. Chronic obstructive pulmonary disease, unspecified COPD type          Plan:       Hypercalcemia:  New problem workup needed  -     Basic Metabolic Panel; Future; Expected date: 01/13/2023    Anxiety:  Stable  -     ALPRAZolam (XANAX) 0.25 MG tablet; Take 1 tablet (0.25 mg total) by mouth 2 (two) times daily as needed for Anxiety.  Dispense: 60 tablet; Refill: 5    MS (multiple sclerosis):  Stable    Atherosclerosis of aorta:  Stable  -     Ambulatory referral/consult to Cardiology; Future; Expected date: 01/20/2023  -     rosuvastatin (CRESTOR) 20 MG tablet; Take 1 tablet (20 mg total) by mouth every evening.  Dispense: 90 tablet; Refill: 3    Chronic obstructive pulmonary disease, unspecified COPD type:  Stable         Continue healthy [place], [place] prescription monitoring program was checked and okay, alprazolam was prescribed for the patient.  Will refer the patient to the cardiologist secondary to plaque formation in the aorta and history of smoking and will decrease the dosage to decrease to 20 mg daily instead of 40 mg daily.  MRI of the brain was ordered to check for the carotid aneurysm.  The patient's BMI has been recorded in the chart. The patient has been provided educational materials regarding the benefits of attaining and maintaining a normal weight. We will continue to address and follow this issue during follow up visits.   Patient agreed with assessment and plan. Patient verbalized understanding.     Subjective:       Patient ID: Veronique Johnson is a 77 y.o. female.    Chief Complaint: Follow-up anxiety    HPI    Anxiety:  The patient with multiple sclerosis complaining of symptoms of anxiety, the symptoms are getting worse since her  was diagnosed with dementia, the patient is taking Xanax, denies any side effects of the medicine, she does not need refills at this time  PATIENT IS A 35 Y/O MALE WHO PRESENTS TO THE ED C/O OF ABD PAIN. PT STATES THAT 
HE HAS A H/O OF CIRRHOSIS AND PATIENT REPORTED DRINKING X1 WEEK AGO. PT STATES 
ACHING, 10/10 PAIN THAT DOES NOT RADIATE. PATIENT DENIES NAUSEA BUT REPORTS 
VOMITING & YELLOW DIARRHEA X1 WEEK. PT ALSO REPORTS SOB AND HEADACHE. PT AAOX4, 
RR EVEN/UNLABORED. PT REPOSITIONED FOR COMFORT, BED PUT IN LOWEST POSITION. ER 
MD DR. PERRIN NOTIFIED. WILL CONTINUE TO MONITOR. but she will contact us refills.      COPD:  The patient is not smoking anymore, using her inhalers as directed, the patient currently seen the pulmonologist.    Atherosclerosis of the aorta:  Currently taking high dosage of cholesterol medication, the patient last cholesterol levels were therapeutic.  The patient stated that she usually eats healthy.  Upon review of the last blood work, the patient has presence of hypercalcemia.  The patient is also concerned about an aneurysm on the carotid artery if she would like to make sure that everything is okay.    Past medical history, past social history was reviewed and discussed with the patient.    Review of Systems   Constitutional:  Negative for activity change and appetite change.   HENT:  Negative for ear discharge.    Eyes:  Negative for discharge and itching.   Respiratory:  Negative for choking and chest tightness.    Cardiovascular:  Negative for palpitations and leg swelling.   Gastrointestinal:  Negative for abdominal distention and constipation.   Endocrine: Negative for cold intolerance and heat intolerance.   Genitourinary:  Negative for dysuria and flank pain.   Musculoskeletal:  Negative for back pain.   Skin:  Negative for pallor.   Allergic/Immunologic: Negative for environmental allergies and food allergies.   Neurological:  Negative for dizziness and facial asymmetry.   Hematological:  Negative for adenopathy. Does not bruise/bleed easily.   Psychiatric/Behavioral:  Negative for agitation, confusion and decreased concentration. The patient is nervous/anxious.      Objective:      Physical Exam  Vitals and nursing note reviewed.   Constitutional:       General: She is not in acute distress.     Appearance: Normal appearance. She is well-developed and normal weight. She is not diaphoretic.   HENT:      Head: Normocephalic and atraumatic.      Right Ear: External ear normal.      Left Ear: External ear normal.      Nose: Nose normal.   Eyes:       General: No scleral icterus.        Right eye: No discharge.         Left eye: No discharge.      Conjunctiva/sclera: Conjunctivae normal.   Cardiovascular:      Rate and Rhythm: Normal rate and regular rhythm.      Heart sounds: Normal heart sounds.   Pulmonary:      Effort: Pulmonary effort is normal. No respiratory distress.      Breath sounds: Normal breath sounds. No wheezing.   Musculoskeletal:         General: No tenderness or deformity.      Cervical back: Neck supple.   Skin:     Coloration: Skin is not pale.      Findings: No erythema.   Neurological:      Mental Status: She is alert.      Cranial Nerves: No cranial nerve deficit.      Coordination: Coordination normal.   Psychiatric:         Behavior: Behavior normal.         Thought Content: Thought content normal.         Judgment: Judgment normal.

## 2023-01-26 ENCOUNTER — OFFICE VISIT (OUTPATIENT)
Dept: CARDIOLOGY | Facility: CLINIC | Age: 78
End: 2023-01-26
Payer: MEDICARE

## 2023-01-26 VITALS
BODY MASS INDEX: 23.16 KG/M2 | HEART RATE: 92 BPM | WEIGHT: 117.94 LBS | HEIGHT: 60 IN | SYSTOLIC BLOOD PRESSURE: 157 MMHG | DIASTOLIC BLOOD PRESSURE: 80 MMHG

## 2023-01-26 DIAGNOSIS — J44.9 CHRONIC OBSTRUCTIVE PULMONARY DISEASE, UNSPECIFIED COPD TYPE: ICD-10-CM

## 2023-01-26 DIAGNOSIS — I25.10 CORONARY ARTERY DISEASE INVOLVING NATIVE CORONARY ARTERY OF NATIVE HEART WITHOUT ANGINA PECTORIS: ICD-10-CM

## 2023-01-26 DIAGNOSIS — I70.0 ATHEROSCLEROSIS OF AORTA: ICD-10-CM

## 2023-01-26 DIAGNOSIS — I10 ESSENTIAL HYPERTENSION: ICD-10-CM

## 2023-01-26 DIAGNOSIS — H81.01 MENIERE'S DISEASE OF RIGHT EAR: ICD-10-CM

## 2023-01-26 DIAGNOSIS — I67.1 CEREBRAL ANEURYSM: Primary | ICD-10-CM

## 2023-01-26 DIAGNOSIS — E78.5 HYPERLIPIDEMIA, UNSPECIFIED HYPERLIPIDEMIA TYPE: ICD-10-CM

## 2023-01-26 PROCEDURE — 99999 PR PBB SHADOW E&M-EST. PATIENT-LVL IV: ICD-10-PCS | Mod: PBBFAC,,, | Performed by: INTERNAL MEDICINE

## 2023-01-26 PROCEDURE — 99214 PR OFFICE/OUTPT VISIT, EST, LEVL IV, 30-39 MIN: ICD-10-PCS | Mod: S$PBB,,, | Performed by: INTERNAL MEDICINE

## 2023-01-26 PROCEDURE — 99214 OFFICE O/P EST MOD 30 MIN: CPT | Mod: S$PBB,,, | Performed by: INTERNAL MEDICINE

## 2023-01-26 PROCEDURE — 99999 PR PBB SHADOW E&M-EST. PATIENT-LVL IV: CPT | Mod: PBBFAC,,, | Performed by: INTERNAL MEDICINE

## 2023-01-26 PROCEDURE — 99214 OFFICE O/P EST MOD 30 MIN: CPT | Mod: PBBFAC,PO | Performed by: INTERNAL MEDICINE

## 2023-01-26 NOTE — PROGRESS NOTES
Subjective:    Patient ID:  Veronique Johnson is a 77 y.o. female patient here for evaluation Establish Care      History of Present Illness:  Cardiology follow-up coronary disease.  Left heart catheterization 10/2021, diffuse three-vessel moderate disease.  Lad was demonstrated 50% stenosis at the takeoff of 1st diagonal.  Follow-up fractional flow reserve was unremarkable at 0.84.  Medical therapy recommended.  EF normal.     Risk factors include hypertension, dyslipidemia.  Patient has documented multiple sclerosis, followed clinically.  History of COPD    History of peripheral vascular disease, nonobstructive disease involving the carotid artery.  Last MRI head neck 12/01/2021 stable.    Review of patient's allergies indicates:   Allergen Reactions    Cephalosporins Anaphylaxis    Iodinated contrast media Hives and Swelling    Penicillins Rash       Past Medical History:   Diagnosis Date    Anemia     Anxiety     Chronic bronchitis with COPD (chronic obstructive pulmonary disease)     Esophageal spasm     Essential tremor     GERD (gastroesophageal reflux disease)     Headache     High cholesterol     Hypertension     Meniere disease     Multiple sclerosis     Muscle spasm     Pancreatic insufficiency      Past Surgical History:   Procedure Laterality Date    CATARACT EXTRACTION, BILATERAL  2006    COLONOSCOPY  09/05/2018    Dr. Leija, in procedures: diverticulosis, 4 colon polyps removed, adenomatous polyps x3 & benign mucosal polyps    COLONOSCOPY N/A 4/22/2021    Procedure: COLONOSCOPY;  Surgeon: Dwayne Santoyo MD;  Location: [facility] ENDO;  Service: Endoscopy;  Laterality: N/A;    CORONARY ANGIOGRAPHY N/A 10/14/2021    Procedure: ANGIOGRAM, CORONARY ARTERY;  Surgeon: Mustapha Manzo MD;  Location: [facility] CATH;  Service: Cardiology;  Laterality: N/A;    CYSTOSCOPY WITH HYDRODISTENSION OF BLADDER N/A 6/5/2019    Procedure: CYSTOSCOPY, WITH BLADDER HYDRODISTENSION;  Surgeon: Marko Burton MD;  Location: [facility]  OR;  Service: OB/GYN;  Laterality: N/A;    DILATION AND CURETTAGE OF UTERUS      ESOPHAGEAL MANOMETRY WITH MEASUREMENT OF IMPEDANCE N/A 2023    Procedure: MANOMETRY, ESOPHAGUS, WITH IMPEDANCE MEASUREMENT;  Surgeon: Vitaliy Hatfield MD;  Location: [address]);  Service: Endoscopy;  Laterality: N/A;  instructions sent to [facility]  Precall done. 0900 arrival time confirmed. SG  instr portal -ml    ESOPHAGOGASTRODUODENOSCOPY  2018    Dr. Leija, in procedures: gastritis; biopsy: duodenum unremarkable, with focal benign gastric metaplasia, stomach- minimal chronic gastritis, negative for h pylori    ESOPHAGOGASTRODUODENOSCOPY N/A 2021    Procedure: EGD (ESOPHAGOGASTRODUODENOSCOPY);  Surgeon: Dwayne Santoyo MD;  [facility];  Service: Endoscopy;  Laterality: N/A;    EYE SURGERY      HEMORRHOID SURGERY      KNEE ARTHROSCOPY W/ MENISCECTOMY Right 2021    Procedure: ARTHROSCOPY, KNEE, WITH MENISCECTOMY;  Surgeon: Shelton Le MD;  Location: [facility] OR;  Service: Orthopedics;  Laterality: Right;    KNEE ARTHROSCOPY W/ MENISCECTOMY Right 2022    Procedure: ARTHROSCOPY, KNEE, WITH MENISCECTOMY;  Surgeon: Shelton Le MD;  Location: [facility] OR;  Service: Orthopedics;  Laterality: Right;  medial meniscectomy    LEFT HEART CATHETERIZATION Right 10/14/2021    Procedure: Left heart cath;  Surgeon: Mustapha Manzo MD;  [facility] CATH;  Service: Cardiology;  Laterality: Right;    ROBOTIC ARTHROPLASTY, KNEE, UNICOMPARTMENTAL Right 2022    Procedure: ROBOTIC ARTHROPLASTY, KNEE, UNICOMPARTMENTAL;  Surgeon: Shelton Le MD;  Location: [facility] OR;  Service: Orthopedics;  Laterality: Right;    TONSILLECTOMY      TUBAL LIGATION       Social History     Tobacco Use    Smoking status: Former     Packs/day: 2.00     Years: 35.00     Pack years: 70.00     Types: Cigarettes     Start date: 1964     Quit date:      Years since quittin.0     Smokeless tobacco: Never   Substance Use Topics    Alcohol use: Yes     Alcohol/week: 7.0 standard drinks     Types: 7 Glasses of wine per week    Drug use: Never        Review of Systems:    As noted in HPI in addition      REVIEW OF SYSTEMS  Review of Systems   Constitutional: Negative for decreased appetite, diaphoresis, night sweats, weight gain and weight loss.   HENT:  Negative for nosebleeds and odynophagia.    Eyes:  Negative for double vision and photophobia.   Cardiovascular:  Negative for chest pain, claudication, cyanosis, dyspnea on exertion, irregular heartbeat, leg swelling, near-syncope, orthopnea, palpitations, paroxysmal nocturnal dyspnea and syncope.   Respiratory:  Negative for cough, hemoptysis, shortness of breath and wheezing.    Hematologic/Lymphatic: Negative for adenopathy.   Skin:  Negative for flushing, skin cancer and suspicious lesions.   Musculoskeletal:  Negative for gout, myalgias and neck pain.   Gastrointestinal:  Negative for abdominal pain, heartburn, hematemesis and hematochezia.   Genitourinary:  Negative for bladder incontinence, hesitancy and nocturia.   Neurological:  Negative for focal weakness, headaches, light-headedness and paresthesias.   Psychiatric/Behavioral:  Negative for memory loss and substance abuse.             Objective:        Vitals:    01/26/23 1347   BP: (!) 157/80   Pulse: 92       Lab Results   Component Value Date    WBC 5.24 08/08/2022    HGB 14.1 08/08/2022    HCT 42.3 08/08/2022     08/08/2022    CHOL 175 01/04/2023    TRIG 96 01/04/2023    HDL 66 01/04/2023    ALT 29 07/13/2022    AST 40 07/13/2022     01/13/2023    K 3.7 01/13/2023     01/13/2023    CREATININE 0.7 01/13/2023    BUN 13 01/13/2023    CO2 27 01/13/2023    TSH 2.824 01/04/2023    INR 0.9 12/01/2021    HGBA1C 5.4 12/02/2021        ECHOCARDIOGRAM RESULTS  No results found for this or any previous visit.        CURRENT/PREVIOUS VISIT EKG  Results for orders placed or  performed during the hospital encounter of 08/08/22   EKG 12-lead    Collection Time: 08/08/22 12:09 PM    Narrative    Test Reason : Z01.818,M17.10,    Vent. Rate : 098 BPM     Atrial Rate : 098 BPM     P-R Int : 124 ms          QRS Dur : 104 ms      QT Int : 344 ms       P-R-T Axes : 054 041 060 degrees     QTc Int : 439 ms    Normal sinus rhythm  Normal ECG  When compared with ECG of 15-OCT-2021 06:49,  No significant change was found  Confirmed by Phuc Ambriz MD ([id number]) on 8/10/2022 12:32:26 PM    Referred By: TRACY LOVE           Confirmed By:Phuc Ambriz MD     No valid procedures specified.   No results found for this or any previous visit.    No valid procedures specified.    PHYSICAL EXAM  CONSTITUTIONAL: Well built, well nourished in no apparent distress  NECK: no carotid bruit, no JVD  LUNGS: CTA  CHEST WALL: no tenderness,  HEART: regular rate and rhythm, S1, S2 normal, no murmur, click, rub or gallop   ABDOMEN: soft, non-tender; bowel sounds normal; no masses,  no organomegaly  EXTREMITIES: Extremities normal, no edema, no calf tenderness noted  NEURO: AAO X 3    I HAVE REVIEWED :    The vital signs, nurses notes, and all the pertinent radiology and labs.         Current Outpatient Medications   Medication Instructions    albuterol (PROVENTIL/VENTOLIN HFA) 90 mcg/actuation inhaler 2 puffs, Inhalation, 3 times daily, Rescue    ALPRAZolam (XANAX) 0.25 mg, Oral, 2 times daily PRN    amLODIPine (NORVASC) 5 mg, Oral, Daily    buPROPion (WELLBUTRIN XL) 300 mg, Oral, Daily    cyanocobalamin 500 mcg, Oral, Daily    fluticasone furoate-vilanteroL (BREO) 100-25 mcg/dose diskus inhaler 1 puff, Inhalation, Daily, Controller    gabapentin (NEURONTIN) 600 mg, Oral, 3 times daily    lipase-protease-amylase (CREON) 36,000-114,000- 180,000 unit CpDR TAKE 1 CAPSULE BY MOUTH 3 (THREE) TIMES DAILY WITH MEALS. &amp; 1 CAPSULE WITH SNACKS    loperamide (IMODIUM) 1 mg/5 mL solution Oral, Every 6 hours PRN     meloxicam (MOBIC) 15 mg, Oral, Daily    montelukast (SINGULAIR) 10 mg, Oral, Nightly    multivitamin capsule 1 capsule, Oral    nitroGLYCERIN (NITROSTAT) 0.4 mg, Sublingual, Every 5 min PRN    olmesartan medoxomil (OLMESARTAN ORAL) olmesartan Take No date recorded No form recorded No frequency recorded No route recorded No set duration recorded No set duration amount recorded active No dosage strength recorded No dosage strength units of measure recorded    omeprazole (PRILOSEC) 20 mg, Oral, Daily    rosuvastatin (CRESTOR) 20 mg, Oral, Nightly          Assessment:   Hypertension, dyslipidemia  Coronary disease, left heart catheterization 2021 with moderate disease, normal FFR of the LAD.  EF normal.  History of carotid disease.  COPD by history.    Plan:   Meds reviewed and reconciled.  No recommended changes.  Continue follow up blood pressure at home.  Six months.        No follow-ups on file.

## 2023-01-31 ENCOUNTER — EXTERNAL CHRONIC CARE MANAGEMENT (OUTPATIENT)
Dept: PRIMARY CARE CLINIC | Facility: CLINIC | Age: 78
End: 2023-01-31
Payer: MEDICARE

## 2023-01-31 PROCEDURE — 99490 CHRNC CARE MGMT STAFF 1ST 20: CPT | Mod: PBBFAC,PO | Performed by: FAMILY MEDICINE

## 2023-01-31 PROCEDURE — 99490 CHRNC CARE MGMT STAFF 1ST 20: CPT | Mod: S$PBB,,, | Performed by: FAMILY MEDICINE

## 2023-01-31 PROCEDURE — 99490 PR CHRONIC CARE MGMT, 1ST 20 MIN: ICD-10-PCS | Mod: S$PBB,,, | Performed by: FAMILY MEDICINE

## 2023-02-05 ENCOUNTER — PATIENT MESSAGE (OUTPATIENT)
Dept: ORTHOPEDICS | Facility: CLINIC | Age: 78
End: 2023-02-05
Payer: MEDICARE

## 2023-02-07 ENCOUNTER — HOSPITAL ENCOUNTER (OUTPATIENT)
Dept: RADIOLOGY | Facility: HOSPITAL | Age: 78
Discharge: HOME | End: 2023-02-07
Attending: FAMILY MEDICINE
Payer: MEDICARE

## 2023-02-07 DIAGNOSIS — Z96.651 S/P RIGHT UNICOMPARTMENTAL KNEE REPLACEMENT: Primary | ICD-10-CM

## 2023-02-07 DIAGNOSIS — I72.0 CAROTID ARTERY ANEURYSM: ICD-10-CM

## 2023-02-07 PROCEDURE — 70544 MR ANGIOGRAPHY HEAD W/O DYE: CPT | Mod: 26,,, | Performed by: RADIOLOGY

## 2023-02-07 PROCEDURE — 70544 MRA BRAIN WITHOUT CONTRAST: ICD-10-PCS | Mod: 26,,, | Performed by: RADIOLOGY

## 2023-02-07 PROCEDURE — 70544 MR ANGIOGRAPHY HEAD W/O DYE: CPT | Mod: TC,PO

## 2023-02-08 ENCOUNTER — OFFICE VISIT (OUTPATIENT)
Dept: ORTHOPEDICS | Facility: CLINIC | Age: 78
End: 2023-02-08
Payer: MEDICARE

## 2023-02-08 ENCOUNTER — HOSPITAL ENCOUNTER (OUTPATIENT)
Dept: RADIOLOGY | Facility: HOSPITAL | Age: 78
Discharge: HOME | End: 2023-02-08
Attending: ORTHOPAEDIC SURGERY
Payer: MEDICARE

## 2023-02-08 VITALS — BODY MASS INDEX: 22.97 KG/M2 | WEIGHT: 117 LBS | HEIGHT: 60 IN | RESPIRATION RATE: 18 BRPM

## 2023-02-08 DIAGNOSIS — Z96.651 S/P RIGHT UNICOMPARTMENTAL KNEE REPLACEMENT: Primary | ICD-10-CM

## 2023-02-08 DIAGNOSIS — Z96.651 S/P RIGHT UNICOMPARTMENTAL KNEE REPLACEMENT: ICD-10-CM

## 2023-02-08 PROCEDURE — 73562 XR KNEE ORTHO RIGHT WITH FLEXION: ICD-10-PCS | Mod: 26,LT,, | Performed by: RADIOLOGY

## 2023-02-08 PROCEDURE — 99999 PR PBB SHADOW E&M-EST. PATIENT-LVL III: CPT | Mod: PBBFAC,,, | Performed by: ORTHOPAEDIC SURGERY

## 2023-02-08 PROCEDURE — 73562 X-RAY EXAM OF KNEE 3: CPT | Mod: 26,LT,, | Performed by: RADIOLOGY

## 2023-02-08 PROCEDURE — 73564 XR KNEE ORTHO RIGHT WITH FLEXION: ICD-10-PCS | Mod: 26,RT,, | Performed by: RADIOLOGY

## 2023-02-08 PROCEDURE — 73564 X-RAY EXAM KNEE 4 OR MORE: CPT | Mod: 26,RT,, | Performed by: RADIOLOGY

## 2023-02-08 PROCEDURE — 99213 PR OFFICE/OUTPT VISIT, EST, LEVL III, 20-29 MIN: ICD-10-PCS | Mod: S$PBB,,, | Performed by: ORTHOPAEDIC SURGERY

## 2023-02-08 PROCEDURE — 99213 OFFICE O/P EST LOW 20 MIN: CPT | Mod: S$PBB,,, | Performed by: ORTHOPAEDIC SURGERY

## 2023-02-08 PROCEDURE — 73564 X-RAY EXAM KNEE 4 OR MORE: CPT | Mod: TC,PO,RT

## 2023-02-08 PROCEDURE — 99213 OFFICE O/P EST LOW 20 MIN: CPT | Mod: PBBFAC,PN | Performed by: ORTHOPAEDIC SURGERY

## 2023-02-08 PROCEDURE — 99999 PR PBB SHADOW E&M-EST. PATIENT-LVL III: ICD-10-PCS | Mod: PBBFAC,,, | Performed by: ORTHOPAEDIC SURGERY

## 2023-02-08 NOTE — PROGRESS NOTES
Past Medical History:   Diagnosis Date    Anemia     Anxiety     Chronic bronchitis with COPD (chronic obstructive pulmonary disease)     Esophageal spasm     Essential tremor     GERD (gastroesophageal reflux disease)     Headache     High cholesterol     Hypertension     Meniere disease     Multiple sclerosis     Muscle spasm     Pancreatic insufficiency        Past Surgical History:   Procedure Laterality Date    CATARACT EXTRACTION, BILATERAL  2006    COLONOSCOPY  09/05/2018    Dr. Leija in procedures: diverticulosis, 4 colon polyps removed, adenomatous polyps x3 & benign mucosal polyps    COLONOSCOPY N/A 4/22/2021    Procedure: COLONOSCOPY;  Surgeon: Dwayne Santoyo MD;  Location: [facility];  Service: Endoscopy;  Laterality: N/A;    CORONARY ANGIOGRAPHY N/A 10/14/2021    Procedure: ANGIOGRAM, CORONARY ARTERY;  Surgeon: Mustapha Manzo MD;  Location: [facility] CATH;  Service: Cardiology;  Laterality: N/A;    CYSTOSCOPY WITH HYDRODISTENSION OF BLADDER N/A 6/5/2019    Procedure: CYSTOSCOPY, WITH BLADDER HYDRODISTENSION;  Surgeon: Marko Burton MD;  Location: [facility] OR;  Service: OB/GYN;  Laterality: N/A;    DILATION AND CURETTAGE OF UTERUS  1966    ESOPHAGEAL MANOMETRY WITH MEASUREMENT OF IMPEDANCE N/A 1/6/2023    Procedure: MANOMETRY, ESOPHAGUS, WITH IMPEDANCE MEASUREMENT;  Surgeon: Vitaliy Hatfield MD;  [facility]  Service: Endoscopy;  Laterality: N/A;  instructions sent to [facility] done. 0900 arrival time confirmed. SG  instr portal -ml    ESOPHAGOGASTRODUODENOSCOPY  09/05/2018    Dr. Leija, in procedures: gastritis; biopsy: duodenum unremarkable, with focal benign gastric metaplasia, stomach- minimal chronic gastritis, negative for h pylori    ESOPHAGOGASTRODUODENOSCOPY N/A 4/22/2021    Procedure: EGD (ESOPHAGOGASTRODUODENOSCOPY);  Surgeon: Dwayne Santoyo MD;  Location: [facility];  Service: Endoscopy;  Laterality: N/A;    EYE SURGERY      HEMORRHOID SURGERY  1997    KNEE  ARTHROSCOPY W/ MENISCECTOMY Right 8/13/2021    Procedure: ARTHROSCOPY, KNEE, WITH MENISCECTOMY;  Surgeon: Shelton Le MD;  Location: [facility] OR;  Service: Orthopedics;  Laterality: Right;    KNEE ARTHROSCOPY W/ MENISCECTOMY Right 4/8/2022    Procedure: ARTHROSCOPY, KNEE, WITH MENISCECTOMY;  Surgeon: Shelton Le MD;  Location: [facility] OR;  Service: Orthopedics;  Laterality: Right;  medial meniscectomy    LEFT HEART CATHETERIZATION Right 10/14/2021    Procedure: Left heart cath;  Surgeon: Mustapha Manzo MD;  Location: [facility] CATH;  Service: Cardiology;  Laterality: Right;    ROBOTIC ARTHROPLASTY, KNEE, UNICOMPARTMENTAL Right 8/16/2022    Procedure: ROBOTIC ARTHROPLASTY, KNEE, UNICOMPARTMENTAL;  Surgeon: Shelton Le MD;  Location: [facility] OR;  Service: Orthopedics;  Laterality: Right;    TONSILLECTOMY  1954    TUBAL LIGATION  1990       Current Outpatient Medications   Medication Sig    albuterol (PROVENTIL/VENTOLIN HFA) 90 mcg/actuation inhaler Inhale 2 puffs into the lungs 3 (three) times daily. Rescue    ALPRAZolam (XANAX) 0.25 MG tablet Take 1 tablet (0.25 mg total) by mouth 2 (two) times daily as needed for Anxiety.    amLODIPine (NORVASC) 5 MG tablet TAKE 1 TABLET (5 MG TOTAL) BY MOUTH ONCE DAILY.    buPROPion (WELLBUTRIN XL) 300 MG 24 hr tablet TAKE 1 TABLET (300 MG TOTAL) BY MOUTH ONCE DAILY.    cyanocobalamin 500 MCG tablet Take 500 mcg by mouth once daily.    lipase-protease-amylase (CREON) 36,000-114,000- 180,000 unit CpDR TAKE 1 CAPSULE BY MOUTH 3 (THREE) TIMES DAILY WITH MEALS. &amp; 1 CAPSULE WITH SNACKS    loperamide (IMODIUM) 1 mg/5 mL solution Take by mouth every 6 (six) hours as needed for Diarrhea.    meloxicam (MOBIC) 15 MG tablet Take 1 tablet (15 mg total) by mouth once daily.    montelukast (SINGULAIR) 10 mg tablet Take 10 mg by mouth every evening.    multivitamin capsule Take 1 capsule by mouth.    nitroGLYCERIN (NITROSTAT) 0.4 MG SL tablet Place 1 tablet (0.4 mg total)  under the tongue every 5 (five) minutes as needed for Chest pain.    omeprazole (PRILOSEC) 20 MG capsule TAKE 1 CAPSULE (20 MG TOTAL) BY MOUTH ONCE DAILY.    rosuvastatin (CRESTOR) 20 MG tablet Take 1 tablet (20 mg total) by mouth every evening.    fluticasone furoate-vilanteroL (BREO) 100-25 mcg/dose diskus inhaler Inhale 1 puff into the lungs once daily. Controller    gabapentin (NEURONTIN) 600 MG tablet Take 1 tablet (600 mg total) by mouth 3 (three) times daily. (Patient taking differently: Take 600 mg by mouth once daily.)     No current facility-administered medications for this visit.       Review of patient's allergies indicates:   Allergen Reactions    Cephalosporins Anaphylaxis    Iodinated contrast media Hives and Swelling    Penicillins Rash       Family History   Problem Relation Age of Onset    Coronary artery disease Mother     Heart disease Mother 60        Bypass twice    Miscarriages / Stillbirths Mother         Stillbirths    Vision loss Mother         Macular Degeration    Heart attack Father     Coronary artery disease Father     Heart disease Father 55        Several Heart Attack    Alcohol abuse Father     Early death Father         Heart Attack 55    Coronary artery disease Sister     Heart disease Sister 65        CAD    Celiac disease Neg Hx     Colon cancer Neg Hx     Crohn's disease Neg Hx     Ulcerative colitis Neg Hx        Social History     Socioeconomic History    Marital status:    Tobacco Use    Smoking status: Former     Packs/day: 2.00     Years: 35.00     Pack years: 70.00     Types: Cigarettes     Start date: 1964     Quit date:      Years since quittin.1    Smokeless tobacco: Never   Substance and Sexual Activity    Alcohol use: Yes     Alcohol/week: 7.0 standard drinks     Types: 7 Glasses of wine per week    Drug use: Never    Sexual activity: Yes     Partners: Male     Birth control/protection: Post-menopausal     Social Determinants of Health      Financial Resource Strain: Low Risk     Difficulty of Paying Living Expenses: Not hard at all   Food Insecurity: No Food Insecurity    Worried About Running Out of Food in the Last Year: Never true    Ran Out of Food in the Last Year: Never true   Transportation Needs: No Transportation Needs    Lack of Transportation (Medical): No    Lack of Transportation (Non-Medical): No   Physical Activity: Inactive    Days of Exercise per Week: 0 days    Minutes of Exercise per Session: 0 min   Stress: Stress Concern Present    Feeling of Stress : To some extent   Social Connections: Unknown    Frequency of Communication with Friends and Family: More than three times a week    Frequency of Social Gatherings with Friends and Family: Twice a week    Active Member of Clubs or Organizations: Yes    Attends Club or Organization Meetings: More than 4 times per year    Marital Status:    Housing Stability: Low Risk     Unable to Pay for Housing in the Last Year: No    Number of Places Lived in the Last Year: 1    Unstable Housing in the Last Year: No       Chief Complaint:   Chief Complaint   Patient presents with    Follow-up     fu post right knee Uni, 8/16/22       Date of surgery:  August 16, 2022    History of present illness:  77-year-old female underwent right robot assisted unicompartmental knee replacement.  Patient doing very well.  Pain is a 2/10.  There is some pain at night along the joint lines.  Had a severe fall recently and wanted to get her knee checked out.  Fall was on January 3rd.      Review of Systems:    Musculoskeletal:  See HPI        Physical Examination:    Vital Signs:    Vitals:    02/08/23 0925   Resp: 18       Body mass index is 22.85 kg/m².    This a well-developed, well nourished patient in no acute distress.  They are alert and oriented and cooperative to examination.  Pt. walks without an antalgic gait.      Examination of the right knee shows well-healed surgical incisions.  No erythema  drainage.  Range of motion 0-130 degrees.  No calf pain.  Negative Homans sign.    X-rays:  Four views of the right knee are  ordered and reviewed which show well-aligned unicompartmental knee Replacement without complication     Assessment::  Status post right unicompartmental knee replacement    Plan:  She is doing well.   Follow up in six months with x-rays of both knees    This note was created using M Modal voice recognition software that occasionally misinterpreted phrases or words.

## 2023-02-13 ENCOUNTER — TELEPHONE (OUTPATIENT)
Dept: GASTROENTEROLOGY | Facility: CLINIC | Age: 78
End: 2023-02-13
Payer: MEDICARE

## 2023-02-13 DIAGNOSIS — I10 ESSENTIAL HYPERTENSION: ICD-10-CM

## 2023-02-13 NOTE — TELEPHONE ENCOUNTER
Ma called pt to go over Dr. Mclean pink sheet and to obtain additional information   No answer message left on pt vm     Portal messages sent as well

## 2023-02-13 NOTE — TELEPHONE ENCOUNTER
No new care gaps identified.  [facility] Embedded Care Gaps. Reference number: [id number]. 2/13/2023   1:56:56 PM CST

## 2023-02-14 RX ORDER — AMLODIPINE BESYLATE 5 MG/1
5 TABLET ORAL DAILY
Qty: 90 TABLET | Refills: 3 | Status: SHIPPED | OUTPATIENT
Start: 2023-02-14 | End: 2024-01-30

## 2023-02-14 NOTE — TELEPHONE ENCOUNTER
Refill Routing Note   Medication(s) are not appropriate for processing by [facility] for the following reason(s):      Refill Routing Note   Medication(s) are not appropriate for processing by [facility] for the following reason(s):       Required vitals abnormal    [facility] action(s):  Defer         Medication reconciliation completed: No   Extended chart review required: No  Alert overridden per protocol: No    Care gaps identified:  No       Appointments  past 12m or future 3m with PCP    Date Provider   Last Visit   1/13/2023 Becky Song MD   Next Visit   7/13/2023 Becky Song MD   ED visits in past 90 days: 0        Note composed:9:25 PM 02/13/2023             Note composed:9:25 PM 02/13/2023

## 2023-02-16 ENCOUNTER — TELEPHONE (OUTPATIENT)
Dept: GASTROENTEROLOGY | Facility: CLINIC | Age: 78
End: 2023-02-16
Payer: MEDICARE

## 2023-02-16 NOTE — TELEPHONE ENCOUNTER
Ma called pt to go over pink sheet and Dr. Mclean NP process   Pt states she has to get her grandchild from school and will call the office back at 330pm

## 2023-02-17 ENCOUNTER — TELEPHONE (OUTPATIENT)
Dept: GASTROENTEROLOGY | Facility: CLINIC | Age: 78
End: 2023-02-17
Payer: MEDICARE

## 2023-02-17 NOTE — TELEPHONE ENCOUNTER
Called pt and went over pink sheet   Pt verbalized understanding of Dr. Mclean Np process and that appointment can be between 10-12 weeks    Discussed with pt that Dr. Mclean is a consultant that does not accept patients as a primary GI provider.  Discussed that she will send them back to their primary GI provider once their symptoms improved or the plan of care is established.     Pt was told the logistics of the appointment (arrival time, length of visit, total time spent at facility).     Pt was also told that Dr. Mclean will review their previous workup but may order additional test and perform her own procedures and that this may require an overnight stay at a local hot to complete the workup. Patient agreed and verbalized understanding.

## 2023-02-17 NOTE — TELEPHONE ENCOUNTER
----- Message from Ness Bingham MA sent at 2/17/2023 12:28 PM CST -----  Regarding: FW: pt caller    ----- Message -----  From: Yamilka Wisdom  Sent: 2/17/2023  10:38 AM CST  To: [facility] Staff  Subject: pt caller                                        Type:  Patient Returning Call        Who Called:  Pt call       Who Left Message for Patient:  Malcolm caruso       Does the patient know what this is regarding?  The pt has questions about her appt referral       Best Call Back Number   [place]          [phone] only use this number           Additional Information:

## 2023-02-18 DIAGNOSIS — Z71.9 HEALTH EDUCATION/COUNSELING: Primary | ICD-10-CM

## 2023-02-20 ENCOUNTER — PATIENT MESSAGE (OUTPATIENT)
Dept: PSYCHIATRY | Facility: CLINIC | Age: 78
End: 2023-02-20
Payer: MEDICARE

## 2023-02-27 ENCOUNTER — TELEPHONE (OUTPATIENT)
Dept: GASTROENTEROLOGY | Facility: CLINIC | Age: 78
End: 2023-02-27
Payer: MEDICARE

## 2023-02-27 NOTE — TELEPHONE ENCOUNTER
MARY faxed to [facility]  [phone]  Requesting camera report 2018  Path reports from EGD's 12/20/17 and 8/29/16  Fax successful @ 2:43pm

## 2023-02-28 ENCOUNTER — EXTERNAL CHRONIC CARE MANAGEMENT (OUTPATIENT)
Dept: PRIMARY CARE CLINIC | Facility: CLINIC | Age: 78
End: 2023-02-28
Payer: MEDICARE

## 2023-02-28 PROCEDURE — 99490 PR CHRONIC CARE MGMT, 1ST 20 MIN: ICD-10-PCS | Mod: S$PBB,,, | Performed by: FAMILY MEDICINE

## 2023-02-28 PROCEDURE — 99490 CHRNC CARE MGMT STAFF 1ST 20: CPT | Mod: PBBFAC,PO | Performed by: FAMILY MEDICINE

## 2023-02-28 PROCEDURE — 99490 CHRNC CARE MGMT STAFF 1ST 20: CPT | Mod: S$PBB,,, | Performed by: FAMILY MEDICINE

## 2023-03-06 ENCOUNTER — TELEPHONE (OUTPATIENT)
Dept: GASTROENTEROLOGY | Facility: CLINIC | Age: 78
End: 2023-03-06
Payer: MEDICARE

## 2023-03-06 ENCOUNTER — PATIENT MESSAGE (OUTPATIENT)
Dept: GASTROENTEROLOGY | Facility: CLINIC | Age: 78
End: 2023-03-06

## 2023-03-06 ENCOUNTER — OFFICE VISIT (OUTPATIENT)
Dept: GASTROENTEROLOGY | Facility: CLINIC | Age: 78
End: 2023-03-06
Payer: MEDICARE

## 2023-03-06 ENCOUNTER — TELEPHONE (OUTPATIENT)
Dept: GASTROENTEROLOGY | Facility: CLINIC | Age: 78
End: 2023-03-06

## 2023-03-06 DIAGNOSIS — R68.81 EARLY SATIETY: ICD-10-CM

## 2023-03-06 DIAGNOSIS — R63.4 WEIGHT LOSS: ICD-10-CM

## 2023-03-06 DIAGNOSIS — R19.7 DIARRHEA, UNSPECIFIED TYPE: ICD-10-CM

## 2023-03-06 DIAGNOSIS — R07.89 NON-CARDIAC CHEST PAIN: ICD-10-CM

## 2023-03-06 DIAGNOSIS — K21.9 GASTROESOPHAGEAL REFLUX DISEASE, UNSPECIFIED WHETHER ESOPHAGITIS PRESENT: ICD-10-CM

## 2023-03-06 DIAGNOSIS — R06.6 HICCUPS: ICD-10-CM

## 2023-03-06 DIAGNOSIS — R13.10 DYSPHAGIA, UNSPECIFIED TYPE: Primary | ICD-10-CM

## 2023-03-06 PROCEDURE — 99215 OFFICE O/P EST HI 40 MIN: CPT | Mod: 95,,, | Performed by: INTERNAL MEDICINE

## 2023-03-06 PROCEDURE — 99215 PR OFFICE/OUTPT VISIT, EST, LEVL V, 40-54 MIN: ICD-10-PCS | Mod: 95,,, | Performed by: INTERNAL MEDICINE

## 2023-03-06 PROCEDURE — 99417 PROLNG OP E/M EACH 15 MIN: CPT | Mod: 95,,, | Performed by: INTERNAL MEDICINE

## 2023-03-06 PROCEDURE — 99417 PR PROLONGED SVC, OUTPT, W/WO DIRECT PT CONTACT,  EA ADDTL 15 MIN: ICD-10-PCS | Mod: 95,,, | Performed by: INTERNAL MEDICINE

## 2023-03-06 NOTE — TELEPHONE ENCOUNTER
[facility] PROCEDURE ORDERS    CLEARANCE FOR PROCEDURES:  [x] Not needed   [] Cardiology   [] Pulmonary  [] PCP    PROCEDURES  [] EGD   [] Colonoscopy   [x] EGD with EndoFlip   [] Esophageal manometry with impedance - dysphagia   [] Esophageal manometry with impedance - rumination  [] Esophageal manometry with impedance - belching   [] EGD with BRAVO 48 hours   [] EGD with BRAVO 96 hours   [] pH Impedance 24 hours   [] Anorectal manometry   [] Rectal Ultrasound     Does manometry need to be placed endoscopically:   [] Yes    FLOOR:  [x] 4th Floor   [] 2nd Floor    Reason for 2nd Floor:   [] Gastroparesis   [] End stage achalasia  [] Pre lung transplant   [] Pre hear transplant   [] Pulmonary HTN (PAP>50 or on meds)   [] Severe pulmonary Disease   [] Complex medical problems   [] BMI>50  [] History of anesthesia issues  [] Other:    PREP  [x] Standard Prep  [] Severe Constipation Prep (Low residue diet 7 days.  1.5 prep the day prior, 0.5 prep the day of procedure)  [] Full liquid diet 5 days   [] Full liquid diet 3 days   [] Full liquid diet 2 days   [] Full liquid diet 1 day   [] Other:     MEDICATIONS    pH Studies  [] ON PPI/H2 Blocker   [] OFF PPI/H2 Blocker     Metal allergy (stainless steal w chromium, nickel, copper, cobalt and iron).:   []  Yes    Pacemaker/defibrillator:  [] Yes    Motility Studies (esophageal manometry/anorectal manometry)  Hold narcotics x 1 days if able   Hold TCA x 1 days if able  Propofol/lidocaine only during sedation.  Discuss with Dr. Mclean if additional sedation needed.   Hold baclofen for thee days (at least one day) if able   Hold muscle relaxants x 1 day if able     Anticoagulation/antiplt agents:   []  Yes    ORDER OF TESTING:  Day 1: EGD FLIP     [] No special requirements - pt lives locally     SCHEDULING PRIORITY  [] Urgent  (<7 days)  [] Lung Transplant Workup  [x] Priority (<30 days)  [] Routine 1 (schedule ASAP)  [] Routine 2 (next available, < 3 months)   [] Routine  3 (ok if > 3 months)      URGENCY   [x] Medically Urgent   [] Medically NOT Urgent      DIAGNOSIS   [x] Dysphagia   [] EoE  [] GERD   [] Nausea  [] Nausea/vomiting   [] Abd pain   [x] Weight loss  [] Diarrhea  [] Melena  [] Hematochezia    [] CRC screening   [] Colon polyps  [] Other:  abn CT     PHYSICIAN  []  Ok with Dr. Arriaza   []  Ok with any GI MD

## 2023-03-06 NOTE — PROGRESS NOTES
The patient location is: home  The chief complaint leading to consultation is: dysphagia, chest pain    Visit type: audiovisual    Face to Face time with patient: 50  70 minutes of total time spent on the encounter, which includes face to face time and non-face to face time preparing to see the patient (eg, review of tests), Obtaining and/or reviewing separately obtained history, Documenting clinical information in the electronic or other health record, Independently interpreting results (not separately reported) and communicating results to the patient/family/caregiver, or Care coordination (not separately reported).         Each patient to whom he or she provides medical services by telemedicine is:  (1) informed of the relationship between the physician and patient and the respective role of any other health care provider with respect to management of the patient; and (2) notified that he or she may decline to receive medical services by telemedicine and may withdraw from such care at any time.    Notes:        [facility] Consultation Note    Reason for Consult:  No chief complaint on file.        PCP:   Becky Song   [address] / [place] [id number]    Referring MD:  Eren Corley  [address] [place] [address]      HPI:  Veronique Johnson is a 77 y.o. female referred to motility clinic for second opinion regarding the following problems:    Aleks Leija MD 12/15/2014  Patient seen for evaluation of dysphagia.  Symptoms started several months ago.  Difficulty swallowing has occurred intermittently of solids.  Symptoms have been progressive with time.  Food seems to get stuck in mid chest.  Prior treatment has included bougie dilation.  Upper endoscopy performed 3 years ago.  Plan dysphagia and chest pain.  EGD with Savary dilation.  Symax SL p.r.n..      GERD.    Retrosternal pyrosis:none   Regurgitation: occasional     Improved w PPI     Upright  symptoms: yes  Nocturnal symptoms:   no     MEDs:   Omeprazole 20mg daily - not sure if it makes a difference     Dysphagia.  Problems with solids: rice, meat 1-2 per week   Problems with liquids: no   Choking while eating: no    Coughing while eating: no   Location: neck  Onset of symptoms: years,     History of food impactions requiring ED visit: no   History of allergies/eczema. Yes    DIET: regular diet          EGDs w dilation with temporary improvement     Manometry w GEJOO    Dry mouth   Yes in am   Biotene       Chest pain/spasm: 1-2 per month   Chest pain starts in throat ad radiates down chest   Random spasm   Not related to eating or swallowing    Hiccups   Develops hiccups when food gets stuck, once they stop food goes down     Weight loss  118lb from 115lb from 130 in 1 month       Eckardt Symptom Score  Dysphagia: 1  Regurgitation: 1  Retrosternal pain: 1  Weight Loss: 2  Total: 5    Poor appetite after knee replacement   Feels this is related to Wellbutrin     Decreased appetite.   Started prior to Wellbutrin   ? Worse on Wellbutrin     Hemorrhoid surgery     Pancreatic insufficieny   Ho ETOH use   -Creon     Chronic diarrhea.   BM: 2+/day   [place] 4, sometimes 6-7  FI: 1/month   Nocturnal: no      MEDS:   Improved w creon   imodium     Anxiety   Counseling   Wellbutrin   Xananx prn     Insomnia     Knee numbness/pain  -gabapentin 600 Tid - takes QHS    Denies early satiety nausea, vomiting, abdominal pain, bloating, constipation, BRBPR, melena      Total visit time was    minutes, more than 50% of which was spent in face-to-face counseling with patient regarding symptoms, diagnostic results, prognosis, risks and benefits of treatment options, instructions for management, importance of compliance with chosen treatment options and coping strategies.      Previous Studies:   15 min spend reviewing records  Manometry 01/06/2023:  EG junction outflow obstruction.  Normalization IRP during upright  swallows per Dr. Hatfield.  Manometry personally reviewed and interpreted by me.  High LES pressure with incomplete relaxation.  Residual IRP 18.2. Sm HH.  80% proximal escape. DCI 04/02/2001.  Distal latency 6.7.  20% rapid contractions.  100% intact.  2/5 upright swallows with normalization of IRP.  1/5 upright swallows with hypercontractile.  Multiple rapid swallow abnormal with loss of inhibition and hypercontractile final contraction.  No significant difference with provocative maneuvers.  No residual w 200 cc bolus. Episode of reflux noted.  Meds:  Xanax and Prilosec.  CT abdomen pelvis without contrast 11/07/2022:  Unremarkable noncontrast appearance of pancreas.  Nonspecific stomach wall thickening.  This could correlate with inadequate distention.  Gastritis or peptic ulcer disease also possible in appropriate clinical settings.  Colonic diverticulosis.  Esophagram 10/31/2022:  No active GERD.  Barium tablet passed without difficulty all delay.  Esophageal dysmotility.  EGD 04/22/2021: Normal esophagus (-).  Hypertonic lower esophageal sphincter.  Dilated 54 Burmese.  Erythema in antrum (-).  Normal duodenum (-).  Single nonbleeding AVM in duodenum.  Colonoscopy 04/22/2021:  Normal ileum. :  Normal (mild intraepithelial eosinophils, possibly due   to food/medication irritation).  7 mm polyp in ascending colon (TA).  Diverticulosis.  Single nonbleeding colonic AVM.  Two 5 mm polyps in sigmoid colon (TA/HP).  Nonbleeding internal hemorrhoids.  Repeat 5 years.  VCE 9/13/2018: prox SB AVMEGD   12/20/2017:  Normal duodenum.  Stricture at GE junction.  Dilation 18 mm.  Biopsy (-).  Erythema in antrum compatible with gastritis (-).  EGD 8/29/2016 normal duodenum.  Normal mucosa in the esophagus (-) dilation 18 mm.  Erythema in antrum compatible with gastritis (-).  EGD 01/08/2015: Normal duodenum.  Normal esophagus () dilation 18 mm Savary.  Hiatal hernia.  Path 09/28/2011: Ileocecal valve biopsy negative.  Ascending  colon biopsy negative.  Sigmoid colon biopsy negative.  Rectum biopsy negative.  Rectal polyp biopsy benign hyperplastic polyp.      Relevant Surgical History:    NA    ROS:  ROS     Complete ROS negative except as above    Medical History:   Past Medical History:   Diagnosis Date    Anemia     Anxiety     Chronic bronchitis with COPD (chronic obstructive pulmonary disease)     Esophageal spasm     Essential tremor     GERD (gastroesophageal reflux disease)     Headache     High cholesterol     Hypertension     Meniere disease     Multiple sclerosis     Muscle spasm     Pancreatic insufficiency         Surgical History:   Past Surgical History:   Procedure Laterality Date    CATARACT EXTRACTION, BILATERAL  2006    COLONOSCOPY  09/05/2018    Dr. Leija, in procedures: diverticulosis, 4 colon polyps removed, adenomatous polyps x3 & benign mucosal polyps    COLONOSCOPY N/A 4/22/2021    Procedure: COLONOSCOPY;  Surgeon: Dwayne Santoyo MD;  Location: [facility] ENDO;  Service: Endoscopy;  Laterality: N/A;    CORONARY ANGIOGRAPHY N/A 10/14/2021    Procedure: ANGIOGRAM, CORONARY ARTERY;  Surgeon: Mustapha Manzo MD;  Location: [facility] CATH;  Service: Cardiology;  Laterality: N/A;    CYSTOSCOPY WITH HYDRODISTENSION OF BLADDER N/A 6/5/2019    Procedure: CYSTOSCOPY, WITH BLADDER HYDRODISTENSION;  Surgeon: Marko Burton MD;  Location: [facility] OR;  Service: OB/GYN;  Laterality: N/A;    DILATION AND CURETTAGE OF UTERUS  1966    ESOPHAGEAL MANOMETRY WITH MEASUREMENT OF IMPEDANCE N/A 1/6/2023    Procedure: MANOMETRY, ESOPHAGUS, WITH IMPEDANCE MEASUREMENT;  Surgeon: Vitaliy Hatfield MD;  Location: [facility] ([address] [place] [address]);  Service: Endoscopy;  Laterality: N/A;  instructions sent to [facility]  Precall done. 0900 arrival time confirmed. SG  instr portal -ml    ESOPHAGOGASTRODUODENOSCOPY  09/05/2018    Dr. Leija, in procedures: gastritis; biopsy: duodenum unremarkable, with focal benign gastric metaplasia, stomach- minimal chronic  gastritis, negative for h pylori    ESOPHAGOGASTRODUODENOSCOPY N/A 4/22/2021    Procedure: EGD (ESOPHAGOGASTRODUODENOSCOPY);  Surgeon: Dwayne Santoyo MD;  Location: [facility] ENDO;  Service: Endoscopy;  Laterality: N/A;    EYE SURGERY      HEMORRHOID SURGERY  1997    KNEE ARTHROSCOPY W/ MENISCECTOMY Right 8/13/2021    Procedure: ARTHROSCOPY, KNEE, WITH MENISCECTOMY;  Surgeon: Shelton Le MD;  Location: [facility] OR;  Service: Orthopedics;  Laterality: Right;    KNEE ARTHROSCOPY W/ MENISCECTOMY Right 4/8/2022    Procedure: ARTHROSCOPY, KNEE, WITH MENISCECTOMY;  Surgeon: Shelton Le MD;  Location: [facility] OR;  Service: Orthopedics;  Laterality: Right;  medial meniscectomy    LEFT HEART CATHETERIZATION Right 10/14/2021    Procedure: Left heart cath;  Surgeon: Mustapha Manzo MD;  Location: [facility] CATH;  Service: Cardiology;  Laterality: Right;    ROBOTIC ARTHROPLASTY, KNEE, UNICOMPARTMENTAL Right 8/16/2022    Procedure: ROBOTIC ARTHROPLASTY, KNEE, UNICOMPARTMENTAL;  Surgeon: Shelton Le MD;  Location: [facility] OR;  Service: Orthopedics;  Laterality: Right;    TONSILLECTOMY  1954    TUBAL LIGATION  1990        Family History:   Family History   Problem Relation Age of Onset    Coronary artery disease Mother     Heart disease Mother 60        Bypass twice    Miscarriages / Stillbirths Mother         Stillbirths    Vision loss Mother         Macular Degeration    Heart attack Father     Coronary artery disease Father     Heart disease Father 55        Several Heart Attack    Alcohol abuse Father     Early death Father         Heart Attack 55    Coronary artery disease Sister     Heart disease Sister 65        CAD    Celiac disease Neg Hx     Colon cancer Neg Hx     Crohn's disease Neg Hx     Ulcerative colitis Neg Hx         Social History:   Social History     Socioeconomic History    Marital status:    Tobacco Use    Smoking status: Former     Packs/day: 2.00     Years: 35.00     Pack years:  70.00     Types: Cigarettes     Start date: 1964     Quit date:      Years since quittin.1    Smokeless tobacco: Never   Substance and Sexual Activity    Alcohol use: Yes     Alcohol/week: 7.0 standard drinks     Types: 7 Glasses of wine per week    Drug use: Never    Sexual activity: Yes     Partners: Male     Birth control/protection: Post-menopausal     Social Determinants of Health     Financial Resource Strain: Low Risk     Difficulty of Paying Living Expenses: Not hard at all   Food Insecurity: No Food Insecurity    Worried About Running Out of Food in the Last Year: Never true    Ran Out of Food in the Last Year: Never true   Transportation Needs: No Transportation Needs    Lack of Transportation (Medical): No    Lack of Transportation (Non-Medical): No   Physical Activity: Inactive    Days of Exercise per Week: 0 days    Minutes of Exercise per Session: 0 min   Stress: Stress Concern Present    Feeling of Stress : To some extent   Social Connections: Unknown    Frequency of Communication with Friends and Family: More than three times a week    Frequency of Social Gatherings with Friends and Family: Twice a week    Active Member of Clubs or Organizations: Yes    Attends Club or Organization Meetings: More than 4 times per year    Marital Status:    Housing Stability: Low Risk     Unable to Pay for Housing in the Last Year: No    Number of Places Lived in the Last Year: 1    Unstable Housing in the Last Year: No        Review of patient's allergies indicates:   Allergen Reactions    Cephalosporins Anaphylaxis    Iodinated contrast media Hives and Swelling    Penicillins Rash       Current Outpatient Medications   Medication Sig Dispense Refill    albuterol (PROVENTIL/VENTOLIN HFA) 90 mcg/actuation inhaler Inhale 2 puffs into the lungs 3 (three) times daily. Rescue 18 g 2    ALPRAZolam (XANAX) 0.25 MG tablet Take 1 tablet (0.25 mg total) by mouth 2 (two) times daily as needed for Anxiety. 60  tablet 5    amLODIPine (NORVASC) 5 MG tablet TAKE 1 TABLET (5 MG TOTAL) BY MOUTH ONCE DAILY. 90 tablet 3    buPROPion (WELLBUTRIN XL) 300 MG 24 hr tablet TAKE 1 TABLET (300 MG TOTAL) BY MOUTH ONCE DAILY. 90 tablet 3    gabapentin (NEURONTIN) 600 MG tablet Take 1 tablet (600 mg total) by mouth 3 (three) times daily. 90 tablet 0    lipase-protease-amylase (CREON) 36,000-114,000- 180,000 unit CpDR TAKE 1 CAPSULE BY MOUTH 3 (THREE) TIMES DAILY WITH MEALS. &amp; 1 CAPSULE WITH SNACKS 120 capsule 11    loperamide (IMODIUM) 1 mg/5 mL solution Take by mouth every 6 (six) hours as needed for Diarrhea.      montelukast (SINGULAIR) 10 mg tablet Take 10 mg by mouth every evening.      omeprazole (PRILOSEC) 20 MG capsule TAKE 1 CAPSULE (20 MG TOTAL) BY MOUTH ONCE DAILY. 90 capsule 3    rosuvastatin (CRESTOR) 20 MG tablet Take 1 tablet (20 mg total) by mouth every evening. 90 tablet 3    cyanocobalamin 500 MCG tablet Take 500 mcg by mouth once daily.      fluticasone furoate-vilanteroL (BREO) 100-25 mcg/dose diskus inhaler Inhale 1 puff into the lungs once daily. Controller 90 each 1    meloxicam (MOBIC) 15 MG tablet Take 1 tablet (15 mg total) by mouth once daily. (Patient not taking: Reported on 3/6/2023) 30 tablet 2    multivitamin capsule Take 1 capsule by mouth.      nitroGLYCERIN (NITROSTAT) 0.4 MG SL tablet Place 1 tablet (0.4 mg total) under the tongue every 5 (five) minutes as needed for Chest pain. (Patient not taking: Reported on 3/6/2023) 30 tablet 0     No current facility-administered medications for this visit.        Objective Findings:  Vital Signs:  There were no vitals taken for this visit.  There is no height or weight on file to calculate BMI.    Physical Exam:  Physical Exam:limited due to video visit  General appearance: alert, cooperative, no distress  HENT: Normocephalic, atraumatic, neck symmetrical, no nasal discharge  Eyes: conjunctivae/corneas clear,  EOM's intact  Extremities: visible extremities  symmetric; no clubbing, cyanosis, or edema  Integument: visible Skin color, texture, turgor normal; no rashes; hair distrubution normal  Neurologic: Alert and oriented X 3,  Psychiatric: no pressured speech; normal affect; no evidence of impaired cognition    Labs:   Reviewed in Epic/record      Assessment and Plan:  Veronique Johnson is a 77 y.o. female with referred to Esophageal Motility Clinic for 2nd opinion regarding following problems:    GERD.    HH on manoemtry  Omeprazole 20mg daily    Dysphagia to solids localizing to neck  Associated w hiccups    Eckardt 5/12  Mult EGDs w dilation up to 54Fr with temporary improvement, last 4/2021  Manometry w GEJOO, prox escape, no residual w 200cc bolus, Normal IRP in 2/5 upright swallows   Esophagogram w dysmotility, BT passed into stomach   -EGD w EOE, G, D, dissach, r/o eos (CT w gastr wall thickening 11/2022) ASAP  -peppermint   -Will consider trazodone    Chest pain/spasm in throat ad radiates down chest   Not related to eating or swallowing  Hypercontractile activity w provocative maneuvers   -peppermint     Dry mouth   Yes in am   Biotene     Weight loss  Poor appetite   Maybe related to Wellbutrin   -EGD  -Def to primary GI      Ho hemorrhoid surgery   -Def to primary GI     Pancreatic insufficieny   Ho ETOH use   -On Creon   -Def to primary GI     Chronic diarrhea. FI+  Colon w increas eos 11/2021  Improved w creon   On imodium   -Def to primary GI     Anxiety   Counseling   On Wellbutrin   On Xananx prn     Insomnia     Knee numbness/pain  -gabapentin 600 Tid - takes QHS    Follow up in about 3 months (around 6/6/2023).    1. Dysphagia, unspecified type    2. Non-cardiac chest pain    3. Gastroesophageal reflux disease, unspecified whether esophagitis present    4. Hiccups    5. Weight loss    6. Diarrhea, unspecified type    7. Early satiety          Order summary:       Discussed with PT that I act as a consult service and do not accept patients to be their  primary GI provider. Discussed that the goal of our visits is to address relevant motility problems while deferring other GI problems as well as screening and surveillance to his/her primary GI provider.   Discussed that he/she needs to continue to follow with his local primary GI provider.  Discussed that we will complete his/her workup, clarify diagnosis and attempt to optimize his/her symptoms with intention of him/her returning to referring GI provider for long term GI care.   Pt verbalized understanding.        Thank you so much for allowing me to participate in the care of Veronique Mclean MD      This note was created using voice recognition software, and may contain some unrecognized transcriptional errors.

## 2023-03-06 NOTE — TELEPHONE ENCOUNTER
Called [facility], medical records.  Spoke with Nila,says will fax us camera report 2018 and EGD path reports 12/20/17 and 8/29/16.

## 2023-03-06 NOTE — PATIENT INSTRUCTIONS
-Complete EGD with EndoFlip  EGD is an endoscopic procedure that allows your doctor to examine your esophagus, stomach and duodenum (part of your small intestine). EGD is an outpatient procedure, meaning you can go home that same day. It takes approximately 30 to 60 minutes to perform.  EndoFLIP is a technology that simultaneously measures the area across the inside of a gastrointestinal organ (for example, the esophagus) and the pressure inside that organ. The ratio of the two measurements is called distensibility (stiffness).    You can try the following products to help with dry mouth.  This should improve your difficulty swallowing as saliva is needed to lubricate food.    -Biotene (artificial saliva) 3-4 times per day  prior to meals or as needed for dry mouth.  -Xylimelts.  You can find these in local pharmacy, such as [facility] or on internet.  -Lozenge - Olsen's Breezers, ACT dry mouth lozenges sold with cough drops  -Therabreath mouthwash  -ACT mouthwash  -Grether's Pastilles, these are usually found on internet      -Start peppermint three to four times per day (altoids, peppermint tea, peppermint oil (four drops in half glass of water)

## 2023-03-07 ENCOUNTER — TELEPHONE (OUTPATIENT)
Dept: ENDOSCOPY | Facility: HOSPITAL | Age: 78
End: 2023-03-07
Payer: MEDICARE

## 2023-03-07 VITALS — WEIGHT: 118 LBS | BODY MASS INDEX: 23.16 KG/M2 | HEIGHT: 60 IN

## 2023-03-07 DIAGNOSIS — R13.10 DYSPHAGIA, UNSPECIFIED TYPE: Primary | ICD-10-CM

## 2023-03-07 DIAGNOSIS — R93.5 ABNORMAL CT OF THE ABDOMEN: ICD-10-CM

## 2023-03-07 DIAGNOSIS — R63.4 WEIGHT LOSS: ICD-10-CM

## 2023-03-07 NOTE — TELEPHONE ENCOUNTER
Contacted patient to schedule procedure. As per our conversation,  patient is scheduled for EGD/Endoflip on 3/10/23 at 1:15 pm. Instructions reviewed with patient and informed instructions will be on patient portal for review.

## 2023-03-09 ENCOUNTER — PATIENT MESSAGE (OUTPATIENT)
Dept: ADMINISTRATIVE | Facility: HOSPITAL | Age: 78
End: 2023-03-09
Payer: MEDICARE

## 2023-03-10 ENCOUNTER — ANESTHESIA EVENT (OUTPATIENT)
Dept: ENDOSCOPY | Facility: HOSPITAL | Age: 78
End: 2023-03-10
Payer: MEDICARE

## 2023-03-10 ENCOUNTER — ANESTHESIA (OUTPATIENT)
Dept: ENDOSCOPY | Facility: HOSPITAL | Age: 78
End: 2023-03-10
Payer: MEDICARE

## 2023-03-10 ENCOUNTER — HOSPITAL ENCOUNTER (OUTPATIENT)
Facility: HOSPITAL | Age: 78
Discharge: HOME | End: 2023-03-10
Attending: INTERNAL MEDICINE | Admitting: INTERNAL MEDICINE
Payer: MEDICARE

## 2023-03-10 VITALS
DIASTOLIC BLOOD PRESSURE: 78 MMHG | RESPIRATION RATE: 18 BRPM | TEMPERATURE: 98 F | OXYGEN SATURATION: 99 % | SYSTOLIC BLOOD PRESSURE: 142 MMHG | HEIGHT: 60 IN | BODY MASS INDEX: 22.58 KG/M2 | WEIGHT: 115 LBS | HEART RATE: 78 BPM

## 2023-03-10 DIAGNOSIS — R13.10 DYSPHAGIA, UNSPECIFIED TYPE: Primary | ICD-10-CM

## 2023-03-10 DIAGNOSIS — R13.10 DYSPHAGIA: ICD-10-CM

## 2023-03-10 PROCEDURE — 91040 ESOPH BALLOON DISTENSION TST: CPT | Mod: TC | Performed by: INTERNAL MEDICINE

## 2023-03-10 PROCEDURE — 88305 TISSUE EXAM BY PATHOLOGIST: CPT | Mod: 26,,, | Performed by: PATHOLOGY

## 2023-03-10 PROCEDURE — D9220A PRA ANESTHESIA: ICD-10-PCS | Mod: ANES,,, | Performed by: ANESTHESIOLOGY

## 2023-03-10 PROCEDURE — D9220A PRA ANESTHESIA: ICD-10-PCS | Mod: CRNA,,, | Performed by: NURSE ANESTHETIST, CERTIFIED REGISTERED

## 2023-03-10 PROCEDURE — 88305 TISSUE EXAM BY PATHOLOGIST: CPT | Mod: 59 | Performed by: PATHOLOGY

## 2023-03-10 PROCEDURE — 25000003 PHARM REV CODE 250: Performed by: NURSE ANESTHETIST, CERTIFIED REGISTERED

## 2023-03-10 PROCEDURE — 43239 EGD BIOPSY SINGLE/MULTIPLE: CPT | Performed by: INTERNAL MEDICINE

## 2023-03-10 PROCEDURE — D9220A PRA ANESTHESIA: Mod: ANES,,, | Performed by: ANESTHESIOLOGY

## 2023-03-10 PROCEDURE — 43239 PR EGD, FLEX, W/BIOPSY, SGL/MULTI: ICD-10-PCS | Mod: ,,, | Performed by: INTERNAL MEDICINE

## 2023-03-10 PROCEDURE — C1726 CATH, BAL DIL, NON-VASCULAR: HCPCS | Performed by: INTERNAL MEDICINE

## 2023-03-10 PROCEDURE — 82657 ENZYME CELL ACTIVITY: CPT | Performed by: PATHOLOGY

## 2023-03-10 PROCEDURE — 43239 EGD BIOPSY SINGLE/MULTIPLE: CPT | Mod: ,,, | Performed by: INTERNAL MEDICINE

## 2023-03-10 PROCEDURE — 37000008 HC ANESTHESIA 1ST 15 MINUTES: Performed by: INTERNAL MEDICINE

## 2023-03-10 PROCEDURE — 91040 PR ESOPH BALLOON DISTENSION TST: ICD-10-PCS | Mod: 26,,, | Performed by: INTERNAL MEDICINE

## 2023-03-10 PROCEDURE — 37000009 HC ANESTHESIA EA ADD 15 MINS: Performed by: INTERNAL MEDICINE

## 2023-03-10 PROCEDURE — 27201012 HC FORCEPS, HOT/COLD, DISP: Performed by: INTERNAL MEDICINE

## 2023-03-10 PROCEDURE — 91040 ESOPH BALLOON DISTENSION TST: CPT | Mod: 26,,, | Performed by: INTERNAL MEDICINE

## 2023-03-10 PROCEDURE — 63600175 PHARM REV CODE 636 W HCPCS: Performed by: NURSE ANESTHETIST, CERTIFIED REGISTERED

## 2023-03-10 PROCEDURE — D9220A PRA ANESTHESIA: Mod: CRNA,,, | Performed by: NURSE ANESTHETIST, CERTIFIED REGISTERED

## 2023-03-10 PROCEDURE — 88305 TISSUE EXAM BY PATHOLOGIST: ICD-10-PCS | Mod: 26,,, | Performed by: PATHOLOGY

## 2023-03-10 RX ORDER — SODIUM CHLORIDE 9 MG/ML
INJECTION, SOLUTION INTRAVENOUS CONTINUOUS
Status: DISCONTINUED | OUTPATIENT
Start: 2023-03-10 | End: 2023-03-10 | Stop reason: HOSPADM

## 2023-03-10 RX ORDER — PROPOFOL 10 MG/ML
VIAL (ML) INTRAVENOUS CONTINUOUS PRN
Status: DISCONTINUED | OUTPATIENT
Start: 2023-03-10 | End: 2023-03-10

## 2023-03-10 RX ORDER — SODIUM CHLORIDE 0.9 % (FLUSH) 0.9 %
10 SYRINGE (ML) INJECTION
Status: DISCONTINUED | OUTPATIENT
Start: 2023-03-10 | End: 2023-03-10 | Stop reason: HOSPADM

## 2023-03-10 RX ORDER — LIDOCAINE HYDROCHLORIDE 20 MG/ML
INJECTION INTRAVENOUS
Status: DISCONTINUED | OUTPATIENT
Start: 2023-03-10 | End: 2023-03-10

## 2023-03-10 RX ORDER — PROPOFOL 10 MG/ML
VIAL (ML) INTRAVENOUS
Status: DISCONTINUED | OUTPATIENT
Start: 2023-03-10 | End: 2023-03-10

## 2023-03-10 RX ORDER — HYDROMORPHONE HYDROCHLORIDE 1 MG/ML
0.2 INJECTION, SOLUTION INTRAMUSCULAR; INTRAVENOUS; SUBCUTANEOUS EVERY 5 MIN PRN
Status: DISCONTINUED | OUTPATIENT
Start: 2023-03-10 | End: 2023-03-10 | Stop reason: HOSPADM

## 2023-03-10 RX ADMIN — SODIUM CHLORIDE: 0.9 INJECTION, SOLUTION INTRAVENOUS at 01:03

## 2023-03-10 RX ADMIN — PROPOFOL 50 MG: 10 INJECTION, EMULSION INTRAVENOUS at 01:03

## 2023-03-10 RX ADMIN — PROPOFOL 125 MCG/KG/MIN: 10 INJECTION, EMULSION INTRAVENOUS at 01:03

## 2023-03-10 RX ADMIN — LIDOCAINE HYDROCHLORIDE 80 MG: 20 INJECTION INTRAVENOUS at 01:03

## 2023-03-10 NOTE — PLAN OF CARE
Discharge instructions reviewed. Vitals stable, tolerating po fluids. No further questions or concerns at this time.

## 2023-03-10 NOTE — ANESTHESIA PREPROCEDURE EVALUATION
03/10/2023  Veronique Johnson is a 77 y.o., female here for EGD/endoflip.    Per Cardiology Note:  Cardiology follow-up coronary disease. Left heart catheterization 10/2021, diffuse three-vessel moderate disease. Lad was demonstrated 50% stenosis at the takeoff of 1st diagonal. Follow-up fractional flow reserve was unremarkable at 0.84. Medical therapy recommended. EF normal.       Past Medical History:   Diagnosis Date    Anemia     Anxiety     Chronic bronchitis with COPD (chronic obstructive pulmonary disease)     Esophageal spasm     Essential tremor     GERD (gastroesophageal reflux disease)     Headache     High cholesterol     Hypertension     Meniere disease     Multiple sclerosis     Muscle spasm     Pancreatic insufficiency      No results found for this or any previous visit.        Pre-op Assessment    I have reviewed the NPO Status.   I have reviewed the Medications.     Review of Systems  Anesthesia Hx:  No problems with previous Anesthesia    Social:  Non-Smoker    Cardiovascular:   Hypertension CAD    Denies Angina.    Pulmonary:   COPD    Hepatic/GI:   GERD, well controlled    Neurological:   Neuromuscular Disease, Headaches Multiple sclerosis   Endocrine:  Endocrine Normal        Physical Exam  General: Well nourished    Airway:  Mallampati: II   Mouth Opening: Normal  TM Distance: Normal  Tongue: Normal  Neck ROM: Normal ROM    Dental:  Intact    Chest/Lungs:  Clear to auscultation, Normal Respiratory Rate        Anesthesia Plan  Type of Anesthesia, risks & benefits discussed:    Anesthesia Type: Gen Natural Airway  Intra-op Monitoring Plan: Standard ASA Monitors  Post Op Pain Control Plan: multimodal analgesia and IV/PO Opioids PRN  Induction:  IV  Airway Plan: Direct  Informed Consent: Informed consent signed with the Patient and all parties understand the risks and agree  with anesthesia plan.  All questions answered. Patient consented to blood products? No  ASA Score: 3    Ready For Surgery From Anesthesia Perspective.     .

## 2023-03-10 NOTE — ANESTHESIA POSTPROCEDURE EVALUATION
Anesthesia Post Evaluation    Patient: Veronique Johnson    Procedure(s) Performed: Procedure(s) (LRB):  EGD (ESOPHAGOGASTRODUODENOSCOPY) (N/A)    Final Anesthesia Type: general      Patient location during evaluation: PACU  Patient participation: Yes- Able to Participate  Level of consciousness: awake and alert and oriented  Post-procedure vital signs: reviewed and stable  Pain management: adequate  Airway patency: patent    PONV status at discharge: No PONV  Anesthetic complications: no      Cardiovascular status: blood pressure returned to baseline and hemodynamically stable  Respiratory status: unassisted and spontaneous ventilation  Hydration status: euvolemic  Follow-up not needed.          Vitals Value Taken Time   /78 03/10/23 1417   Temp 37.6 03/10/23 1437   Pulse 76 03/10/23 1420   Resp 36 03/10/23 1420   SpO2 99 % 03/10/23 1420   Vitals shown include unvalidated device data.      No case tracking events are documented in the log.      Pain/Haylie Score: Haylie Score: 10 (3/10/2023  1:45 PM)

## 2023-03-10 NOTE — PROVATION PATIENT INSTRUCTIONS
Discharge Summary/Instructions after an Endoscopic Procedure  Patient Name: Veronique Johnson  Patient MRN: [id number]  Patient YOB: 1945  Friday, March 10, 2023  Bere Mclean MD  Dear patient,  As a result of recent federal legislation (The Federal Cures Act), you may   receive lab or pathology results from your procedure in your [facility]   account before your physician is able to contact you. Your physician or   their representative will relay the results to you with their   recommendations at their soonest availability.  Thank you,  RESTRICTIONS:  During your procedure today, you received medications for sedation.  These   medications may affect your judgment, balance and coordination.  Therefore,   for 24 hours, you have the following restrictions:   - DO NOT drive a car, operate machinery, make legal/financial decisions,   sign important papers or drink alcohol.    ACTIVITY:  Today: no heavy lifting, straining or running due to procedural   sedation/anesthesia.  The following day: return to full activity including work.  DIET:  Eat and drink normally unless instructed otherwise.     TREATMENT FOR COMMON SIDE EFFECTS:  - Mild abdominal pain, nausea, belching, bloating or excessive gas:  rest,   eat lightly and use a heating pad.  - Sore Throat: treat with throat lozenges and/or gargle with warm salt   water.  - Because air was used during the procedure, expelling large amounts of air   from your rectum or belching is normal.  - If a bowel prep was taken, you may not have a bowel movement for 1-3 days.    This is normal.  SYMPTOMS TO WATCH FOR AND REPORT TO YOUR PHYSICIAN:  1. Abdominal pain or bloating, other than gas cramps.  2. Chest pain.  3. Back pain.  4. Signs of infection such as: chills or fever occurring within 24 hours   after the procedure.  5. Rectal bleeding, which would show as bright red, maroon, or black stools.   (A tablespoon of blood from the rectum is not serious, especially if    hemorrhoids are present.)  6. Vomiting.  7. Weakness or dizziness.  GO DIRECTLY TO THE NEAREST EMERGENCY ROOM IF YOU HAVE ANY OF THE FOLLOWING:      Difficulty breathing              Chills and/or fever over 101 F   Persistent vomiting and/or vomiting blood   Severe abdominal pain   Severe chest pain   Black, tarry stools   Bleeding- more than one tablespoon   Any other symptom or condition that you feel may need urgent attention  Your doctor recommends these additional instructions:  If any biopsies were taken, your doctors clinic will contact you in 1 to 2   weeks with any results.  - Discharge patient to home (with escort).   - Resume previous diet.   - Continue present medications.   - Await pathology results.   - The findings and recommendations were discussed with the patient.   - Patient has a contact number available for emergencies.  The signs and   symptoms of potential delayed complications were discussed with the   patient.  Return to normal activities tomorrow.  Written discharge   instructions were provided to the patient.  For questions, problems or results please call your physician - Bere Mclean MD at Work:  [phone].  [facility] [place], EMERGENCY ROOM PHONE NUMBER: [phone]  IF A COMPLICATION OR EMERGENCY SITUATION ARISES AND YOU ARE UNABLE TO REACH   YOUR PHYSICIAN - GO DIRECTLY TO THE EMERGENCY ROOM.  Bere Mclean MD  3/10/2023 1:39:23 PM  This report has been verified and signed electronically.  Dear patient,  As a result of recent federal legislation (The Federal Cures Act), you may   receive lab or pathology results from your procedure in your [facility]   account before your physician is able to contact you. Your physician or   their representative will relay the results to you with their   recommendations at their soonest availability.  Thank you,  PROVATION

## 2023-03-10 NOTE — H&P
Short Stay Endoscopy History and Physical    PCP - Becky Song MD     Procedure - EGD w FLIP  ASA - per anesthesia  Mallampati - per anesthesia  History of Anesthesia problems - no  Family history Anesthesia problems -  no   Plan of anesthesia - General    HPI:  This is a 77 y.o. female here for evaluation of dysphagia, weight loss, abn CT       Medical History:  has a past medical history of Anemia, Anxiety, Chronic bronchitis with COPD (chronic obstructive pulmonary disease), Esophageal spasm, Essential tremor, GERD (gastroesophageal reflux disease), Headache, High cholesterol, Hypertension, Meniere disease, Multiple sclerosis, Muscle spasm, and Pancreatic insufficiency.    Surgical History:  has a past surgical history that includes Tonsillectomy (1954); Tubal ligation (1990); Cataract extraction, bilateral (2006); Dilation and curettage of uterus (1966); Hemorrhoid surgery (1997); Cystoscopy with hydrodistension of bladder (N/A, 6/5/2019); Esophagogastroduodenoscopy (09/05/2018); Colonoscopy (09/05/2018); Esophagogastroduodenoscopy (N/A, 4/22/2021); Colonoscopy (N/A, 4/22/2021); Eye surgery; Knee arthroscopy w/ meniscectomy (Right, 8/13/2021); Left heart catheterization (Right, 10/14/2021); Coronary angiography (N/A, 10/14/2021); Knee arthroscopy w/ meniscectomy (Right, 4/8/2022); robotic arthroplasty, knee, unicompartmental (Right, 8/16/2022); and Esophageal manometry with measurement of impedance (N/A, 1/6/2023).    Family History: family history includes Alcohol abuse in her father; Coronary artery disease in her father, mother, and sister; Early death in her father; Heart attack in her father; Heart disease (age of onset: 55) in her father; Heart disease (age of onset: 60) in her mother; Heart disease (age of onset: 65) in her sister; Miscarriages / Stillbirths in her mother; Vision loss in her mother.. Otherwise no colon cancer, inflammatory bowel disease, or GI malignancies.    Social History:  reports  that she quit smoking about 22 years ago. Her smoking use included cigarettes. She started smoking about 58 years ago. She has a 70.00 pack-year smoking history. She has never used smokeless tobacco. She reports current alcohol use of about 7.0 standard drinks per week. She reports that she does not use drugs.    Review of patient's allergies indicates:   Allergen Reactions    Cephalosporins Anaphylaxis    Iodinated contrast media Hives and Swelling    Penicillins Rash       Medications:   No medications prior to admission.       Physical Exam:    Vital Signs: There were no vitals filed for this visit.    I have explained the risks and benefits of endoscopy procedures to the patient including but not limited to bleeding, perforation, infection, and death.      Bere Mclean MD

## 2023-03-10 NOTE — TRANSFER OF CARE
Anesthesia Transfer of Care Note    Patient: Veronique Johnson    Procedure(s) Performed: Procedure(s) (LRB):  EGD (ESOPHAGOGASTRODUODENOSCOPY) (N/A)    Patient location: [facility]    Anesthesia Type: general    Transport from OR: Transported from OR on 2-3 L/min O2 by NC with adequate spontaneous ventilation    Post pain: adequate analgesia    Post assessment: no apparent anesthetic complications    Post vital signs: stable    Level of consciousness: awake    Nausea/Vomiting: no nausea/vomiting    Complications: none    Transfer of care protocol was followed      Last vitals:   Visit Vitals  /64 (BP Location: Left arm, Patient Position: Lying)   Pulse 77   Temp 36.8 °C (98.2 °F) (Tympanic)   Resp 18   Ht 5' (1.524 m)   Wt 52.2 kg (115 lb)   SpO2 100%   Breastfeeding No   BMI 22.46 kg/m²

## 2023-03-15 ENCOUNTER — PATIENT MESSAGE (OUTPATIENT)
Dept: ORTHOPEDICS | Facility: CLINIC | Age: 78
End: 2023-03-15

## 2023-03-15 ENCOUNTER — HOSPITAL ENCOUNTER (OUTPATIENT)
Dept: RADIOLOGY | Facility: HOSPITAL | Age: 78
Discharge: HOME | End: 2023-03-15
Attending: ORTHOPAEDIC SURGERY
Payer: MEDICARE

## 2023-03-15 ENCOUNTER — OFFICE VISIT (OUTPATIENT)
Dept: ORTHOPEDICS | Facility: CLINIC | Age: 78
End: 2023-03-15
Payer: MEDICARE

## 2023-03-15 ENCOUNTER — PATIENT MESSAGE (OUTPATIENT)
Dept: ORTHOPEDICS | Facility: CLINIC | Age: 78
End: 2023-03-15
Payer: MEDICARE

## 2023-03-15 VITALS — HEIGHT: 60 IN | BODY MASS INDEX: 22.58 KG/M2 | WEIGHT: 115 LBS | RESPIRATION RATE: 18 BRPM

## 2023-03-15 DIAGNOSIS — Z96.651 S/P RIGHT UNICOMPARTMENTAL KNEE REPLACEMENT: ICD-10-CM

## 2023-03-15 DIAGNOSIS — Z96.651 S/P RIGHT UNICOMPARTMENTAL KNEE REPLACEMENT: Primary | ICD-10-CM
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
## 2023-03-15 DIAGNOSIS — M75.100 ROTATOR CUFF SYNDROME, UNSPECIFIED LATERALITY: ICD-10-CM

## 2023-03-15 PROCEDURE — 99999 PR PBB SHADOW E&M-EST. PATIENT-LVL II: ICD-10-PCS | Mod: PBBFAC,,, | Performed by: ORTHOPAEDIC SURGERY

## 2023-03-15 PROCEDURE — 99214 PR OFFICE/OUTPT VISIT, EST, LEVL IV, 30-39 MIN: ICD-10-PCS | Mod: S$PBB,,, | Performed by: ORTHOPAEDIC SURGERY

## 2023-03-15 PROCEDURE — 99999 PR PBB SHADOW E&M-EST. PATIENT-LVL II: CPT | Mod: PBBFAC,,, | Performed by: ORTHOPAEDIC SURGERY

## 2023-03-15 PROCEDURE — 99214 OFFICE O/P EST MOD 30 MIN: CPT | Mod: S$PBB,,, | Performed by: ORTHOPAEDIC SURGERY

## 2023-03-15 PROCEDURE — 73564 XR KNEE ORTHO RIGHT WITH FLEXION: ICD-10-PCS | Mod: 26,RT,, | Performed by: RADIOLOGY

## 2023-03-15 PROCEDURE — 73564 X-RAY EXAM KNEE 4 OR MORE: CPT | Mod: 26,RT,, | Performed by: RADIOLOGY

## 2023-03-15 PROCEDURE — 73562 X-RAY EXAM OF KNEE 3: CPT | Mod: 26,LT,, | Performed by: RADIOLOGY

## 2023-03-15 PROCEDURE — 73564 X-RAY EXAM KNEE 4 OR MORE: CPT | Mod: TC,PO,RT

## 2023-03-15 PROCEDURE — 73562 XR KNEE ORTHO RIGHT WITH FLEXION: ICD-10-PCS | Mod: 26,LT,, | Performed by: RADIOLOGY

## 2023-03-15 PROCEDURE — 99212 OFFICE O/P EST SF 10 MIN: CPT | Mod: PBBFAC,PN | Performed by: ORTHOPAEDIC SURGERY

## 2023-03-15 NOTE — PROGRESS NOTES
Past Medical History:   Diagnosis Date    Anemia     Anxiety     Chronic bronchitis with COPD (chronic obstructive pulmonary disease)     Esophageal spasm     Essential tremor     GERD (gastroesophageal reflux disease)     Headache     High cholesterol     Hypertension     Meniere disease     Multiple sclerosis     Muscle spasm     Pancreatic insufficiency        Past Surgical History:   Procedure Laterality Date    CATARACT EXTRACTION, BILATERAL  2006    COLONOSCOPY  09/05/2018    Dr. Leija in procedures: diverticulosis, 4 colon polyps removed, adenomatous polyps x3 & benign mucosal polyps    COLONOSCOPY N/A 4/22/2021    Procedure: COLONOSCOPY;  Surgeon: Dwayne Santoyo MD;  Location: [facility];  Service: Endoscopy;  Laterality: N/A;    CORONARY ANGIOGRAPHY N/A 10/14/2021    Procedure: ANGIOGRAM, CORONARY ARTERY;  Surgeon: Mustapha Manzo MD;  [email or web address];  Service: Cardiology;  Laterality: N/A;    CYSTOSCOPY WITH HYDRODISTENSION OF BLADDER N/A 6/5/2019    Procedure: CYSTOSCOPY, WITH BLADDER HYDRODISTENSION;  Surgeon: Marko Burton MD;  Location: [facility] OR;  Service: OB/GYN;  Laterality: N/A;    DILATION AND CURETTAGE OF UTERUS  1966    ESOPHAGEAL MANOMETRY WITH MEASUREMENT OF IMPEDANCE N/A 1/6/2023    Procedure: MANOMETRY, ESOPHAGUS, WITH IMPEDANCE MEASUREMENT;  Surgeon: Vitaliy Hatfield MD;  [facility]  Service: Endoscopy;  Laterality: N/A;  instructions sent to [facility] Precall done. 0900 arrival time confirmed. SG  instr portal -ml    ESOPHAGOGASTRODUODENOSCOPY  09/05/2018    Dr. Leija, in procedures: gastritis; biopsy: duodenum unremarkable, with focal benign gastric metaplasia, stomach- minimal chronic gastritis, negative for h pylori    ESOPHAGOGASTRODUODENOSCOPY N/A 4/22/2021    Procedure: EGD (ESOPHAGOGASTRODUODENOSCOPY);  Surgeon: Dwayne Santoyo MD;  Location: [facility];  Service: Endoscopy;  Laterality: N/A;    ESOPHAGOGASTRODUODENOSCOPY N/A 3/10/2023     Procedure: EGD (ESOPHAGOGASTRODUODENOSCOPY);  Surgeon: Bere Mclean MD;  Location: [facility] ENDO (2ND FLR);  Service: Endoscopy;  Laterality: N/A;  Endoflip  2nd floor due to availability  propofol only  instructions sent to [facility]    EYE SURGERY      HEMORRHOID SURGERY  1997    KNEE ARTHROSCOPY W/ MENISCECTOMY Right 8/13/2021    Procedure: ARTHROSCOPY, KNEE, WITH MENISCECTOMY;  Surgeon: Shelton Le MD;  Location: [facility] OR;  Service: Orthopedics;  Laterality: Right;    KNEE ARTHROSCOPY W/ MENISCECTOMY Right 4/8/2022    Procedure: ARTHROSCOPY, KNEE, WITH MENISCECTOMY;  Surgeon: Shelton Le MD;  Location: [facility] OR;  Service: Orthopedics;  Laterality: Right;  medial meniscectomy    LEFT HEART CATHETERIZATION Right 10/14/2021    Procedure: Left heart cath;  Surgeon: Mustapha Manzo MD;  Location: [facility] CATH;  Service: Cardiology;  Laterality: Right;    ROBOTIC ARTHROPLASTY, KNEE, UNICOMPARTMENTAL Right 8/16/2022    Procedure: ROBOTIC ARTHROPLASTY, KNEE, UNICOMPARTMENTAL;  Surgeon: Shelton Le MD;  Location: [facility] OR;  Service: Orthopedics;  Laterality: Right;    TONSILLECTOMY  1954    TUBAL LIGATION  1990       Current Outpatient Medications   Medication Sig    ALPRAZolam (XANAX) 0.25 MG tablet Take 1 tablet (0.25 mg total) by mouth 2 (two) times daily as needed for Anxiety.    amLODIPine (NORVASC) 5 MG tablet TAKE 1 TABLET (5 MG TOTAL) BY MOUTH ONCE DAILY.    buPROPion (WELLBUTRIN XL) 300 MG 24 hr tablet TAKE 1 TABLET (300 MG TOTAL) BY MOUTH ONCE DAILY.    cyanocobalamin 500 MCG tablet Take 500 mcg by mouth once daily.    fluticasone furoate-vilanteroL (BREO) 100-25 mcg/dose diskus inhaler Inhale 1 puff into the lungs once daily. Controller    gabapentin (NEURONTIN) 600 MG tablet Take 1 tablet (600 mg total) by mouth 3 (three) times daily.    lipase-protease-amylase (CREON) 36,000-114,000- 180,000 unit CpDR TAKE 1 CAPSULE BY MOUTH 3 (THREE) TIMES DAILY WITH MEALS. &amp; 1 CAPSULE  WITH SNACKS    loperamide (IMODIUM) 1 mg/5 mL solution Take by mouth every 6 (six) hours as needed for Diarrhea.    meloxicam (MOBIC) 15 MG tablet Take 1 tablet (15 mg total) by mouth once daily.    montelukast (SINGULAIR) 10 mg tablet Take 10 mg by mouth every evening.    multivitamin capsule Take 1 capsule by mouth.    omeprazole (PRILOSEC) 20 MG capsule TAKE 1 CAPSULE (20 MG TOTAL) BY MOUTH ONCE DAILY.    rosuvastatin (CRESTOR) 20 MG tablet Take 1 tablet (20 mg total) by mouth every evening.     No current facility-administered medications for this visit.       Review of patient's allergies indicates:   Allergen Reactions    Cephalosporins Anaphylaxis    Iodinated contrast media Hives and Swelling    Penicillins Rash       Family History   Problem Relation Age of Onset    Coronary artery disease Mother     Heart disease Mother 60        Bypass twice    Miscarriages / Stillbirths Mother         Stillbirths    Vision loss Mother         Macular Degeration    Heart attack Father     Coronary artery disease Father     Heart disease Father 55        Several Heart Attack    Alcohol abuse Father     Early death Father         Heart Attack 55    Coronary artery disease Sister     Heart disease Sister 65        CAD    Celiac disease Neg Hx     Colon cancer Neg Hx     Crohn's disease Neg Hx     Ulcerative colitis Neg Hx        Social History     Socioeconomic History    Marital status:    Tobacco Use    Smoking status: Former     Packs/day: 2.00     Years: 35.00     Pack years: 70.00     Types: Cigarettes     Start date: 1964     Quit date:      Years since quittin.2    Smokeless tobacco: Never   Substance and Sexual Activity    Alcohol use: Yes     Alcohol/week: 7.0 standard drinks     Types: 7 Glasses of wine per week    Drug use: Never    Sexual activity: Yes     Partners: Male     Birth control/protection: Post-menopausal     Social Determinants of Health     Financial Resource Strain: Low Risk      Difficulty of Paying Living Expenses: Not hard at all   Food Insecurity: No Food Insecurity    Worried About Running Out of Food in the Last Year: Never true    Ran Out of Food in the Last Year: Never true   Transportation Needs: No Transportation Needs    Lack of Transportation (Medical): No    Lack of Transportation (Non-Medical): No   Physical Activity: Inactive    Days of Exercise per Week: 0 days    Minutes of Exercise per Session: 0 min   Stress: Stress Concern Present    Feeling of Stress : To some extent   Social Connections: Unknown    Frequency of Communication with Friends and Family: More than three times a week    Frequency of Social Gatherings with Friends and Family: Twice a week    Active Member of Clubs or Organizations: Yes    Attends Club or Organization Meetings: More than 4 times per year    Marital Status:    Housing Stability: Low Risk     Unable to Pay for Housing in the Last Year: No    Number of Places Lived in the Last Year: 1    Unstable Housing in the Last Year: No       Chief Complaint:   No chief complaint on file.      Date of surgery:  August 16, 2022    History of present illness:  77-year-old female underwent right robot assisted unicompartmental knee replacement.  Patient had another fall since the saw her last and wanted her knee checked out.  Date of fall was March 10th.  Also complaining of left shoulder pain.  Patient had a little pre-existing left shoulder pain before the fall but now has difficulty raising her arm up.  Pain overall is the 3/10 you had.  It      Review of Systems:    Musculoskeletal:  See HPI        Physical Examination:    Vital Signs:    There were no vitals filed for this visit.      There is no height or weight on file to calculate BMI.    This a well-developed, well nourished patient in no acute distress.  They are alert and oriented and cooperative to examination.  Pt. walks without an antalgic gait.      Examination of the right knee shows  well-healed surgical incisions.  No erythema drainage.  Range of motion 0-130 degrees.  Mild swelling in the back of her knee.  Small abrasion on the front of her knee from the recent fall.    Examination of the left shoulder shows no rashes or erythema. There are no masses, ecchymosis, or atrophy. The patient has full range of motion in forward flexion, external rotation, and internal rotation to the mid T-spine. The patient has - Cannon test. - Neer - [place]'s test. - Speeds test. Nontender to palpation over a.c. joint. Normal stability anteriorly, posteriorly, and negative sulcus sign. Passive range of motion: Forward flexion of 180°, external rotation at 90° of 90°, internal rotation of 50°, and external rotation at 0° of 50°. 2+ radial pulse. Intact axillary, radial, median and ulnar sensation. 5 out of 5 resisted forward flexion, external rotation, and negative lift off test.      X-rays:  Four views of the right knee are  ordered and reviewed which show well-aligned unicompartmental knee Replacement without complication.  Small amount of cement behind the polyethylene.  No changes from previous x-ray.     Assessment::  Status post right unicompartmental knee replacement  Left shoulder strain versus cuff tear    Plan:  Reviewed the x-rays with her today.  I do not see any traumatic issues with the knee replacement.  We talked about possibly getting her into some physical therapy to help with her balance as well as to treat her left shoulder.  Patient declined.  I gave her a rotator cuff guide and a Thera-Band to start to work on for the left shoulder.  Continue with the knee exercises on the right.  Follow-up if not improving.    This note was created using Dashbook voice recognition software that occasionally misinterpreted phrases or words.

## 2023-03-16 LAB
FINAL PATHOLOGIC DIAGNOSIS: NORMAL
Lab: NORMAL

## 2023-03-17 ENCOUNTER — TELEPHONE (OUTPATIENT)
Dept: GASTROENTEROLOGY | Facility: CLINIC | Age: 78
End: 2023-03-17
Payer: MEDICARE

## 2023-03-17 NOTE — TELEPHONE ENCOUNTER
----- Message from Bere Mclean MD sent at 3/17/2023  2:43 PM CDT -----  Your pathology shows lactase deficiency.   Eliminate all forms of dairy/lactose (milk, cheese, butter, creamers, ice cream etc) from your diet  I am including information about this condition.  We will arrange apt w dietitian to help with diet - pls arrange apt    Lactose intolerance is a common digestive problem where the body is unable to digest lactose, a type of sugar mainly found in milk and dairy products.  Symptoms of lactose intolerance usually develop within a few hours of consuming food or drink that contains lactose. They may include:  flatulence (wind)  diarrhoea  bloated stomach  stomach cramps and pains  stomach rumbling  feeling sick    The severity of your symptoms and when they appear depends on the amount of lactose you've consumed.  Some people may still be able to drink a small glass of milk without triggering any symptoms, while others may not even be able to have milk in their tea or coffee.    When to seek medical advice  The symptoms of lactose intolerance can be similar to several other conditions, so it's important to see your GP for a diagnosis before removing milk and dairy products from your diet.  For example, the symptoms above can also be caused by:  irritable bowel syndrome (IBS) - a long-term disorder that affects the digestive system  milk protein intolerance - an adverse reaction to the protein in milk from cows (not the same as a milk allergy)  If your GP thinks you have lactose intolerance, they may suggest avoiding foods and drinks containing lactose for two weeks to see if your symptoms improve.  Read more about diagnosing lactose intolerance.    What causes lactose intolerance?  The body digests lactose using a substance called lactase. This breaks down lactose into two sugars called glucose and galactose, which can be easily absorbed into the bloodstream.  People with lactose intolerance don't  produce enough lactase, so lactose stays in the digestive system where it's fermented by bacteria. This leads to the production of various gases, which cause the symptoms associated with lactose intolerance.  Depending on the underlying reason why the body isn't producing enough lactase, lactose intolerance may be temporary or permanent. Most cases that develop in adults are inherited and tend to be lifelong, but cases in young children are often caused by an infection in the digestive system and may only last for a few weeks.  Read more about the causes of lactose intolerance.    Who's affected?  In the [place], lactose intolerance is more common in people of  or -American descent.  Lactose intolerance can develop at any age. Many cases first develop in people aged 20 to 40, although babies and young children can also be affected.    Is it an allergy?  Lactose intolerance isn't the same as a milk or dairy allergy. Food allergies are caused by your immune system reacting to a certain type of food. This causes symptoms such as a rash, wheezing and itching.  If you're allergic to something, even a tiny particle can be enough to trigger a reaction, while most people with lactose intolerance can still consume small amounts of lactose without experiencing any problems (although this varies from person to person).    Lactose intolerance is usually the result of your body not producing enough lactase.  Lactase is an enzyme (a protein that causes a chemical reaction to occur) normally produced in your small intestine that's used to digest lactose.  If you have a lactase deficiency, it means your body doesn't produce enough lactase.    Digesting lactose  After eating or drinking something containing lactose, it passes down your oesophagus (gullet) into your stomach, where it's digested. The digested food then passes into your small intestine.  The lactase in your small intestine should break lactose down into glucose  and galactose (other types of sugar), which are then absorbed into your bloodstream. If there isn't enough lactase, the unabsorbed lactose moves through your digestive system to your colon (large intestine).  Bacteria in the colon ferment (break down) the lactose, producing fatty acids and gases such as carbon dioxide, hydrogen and methane. The breakdown of the lactose in the colon, and the resulting acids and gases that are produced, cause the symptoms of lactose intolerance such as flatulence and bloating.    Types of lactase deficiency  The main types of lactase deficiency are outlined below.    Primary lactase deficiency  Primary lactase deficiency is the most common cause of lactose intolerance worldwide. This type of lactase deficiency is caused by an inherited genetic fault that runs in families.  Primary lactase deficiency develops when your lactase production decreases as your diet becomes less reliant on milk and dairy products. This is usually after the age of two, when breastfeeding or bottle-feeding has stopped, although the symptoms may not be noticeable until adulthood.    Secondary lactase deficiency  Secondary lactase deficiency is a shortage of lactase caused by a problem in your small intestine. It can occur at any age, and may be the result of another condition, surgery to your small intestine, or taking certain medication.  Secondary lactase deficiency is the most common cause of lactose intolerance in the [place], particularly in babies and young children.  Possible causes of secondary lactase deficiency include:  gastroenteritis - an infection of the stomach and intestines  coeliac disease - a bowel condition caused by an adverse reaction to a protein called gluten  Crohn's disease - a long-term condition that causes inflammation of the lining of the digestive system  ulcerative colitis - a long-term condition that affects the large intestine  chemotherapy - a cancer treatment  long courses of  antibiotics    The decrease in the production of lactase in secondary lactase deficiency is sometimes only temporary, but it may be permanent if it's caused by a long-term condition.  It's also possible to develop secondary lactase deficiency later in life, even without another condition to trigger it. This is because your body's production of lactase naturally reduces as you get older.    Congenital lactase deficiency  Congenital lactase deficiency is a rare condition that runs in families and is found in  babies.  It's caused by an inherited genetic fault that means affected babies produce very little or no lactase.  The genetic mutation responsible for congenital lactase deficiency is passed on in an autosomal recessive inheritance pattern. This means both parents must have a copy of the faulty gene to pass on the condition.  Read more about genetic inheritance.     Developmental lactase deficiency  Some babies born prematurely (before the 37th week of pregnancy) have a temporary lactose intolerance because their small intestine wasn't fully developed by the time they were born.  This is known as developmental lactase deficiency and it usually improves as affected babies get older.    TREATMENT  There's no cure for lactose intolerance, but most people are able to control their symptoms by making changes to their diet.  Some cases of lactose intolerance, such as those caused by gastroenteritis, are only temporary and will improve within a few days or weeks. Other cases, such as those caused by an inherited genetic fault or a long-term underlying condition, are likely to be lifelong.    Changing your diet  In most cases, cutting down on or avoiding sources of lactose and replacing them with lactose-free alternatives is enough to control the symptoms of lactose intolerance.  The exact changes you need to make to your diet depend on how sensitive you are to lactose. Some people are able to tolerate some  lactose in their diet without any problems, whereas others experience symptoms after consuming food containing only a tiny amount of lactose.  If you decide to experiment with what you can and can't eat, make sure to introduce new foods gradually, rather than all at once. This will help you to get used to any foods you might be sensitive to and identify any that cause problems.  Eating fewer products containing lactose, or avoiding them completely, can mean you miss out on certain vitamins and minerals in your diet and increase your risk of complications. You'll also need to make sure you're getting enough nutrition from either lacto-free foods or dietary supplements.  If you or your child are extremely sensitive to lactose, talk to your GP about your diet.   Milk products are rich in calcium needed for healthy bones so you may need to have regular bone density checks.  You may be referred to a dietitian (an expert in diet and nutrition) who can advise you about what foods should be included in your, or your child's, diet.    Sources of lactose  Some of the main sources of lactose you may need to cut down on or avoid if you're lactose intolerant are described below.    Milk  A major source of lactose is milk, including cow's milk, goat's milk and sheep's milk. Depending on how mild or severe your lactose intolerance is, you may need to change the amount of milk in your diet.   For example:  you may be able to have milk in your tea or coffee, but not on your cereal  some products containing milk, such as milk chocolate, may still be acceptable in small quantities  you may find that drinking milk as part of a meal, rather than on its own, improves how the lactose is absorbed  If even a small amount of milk triggers your symptoms, there are some alternatives you can try, such as soya or rice milk (see below).    Dairy products  Other dairy products made from milk - such as cream, cheese, yoghurt, ice cream and butter -  also contain lactose and may need to be avoided if you're lactose intolerant.  But the level of lactose in these products varies and is sometimes quite low, so you may still be able to have some of them without experiencing any problems.  It's worth experimenting with different foods to find out if there are any dairy products you can eat because they're a good source of essential nutrients such as calcium.    Other foods and drinks  As well as milk and dairy products, there are other foods and drinks that can sometimes contain lactose.  These include:  salad cream, salad dressing and mayonnaise  biscuits  chocolate  boiled sweets  cakes  some types of bread and other baked goods  some breakfast cereals  packets of mixes to make pancakes and biscuits  packets of instant potatoes and instant soup  some processed meats, such as sliced ham  Check the ingredients of all food and drink products carefully, because milk or lactose are often hidden ingredients.  The lactose found in some foods won't necessarily be listed separately on the food label, so you need to check the ingredients list for milk, whey, curds and milk products such as cheese, butter and cream.  Some ingredients may sound like they contain lactose when they don't, such as lactic acid, sodium lactate and cocoa butter. These ingredients don't need to be avoided if you're lactose intolerant.    Medication  Some prescription medicines, over-the-counter medicines and complementary medicines may contain a small amount of lactose. While this isn't usually enough to trigger the symptoms of lactose intolerance in most people, it may cause problems if your intolerance is severe or you're taking several different medicines.  If you need to start taking a new medication, check with your GP or pharmacist in case it contains lactose.    Lactose-free foods and drinks  There are a number of alternative foods and drinks available in [place] to replace the milk and  "dairy products you need to avoid.  Food and drinks that don't usually contain lactose include:  soya milks, yoghurts and some cheeses  milks made from rice, oats, almonds, hazelnuts, coconut, quinoa, and potato  foods with the "dairy-free" or "suitable for vegans" signs  carob bars    Lactose-free dairy products  There are a number of lactose-free dairy products available to buy that are suitable for people with lactose intolerance.  These contain the same vitamins and minerals as standard dairy products, but they also have an added enzyme, called lactase, that helps digest any lactose so the products do not trigger any symptoms.   Lactose-free versions of milk, yoghurt and cheese are normally available in larger supermarkets.    Getting enough calcium  If you're unable to eat most dairy products, you may not be getting enough calcium in your daily diet. Calcium has several important functions, including:   helping build strong bones and teeth  regulating muscle contractions (including heartbeat)  ensuring blood clots normally  Therefore, it's a good idea to choose lactose-free products with added calcium and ensure your diet contains alternative sources of calcium, such as:  green leafy vegetables, such as kale, broccoli, cabbage and okra  soya beans  tofu  nuts  bread and anything made with fortified flour  fish containing edible bones (for example, sardines, salmon, and pilchards)  You can also buy combined calcium and vitamin D supplements from most pharmacists to help maintain good bone health.  It's important to check with your GP or dietitian whether you should be taking supplements, however, as taking excessively high levels of calcium can cause side effects.    Lactase substitutes  In addition to dietary changes, you may also find it useful to take liquid drops, tablets or capsules that contain lactase substitutes. These are available from most [employer] foods shops.  Lactase substitutes replace the lactase " your small intestine isn't producing, which can reduce your symptoms by helping your body break down any lactose in your diet more easily.  Lactase substitutes can either be added to milk or taken just before eating a meal containing lactose.

## 2023-03-20 LAB
FINAL PATHOLOGIC DIAGNOSIS: NORMAL
Lab: NORMAL

## 2023-03-22 ENCOUNTER — CLINICAL SUPPORT (OUTPATIENT)
Dept: GASTROENTEROLOGY | Facility: CLINIC | Age: 78
End: 2023-03-22
Payer: MEDICARE

## 2023-03-22 DIAGNOSIS — Z71.9 ENCOUNTER FOR HEALTH EDUCATION: Primary | ICD-10-CM

## 2023-03-22 NOTE — PROGRESS NOTES
Referring Provider:  Dr. Bere Mclean MD  Reason for Nutrition Referral:  Lactose Intolerance    Patient Information: Veronique Johnson 77 y.o.-year-old White female   Nutrition-related concerns: Pt presents for lactose intolerance; reports episodes of diarrhea off and on multiple times a day         A = Nutrition Assessment  Anthropometric Data Estimated body mass index is 22.46 kg/m² as calculated from the following:    Height as of 3/15/23: 5' (1.524 m).    Weight as of 3/15/23: 52.2 kg (115 lb).  Ideal Body Weight (IBW): 100lbs (45.5kg); 115%IBW   Last 3 Weights:   Wt Readings from Last 3 Encounters:   03/15/23 52.2 kg (115 lb)   03/10/23 52.2 kg (115 lb)   03/07/23 53.5 kg (118 lb)     Relevant Wt hx: approx 11lb weight loss in 1 month (Oct 2022 to Nov 2022) without weight regain   Biochemical Data Labs:   Lab Results   Component Value Date    PKCSYEDQ57NF 52 11/19/2019    PXCHJPER97 1555 (H) 02/23/2023     Lab Results   Component Value Date    HGB 13.5 02/23/2023    HCT 41.3 02/23/2023     Lab Results   Component Value Date    ALBUMIN 4.2 02/23/2023     Hemoglobin A1C   Date Value Ref Range Status   12/02/2021 5.4 0.0 - 5.6 % Final     Comment:     Reference Interval:  5.0 - 5.6 Normal   5.7 - 6.4 High Risk   > 6.5 Diabetic      Hgb A1c results are standardized based on the (NGSP) National   Glycohemoglobin Standardization Program.      Hemoglobin A1C levels are related to mean serum/plasma glucose   during the preceding 2-3 months.          Meds:reviewed   Dietary Data  Appetite: Good  Dietary Intake:  Breakfast:  2 cups of black coffee, only sometimes has breakfast  Biscuits OR powdered donuts OR waffles  Lunch:  Dinner Leftovers OR [place] with tomato, avocado, cheese, meat (ham, turkey) on Naan/ or [place] bread with Chips and Coke   Dinner:  Meat (pork chops, steak, chicken, seafood) with a vegetable (eggplant, [place] sprouts) AND salad with tomatoes and olive oil  Snacks: 1-2x/day  Fruit (grapes,  apples, banana) OR skittles OR brownies  Does drink milk: 2% cow's milk, usually eaten with cereal; doesn't eat yogurt or cottage cheese  Overall impression of dietary recall:   Patient eats a good variety of lean protein choices, fruits and vegetables and eats consistently throughout the day with meals/snacks. Lactose-containing food choices in diet recall are regular cow's milk, cheese, carbohydrate boxed mixes (biscuits, waffles, brownies) and likely the powdered donuts. We discussed alternative options for these choices during the consult and options given on handouts.   Other Data:  Supplements/ MVI: discussed potential for Calcium and Vit D supplements if dairy products not replaced with nutrient-equivalent dairy alternatives.                          Food Allergies/intolerances: Lactose intolerance  Dx: Lactose intolerance     D = Nutrition Diagnosis   Patient Assessment: Pt is at nutrition risk due to dx of lactose intolerance and need for diet education.     Patient knowledge of healthy eating and exercise patterns was assessed to be adequate. Session was spent educating patient on lactose-controlled nutrition therapy. Emphasis placed on lactose-free dairy alternatives, how to read ingredients lists to find sources of lactose, and lower-lactose/well-tolerated lactose-containing dairy choices to ensure adherence. Reviewed strategies for choosing and consuming healthy, well-balanced meals as well as snacks regularly.  Also discussed potential need for vitamin D and Calcium supplementation. Discussed plan to remain on strict lactose-free eating plan for 2 weeks with resolution of GI symptoms. Then discussed next steps of trials of lactose-containing foods in very small amounts to test tolerance levels. Also discussed potential use of Lactaid pills to help with the digestion of lactose-containing food/drink items. Patient advised to reach out to RD to determine trial plan if desired.  Patient verbalized  understanding.  Provided RD contact information for concerns or questions.  Expect good compliance with dietary recommendations at this time.     Primary Problem: Altered GI function  and Food and nutrition related knowledge deficit   Etiology: Related to  malabsorption of lactose-containing foods/drinks and lack of nutrition education on lactose-free food/drink choices  Signs/symptoms: As evidenced by diet recall with lactose-containing foods/drinks and no prior knowledge of need for nutrition-related recommendations .     Education Materials provided:   1. Academy of Nutrition and Dietetics/NCM: Lactose-Controlled Nutrition Therapy  2. [employer]: Managing Lactose Intolerance     I = Nutrition Intervention  Calorie Requirements: 9239-6405 kcal/day (25-30 kcal/kg) *using IBW  Protein requirements: 50-55 g/day (1.0-1.1 g/kg) *using IBW  Recommendation #1 Implement a balanced, regular eating pattern of 3 meals, 1-2 snacks daily. Avoid skipped meals.    Recommendation #2  Replace lactose-containing foods/drinks with lactose-free or low-lactose choices. Options provided on handouts and discussed in consult.   Recommendation #3 Read ingredients lists/labels on food products to avoid hidden sources of lactose. Common words for lactose-containing ingredients were provided on handout and discussed in consult.   Recommendation #4 Follow up as necessary per weight changes and further education needs. Recommend strict lactose-free eating plan for a minimum of 2 weeks (with resolution of GI symptoms) before implementing trials of low lactose-containing foods/drinks to test tolerance. Utilize Lactaid pills when there is lactose consumption.         M = Nutrition Monitoring   Indicator 1. Diet recall    Indicator 2. GI signs and symptoms     E= Nutrition Evaluation   Goal 1. Diet recall shows good compliance with recommendations reviewed during session    Goal 2. Resolution of GI signs and symptoms with  implementation of Lactose-controlled eating plan     Consultation Time:50 Minutes  Follow Up: Patient provided with Dietitian contact number and advised to call or make future appointment if further consultation is needed/desired.    Communication with provider via Epic  Signature: Helena Kelley, MPH, RDN, LDN

## 2023-03-27 ENCOUNTER — TELEPHONE (OUTPATIENT)
Dept: GASTROENTEROLOGY | Facility: CLINIC | Age: 78
End: 2023-03-27
Payer: MEDICARE

## 2023-03-27 NOTE — TELEPHONE ENCOUNTER
Dr. Mclean,    Pt calling stating you sent a message for her to schedule a follow up ASAP +  I'm unable to see message that as sent to patient ,Last office note states follow up 6/6/2023  Please advise

## 2023-03-27 NOTE — TELEPHONE ENCOUNTER
----- Message from Yris Cazares sent at 3/27/2023  1:28 PM CDT -----  Regarding: Follow up Appt  Contact: Pt @ [phone]  Pt is calling to get a follow up appt. Asking for a call back

## 2023-03-31 ENCOUNTER — EXTERNAL CHRONIC CARE MANAGEMENT (OUTPATIENT)
Dept: PRIMARY CARE CLINIC | Facility: CLINIC | Age: 78
End: 2023-03-31
Payer: MEDICARE

## 2023-03-31 PROCEDURE — 99490 CHRNC CARE MGMT STAFF 1ST 20: CPT | Mod: PBBFAC,PO | Performed by: FAMILY MEDICINE

## 2023-03-31 PROCEDURE — 99490 PR CHRONIC CARE MGMT, 1ST 20 MIN: ICD-10-PCS | Mod: S$PBB,,, | Performed by: FAMILY MEDICINE

## 2023-03-31 PROCEDURE — 99490 CHRNC CARE MGMT STAFF 1ST 20: CPT | Mod: S$PBB,,, | Performed by: FAMILY MEDICINE

## 2023-04-12 ENCOUNTER — PATIENT MESSAGE (OUTPATIENT)
Dept: PSYCHIATRY | Facility: CLINIC | Age: 78
End: 2023-04-12
Payer: MEDICARE

## 2023-04-26 ENCOUNTER — OFFICE VISIT (OUTPATIENT)
Dept: GASTROENTEROLOGY | Facility: CLINIC | Age: 78
End: 2023-04-26
Payer: MEDICARE

## 2023-04-26 ENCOUNTER — OFFICE VISIT (OUTPATIENT)
Dept: ORTHOPEDICS | Facility: CLINIC | Age: 78
End: 2023-04-26
Payer: MEDICARE

## 2023-04-26 VITALS — WEIGHT: 115 LBS | HEIGHT: 60 IN | RESPIRATION RATE: 18 BRPM | BODY MASS INDEX: 22.58 KG/M2

## 2023-04-26 DIAGNOSIS — E73.9 ADULT LACTASE DEFICIENCY: ICD-10-CM

## 2023-04-26 DIAGNOSIS — R13.10 DYSPHAGIA, UNSPECIFIED TYPE: ICD-10-CM

## 2023-04-26 DIAGNOSIS — R19.7 DIARRHEA, UNSPECIFIED TYPE: ICD-10-CM

## 2023-04-26 DIAGNOSIS — R07.89 NON-CARDIAC CHEST PAIN: ICD-10-CM

## 2023-04-26 DIAGNOSIS — Z96.651 PRESENCE OF RIGHT ARTIFICIAL KNEE JOINT: Primary | ICD-10-CM

## 2023-04-26 DIAGNOSIS — Z96.651 S/P RIGHT UNICOMPARTMENTAL KNEE REPLACEMENT: Primary | ICD-10-CM

## 2023-04-26 DIAGNOSIS — Z96.651 S/P RIGHT UNICOMPARTMENTAL KNEE REPLACEMENT: ICD-10-CM

## 2023-04-26 DIAGNOSIS — K21.9 GASTROESOPHAGEAL REFLUX DISEASE, UNSPECIFIED WHETHER ESOPHAGITIS PRESENT: Primary | ICD-10-CM

## 2023-04-26 DIAGNOSIS — K52.81 EOSINOPHILIC GASTROENTERITIS: ICD-10-CM

## 2023-04-26 PROCEDURE — 99999 PR PBB SHADOW E&M-EST. PATIENT-LVL III: CPT | Mod: PBBFAC,,, | Performed by: ORTHOPAEDIC SURGERY

## 2023-04-26 PROCEDURE — 99999 PR PBB SHADOW E&M-EST. PATIENT-LVL III: ICD-10-PCS | Mod: PBBFAC,,, | Performed by: ORTHOPAEDIC SURGERY

## 2023-04-26 PROCEDURE — 99215 PR OFFICE/OUTPT VISIT, EST, LEVL V, 40-54 MIN: ICD-10-PCS | Mod: 95,,, | Performed by: INTERNAL MEDICINE

## 2023-04-26 PROCEDURE — 99213 OFFICE O/P EST LOW 20 MIN: CPT | Mod: PBBFAC,PN | Performed by: ORTHOPAEDIC SURGERY

## 2023-04-26 PROCEDURE — 99213 OFFICE O/P EST LOW 20 MIN: CPT | Mod: S$PBB,,, | Performed by: ORTHOPAEDIC SURGERY

## 2023-04-26 PROCEDURE — 99215 OFFICE O/P EST HI 40 MIN: CPT | Mod: 95,,, | Performed by: INTERNAL MEDICINE

## 2023-04-26 PROCEDURE — 99213 PR OFFICE/OUTPT VISIT, EST, LEVL III, 20-29 MIN: ICD-10-PCS | Mod: S$PBB,,, | Performed by: ORTHOPAEDIC SURGERY
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
## 2023-04-26 NOTE — PATIENT INSTRUCTIONS
You can try the following products to help with dry mouth.  This should improve your difficulty swallowing as saliva is needed to lubricate food.    -Biotene (artificial saliva) 3-4 times per day  prior to meals or as needed for dry mouth.  -Xylimelts.  You can find these in local pharmacy, such as [facility] or on internet.  -Lozenge - Olsen's Breezers, ACT dry mouth lozenges sold with cough drops  -Therabreath mouthwash  -ACT mouthwash  -Grether's Pastilles, these are usually found on internet      These lifestyle modifications may help with acid reflux at night   -Elevate the head of the bed.  This can be achieved by purchasing an adjustable bed frame or by putting six- to eight-inch blocks under the legs at the head of the bed or a styrofoam wedge under the mattress.   -Refrain from laying down after meals  -Avoid meals two to three hours before bedtime    -Discuss trazodone with your pcp to help with esophageal spasms     Your recent and previous biopsies are concerning for Eosinophilic Gastroenteritis.  Please follow up with HARITHA Corley to discuss this further     -Eliminate all forms of dairy/lactose (milk, cheese, butter, creamers, ice cream etc) from your diet for 14 days to see if your symptoms improve.  -Make sure she has not recently been exposed to the following medications that may cause eosinophilia:   NSAIDs (Ibuprofen, motrin, alleve, ASA), sulfas, and nitrofurantoin, tetracyclines or penicillins  SSRIs for depression  Cephalosporins (cefotaxime is most commonly reported but others such as cefoxitin, cefoperazone, cefotriaxone have also been described)  sulfasalazine, hydantoin, carbamazepine, d-penicillamine, allopurinol, hydrochlorothiazide, and cyclosporine (Reported to cause DRESS syndrome)

## 2023-04-27 ENCOUNTER — PATIENT MESSAGE (OUTPATIENT)
Dept: FAMILY MEDICINE | Facility: CLINIC | Age: 78
End: 2023-04-27
Payer: MEDICARE

## 2023-04-27 NOTE — PROGRESS NOTES
Past Medical History:   Diagnosis Date    Anemia     Anxiety     Chronic bronchitis with COPD (chronic obstructive pulmonary disease)     Esophageal spasm     Essential tremor     GERD (gastroesophageal reflux disease)     Headache     High cholesterol     Hypertension     Meniere disease     Multiple sclerosis     Muscle spasm     Pancreatic insufficiency        Past Surgical History:   Procedure Laterality Date    CATARACT EXTRACTION, BILATERAL  2006    COLONOSCOPY  09/05/2018    Dr. Leija in procedures: diverticulosis, 4 colon polyps removed, adenomatous polyps x3 & benign mucosal polyps    COLONOSCOPY N/A 4/22/2021    Procedure: COLONOSCOPY;  Surgeon: Dwayne Santoyo MD;  [facility];  Service: Endoscopy;  Laterality: N/A;    CORONARY ANGIOGRAPHY N/A 10/14/2021    Procedure: ANGIOGRAM, CORONARY ARTERY;  Surgeon: Mustapha Manzo MD;  Location: [facility] CATH;  Service: Cardiology;  Laterality: N/A;    CYSTOSCOPY WITH HYDRODISTENSION OF BLADDER N/A 6/5/2019    Procedure: CYSTOSCOPY, WITH BLADDER HYDRODISTENSION;  Surgeon: Marko Burton MD;  Location: [facility] OR;  Service: OB/GYN;  Laterality: N/A;    DILATION AND CURETTAGE OF UTERUS  1966    ESOPHAGEAL MANOMETRY WITH MEASUREMENT OF IMPEDANCE N/A 1/6/2023    Procedure: MANOMETRY, ESOPHAGUS, WITH IMPEDANCE MEASUREMENT;  Surgeon: Vitaliy Hatfield MD;  [facility]  Service: Endoscopy;  Laterality: N/A;  instructions sent to [facility] done. 0900 arrival time confirmed. SG  instr portal -ml    ESOPHAGOGASTRODUODENOSCOPY  09/05/2018    Dr. Leija, in procedures: gastritis; biopsy: duodenum unremarkable, with focal benign gastric metaplasia, stomach- minimal chronic gastritis, negative for h pylori    ESOPHAGOGASTRODUODENOSCOPY N/A 4/22/2021    Procedure: EGD (ESOPHAGOGASTRODUODENOSCOPY);  Surgeon: Dwayne Santoyo MD;  [facility];  Service: Endoscopy;  Laterality: N/A;    ESOPHAGOGASTRODUODENOSCOPY N/A 3/10/2023     Procedure: EGD (ESOPHAGOGASTRODUODENOSCOPY);  Surgeon: Bere Mclean MD;  Location: [facility] ENDO (2ND FLR);  Service: Endoscopy;  Laterality: N/A;  Endoflip  2nd floor due to availability  propofol only  instructions sent to [facility]    EYE SURGERY      HEMORRHOID SURGERY  1997    KNEE ARTHROSCOPY W/ MENISCECTOMY Right 8/13/2021    Procedure: ARTHROSCOPY, KNEE, WITH MENISCECTOMY;  Surgeon: Shelton Le MD;  Location: [facility] OR;  Service: Orthopedics;  Laterality: Right;    KNEE ARTHROSCOPY W/ MENISCECTOMY Right 4/8/2022    Procedure: ARTHROSCOPY, KNEE, WITH MENISCECTOMY;  Surgeon: Shelton Le MD;  Location: [facility] OR;  Service: Orthopedics;  Laterality: Right;  medial meniscectomy    LEFT HEART CATHETERIZATION Right 10/14/2021    Procedure: Left heart cath;  Surgeon: Mustapha Manzo MD;  Location: [facility] CATH;  Service: Cardiology;  Laterality: Right;    ROBOTIC ARTHROPLASTY, KNEE, UNICOMPARTMENTAL Right 8/16/2022    Procedure: ROBOTIC ARTHROPLASTY, KNEE, UNICOMPARTMENTAL;  Surgeon: Shelton Le MD;  Location: [facility] OR;  Service: Orthopedics;  Laterality: Right;    TONSILLECTOMY  1954    TUBAL LIGATION  1990       Current Outpatient Medications   Medication Sig    ALPRAZolam (XANAX) 0.25 MG tablet Take 1 tablet (0.25 mg total) by mouth 2 (two) times daily as needed for Anxiety.    amLODIPine (NORVASC) 5 MG tablet TAKE 1 TABLET (5 MG TOTAL) BY MOUTH ONCE DAILY.    buPROPion (WELLBUTRIN XL) 300 MG 24 hr tablet TAKE 1 TABLET (300 MG TOTAL) BY MOUTH ONCE DAILY.    cyanocobalamin 500 MCG tablet Take 500 mcg by mouth once daily.    lipase-protease-amylase (CREON) 36,000-114,000- 180,000 unit CpDR TAKE 1 CAPSULE BY MOUTH 3 (THREE) TIMES DAILY WITH MEALS. &amp; 1 CAPSULE WITH SNACKS    loperamide (IMODIUM) 1 mg/5 mL solution Take by mouth every 6 (six) hours as needed for Diarrhea.    meloxicam (MOBIC) 15 MG tablet Take 1 tablet (15 mg total) by mouth once daily.    montelukast (SINGULAIR) 10 mg  tablet Take 10 mg by mouth every evening.    multivitamin capsule Take 1 capsule by mouth.    omeprazole (PRILOSEC) 20 MG capsule TAKE 1 CAPSULE (20 MG TOTAL) BY MOUTH ONCE DAILY.    rosuvastatin (CRESTOR) 20 MG tablet Take 1 tablet (20 mg total) by mouth every evening.    fluticasone furoate-vilanteroL (BREO) 100-25 mcg/dose diskus inhaler Inhale 1 puff into the lungs once daily. Controller    gabapentin (NEURONTIN) 600 MG tablet Take 1 tablet (600 mg total) by mouth 3 (three) times daily.     No current facility-administered medications for this visit.       Review of patient's allergies indicates:   Allergen Reactions    Cephalosporins Anaphylaxis    Iodinated contrast media Hives and Swelling    Penicillins Rash       Family History   Problem Relation Age of Onset    Coronary artery disease Mother     Heart disease Mother 60        Bypass twice    Miscarriages / Stillbirths Mother         Stillbirths    Vision loss Mother         Macular Degeration    Heart attack Father     Coronary artery disease Father     Heart disease Father 55        Several Heart Attack    Alcohol abuse Father     Early death Father         Heart Attack 55    Coronary artery disease Sister     Heart disease Sister 65        CAD    Celiac disease Neg Hx     Colon cancer Neg Hx     Crohn's disease Neg Hx     Ulcerative colitis Neg Hx        Social History     Socioeconomic History    Marital status:    Tobacco Use    Smoking status: Former     Packs/day: 2.00     Years: 35.00     Pack years: 70.00     Types: Cigarettes     Start date: 1964     Quit date:      Years since quittin.3    Smokeless tobacco: Never   Substance and Sexual Activity    Alcohol use: Yes     Alcohol/week: 7.0 standard drinks     Types: 7 Glasses of wine per week    Drug use: Never    Sexual activity: Yes     Partners: Male     Birth control/protection: Post-menopausal     Social Determinants of Health     Financial Resource Strain: Low Risk      Difficulty of Paying Living Expenses: Not hard at all   Food Insecurity: No Food Insecurity    Worried About Running Out of Food in the Last Year: Never true    Ran Out of Food in the Last Year: Never true   Transportation Needs: No Transportation Needs    Lack of Transportation (Medical): No    Lack of Transportation (Non-Medical): No   Physical Activity: Inactive    Days of Exercise per Week: 0 days    Minutes of Exercise per Session: 0 min   Stress: Stress Concern Present    Feeling of Stress : To some extent   Social Connections: Unknown    Frequency of Communication with Friends and Family: More than three times a week    Frequency of Social Gatherings with Friends and Family: Twice a week    Active Member of Clubs or Organizations: Yes    Attends Club or Organization Meetings: More than 4 times per year    Marital Status:    Housing Stability: Low Risk     Unable to Pay for Housing in the Last Year: No    Number of Places Lived in the Last Year: 1    Unstable Housing in the Last Year: No       Chief Complaint:   Chief Complaint   Patient presents with    Knee Pain     Follow up right knee.        Date of surgery:  August 16, 2022    History of present illness:  77-year-old female underwent right robot assisted unicompartmental knee replacement.  She is complaining of pain along the anteromedial aspect of her knee.  Located near the patellar retinaculum.  Feels like it catches at times.  Pain with twisting and sometimes at night.  Pain with prolonged walking.  Rates pain is a 4/10.    Review of Systems:    Musculoskeletal:  See HPI        Physical Examination:    Vital Signs:    Vitals:    04/26/23 1441   Resp: 18         Body mass index is 22.46 kg/m².    This a well-developed, well nourished patient in no acute distress.  They are alert and oriented and cooperative to examination.  Pt. walks without an antalgic gait.      Examination of the right knee shows well-healed surgical incisions.  No erythema  drainage.  Range of motion 0-130 degrees.  Tender over the medial retinaculum.  Stable to varus and valgus stress.  Negative drawer exam.  I do not feel anything popping or catching.  She localizes her symptoms to the medial retinaculum at the very top of her surgical incision.        X-rays:  Four views of the right knee are  ordered and reviewed which show well-aligned unicompartmental knee Replacement without complication.  Small amount of cement behind the polyethylene.  No changes from previous x-ray.     Assessment::  Status post right unicompartmental knee replacement  Continued right knee pain    Plan:  Reviewed the x-rays with her today.  I do not see any traumatic issues with the knee replacement.  Recommended a CT scan to further evaluate the knee.  We talked about how it could be a little scar tissue from the arthrotomy closure that could be rubbing and snapping.  I do not see any signs of hardware issues or even cement that could be bothering her.  Follow-up after the CT scan is completed.    This note was created using [employer] voice recognition software that occasionally misinterpreted phrases or words.

## 2023-04-30 ENCOUNTER — EXTERNAL CHRONIC CARE MANAGEMENT (OUTPATIENT)
Dept: PRIMARY CARE CLINIC | Facility: CLINIC | Age: 78
End: 2023-04-30
Payer: MEDICARE

## 2023-04-30 PROCEDURE — 99490 CHRNC CARE MGMT STAFF 1ST 20: CPT | Mod: PBBFAC,PO | Performed by: FAMILY MEDICINE

## 2023-04-30 PROCEDURE — 99490 CHRNC CARE MGMT STAFF 1ST 20: CPT | Mod: S$PBB,,, | Performed by: FAMILY MEDICINE

## 2023-04-30 PROCEDURE — 99490 PR CHRONIC CARE MGMT, 1ST 20 MIN: ICD-10-PCS | Mod: S$PBB,,, | Performed by: FAMILY MEDICINE

## 2023-05-02 ENCOUNTER — HOSPITAL ENCOUNTER (OUTPATIENT)
Dept: RADIOLOGY | Facility: HOSPITAL | Age: 78
Discharge: HOME | End: 2023-05-02
Attending: ORTHOPAEDIC SURGERY
Payer: MEDICARE

## 2023-05-02 DIAGNOSIS — Z96.651 PRESENCE OF RIGHT ARTIFICIAL KNEE JOINT: ICD-10-CM

## 2023-05-02 DIAGNOSIS — Z96.651 S/P RIGHT UNICOMPARTMENTAL KNEE REPLACEMENT: ICD-10-CM

## 2023-05-02 PROCEDURE — 73700 CT KNEE WITHOUT CONTRAST RIGHT: ICD-10-PCS | Mod: 26,RT,, | Performed by: RADIOLOGY

## 2023-05-02 PROCEDURE — 73700 CT LOWER EXTREMITY W/O DYE: CPT | Mod: TC,PO,RT

## 2023-05-02 PROCEDURE — 73700 CT LOWER EXTREMITY W/O DYE: CPT | Mod: 26,RT,, | Performed by: RADIOLOGY

## 2023-05-17 ENCOUNTER — OFFICE VISIT (OUTPATIENT)
Dept: ORTHOPEDICS | Facility: CLINIC | Age: 78
End: 2023-05-17
Payer: MEDICARE

## 2023-05-17 DIAGNOSIS — Z96.651 S/P RIGHT UNICOMPARTMENTAL KNEE REPLACEMENT: ICD-10-CM

## 2023-05-17 DIAGNOSIS — M25.561 ACUTE PAIN OF RIGHT KNEE: ICD-10-CM

## 2023-05-17 DIAGNOSIS — Z96.651 S/P RIGHT UNICOMPARTMENTAL KNEE REPLACEMENT: Primary | ICD-10-CM

## 2023-05-17 DIAGNOSIS — M76.899 QUADRICEPS TENDONITIS: Primary | ICD-10-CM

## 2023-05-17 PROCEDURE — 99999 PR PBB SHADOW E&M-EST. PATIENT-LVL I: ICD-10-PCS | Mod: PBBFAC,,, | Performed by: ORTHOPAEDIC SURGERY

## 2023-05-17 PROCEDURE — 99211 OFF/OP EST MAY X REQ PHY/QHP: CPT | Mod: PBBFAC,PN | Performed by: ORTHOPAEDIC SURGERY

## 2023-05-17 PROCEDURE — 99213 OFFICE O/P EST LOW 20 MIN: CPT | Mod: S$PBB,,, | Performed by: ORTHOPAEDIC SURGERY

## 2023-05-17 PROCEDURE — 99213 PR OFFICE/OUTPT VISIT, EST, LEVL III, 20-29 MIN: ICD-10-PCS | Mod: S$PBB,,, | Performed by: ORTHOPAEDIC SURGERY

## 2023-05-17 PROCEDURE — 99999 PR PBB SHADOW E&M-EST. PATIENT-LVL I: CPT | Mod: PBBFAC,,, | Performed by: ORTHOPAEDIC SURGERY

## 2023-05-17 NOTE — PROGRESS NOTES
Past Medical History:   Diagnosis Date    Anemia     Anxiety     Chronic bronchitis with COPD (chronic obstructive pulmonary disease)     Esophageal spasm     Essential tremor     GERD (gastroesophageal reflux disease)     Headache     High cholesterol     Hypertension     Meniere disease     Multiple sclerosis     Muscle spasm     Pancreatic insufficiency        Past Surgical History:   Procedure Laterality Date    CATARACT EXTRACTION, BILATERAL  2006    COLONOSCOPY  09/05/2018    Dr. Leija in procedures: diverticulosis, 4 colon polyps removed, adenomatous polyps x3 & benign mucosal polyps    COLONOSCOPY N/A 4/22/2021    Procedure: COLONOSCOPY;  Surgeon: Dwayne Santoyo MD;  Location: [facility];  Service: Endoscopy;  Laterality: N/A;    CORONARY ANGIOGRAPHY N/A 10/14/2021    Procedure: ANGIOGRAM, CORONARY ARTERY;  Surgeon: Mustapha Manzo MD;  Location: [facility] CATH;  Service: Cardiology;  Laterality: N/A;    CYSTOSCOPY WITH HYDRODISTENSION OF BLADDER N/A 6/5/2019    Procedure: CYSTOSCOPY, WITH BLADDER HYDRODISTENSION;  Surgeon: Marko Burton MD;  Location: [facility] OR;  Service: OB/GYN;  Laterality: N/A;    DILATION AND CURETTAGE OF UTERUS  1966    ESOPHAGEAL MANOMETRY WITH MEASUREMENT OF IMPEDANCE N/A 1/6/2023    Procedure: MANOMETRY, ESOPHAGUS, WITH IMPEDANCE MEASUREMENT;  Surgeon: Vitaliy Hatfield MD;  [facility]  Service: Endoscopy;  Laterality: N/A;  instructions sent to [facility] done. 0900 arrival time confirmed. SG  instr portal -ml    ESOPHAGOGASTRODUODENOSCOPY  09/05/2018    Dr. Leija, in procedures: gastritis; biopsy: duodenum unremarkable, with focal benign gastric metaplasia, stomach- minimal chronic gastritis, negative for h pylori    ESOPHAGOGASTRODUODENOSCOPY N/A 4/22/2021    Procedure: EGD (ESOPHAGOGASTRODUODENOSCOPY);  Surgeon: Dwayne Santoyo MD;  Location: [facility];  Service: Endoscopy;  Laterality: N/A;    ESOPHAGOGASTRODUODENOSCOPY N/A 3/10/2023     Procedure: EGD (ESOPHAGOGASTRODUODENOSCOPY);  Surgeon: Bere Mclean MD;  Location: [facility] ENDO (2ND FLR);  Service: Endoscopy;  Laterality: N/A;  Endoflip  2nd floor due to availability  propofol only  instructions sent to [facility]    EYE SURGERY      HEMORRHOID SURGERY  1997    KNEE ARTHROSCOPY W/ MENISCECTOMY Right 8/13/2021    Procedure: ARTHROSCOPY, KNEE, WITH MENISCECTOMY;  Surgeon: Shelton Le MD;  Location: [facility] OR;  Service: Orthopedics;  Laterality: Right;    KNEE ARTHROSCOPY W/ MENISCECTOMY Right 4/8/2022    Procedure: ARTHROSCOPY, KNEE, WITH MENISCECTOMY;  Surgeon: Shelton Le MD;  Location: [facility] OR;  Service: Orthopedics;  Laterality: Right;  medial meniscectomy    LEFT HEART CATHETERIZATION Right 10/14/2021    Procedure: Left heart cath;  Surgeon: Mustapha Manzo MD;  Location: [facility] CATH;  Service: Cardiology;  Laterality: Right;    ROBOTIC ARTHROPLASTY, KNEE, UNICOMPARTMENTAL Right 8/16/2022    Procedure: ROBOTIC ARTHROPLASTY, KNEE, UNICOMPARTMENTAL;  Surgeon: Shelton Le MD;  Location: [facility] OR;  Service: Orthopedics;  Laterality: Right;    TONSILLECTOMY  1954    TUBAL LIGATION  1990       Current Outpatient Medications   Medication Sig    ALPRAZolam (XANAX) 0.25 MG tablet Take 1 tablet (0.25 mg total) by mouth 2 (two) times daily as needed for Anxiety.    amLODIPine (NORVASC) 5 MG tablet TAKE 1 TABLET (5 MG TOTAL) BY MOUTH ONCE DAILY.    buPROPion (WELLBUTRIN XL) 300 MG 24 hr tablet TAKE 1 TABLET (300 MG TOTAL) BY MOUTH ONCE DAILY.    cyanocobalamin 500 MCG tablet Take 500 mcg by mouth once daily.    fluticasone furoate-vilanteroL (BREO) 100-25 mcg/dose diskus inhaler Inhale 1 puff into the lungs once daily. Controller    gabapentin (NEURONTIN) 600 MG tablet Take 1 tablet (600 mg total) by mouth 3 (three) times daily.    lipase-protease-amylase (CREON) 36,000-114,000- 180,000 unit CpDR TAKE 1 CAPSULE BY MOUTH 3 (THREE) TIMES DAILY WITH MEALS. &amp; 1 CAPSULE  WITH SNACKS    loperamide (IMODIUM) 1 mg/5 mL solution Take by mouth every 6 (six) hours as needed for Diarrhea.    meloxicam (MOBIC) 15 MG tablet Take 1 tablet (15 mg total) by mouth once daily.    montelukast (SINGULAIR) 10 mg tablet Take 10 mg by mouth every evening.    multivitamin capsule Take 1 capsule by mouth.    omeprazole (PRILOSEC) 20 MG capsule TAKE 1 CAPSULE (20 MG TOTAL) BY MOUTH ONCE DAILY.    rosuvastatin (CRESTOR) 20 MG tablet Take 1 tablet (20 mg total) by mouth every evening.     No current facility-administered medications for this visit.       Review of patient's allergies indicates:   Allergen Reactions    Cephalosporins Anaphylaxis    Iodinated contrast media Hives and Swelling    Penicillins Rash       Family History   Problem Relation Age of Onset    Coronary artery disease Mother     Heart disease Mother 60        Bypass twice    Miscarriages / Stillbirths Mother         Stillbirths    Vision loss Mother         Macular Degeration    Heart attack Father     Coronary artery disease Father     Heart disease Father 55        Several Heart Attack    Alcohol abuse Father     Early death Father         Heart Attack 55    Coronary artery disease Sister     Heart disease Sister 65        CAD    Celiac disease Neg Hx     Colon cancer Neg Hx     Crohn's disease Neg Hx     Ulcerative colitis Neg Hx        Social History     Socioeconomic History    Marital status:    Tobacco Use    Smoking status: Former     Packs/day: 2.00     Years: 35.00     Pack years: 70.00     Types: Cigarettes     Start date: 1964     Quit date:      Years since quittin.3    Smokeless tobacco: Never   Substance and Sexual Activity    Alcohol use: Yes     Alcohol/week: 7.0 standard drinks     Types: 7 Glasses of wine per week    Drug use: Never    Sexual activity: Yes     Partners: Male     Birth control/protection: Post-menopausal     Social Determinants of Health     Financial Resource Strain: Low Risk      Difficulty of Paying Living Expenses: Not hard at all   Food Insecurity: No Food Insecurity    Worried About Running Out of Food in the Last Year: Never true    Ran Out of Food in the Last Year: Never true   Transportation Needs: No Transportation Needs    Lack of Transportation (Medical): No    Lack of Transportation (Non-Medical): No   Physical Activity: Inactive    Days of Exercise per Week: 0 days    Minutes of Exercise per Session: 0 min   Stress: Stress Concern Present    Feeling of Stress : To some extent   Social Connections: Unknown    Frequency of Communication with Friends and Family: More than three times a week    Frequency of Social Gatherings with Friends and Family: Twice a week    Active Member of Clubs or Organizations: Yes    Attends Club or Organization Meetings: More than 4 times per year    Marital Status:    Housing Stability: Low Risk     Unable to Pay for Housing in the Last Year: No    Number of Places Lived in the Last Year: 1    Unstable Housing in the Last Year: No       Chief Complaint:   No chief complaint on file.      Date of surgery:  August 16, 2022    History of present illness:  77-year-old female underwent right robot assisted unicompartmental knee replacement.  She is complaining of pain along the anteromedial aspect of her knee.  Located near the patellar retinaculum.  Feels like it catches at times.  Pain with twisting and sometimes at night.  Pain with prolonged walking.  Rates pain is a 4/10.  CT scan showed no abnormalities.    Review of Systems:    Musculoskeletal:  See HPI        Physical Examination:    Vital Signs:    There were no vitals filed for this visit.        There is no height or weight on file to calculate BMI.    This a well-developed, well nourished patient in no acute distress.  They are alert and oriented and cooperative to examination.  Pt. walks without an antalgic gait.      Examination of the right knee shows well-healed surgical incisions.  No  erythema drainage.  Range of motion 0-130 degrees.  Tender over the medial retinaculum.  Stable to varus and valgus stress.  Negative drawer exam.  I do not feel anything popping or catching.  She localizes her symptoms to the medial retinaculum at the very top of her surgical incision.        X-rays:  Four views of the right knee are reviewed which show well-aligned unicompartmental knee Replacement without complication.  Small amount of cement behind the polyethylene.  No changes from previous x-ray.    CT scan of the right knee is reviewed and interpreted:1. Right knee arthroplasty hardware with no evidence for complication.  2. Degenerative change elsewhere on the knee including spurring along the medial tibial spine.  3. Small joint effusion.     Assessment::  Status post right unicompartmental knee replacement  Continued right knee pain    Plan:  Reviewed the CT with her today.  I do not see any traumatic issues with the knee replacement.  We talked about how it could be a little scar tissue from the arthrotomy closure that could be rubbing and snapping.  I do not see any signs of hardware issues or even cement that could be bothering her.  Recommended some aggressive physical therapy for eval and treatment as well as modalities to possibly help with any scar tissue that might be impinging or rubbing.  Follow-up in 2 months.    This note was created using [employer] voice recognition software that occasionally misinterpreted phrases or words.

## 2023-05-18 ENCOUNTER — PATIENT MESSAGE (OUTPATIENT)
Dept: ORTHOPEDICS | Facility: CLINIC | Age: 78
End: 2023-05-18
Payer: MEDICARE

## 2023-05-18 DIAGNOSIS — M76.899 QUADRICEPS TENDONITIS: Primary | ICD-10-CM

## 2023-05-18 DIAGNOSIS — Z96.651 S/P RIGHT UNICOMPARTMENTAL KNEE REPLACEMENT: ICD-10-CM

## 2023-05-31 ENCOUNTER — EXTERNAL CHRONIC CARE MANAGEMENT (OUTPATIENT)
Dept: PRIMARY CARE CLINIC | Facility: CLINIC | Age: 78
End: 2023-05-31
Payer: MEDICARE

## 2023-05-31 PROCEDURE — 99490 CHRNC CARE MGMT STAFF 1ST 20: CPT | Mod: S$PBB,,, | Performed by: FAMILY MEDICINE

## 2023-05-31 PROCEDURE — 99490 PR CHRONIC CARE MGMT, 1ST 20 MIN: ICD-10-PCS | Mod: S$PBB,,, | Performed by: FAMILY MEDICINE

## 2023-05-31 PROCEDURE — 99439 CHRNC CARE MGMT STAF EA ADDL: CPT | Mod: PBBFAC,27,PO | Performed by: FAMILY MEDICINE

## 2023-05-31 PROCEDURE — 99439 CHRNC CARE MGMT STAF EA ADDL: CPT | Mod: S$PBB,,, | Performed by: FAMILY MEDICINE

## 2023-05-31 PROCEDURE — 99490 CHRNC CARE MGMT STAFF 1ST 20: CPT | Mod: PBBFAC,25,PO | Performed by: FAMILY MEDICINE

## 2023-05-31 PROCEDURE — 99439 PR CHRONIC CARE MGMT, EA ADDTL 20 MIN: ICD-10-PCS | Mod: S$PBB,,, | Performed by: FAMILY MEDICINE

## 2023-06-05 ENCOUNTER — TELEPHONE (OUTPATIENT)
Dept: GASTROENTEROLOGY | Facility: CLINIC | Age: 78
End: 2023-06-05
Payer: MEDICARE

## 2023-06-05 ENCOUNTER — OFFICE VISIT (OUTPATIENT)
Dept: GASTROENTEROLOGY | Facility: CLINIC | Age: 78
End: 2023-06-05
Payer: MEDICARE

## 2023-06-05 ENCOUNTER — LAB VISIT (OUTPATIENT)
Dept: LAB | Facility: HOSPITAL | Age: 78
End: 2023-06-05
Payer: MEDICARE

## 2023-06-05 VITALS — BODY MASS INDEX: 22.76 KG/M2 | WEIGHT: 115.94 LBS | HEIGHT: 60 IN

## 2023-06-05 DIAGNOSIS — R19.7 DIARRHEA, UNSPECIFIED TYPE: ICD-10-CM

## 2023-06-05 DIAGNOSIS — K52.81 EOSINOPHILIC ENTERITIS: ICD-10-CM

## 2023-06-05 DIAGNOSIS — K52.81 EOSINOPHILIC ENTERITIS: Primary | ICD-10-CM

## 2023-06-05 DIAGNOSIS — Z87.19 HISTORY OF GASTROESOPHAGEAL REFLUX (GERD): ICD-10-CM

## 2023-06-05 DIAGNOSIS — R15.2 FECAL URGENCY: ICD-10-CM

## 2023-06-05 DIAGNOSIS — E73.9 ADULT LACTASE DEFICIENCY: ICD-10-CM

## 2023-06-05 DIAGNOSIS — K22.4 ESOPHAGEAL DYSMOTILITY: ICD-10-CM

## 2023-06-05 DIAGNOSIS — R10.13 EPIGASTRIC PAIN: ICD-10-CM

## 2023-06-05 DIAGNOSIS — K86.89 PANCREATIC INSUFFICIENCY: ICD-10-CM

## 2023-06-05 LAB
BASOPHILS # BLD AUTO: 0.03 K/UL (ref 0–0.2)
BASOPHILS NFR BLD: 0.6 % (ref 0–1.9)
DIFFERENTIAL METHOD: ABNORMAL
EOSINOPHIL # BLD AUTO: 0.3 K/UL (ref 0–0.5)
EOSINOPHIL NFR BLD: 6.5 % (ref 0–8)
ERYTHROCYTE [DISTWIDTH] IN BLOOD BY AUTOMATED COUNT: 14 % (ref 11.5–14.5)
HCT VFR BLD AUTO: 40.4 % (ref 37–48.5)
HGB BLD-MCNC: 12.9 G/DL (ref 12–16)
IGA SERPL-MCNC: 146 MG/DL (ref 40–350)
IMM GRANULOCYTES # BLD AUTO: 0.01 K/UL (ref 0–0.04)
IMM GRANULOCYTES NFR BLD AUTO: 0.2 % (ref 0–0.5)
LYMPHOCYTES # BLD AUTO: 1.2 K/UL (ref 1–4.8)
LYMPHOCYTES NFR BLD: 24.3 % (ref 18–48)
MCH RBC QN AUTO: 28.5 PG (ref 27–31)
MCHC RBC AUTO-ENTMCNC: 31.9 G/DL (ref 32–36)
MCV RBC AUTO: 89 FL (ref 82–98)
MONOCYTES # BLD AUTO: 0.5 K/UL (ref 0.3–1)
MONOCYTES NFR BLD: 9.5 % (ref 4–15)
NEUTROPHILS # BLD AUTO: 2.9 K/UL (ref 1.8–7.7)
NEUTROPHILS NFR BLD: 58.9 % (ref 38–73)
NRBC BLD-RTO: 0 /100 WBC
PLATELET # BLD AUTO: 225 K/UL (ref 150–450)
PMV BLD AUTO: 10.2 FL (ref 9.2–12.9)
RBC # BLD AUTO: 4.52 M/UL (ref 4–5.4)
WBC # BLD AUTO: 4.93 K/UL (ref 3.9–12.7)

## 2023-06-05 PROCEDURE — 99213 OFFICE O/P EST LOW 20 MIN: CPT | Mod: PBBFAC,PO | Performed by: NURSE PRACTITIONER

## 2023-06-05 PROCEDURE — 82784 ASSAY IGA/IGD/IGG/IGM EACH: CPT | Performed by: NURSE PRACTITIONER

## 2023-06-05 PROCEDURE — 99214 OFFICE O/P EST MOD 30 MIN: CPT | Mod: S$PBB,,, | Performed by: NURSE PRACTITIONER

## 2023-06-05 PROCEDURE — 99999 PR PBB SHADOW E&M-EST. PATIENT-LVL III: CPT | Mod: PBBFAC,,, | Performed by: NURSE PRACTITIONER

## 2023-06-05 PROCEDURE — 36415 COLL VENOUS BLD VENIPUNCTURE: CPT | Mod: PO | Performed by: NURSE PRACTITIONER

## 2023-06-05 PROCEDURE — 86364 TISS TRNSGLTMNASE EA IG CLAS: CPT | Performed by: NURSE PRACTITIONER

## 2023-06-05 PROCEDURE — 85025 COMPLETE CBC W/AUTO DIFF WBC: CPT | Performed by: NURSE PRACTITIONER

## 2023-06-05 PROCEDURE — 99214 PR OFFICE/OUTPT VISIT, EST, LEVL IV, 30-39 MIN: ICD-10-PCS | Mod: S$PBB,,, | Performed by: NURSE PRACTITIONER

## 2023-06-05 PROCEDURE — 99999 PR PBB SHADOW E&M-EST. PATIENT-LVL III: ICD-10-PCS | Mod: PBBFAC,,, | Performed by: NURSE PRACTITIONER

## 2023-06-05 RX ORDER — PANCRELIPASE 36000; 180000; 114000 [USP'U]/1; [USP'U]/1; [USP'U]/1
CAPSULE, DELAYED RELEASE PELLETS ORAL
Qty: 240 CAPSULE | Refills: 11 | Status: SHIPPED | OUTPATIENT
Start: 2023-06-05 | End: 2023-10-02 | Stop reason: SDUPTHER

## 2023-06-05 RX ORDER — HYOSCYAMINE SULFATE 0.12 MG/5ML
125 LIQUID ORAL EVERY 4 HOURS PRN
COMMUNITY
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
## 2023-06-08 ENCOUNTER — LAB VISIT (OUTPATIENT)
Dept: LAB | Facility: HOSPITAL | Age: 78
End: 2023-06-08
Payer: MEDICARE

## 2023-06-08 DIAGNOSIS — R19.7 DIARRHEA, UNSPECIFIED TYPE: ICD-10-CM

## 2023-06-08 DIAGNOSIS — K86.89 PANCREATIC INSUFFICIENCY: ICD-10-CM

## 2023-06-08 DIAGNOSIS — K52.81 EOSINOPHILIC ENTERITIS: ICD-10-CM

## 2023-06-08 LAB
C DIFF GDH STL QL: NEGATIVE
C DIFF TOX A+B STL QL IA: NEGATIVE
TTG IGA SER-ACNC: 0.4 U/ML

## 2023-06-08 PROCEDURE — 83986 ASSAY PH BODY FLUID NOS: CPT | Performed by: NURSE PRACTITIONER

## 2023-06-08 PROCEDURE — 87449 NOS EACH ORGANISM AG IA: CPT | Mod: 91 | Performed by: NURSE PRACTITIONER

## 2023-06-08 PROCEDURE — 87425 ROTAVIRUS AG IA: CPT | Performed by: NURSE PRACTITIONER

## 2023-06-08 PROCEDURE — 82705 FATS/LIPIDS FECES QUAL: CPT | Performed by: NURSE PRACTITIONER

## 2023-06-08 PROCEDURE — 87449 NOS EACH ORGANISM AG IA: CPT | Performed by: NURSE PRACTITIONER

## 2023-06-08 PROCEDURE — 89055 LEUKOCYTE ASSESSMENT FECAL: CPT | Performed by: NURSE PRACTITIONER

## 2023-06-08 PROCEDURE — 87046 STOOL CULTR AEROBIC BACT EA: CPT | Performed by: NURSE PRACTITIONER

## 2023-06-08 PROCEDURE — 82653 EL-1 FECAL QUANTITATIVE: CPT | Performed by: NURSE PRACTITIONER

## 2023-06-08 PROCEDURE — 87329 GIARDIA AG IA: CPT | Performed by: NURSE PRACTITIONER

## 2023-06-08 PROCEDURE — 87045 FECES CULTURE AEROBIC BACT: CPT | Performed by: NURSE PRACTITIONER

## 2023-06-08 PROCEDURE — 87427 SHIGA-LIKE TOXIN AG IA: CPT | Mod: 59 | Performed by: NURSE PRACTITIONER

## 2023-06-09 LAB
CRYPTOSP AG STL QL IA: NEGATIVE
E COLI SXT1 STL QL IA: NEGATIVE
E COLI SXT2 STL QL IA: NEGATIVE
G LAMBLIA AG STL QL IA: NEGATIVE
RV AG STL QL IA.RAPID: NEGATIVE
WBC #/AREA STL HPF: NORMAL /[HPF]

## 2023-06-10 LAB — BACTERIA STL CULT: NORMAL

## 2023-06-11 LAB
FAT STL QL: NORMAL
HADV DNA SERPL QL NAA+PROBE: NEGATIVE
NEUTRAL FAT STL QL: NORMAL
PH STL: 7 [PH] (ref 5–8.5)
SPECIMEN SOURCE: NORMAL

## 2023-06-12 ENCOUNTER — TELEPHONE (OUTPATIENT)
Dept: GASTROENTEROLOGY | Facility: CLINIC | Age: 78
End: 2023-06-12
Payer: MEDICARE

## 2023-06-12 ENCOUNTER — PATIENT MESSAGE (OUTPATIENT)
Dept: GASTROENTEROLOGY | Facility: CLINIC | Age: 78
End: 2023-06-12
Payer: MEDICARE

## 2023-06-12 ENCOUNTER — HOSPITAL ENCOUNTER (OUTPATIENT)
Dept: RADIOLOGY | Facility: HOSPITAL | Age: 78
Discharge: HOME | End: 2023-06-12
Attending: NURSE PRACTITIONER
Payer: MEDICARE

## 2023-06-12 DIAGNOSIS — R19.5 YEAST IN STOOL: ICD-10-CM

## 2023-06-12 DIAGNOSIS — R10.13 EPIGASTRIC PAIN: ICD-10-CM

## 2023-06-12 DIAGNOSIS — R19.7 DIARRHEA, UNSPECIFIED TYPE: Primary | ICD-10-CM

## 2023-06-12 PROCEDURE — 76705 ECHO EXAM OF ABDOMEN: CPT | Mod: 26,,, | Performed by: RADIOLOGY

## 2023-06-12 PROCEDURE — 76705 US ABDOMEN LIMITED: ICD-10-PCS | Mod: 26,,, | Performed by: RADIOLOGY

## 2023-06-12 PROCEDURE — 76705 ECHO EXAM OF ABDOMEN: CPT | Mod: TC,PO

## 2023-06-12 RX ORDER — NYSTATIN 500000 [USP'U]/1
500000 TABLET, COATED ORAL EVERY 8 HOURS
Qty: 21 TABLET | Refills: 0 | Status: SHIPPED | OUTPATIENT
Start: 2023-06-12 | End: 2023-06-19

## 2023-06-12 NOTE — TELEPHONE ENCOUNTER
Called pt, informed of stool studies. Pt verbalized understanding. [facility] sent.

## 2023-06-12 NOTE — TELEPHONE ENCOUNTER
Please call to inform & review the results with the patient- Some stool studies are still pending (we will notify you once received and reviewed), but the ones that have come back show positive for yeast in stool. Otherwise, unremarkable findings.  I sent in a prescription to nystatin to pharmacy on file.  Continue with previous recommendations. If no improvement in symptoms or symptoms worsen, call/follow-up at clinic or go to ER.    Thanks,

## 2023-06-13 LAB — ELASTASE 1, FECAL: 247 MCG/G

## 2023-06-20 LAB — O+P STL MICRO: NORMAL

## 2023-06-27 ENCOUNTER — PATIENT MESSAGE (OUTPATIENT)
Dept: GASTROENTEROLOGY | Facility: CLINIC | Age: 78
End: 2023-06-27
Payer: MEDICARE

## 2023-06-30 ENCOUNTER — EXTERNAL CHRONIC CARE MANAGEMENT (OUTPATIENT)
Dept: PRIMARY CARE CLINIC | Facility: CLINIC | Age: 78
End: 2023-06-30
Payer: MEDICARE

## 2023-06-30 PROCEDURE — 99490 CHRNC CARE MGMT STAFF 1ST 20: CPT | Mod: PBBFAC,PO | Performed by: FAMILY MEDICINE

## 2023-06-30 PROCEDURE — 99490 PR CHRONIC CARE MGMT, 1ST 20 MIN: ICD-10-PCS | Mod: S$PBB,,, | Performed by: FAMILY MEDICINE

## 2023-06-30 PROCEDURE — 99490 CHRNC CARE MGMT STAFF 1ST 20: CPT | Mod: S$PBB,,, | Performed by: FAMILY MEDICINE

## 2023-07-06 ENCOUNTER — PATIENT MESSAGE (OUTPATIENT)
Dept: GASTROENTEROLOGY | Facility: CLINIC | Age: 78
End: 2023-07-06
Payer: MEDICARE

## 2023-07-13 ENCOUNTER — OFFICE VISIT (OUTPATIENT)
Dept: CARDIOLOGY | Facility: CLINIC | Age: 78
End: 2023-07-13
Payer: MEDICARE

## 2023-07-13 ENCOUNTER — OFFICE VISIT (OUTPATIENT)
Dept: FAMILY MEDICINE | Facility: CLINIC | Age: 78
End: 2023-07-13
Payer: MEDICARE

## 2023-07-13 ENCOUNTER — LAB VISIT (OUTPATIENT)
Dept: LAB | Facility: HOSPITAL | Age: 78
End: 2023-07-13
Attending: FAMILY MEDICINE
Payer: MEDICARE

## 2023-07-13 VITALS
HEART RATE: 76 BPM | SYSTOLIC BLOOD PRESSURE: 118 MMHG | DIASTOLIC BLOOD PRESSURE: 66 MMHG | BODY MASS INDEX: 22.31 KG/M2 | WEIGHT: 113.63 LBS | HEIGHT: 60 IN | OXYGEN SATURATION: 95 %

## 2023-07-13 VITALS
SYSTOLIC BLOOD PRESSURE: 146 MMHG | HEART RATE: 88 BPM | BODY MASS INDEX: 22.29 KG/M2 | HEIGHT: 60 IN | WEIGHT: 113.56 LBS | DIASTOLIC BLOOD PRESSURE: 79 MMHG

## 2023-07-13 DIAGNOSIS — K52.81 EOSINOPHILIC ENTERITIS: ICD-10-CM

## 2023-07-13 DIAGNOSIS — H81.09 MENIERE'S DISEASE, UNSPECIFIED LATERALITY: ICD-10-CM

## 2023-07-13 DIAGNOSIS — E78.2 MIXED HYPERLIPIDEMIA: ICD-10-CM

## 2023-07-13 DIAGNOSIS — I10 ESSENTIAL HYPERTENSION: ICD-10-CM

## 2023-07-13 DIAGNOSIS — I25.10 CORONARY ARTERY DISEASE INVOLVING NATIVE CORONARY ARTERY OF NATIVE HEART WITHOUT ANGINA PECTORIS: ICD-10-CM

## 2023-07-13 DIAGNOSIS — I67.1 CEREBRAL ANEURYSM: ICD-10-CM

## 2023-07-13 DIAGNOSIS — R79.82 CRP ELEVATED: ICD-10-CM

## 2023-07-13 DIAGNOSIS — R70.0 ESR RAISED: ICD-10-CM

## 2023-07-13 DIAGNOSIS — J43.1 PANLOBULAR EMPHYSEMA: ICD-10-CM

## 2023-07-13 DIAGNOSIS — R10.2 PELVIC PAIN: ICD-10-CM

## 2023-07-13 DIAGNOSIS — I70.0 ATHEROSCLEROSIS OF AORTA: ICD-10-CM

## 2023-07-13 DIAGNOSIS — G35 MS (MULTIPLE SCLEROSIS): Primary | ICD-10-CM

## 2023-07-13 DIAGNOSIS — E78.49 OTHER HYPERLIPIDEMIA: ICD-10-CM

## 2023-07-13 DIAGNOSIS — F41.9 ANXIETY: ICD-10-CM

## 2023-07-13 DIAGNOSIS — I10 ESSENTIAL HYPERTENSION: Primary | ICD-10-CM

## 2023-07-13 LAB
CHOLEST SERPL-MCNC: 153 MG/DL (ref 120–199)
CHOLEST/HDLC SERPL: 2.6 {RATIO} (ref 2–5)
CRP SERPL-MCNC: 5.86 MG/L (ref 0–3.19)
ERYTHROCYTE [SEDIMENTATION RATE] IN BLOOD BY PHOTOMETRIC METHOD: 39 MM/HR (ref 0–36)
HDLC SERPL-MCNC: 60 MG/DL (ref 40–75)
HDLC SERPL: 39.2 % (ref 20–50)
LDLC SERPL CALC-MCNC: 79.2 MG/DL (ref 63–159)
NONHDLC SERPL-MCNC: 93 MG/DL
TRIGL SERPL-MCNC: 69 MG/DL (ref 30–150)

## 2023-07-13 PROCEDURE — 99214 OFFICE O/P EST MOD 30 MIN: CPT | Mod: S$PBB,,, | Performed by: INTERNAL MEDICINE

## 2023-07-13 PROCEDURE — 99999 PR PBB SHADOW E&M-EST. PATIENT-LVL IV: CPT | Mod: PBBFAC,,, | Performed by: FAMILY MEDICINE

## 2023-07-13 PROCEDURE — 99214 PR OFFICE/OUTPT VISIT, EST, LEVL IV, 30-39 MIN: ICD-10-PCS | Mod: S$PBB,,, | Performed by: FAMILY MEDICINE

## 2023-07-13 PROCEDURE — 99999 PR PBB SHADOW E&M-EST. PATIENT-LVL IV: ICD-10-PCS | Mod: PBBFAC,,, | Performed by: FAMILY MEDICINE

## 2023-07-13 PROCEDURE — 99999 PR PBB SHADOW E&M-EST. PATIENT-LVL III: CPT | Mod: PBBFAC,,, | Performed by: INTERNAL MEDICINE

## 2023-07-13 PROCEDURE — 86141 C-REACTIVE PROTEIN HS: CPT | Performed by: FAMILY MEDICINE

## 2023-07-13 PROCEDURE — 99999 PR PBB SHADOW E&M-EST. PATIENT-LVL III: ICD-10-PCS | Mod: PBBFAC,,, | Performed by: INTERNAL MEDICINE

## 2023-07-13 PROCEDURE — 99214 PR OFFICE/OUTPT VISIT, EST, LEVL IV, 30-39 MIN: ICD-10-PCS | Mod: S$PBB,,, | Performed by: INTERNAL MEDICINE

## 2023-07-13 PROCEDURE — 99214 OFFICE O/P EST MOD 30 MIN: CPT | Mod: PBBFAC,27,PO | Performed by: FAMILY MEDICINE

## 2023-07-13 PROCEDURE — 80061 LIPID PANEL: CPT | Performed by: FAMILY MEDICINE

## 2023-07-13 PROCEDURE — 99213 OFFICE O/P EST LOW 20 MIN: CPT | Mod: PBBFAC,PO | Performed by: INTERNAL MEDICINE

## 2023-07-13 PROCEDURE — 85652 RBC SED RATE AUTOMATED: CPT | Performed by: FAMILY MEDICINE

## 2023-07-13 PROCEDURE — 99214 OFFICE O/P EST MOD 30 MIN: CPT | Mod: S$PBB,,, | Performed by: FAMILY MEDICINE

## 2023-07-13 PROCEDURE — 36415 COLL VENOUS BLD VENIPUNCTURE: CPT | Mod: PO | Performed by: FAMILY MEDICINE

## 2023-07-13 NOTE — PROGRESS NOTES
Assessment:       1. Essential hypertension    2. Eosinophilic enteritis    3. ESR raised    4. CRP elevated    5. Other hyperlipidemia    6. Pelvic pain    7. Anxiety        Plan:       Essential hypertension:  Stable  -     Lipid Panel; Future; Expected date: 07/13/2023    Eosinophilic enteritis:  Stable    ESR raised:  New problem workup needed  -     Sedimentation rate; Future; Expected date: 07/13/2023  -     CRP, High Sensitivity; Future; Expected date: 07/13/2023    CRP elevated:  Worsening  -     CRP, High Sensitivity; Future; Expected date: 07/13/2023    Other hyperlipidemia: Uncontrolled  -     CRP, High Sensitivity; Future; Expected date: 07/13/2023    Pelvic pain:  New problem workup needed  -     US Pelvis Comp with Transvag NON-OB (xpd); Future; Expected date: 07/13/2023    Anxiety: stable         Healthy habits, avoid processed foods and processed starches, for enteritis specific foods that are avoided in the six-food elimination diet include soy, wheat, egg, milk, peanut/tree nuts, and shellfish/fish.  [place] prescription monitoring program was checked and okay.   Patient agreed with assessment and plan. Patient verbalized understanding.     Subjective:       Patient ID: Veronique Johnson is a 77 y.o. female.    Chief Complaint: Follow-up anxiety    HPI    Anxiety:  The patient is currently taking Xanax, she has refill for the pharmacy, the patient will be get up tomorrow, denies any side effects of the medication.    Eosinophilic enteritis: The patient was diagnosed with eosinophilic enteritis and was asking what she is not supposed to be eating to help with diarrhea symptoms.    Hyperlipidemia:  The last triglycerides levels were elevated, high sensitivity CRP, ESR level were also elevated.  The patient stated that she is cutting back on drinking alcohol.  She is also is complaining of pelvic pain more on the right side for more than 2 weeks.    Past medical history, past social history was  reviewed and discussed with the patient.    Review of Systems   Constitutional:  Negative for activity change, appetite change and chills.   HENT:  Negative for congestion and ear discharge.    Eyes:  Negative for discharge and itching.   Respiratory:  Negative for choking and chest tightness.    Cardiovascular:  Negative for chest pain, palpitations and leg swelling.   Gastrointestinal:  Positive for diarrhea. Negative for abdominal distention, abdominal pain and constipation.   Endocrine: Negative for cold intolerance and heat intolerance.   Genitourinary:  Positive for pelvic pain. Negative for dysuria and flank pain.   Musculoskeletal:  Negative for arthralgias and back pain.   Skin:  Negative for pallor and rash.   Allergic/Immunologic: Negative for environmental allergies and food allergies.   Neurological:  Negative for dizziness, facial asymmetry and headaches.   Hematological:  Negative for adenopathy. Does not bruise/bleed easily.   Psychiatric/Behavioral:  Negative for agitation, confusion, decreased concentration and sleep disturbance.      Objective:      Physical Exam  Vitals and nursing note reviewed.   Constitutional:       General: She is not in acute distress.     Appearance: Normal appearance. She is well-developed and normal weight. She is not diaphoretic.   HENT:      Head: Normocephalic and atraumatic.      Right Ear: External ear normal.      Left Ear: External ear normal.      Nose: Nose normal.      Mouth/Throat:      Pharynx: No oropharyngeal exudate.   Eyes:      General: No scleral icterus.        Right eye: No discharge.         Left eye: No discharge.      Conjunctiva/sclera: Conjunctivae normal.      Pupils: Pupils are equal, round, and reactive to light.   Neck:      Thyroid: No thyromegaly.      Vascular: No JVD.      Trachea: No tracheal deviation.   Cardiovascular:      Rate and Rhythm: Normal rate and regular rhythm.      Heart sounds: Normal heart sounds.   Pulmonary:       Effort: Pulmonary effort is normal. No respiratory distress.      Breath sounds: Normal breath sounds. No wheezing.   Abdominal:      General: Abdomen is flat. Bowel sounds are normal. There is no distension.      Palpations: There is no mass.      Tenderness: There is abdominal tenderness (Right side of the pelvic area).   Musculoskeletal:         General: No tenderness.      Cervical back: Neck supple.   Lymphadenopathy:      Cervical: No cervical adenopathy.   Skin:     Coloration: Skin is not pale.   Neurological:      Mental Status: She is alert.      Cranial Nerves: No cranial nerve deficit.      Coordination: Coordination normal.   Psychiatric:         Behavior: Behavior normal.         Thought Content: Thought content normal.         Judgment: Judgment normal.

## 2023-07-13 NOTE — PROGRESS NOTES
Subjective:    Patient ID:  Veronique Johnson is a 77 y.o. female patient here for evaluation Follow-up      History of Present Illness:  Cardiology follow-up coronary disease.  Left heart catheterization 10/2021 with moderate LAD disease, mid segment, normal fractional flow reserve, medical therapy recommended.  No angina.  No significant SIMMONS.  No PND orthopnea.    Positive family history, past tobacco use.  Hypertension, dyslipidemia.  History of COPD.    Peripheral arterial disease.  Last MRI head and neck 12 01/2021, stable.  No significant carotid artery stenosis.             Review of patient's allergies indicates:   Allergen Reactions    Cephalosporins Anaphylaxis    Iodinated contrast media Hives and Swelling    Penicillins Rash       Past Medical History:   Diagnosis Date    Anemia     Anxiety     Chronic bronchitis with COPD (chronic obstructive pulmonary disease)     Esophageal spasm     Essential tremor     GERD (gastroesophageal reflux disease)     Headache     High cholesterol     Hypertension     Meniere disease     Multiple sclerosis     Muscle spasm     Pancreatic insufficiency      Past Surgical History:   Procedure Laterality Date    CATARACT EXTRACTION, BILATERAL  2006    COLONOSCOPY  09/05/2018    Dr. Leija, in procedures: diverticulosis, 4 colon polyps removed, adenomatous polyps x3 & benign mucosal polyps    COLONOSCOPY N/A 4/22/2021    Procedure: COLONOSCOPY;  Surgeon: Dwayne Santoyo MD;  Location: [facility] ENDO;  Service: Endoscopy;  Laterality: N/A;    CORONARY ANGIOGRAPHY N/A 10/14/2021    Procedure: ANGIOGRAM, CORONARY ARTERY;  Surgeon: Mustapha Manzo MD;  Location: [facility] CATH;  Service: Cardiology;  Laterality: N/A;    CYSTOSCOPY WITH HYDRODISTENSION OF BLADDER N/A 6/5/2019    Procedure: CYSTOSCOPY, WITH BLADDER HYDRODISTENSION;  Surgeon: Marko Burton MD;  Location: [facility] OR;  Service: OB/GYN;  Laterality: N/A;    DILATION AND CURETTAGE OF UTERUS  1966    ESOPHAGEAL MANOMETRY WITH  MEASUREMENT OF IMPEDANCE N/A 1/6/2023    Procedure: MANOMETRY, ESOPHAGUS, WITH IMPEDANCE MEASUREMENT;  Surgeon: Vitaliy Hatfield MD;  Location: [facility] (4TH FLR);  Service: Endoscopy;  Laterality: N/A;  instructions sent to [facility]  Precall done. 0900 arrival time confirmed. SG  instr portal -ml    ESOPHAGOGASTRODUODENOSCOPY  09/05/2018    Dr. Leija, in procedures: gastritis; biopsy: duodenum unremarkable, with focal benign gastric metaplasia, stomach- minimal chronic gastritis, negative for h pylori    ESOPHAGOGASTRODUODENOSCOPY N/A 4/22/2021    Procedure: EGD (ESOPHAGOGASTRODUODENOSCOPY);  Surgeon: Dwayne Santoyo MD;  Location: [facility];  Service: Endoscopy;  Laterality: N/A;    ESOPHAGOGASTRODUODENOSCOPY N/A 3/10/2023    Procedure: EGD (ESOPHAGOGASTRODUODENOSCOPY);  Surgeon: Bere Mclean MD;  Location: [facility] (2ND FLR);  Service: Endoscopy;  Laterality: N/A;  Endoflip  2nd floor due to availability  propofol only  instructions sent to [facility]    EYE SURGERY      HEMORRHOID SURGERY  1997    KNEE ARTHROSCOPY W/ MENISCECTOMY Right 8/13/2021    Procedure: ARTHROSCOPY, KNEE, WITH MENISCECTOMY;  Surgeon: Shelton Le MD;  Location: [facility] OR;  Service: Orthopedics;  Laterality: Right;    KNEE ARTHROSCOPY W/ MENISCECTOMY Right 4/8/2022    Procedure: ARTHROSCOPY, KNEE, WITH MENISCECTOMY;  Surgeon: Shelton Le MD;  Location: [facility] OR;  Service: Orthopedics;  Laterality: Right;  medial meniscectomy    LEFT HEART CATHETERIZATION Right 10/14/2021    Procedure: Left heart cath;  Surgeon: Mustapha Manzo MD;  Location: [facility] CATH;  Service: Cardiology;  Laterality: Right;    ROBOTIC ARTHROPLASTY, KNEE, UNICOMPARTMENTAL Right 8/16/2022    Procedure: ROBOTIC ARTHROPLASTY, KNEE, UNICOMPARTMENTAL;  Surgeon: Shelton Le MD;  Location: [facility] OR;  Service: Orthopedics;  Laterality: Right;    TONSILLECTOMY  1954    TUBAL LIGATION  1990     Social History     Tobacco Use    Smoking  status: Former     Packs/day: 2.00     Years: 35.00     Pack years: 70.00     Types: Cigarettes     Start date: 1964     Quit date:      Years since quittin.5    Smokeless tobacco: Never   Substance Use Topics    Alcohol use: Yes     Alcohol/week: 7.0 standard drinks     Types: 7 Glasses of wine per week    Drug use: Never        Review of Systems:    As noted in HPI in addition      REVIEW OF SYSTEMS  Review of Systems   Constitutional: Negative for decreased appetite, diaphoresis, night sweats, weight gain and weight loss.   HENT:  Negative for nosebleeds and odynophagia.    Eyes:  Negative for double vision and photophobia.   Cardiovascular:  Negative for chest pain, claudication, cyanosis, dyspnea on exertion, irregular heartbeat, leg swelling, near-syncope, orthopnea, palpitations, paroxysmal nocturnal dyspnea and syncope.   Respiratory:  Negative for cough, hemoptysis, shortness of breath and wheezing.    Hematologic/Lymphatic: Negative for adenopathy.   Skin:  Negative for flushing, skin cancer and suspicious lesions.   Musculoskeletal:  Negative for gout, myalgias and neck pain.   Gastrointestinal:  Negative for abdominal pain, heartburn, hematemesis and hematochezia.   Genitourinary:  Negative for bladder incontinence, hesitancy and nocturia.   Neurological:  Negative for focal weakness, headaches, light-headedness and paresthesias.   Psychiatric/Behavioral:  Negative for memory loss and substance abuse.             Objective:        Vitals:    23 0902   BP: (!) 146/79   Pulse: 88       Lab Results   Component Value Date    WBC 4.93 2023    HGB 12.9 2023    HCT 40.4 2023     2023    CHOL 130 2023    TRIG 214 (H) 2023    HDL 42 2023    ALT 14 2023    AST 22 2023     2023    K 3.7 2023     2023    CREATININE 0.9 2023    BUN 10 2023    CO2 27 2023    TSH 2.824 2023    INR 0.9  12/01/2021    HGBA1C 5.4 12/02/2021        ECHOCARDIOGRAM RESULTS  No results found for this or any previous visit.        CURRENT/PREVIOUS VISIT EKG  Results for orders placed or performed during the hospital encounter of 08/08/22   EKG 12-lead    Collection Time: 08/08/22 12:09 PM    Narrative    Test Reason : Z01.818,M17.10,    Vent. Rate : 098 BPM     Atrial Rate : 098 BPM     P-R Int : 124 ms          QRS Dur : 104 ms      QT Int : 344 ms       P-R-T Axes : 054 041 060 degrees     QTc Int : 439 ms    Normal sinus rhythm  Normal ECG  When compared with ECG of 15-OCT-2021 06:49,  No significant change was found  Confirmed by Phuc Ambriz MD ([id number]) on 8/10/2022 12:32:26 PM    Referred By: TRACY LOVE           Confirmed By:Phuc Ambriz MD     No valid procedures specified.   No results found for this or any previous visit.    No valid procedures specified.    PHYSICAL EXAM  CONSTITUTIONAL: Well built, well nourished in no apparent distress  NECK: no carotid bruit, no JVD  LUNGS: CTA  CHEST WALL: no tenderness,  HEART: regular rate and rhythm, S1, S2 normal, no murmur, click, rub or gallop   ABDOMEN: soft, non-tender; bowel sounds normal; no masses,  no organomegaly  EXTREMITIES: Extremities normal, no edema, no calf tenderness noted  NEURO: AAO X 3    I HAVE REVIEWED :    The vital signs, nurses notes, and all the pertinent radiology and labs.         Current Outpatient Medications   Medication Instructions    ALPRAZolam (XANAX) 0.25 mg, Oral, 2 times daily PRN    amLODIPine (NORVASC) 5 mg, Oral, Daily    buPROPion (WELLBUTRIN XL) 300 mg, Oral, Daily    cyanocobalamin 500 mcg, Oral, Daily    fluticasone furoate-vilanteroL (BREO) 100-25 mcg/dose diskus inhaler 1 puff, Inhalation, Daily, Controller    hyoscyamine (LEVSIN) 125 mcg, Oral, Every 4 hours PRN    lipase-protease-amylase (CREON) 36,000-114,000- 180,000 unit CpDR TAKE 2 CAPSULE BY MOUTH 3 (THREE) TIMES DAILY WITH MEALS. & 1 CAPSULE WITH SNACKS     loperamide (IMODIUM) 1 mg/5 mL solution Oral, Every 6 hours PRN    montelukast (SINGULAIR) 10 mg, Oral, Nightly    multivitamin capsule 1 capsule, Oral    omeprazole (PRILOSEC) 20 mg, Oral, Daily    rosuvastatin (CRESTOR) 20 mg, Oral, Nightly          Assessment:   Coronary artery disease, moderate mid LAD stenosis by left heart catheterization 2021.  Medical therapy recommended.    Peripheral arterial disease, known carotid stenosis noncritical stable carotid aneurysm.    Past tobacco use, COPD.    Hypertension, dyslipidemia.            Plan:   Labs and follow-up primary care.  Labs follow-up GI.  Risk factor modification.  Six months.          No follow-ups on file.

## 2023-07-18 ENCOUNTER — HOSPITAL ENCOUNTER (OUTPATIENT)
Dept: RADIOLOGY | Facility: HOSPITAL | Age: 78
Discharge: HOME | End: 2023-07-18
Attending: FAMILY MEDICINE
Payer: MEDICARE

## 2023-07-18 DIAGNOSIS — R10.2 PELVIC PAIN: ICD-10-CM

## 2023-07-18 PROCEDURE — 76856 US PELVIS COMP WITH TRANSVAG NON-OB (XPD): ICD-10-PCS | Mod: 26,,, | Performed by: RADIOLOGY

## 2023-07-18 PROCEDURE — 76830 TRANSVAGINAL US NON-OB: CPT | Mod: 26,,, | Performed by: RADIOLOGY

## 2023-07-18 PROCEDURE — 76830 US PELVIS COMP WITH TRANSVAG NON-OB (XPD): ICD-10-PCS | Mod: 26,,, | Performed by: RADIOLOGY

## 2023-07-18 PROCEDURE — 76856 US EXAM PELVIC COMPLETE: CPT | Mod: 26,,, | Performed by: RADIOLOGY

## 2023-07-18 PROCEDURE — 76856 US EXAM PELVIC COMPLETE: CPT | Mod: TC,PO

## 2023-07-21 ENCOUNTER — PATIENT MESSAGE (OUTPATIENT)
Dept: PSYCHIATRY | Facility: CLINIC | Age: 78
End: 2023-07-21
Payer: MEDICARE

## 2023-07-24 ENCOUNTER — TELEPHONE (OUTPATIENT)
Dept: ENDOSCOPY | Facility: HOSPITAL | Age: 78
End: 2023-07-24
Payer: MEDICARE

## 2023-07-31 ENCOUNTER — EXTERNAL CHRONIC CARE MANAGEMENT (OUTPATIENT)
Dept: PRIMARY CARE CLINIC | Facility: CLINIC | Age: 78
End: 2023-07-31
Payer: MEDICARE

## 2023-07-31 PROCEDURE — 99439 PR CHRONIC CARE MGMT, EA ADDTL 20 MIN: ICD-10-PCS | Mod: S$PBB,,, | Performed by: FAMILY MEDICINE

## 2023-07-31 PROCEDURE — 99439 CHRNC CARE MGMT STAF EA ADDL: CPT | Mod: PBBFAC,PO | Performed by: FAMILY MEDICINE

## 2023-07-31 PROCEDURE — 99490 CHRNC CARE MGMT STAFF 1ST 20: CPT | Mod: S$PBB,,, | Performed by: FAMILY MEDICINE

## 2023-07-31 PROCEDURE — 99490 CHRNC CARE MGMT STAFF 1ST 20: CPT | Mod: PBBFAC,PO | Performed by: FAMILY MEDICINE

## 2023-07-31 PROCEDURE — 99439 CHRNC CARE MGMT STAF EA ADDL: CPT | Mod: S$PBB,,, | Performed by: FAMILY MEDICINE

## 2023-07-31 PROCEDURE — 99490 PR CHRONIC CARE MGMT, 1ST 20 MIN: ICD-10-PCS | Mod: S$PBB,,, | Performed by: FAMILY MEDICINE

## 2023-08-02 ENCOUNTER — PATIENT MESSAGE (OUTPATIENT)
Dept: GASTROENTEROLOGY | Facility: CLINIC | Age: 78
End: 2023-08-02
Payer: MEDICARE

## 2023-08-04 ENCOUNTER — TELEPHONE (OUTPATIENT)
Dept: GASTROENTEROLOGY | Facility: CLINIC | Age: 78
End: 2023-08-04
Payer: MEDICARE

## 2023-08-04 NOTE — TELEPHONE ENCOUNTER
----- Message from Isaiah Truong sent at 8/4/2023 12:22 PM CDT -----  Type:  Patient Returning Call    Who Called:pt  Who Left Message for Patient:  Does the patient know what this is regarding?: concerns  Would the patient rather a call back or a response via [facility]? Call  Best Call Back Number:[phone]  Additional Information: pt states she has questions about food allergies/ diet. Pt states she would like a call back from office asap. Pt has appt on 12/19 but would like to speak with provider now if possible.

## 2023-08-04 NOTE — TELEPHONE ENCOUNTER
Returned call to the patient, patient was notified to see an allergist to determine exactly what she is allergic to so that she can properly avoid those items, patient verbalized understanding of this and states that she will sen an allergist.

## 2023-08-09 ENCOUNTER — OFFICE VISIT (OUTPATIENT)
Dept: ORTHOPEDICS | Facility: CLINIC | Age: 78
End: 2023-08-09
Payer: MEDICARE

## 2023-08-09 VITALS — BODY MASS INDEX: 22.19 KG/M2 | WEIGHT: 113 LBS | HEIGHT: 60 IN | RESPIRATION RATE: 18 BRPM

## 2023-08-09 DIAGNOSIS — Z96.651 S/P RIGHT UNICOMPARTMENTAL KNEE REPLACEMENT: Primary | ICD-10-CM

## 2023-08-09 PROCEDURE — 99999 PR PBB SHADOW E&M-EST. PATIENT-LVL III: ICD-10-PCS | Mod: PBBFAC,,, | Performed by: ORTHOPAEDIC SURGERY

## 2023-08-09 PROCEDURE — 99999 PR PBB SHADOW E&M-EST. PATIENT-LVL III: CPT | Mod: PBBFAC,,, | Performed by: ORTHOPAEDIC SURGERY

## 2023-08-09 PROCEDURE — 99213 OFFICE O/P EST LOW 20 MIN: CPT | Mod: PBBFAC,PN | Performed by: ORTHOPAEDIC SURGERY

## 2023-08-09 PROCEDURE — 99214 PR OFFICE/OUTPT VISIT, EST, LEVL IV, 30-39 MIN: ICD-10-PCS | Mod: S$PBB,,, | Performed by: ORTHOPAEDIC SURGERY

## 2023-08-09 PROCEDURE — 99214 OFFICE O/P EST MOD 30 MIN: CPT | Mod: S$PBB,,, | Performed by: ORTHOPAEDIC SURGERY

## 2023-08-09 NOTE — PROGRESS NOTES
Past Medical History:   Diagnosis Date    Anemia     Anxiety     Chronic bronchitis with COPD (chronic obstructive pulmonary disease)     Esophageal spasm     Essential tremor     GERD (gastroesophageal reflux disease)     Headache     High cholesterol     Hypertension     Meniere disease     Multiple sclerosis     Muscle spasm     Pancreatic insufficiency        Past Surgical History:   Procedure Laterality Date    CATARACT EXTRACTION, BILATERAL  2006    COLONOSCOPY  09/05/2018    Dr. Leija in procedures: diverticulosis, 4 colon polyps removed, adenomatous polyps x3 & benign mucosal polyps    COLONOSCOPY N/A 4/22/2021    Procedure: COLONOSCOPY;  Surgeon: Dwayne Santoyo MD;  Location: [facility];  Service: Endoscopy;  Laterality: N/A;    CORONARY ANGIOGRAPHY N/A 10/14/2021    Procedure: ANGIOGRAM, CORONARY ARTERY;  Surgeon: Mustapha Manzo MD;  Location: [facility] CATH;  Service: Cardiology;  Laterality: N/A;    CYSTOSCOPY WITH HYDRODISTENSION OF BLADDER N/A 6/5/2019    Procedure: CYSTOSCOPY, WITH BLADDER HYDRODISTENSION;  Surgeon: Marko Burton MD;  [facility] OR;  Service: OB/GYN;  Laterality: N/A;    DILATION AND CURETTAGE OF UTERUS  1966    ESOPHAGEAL MANOMETRY WITH MEASUREMENT OF IMPEDANCE N/A 1/6/2023    Procedure: MANOMETRY, ESOPHAGUS, WITH IMPEDANCE MEASUREMENT;  Surgeon: Vitaliy Hatfield MD;  [facility]  Service: Endoscopy;  Laterality: N/A;  instructions sent to [facility] Precall done. 0900 arrival time confirmed. SG  instr portal -ml    ESOPHAGOGASTRODUODENOSCOPY  09/05/2018    Dr. Leija, in procedures: gastritis; biopsy: duodenum unremarkable, with focal benign gastric metaplasia, stomach- minimal chronic gastritis, negative for h pylori    ESOPHAGOGASTRODUODENOSCOPY N/A 4/22/2021    Procedure: EGD (ESOPHAGOGASTRODUODENOSCOPY);  Surgeon: Dwayne Santoyo MD;  Location: [facility];  Service: Endoscopy;  Laterality: N/A;    ESOPHAGOGASTRODUODENOSCOPY N/A 3/10/2023     Procedure: EGD (ESOPHAGOGASTRODUODENOSCOPY);  Surgeon: Bere Mclean MD;  Location: [facility] ENDO (2ND FLR);  Service: Endoscopy;  Laterality: N/A;  Endoflip  2nd floor due to availability  propofol only  instructions sent to [facility]    EYE SURGERY      HEMORRHOID SURGERY  1997    KNEE ARTHROSCOPY W/ MENISCECTOMY Right 8/13/2021    Procedure: ARTHROSCOPY, KNEE, WITH MENISCECTOMY;  Surgeon: Shelton Le MD;  Location: [facility] OR;  Service: Orthopedics;  Laterality: Right;    KNEE ARTHROSCOPY W/ MENISCECTOMY Right 4/8/2022    Procedure: ARTHROSCOPY, KNEE, WITH MENISCECTOMY;  Surgeon: Shelton Le MD;  Location: [facility] OR;  Service: Orthopedics;  Laterality: Right;  medial meniscectomy    LEFT HEART CATHETERIZATION Right 10/14/2021    Procedure: Left heart cath;  Surgeon: Mustapha Manzo MD;  Location: [facility] CATH;  Service: Cardiology;  Laterality: Right;    ROBOTIC ARTHROPLASTY, KNEE, UNICOMPARTMENTAL Right 8/16/2022    Procedure: ROBOTIC ARTHROPLASTY, KNEE, UNICOMPARTMENTAL;  Surgeon: Shelton Le MD;  Location: [facility] OR;  Service: Orthopedics;  Laterality: Right;    TONSILLECTOMY  1954    TUBAL LIGATION  1990       Current Outpatient Medications   Medication Sig    ALPRAZolam (XANAX) 0.25 MG tablet TAKE 1 TABLET (0.25 MG TOTAL) BY MOUTH 2 (TWO) TIMES DAILY AS NEEDED FOR ANXIETY.    amLODIPine (NORVASC) 5 MG tablet TAKE 1 TABLET (5 MG TOTAL) BY MOUTH ONCE DAILY.    buPROPion (WELLBUTRIN XL) 300 MG 24 hr tablet TAKE 1 TABLET (300 MG TOTAL) BY MOUTH ONCE DAILY.    cyanocobalamin 500 MCG tablet Take 500 mcg by mouth once daily.    fluticasone furoate-vilanteroL (BREO) 100-25 mcg/dose diskus inhaler Inhale 1 puff into the lungs once daily. Controller    hyoscyamine (LEVSIN) 0.125 mg/5 mL Elix Take 125 mcg by mouth every 4 (four) hours as needed.    lipase-protease-amylase (CREON) 36,000-114,000- 180,000 unit CpDR TAKE 2 CAPSULE BY MOUTH 3 (THREE) TIMES DAILY WITH MEALS. & 1 CAPSULE WITH SNACKS     loperamide (IMODIUM) 1 mg/5 mL solution Take by mouth every 6 (six) hours as needed for Diarrhea.    montelukast (SINGULAIR) 10 mg tablet Take 10 mg by mouth every evening.    multivitamin capsule Take 1 capsule by mouth.    omeprazole (PRILOSEC) 20 MG capsule TAKE 1 CAPSULE (20 MG TOTAL) BY MOUTH ONCE DAILY.    rosuvastatin (CRESTOR) 20 MG tablet Take 1 tablet (20 mg total) by mouth every evening.     No current facility-administered medications for this visit.       Review of patient's allergies indicates:   Allergen Reactions    Cephalosporins Anaphylaxis    Iodinated contrast media Hives and Swelling    Penicillins Rash       Family History   Problem Relation Age of Onset    Coronary artery disease Mother     Heart disease Mother 60        Bypass twice    Miscarriages / Stillbirths Mother         Stillbirths    Vision loss Mother         Macular Degeration    Heart attack Father     Coronary artery disease Father     Heart disease Father 55        Several Heart Attack    Alcohol abuse Father     Early death Father         Heart Attack 55    Coronary artery disease Sister     Heart disease Sister 65        CAD    Celiac disease Neg Hx     Colon cancer Neg Hx     Crohn's disease Neg Hx     Ulcerative colitis Neg Hx        Social History     Socioeconomic History    Marital status:    Tobacco Use    Smoking status: Former     Current packs/day: 0.00     Average packs/day: 2.0 packs/day for 36.3 years (72.7 ttl pk-yrs)     Types: Cigarettes     Start date: 1964     Quit date:      Years since quittin.6    Smokeless tobacco: Never   Substance and Sexual Activity    Alcohol use: Yes     Alcohol/week: 7.0 standard drinks of alcohol     Types: 7 Glasses of wine per week    Drug use: Never    Sexual activity: Yes     Partners: Male     Birth control/protection: Post-menopausal     Social Determinants of Health     Financial Resource Strain: Low Risk  (2023)    Overall Financial Resource Strain  (CARDIA)     Difficulty of Paying Living Expenses: Not hard at all   Food Insecurity: No Food Insecurity (7/6/2023)    Hunger Vital Sign     Worried About Running Out of Food in the Last Year: Never true     Ran Out of Food in the Last Year: Never true   Transportation Needs: No Transportation Needs (7/6/2023)    PRAPARE - Transportation     Lack of Transportation (Medical): No     Lack of Transportation (Non-Medical): No   Physical Activity: Insufficiently Active (7/6/2023)    Exercise Vital Sign     Days of Exercise per Week: 2 days     Minutes of Exercise per Session: 30 min   Stress: Stress Concern Present (7/6/2023)    [employer] - Occupational Stress Questionnaire     Feeling of Stress : Rather much   Social Connections: Unknown (7/6/2023)    Social Connection and Isolation Panel [NHANES]     Frequency of Communication with Friends and Family: More than three times a week     Frequency of Social Gatherings with Friends and Family: More than three times a week     Active Member of Clubs or Organizations: Patient refused     Attends Club or Organization Meetings: Patient refused     Marital Status:    Housing Stability: Low Risk  (7/6/2023)    Housing Stability Vital Sign     Unable to Pay for Housing in the Last Year: No     Number of Places Lived in the Last Year: 1     Unstable Housing in the Last Year: No       Chief Complaint:   Chief Complaint   Patient presents with    Post-op Evaluation       Date of surgery:  August 16, 2022    History of present illness:  77-year-old female underwent right robot assisted unicompartmental knee replacement.  Pain is much improved after some therapy and dry needling.  Only real issue is more with generalized balance, not related to the knee.  Needing a cane a little more now.    Review of Systems:    Musculoskeletal:  See HPI        Physical Examination:    Vital Signs:    Vitals:    08/09/23 0940   Resp: 18           Body mass index  is 22.07 kg/m².    This a well-developed, well nourished patient in no acute distress.  They are alert and oriented and cooperative to examination.  Pt. walks without an antalgic gait.      Examination of the right knee shows well-healed surgical incisions.  No erythema drainage.  Range of motion 0-130 degrees.   Stable to varus and valgus stress.  Negative drawer exam.     X-rays:  Four views of the right knee are reviewed which show well-aligned unicompartmental knee Replacement without complication.  Small amount of cement behind the polyethylene.  No changes from previous x-ray.    CT scan of the right knee is reviewed and interpreted:1. Right knee arthroplasty hardware with no evidence for complication.  2. Degenerative change elsewhere on the knee including spurring along the medial tibial spine.  3. Small joint effusion.     Assessment::  Status post right unicompartmental knee replacement  Balance issues    Plan:  I agree with using a cane to help with balance.  Also agree with keeping up with her generalized lower extremity strength.  We talked about follow-up in 1 year with annual exam of the right knee.  We talked about possibly getting her back into some physical therapy if needed in the meantime if some symptoms should recur.    This note was created using [employer] voice recognition software that occasionally misinterpreted phrases or words.

## 2023-08-10 ENCOUNTER — LAB VISIT (OUTPATIENT)
Dept: LAB | Facility: HOSPITAL | Age: 78
End: 2023-08-10
Attending: FAMILY MEDICINE
Payer: MEDICARE

## 2023-08-10 ENCOUNTER — OFFICE VISIT (OUTPATIENT)
Dept: FAMILY MEDICINE | Facility: CLINIC | Age: 78
End: 2023-08-10
Attending: FAMILY MEDICINE
Payer: MEDICARE

## 2023-08-10 VITALS
DIASTOLIC BLOOD PRESSURE: 68 MMHG | SYSTOLIC BLOOD PRESSURE: 122 MMHG | HEART RATE: 91 BPM | OXYGEN SATURATION: 97 % | WEIGHT: 112.13 LBS | BODY MASS INDEX: 21.89 KG/M2

## 2023-08-10 DIAGNOSIS — N39.0 ACUTE UTI: ICD-10-CM

## 2023-08-10 DIAGNOSIS — N39.0 ACUTE UTI: Primary | ICD-10-CM

## 2023-08-10 LAB
BACTERIA #/AREA URNS HPF: ABNORMAL /HPF
BILIRUB UR QL STRIP: NEGATIVE
CLARITY UR: ABNORMAL
COLOR UR: YELLOW
GLUCOSE UR QL STRIP: NEGATIVE
HGB UR QL STRIP: ABNORMAL
KETONES UR QL STRIP: NEGATIVE
LEUKOCYTE ESTERASE UR QL STRIP: ABNORMAL
MICROSCOPIC COMMENT: ABNORMAL
NITRITE UR QL STRIP: POSITIVE
PH UR STRIP: 7 [PH] (ref 5–8)
PROT UR QL STRIP: NEGATIVE
RBC #/AREA URNS HPF: 3 /HPF (ref 0–4)
SP GR UR STRIP: <=1.005 (ref 1–1.03)
SQUAMOUS #/AREA URNS HPF: 1 /HPF
URN SPEC COLLECT METH UR: ABNORMAL
WBC #/AREA URNS HPF: 35 /HPF (ref 0–5)

## 2023-08-10 PROCEDURE — 87186 SC STD MICRODIL/AGAR DIL: CPT | Performed by: INTERNAL MEDICINE

## 2023-08-10 PROCEDURE — 99213 OFFICE O/P EST LOW 20 MIN: CPT | Mod: PBBFAC,PO | Performed by: INTERNAL MEDICINE

## 2023-08-10 PROCEDURE — 87086 URINE CULTURE/COLONY COUNT: CPT | Performed by: INTERNAL MEDICINE

## 2023-08-10 PROCEDURE — 87077 CULTURE AEROBIC IDENTIFY: CPT | Performed by: INTERNAL MEDICINE

## 2023-08-10 PROCEDURE — 99213 PR OFFICE/OUTPT VISIT, EST, LEVL III, 20-29 MIN: ICD-10-PCS | Mod: S$PBB,,, | Performed by: INTERNAL MEDICINE

## 2023-08-10 PROCEDURE — 99999 PR PBB SHADOW E&M-EST. PATIENT-LVL III: ICD-10-PCS | Mod: PBBFAC,,, | Performed by: INTERNAL MEDICINE

## 2023-08-10 PROCEDURE — 81000 URINALYSIS NONAUTO W/SCOPE: CPT | Mod: PO | Performed by: INTERNAL MEDICINE

## 2023-08-10 PROCEDURE — 99213 OFFICE O/P EST LOW 20 MIN: CPT | Mod: S$PBB,,, | Performed by: INTERNAL MEDICINE

## 2023-08-10 PROCEDURE — 87088 URINE BACTERIA CULTURE: CPT | Performed by: INTERNAL MEDICINE

## 2023-08-10 PROCEDURE — 99999 PR PBB SHADOW E&M-EST. PATIENT-LVL III: CPT | Mod: PBBFAC,,, | Performed by: INTERNAL MEDICINE

## 2023-08-10 RX ORDER — NITROFURANTOIN 25; 75 MG/1; MG/1
100 CAPSULE ORAL 2 TIMES DAILY
Qty: 10 CAPSULE | Refills: 0 | Status: SHIPPED | OUTPATIENT
Start: 2023-08-10 | End: 2023-08-15

## 2023-08-10 NOTE — PROGRESS NOTES
Subjective     Veronique Johnson is a 77 y.o. old, female here for Urinary Tract Infection    Patient is here with complaints of typical UTI symptoms the past 3 days with dysuria and pelvic discomfort.    ROS  Medications     Outpatient Medications Marked as Taking for the 8/10/23 encounter (Office Visit) with Rajesh Melissa MD   Medication Sig Dispense Refill    ALPRAZolam (XANAX) 0.25 MG tablet TAKE 1 TABLET (0.25 MG TOTAL) BY MOUTH 2 (TWO) TIMES DAILY AS NEEDED FOR ANXIETY. 60 tablet 5    amLODIPine (NORVASC) 5 MG tablet TAKE 1 TABLET (5 MG TOTAL) BY MOUTH ONCE DAILY. 90 tablet 3    buPROPion (WELLBUTRIN XL) 300 MG 24 hr tablet TAKE 1 TABLET (300 MG TOTAL) BY MOUTH ONCE DAILY. 90 tablet 3    cyanocobalamin 500 MCG tablet Take 500 mcg by mouth once daily.      hyoscyamine (LEVSIN) 0.125 mg/5 mL Elix Take 125 mcg by mouth every 4 (four) hours as needed.      lipase-protease-amylase (CREON) 36,000-114,000- 180,000 unit CpDR TAKE 2 CAPSULE BY MOUTH 3 (THREE) TIMES DAILY WITH MEALS. & 1 CAPSULE WITH SNACKS 240 capsule 11    loperamide (IMODIUM) 1 mg/5 mL solution Take by mouth every 6 (six) hours as needed for Diarrhea.      montelukast (SINGULAIR) 10 mg tablet Take 10 mg by mouth every evening.      multivitamin capsule Take 1 capsule by mouth.      omeprazole (PRILOSEC) 20 MG capsule TAKE 1 CAPSULE (20 MG TOTAL) BY MOUTH ONCE DAILY. 90 capsule 3    rosuvastatin (CRESTOR) 20 MG tablet Take 1 tablet (20 mg total) by mouth every evening. 90 tablet 3     Objective     /68   Pulse 91   Wt 50.8 kg (112 lb 1.7 oz)   SpO2 97%   BMI 21.89 kg/m²   Physical Exam  Assessment and Plan     Acute UTI  -     Urine culture  -     Urinalysis; Future; Expected date: 08/10/2023  -     nitrofurantoin, macrocrystal-monohydrate, (MACROBID) 100 MG capsule; Take 1 capsule (100 mg total) by mouth 2 (two) times daily. for 5 days  Dispense: 10 capsule; Refill: 0    Symptoms consistent witht he above.    Start macrobid,  culture pending.    No follow-ups on file.  ___________________  Rajesh Melissa MD  Internal Medicine and Pediatrics

## 2023-08-12 LAB — BACTERIA UR CULT: ABNORMAL

## 2023-08-17 ENCOUNTER — PATIENT MESSAGE (OUTPATIENT)
Dept: FAMILY MEDICINE | Facility: CLINIC | Age: 78
End: 2023-08-17
Payer: MEDICARE

## 2023-08-17 RX ORDER — CIPROFLOXACIN 500 MG/1
500 TABLET ORAL 2 TIMES DAILY
Qty: 14 TABLET | Refills: 0 | Status: SHIPPED | OUTPATIENT
Start: 2023-08-17 | End: 2023-08-24 | Stop reason: ALTCHOICE

## 2023-08-23 NOTE — PROGRESS NOTES
ALLERGY & IMMUNOLOGY CLINIC -  NEW PATIENT     HISTORY OF PRESENT ILLNESS     Patient ID: Veronique Johnson is a 77 y.o. female    CC:   Chief Complaint   Patient presents with    Other     Discuss allergy testing for foods        HPI: Veronique Johnson is a 77 y.o. female presents for evaluation of:    08/25/2023  States she has been experiencing esophageal spasms and dysphagia symptoms for the previous 8 months. Around that time was also diagnosed with pancreatic insufficiency and was started on Creon. She has continued to experience bouts of watery diarrhea and has had endoscopy performed showing eosinophilic microabscesses. Endorses >30 pound weight loss as well during that time. Denies nausea, vomiting. Also has been diagnosed with lactose intolerance as well for which she now takes lactaid. For the previous month, she has noticed her diarrhea illness has been improving. For the previous 3 weeks, she had been adhering to a strict 6- food elimination diet.       H/o Asthma: denies  H/o Eczema: denies  H/o Rhinitis: +Dust mites, Lasted tested 70 years   Oral Allergy:  denies  Food Allergy: denies  Venom Allergy: denies  Latex Allergy: denies  Env/Occ: denies any environmental or occupational exposures     REVIEW OF SYSTEMS     CONST: no F/C/NS  Balance of review of systems negative except as mentioned above     MEDICAL HISTORY     MedHx: active problems reviewed  SurgHx:   Past Surgical History:   Procedure Laterality Date    CATARACT EXTRACTION, BILATERAL  2006    COLONOSCOPY  09/05/2018    Dr. Leija, in procedures: diverticulosis, 4 colon polyps removed, adenomatous polyps x3 & benign mucosal polyps    COLONOSCOPY N/A 4/22/2021    Procedure: COLONOSCOPY;  Surgeon: Dwayne Santoyo MD;  Location: [facility] ENDO;  Service: Endoscopy;  Laterality: N/A;    CORONARY ANGIOGRAPHY N/A 10/14/2021    Procedure: ANGIOGRAM, CORONARY ARTERY;  Surgeon: Mustapha Manzo MD;  Location: [facility] CATH;  Service: Cardiology;  Laterality:  N/A;    CYSTOSCOPY WITH HYDRODISTENSION OF BLADDER N/A 6/5/2019    Procedure: CYSTOSCOPY, WITH BLADDER HYDRODISTENSION;  Surgeon: Marko Burton MD;  Location: [facility] OR;  Service: OB/GYN;  Laterality: N/A;    DILATION AND CURETTAGE OF UTERUS  1966    ESOPHAGEAL MANOMETRY WITH MEASUREMENT OF IMPEDANCE N/A 1/6/2023    Procedure: MANOMETRY, ESOPHAGUS, WITH IMPEDANCE MEASUREMENT;  Surgeon: Vitaliy Hatfield MD;  Location: [facility] (4TH FLR);  Service: Endoscopy;  Laterality: N/A;  instructions sent to [facility]  Precall done. 0900 arrival time confirmed. SG  instr portal -ml    ESOPHAGOGASTRODUODENOSCOPY  09/05/2018    Dr. Leija, in procedures: gastritis; biopsy: duodenum unremarkable, with focal benign gastric metaplasia, stomach- minimal chronic gastritis, negative for h pylori    ESOPHAGOGASTRODUODENOSCOPY N/A 4/22/2021    Procedure: EGD (ESOPHAGOGASTRODUODENOSCOPY);  Surgeon: Dwayne Santoyo MD;  Location: [facility];  Service: Endoscopy;  Laterality: N/A;    ESOPHAGOGASTRODUODENOSCOPY N/A 3/10/2023    Procedure: EGD (ESOPHAGOGASTRODUODENOSCOPY);  Surgeon: Bere Mclean MD;  Location: [facility] (2ND FLR);  Service: Endoscopy;  Laterality: N/A;  Endoflip  2nd floor due to availability  propofol only  instructions sent to [facility]    EYE SURGERY      HEMORRHOID SURGERY  1997    KNEE ARTHROSCOPY W/ MENISCECTOMY Right 8/13/2021    Procedure: ARTHROSCOPY, KNEE, WITH MENISCECTOMY;  Surgeon: Shelton Le MD;  Location: [facility] OR;  Service: Orthopedics;  Laterality: Right;    KNEE ARTHROSCOPY W/ MENISCECTOMY Right 4/8/2022    Procedure: ARTHROSCOPY, KNEE, WITH MENISCECTOMY;  Surgeon: Shelton Le MD;  Location: [facility] OR;  Service: Orthopedics;  Laterality: Right;  medial meniscectomy    LEFT HEART CATHETERIZATION Right 10/14/2021    Procedure: Left heart cath;  Surgeon: Mustapha Manzo MD;  Location: [facility] CATH;  Service: Cardiology;  Laterality: Right;    ROBOTIC ARTHROPLASTY, KNEE,  UNICOMPARTMENTAL Right 8/16/2022    Procedure: ROBOTIC ARTHROPLASTY, KNEE, UNICOMPARTMENTAL;  Surgeon: Shelton Le MD;  Location: [facility];  Service: Orthopedics;  Laterality: Right;    TONSILLECTOMY  1954    TUBAL LIGATION  1990       SocHx:   -Denies smoking history and illicit drug use     FamHx:   Otherwise no Family History of asthma, allergic rhinitis, atopic dermatitis, drug allergy, food allergy, venom allergy or immune deficiency.     Allergies: see below  Medications: MAR reviewed       PHYSICAL EXAM     VS: Pulse 97   Ht 5' (1.524 m)   Wt 51.2 kg (112 lb 15.8 oz)   SpO2 95%   BMI 22.07 kg/m²   GENERAL: awake, alert, cooperative with exam  EYES: PERRL, EOMI, no conjunctival injection, no discharge, no infraorbital shiners  EARS: external auditory canals normal B/L, TM normal B/L  ORAL: MMM, no ulcers, no thrush, no cobblestoning  NECK: supple, trachea midline, no cervical or submandibular LAD  LUNGS: CTAB, no w/r/c, no increased WOB  HEART: Normal Rate and regular rhythm, normal S1/S2, no m/g/r  ABDOMEN: Normoactive bowel sounds, soft, non-tender, non-distended, no HSM  EXTREMITIES: +2 distal pulses, no c/c/e  DERM: no rashes, no skin breaks     LABORATORY STUDIES     Component      Latest Ref Rng & Units 6/5/2023   WBC      3.90 - 12.70 K/uL 4.93   Gran # (ANC)      1.8 - 7.7 K/uL 2.9   Immature Grans (Abs)      0.00 - 0.04 K/uL 0.01   Lymph #      1.0 - 4.8 K/uL 1.2   Mono #      0.3 - 1.0 K/uL 0.5   Eos #      0.0 - 0.5 K/uL 0.3   Baso #      0.00 - 0.20 K/uL 0.03        IMAGING & OTHER DIAGNOSTICS   3/10/2023  RELIAPATH DIAGNOSIS:   A.   DUODENUM, 2ND PORTION, BIOPSY:          - Benign duodenal mucosa showing a focal eosinophilic microabscess   within epithelium, see comment.          - No intraepithelial lymphocytosis, villous blunting or features of   celiac disease identified.   B.   STOMACH, ANTRUM/BODY/ANGULARIS, BIOPSY:          - Benign gastric mucosa showing no diagnostic  abnormality.          - No intestinal metaplasia or dysplasia identified.          - No Helicobacter organisms or increased eosinophils identified.   C.   ESOPHAGUS, PROXIMAL/MID, BIOPSY:          - Benign squamous esophageal mucosa showing mild reactive changes.          - No increased eosinophils identified.   COMMENT:   A). The clustering of eosinophils within the crypt epithelium can be seen in   eosinophilic enteritis but as an isolated finding is not diagnostic.   Eosinophils are not increased in the other biopsied areas. Clinical follow up   and correlation are recommended.      ASSESSMENT/PLAN     Veronique Johnson is a 77 y.o. female with     1. Eosinophilic duodenitis  77 year old with pancreatic insufficiency and lactose intolerance with 8 month history of diarrhea that has improved with several therapeutic interventions including elimination diet and creon. Discussed that much more from an allergy perspective is known about EoE compared to other Eosinophilic GI disorders. Extrapolating the data from EoE, we discussed that skin prick testing has an approximately 50% failure rate in achieving histologic remission and improvement in symptoms. Discussed that an elemental diet is the best studied of all dietary adjustments, but also is noted to be difficult to adhere. Very low positive and negative predictive values exist for allergy-directed food elimination. Discussed 2, 4 and 6 food elimination diets as well as other therapies such as mast cell stabilizers and LTRA. Will trial 2 food elimination diet given difficulty with 6 food elimination diet. Additionally prescribed Cromolyn to be used 4 times daily as it has shown some efficacy in short and long term control of EoE/EGID        Follow up: 6 weeks      Amarjit Villarreal MD    I spent a total of 60 minutes on the day of the visit. This includes face to face time and non-face to face time preparing to see the patient (eg, review of tests), obtaining and/or  reviewing separately obtained history, documenting clinical information in the electronic or other health record, independently interpreting results and communicating results to the patient/family/caregiver, or care coordinator.

## 2023-08-24 ENCOUNTER — OFFICE VISIT (OUTPATIENT)
Dept: FAMILY MEDICINE | Facility: CLINIC | Age: 78
End: 2023-08-24
Payer: MEDICARE

## 2023-08-24 VITALS
HEIGHT: 60 IN | HEART RATE: 98 BPM | OXYGEN SATURATION: 96 % | RESPIRATION RATE: 18 BRPM | WEIGHT: 114.44 LBS | BODY MASS INDEX: 22.47 KG/M2 | DIASTOLIC BLOOD PRESSURE: 68 MMHG | SYSTOLIC BLOOD PRESSURE: 122 MMHG

## 2023-08-24 DIAGNOSIS — N39.0 FREQUENT UTI: Primary | ICD-10-CM

## 2023-08-24 DIAGNOSIS — N30.10 INTERSTITIAL CYSTITIS: ICD-10-CM

## 2023-08-24 PROCEDURE — 99214 OFFICE O/P EST MOD 30 MIN: CPT | Mod: S$PBB,,, | Performed by: FAMILY MEDICINE

## 2023-08-24 PROCEDURE — 99999 PR PBB SHADOW E&M-EST. PATIENT-LVL IV: CPT | Mod: PBBFAC,,, | Performed by: FAMILY MEDICINE

## 2023-08-24 PROCEDURE — 99214 PR OFFICE/OUTPT VISIT, EST, LEVL IV, 30-39 MIN: ICD-10-PCS | Mod: S$PBB,,, | Performed by: FAMILY MEDICINE

## 2023-08-24 PROCEDURE — 99999 PR PBB SHADOW E&M-EST. PATIENT-LVL IV: ICD-10-PCS | Mod: PBBFAC,,, | Performed by: FAMILY MEDICINE

## 2023-08-24 PROCEDURE — 99214 OFFICE O/P EST MOD 30 MIN: CPT | Mod: PBBFAC,PO | Performed by: FAMILY MEDICINE

## 2023-08-24 RX ORDER — ALBUTEROL SULFATE 90 UG/1
2 AEROSOL, METERED RESPIRATORY (INHALATION)
COMMUNITY
Start: 2023-06-30

## 2023-08-24 NOTE — PROGRESS NOTES
Subjective:       Patient ID: Veronique Johnson is a 77 y.o. female.    Chief Complaint: Urinary Tract Infection    HPI:  Pleasant 77-year-old patient here today to follow-up on UTI treatment.  The patient had E coli that was sensitive to all antibiotics tested.  She had symptoms up until yesterday but she is a little bit better today.  No signs of sepsis afebrile vital signs stable.  She has seen Urology in [facility] in the past.  No extensive surgery has been done.  I suspect she needs to go back to Urology to see this anything more they can do.  Apparently they did do some injections to help with interstitial cystitis.    Review of Systems   Constitutional: Negative.    HENT: Negative.     Eyes: Negative.    Respiratory: Negative.     Cardiovascular: Negative.    Gastrointestinal: Negative.    Endocrine: Negative.    Genitourinary: Negative.    Musculoskeletal: Negative.    Skin: Negative.    Allergic/Immunologic: Negative.    Neurological: Negative.    Hematological: Negative.    Psychiatric/Behavioral: Negative.         Objective:      Vitals:    08/24/23 1521   BP: 122/68   Pulse: 98   Resp: 18   SpO2: 96%   Weight: 51.9 kg (114 lb 6.7 oz)   Height: 5' (1.524 m)      Physical Exam    Results for orders placed or performed in visit on 08/10/23   Urinalysis   Result Value Ref Range    Specimen UA Urine, Clean Catch     Color, UA Yellow Yellow, Straw, Tania    Appearance, UA Hazy (A) Clear    pH, UA 7.0 5.0 - 8.0    Specific Gravity, UA <=1.005 (A) 1.005 - 1.030    Protein, UA Negative Negative    Glucose, UA Negative Negative    Ketones, UA Negative Negative    Bilirubin (UA) Negative Negative    Occult Blood UA 1+ (A) Negative    Nitrite, UA Positive (A) Negative    Leukocytes, UA 3+ (A) Negative   Urinalysis Microscopic   Result Value Ref Range    RBC, UA 3 0 - 4 /hpf    WBC, UA 35 (H) 0 - 5 /hpf    Bacteria Moderate (A) None-Occ /hpf    Squam Epithel, UA 1 /hpf    Microscopic Comment SEE COMMENT        Assessment:       1. Frequent UTI    2. Interstitial cystitis        Plan:       Frequent UTI  -     Ambulatory referral/consult to Urology; Future; Expected date: 08/31/2023    Interstitial cystitis          Medication List with Changes/Refills   Current Medications    ALBUTEROL (PROVENTIL/VENTOLIN HFA) 90 MCG/ACTUATION INHALER    Inhale 2 puffs into the lungs.    ALPRAZOLAM (XANAX) 0.25 MG TABLET    TAKE 1 TABLET (0.25 MG TOTAL) BY MOUTH 2 (TWO) TIMES DAILY AS NEEDED FOR ANXIETY.    AMLODIPINE (NORVASC) 5 MG TABLET    TAKE 1 TABLET (5 MG TOTAL) BY MOUTH ONCE DAILY.    BUPROPION (WELLBUTRIN XL) 300 MG 24 HR TABLET    TAKE 1 TABLET (300 MG TOTAL) BY MOUTH ONCE DAILY.    FLUTICASONE FUROATE-VILANTEROL (BREO) 100-25 MCG/DOSE DISKUS INHALER    Inhale 1 puff into the lungs once daily. Controller    HYOSCYAMINE (LEVSIN) 0.125 MG/5 ML ELIX    Take 125 mcg by mouth every 4 (four) hours as needed.    LIPASE-PROTEASE-AMYLASE (CREON) 36,000-114,000- 180,000 UNIT CPDR    TAKE 2 CAPSULE BY MOUTH 3 (THREE) TIMES DAILY WITH MEALS. & 1 CAPSULE WITH SNACKS    LOPERAMIDE (IMODIUM) 1 MG/5 ML SOLUTION    Take by mouth every 6 (six) hours as needed for Diarrhea.    MONTELUKAST (SINGULAIR) 10 MG TABLET    Take 10 mg by mouth every evening.    OMEPRAZOLE (PRILOSEC) 20 MG CAPSULE    TAKE 1 CAPSULE (20 MG TOTAL) BY MOUTH ONCE DAILY.    ROSUVASTATIN (CRESTOR) 20 MG TABLET    Take 1 tablet (20 mg total) by mouth every evening.   Discontinued Medications    CIPROFLOXACIN HCL (CIPRO) 500 MG TABLET    Take 1 tablet (500 mg total) by mouth 2 (two) times daily. for 7 days    CYANOCOBALAMIN 500 MCG TABLET    Take 500 mcg by mouth once daily.    MULTIVITAMIN CAPSULE    Take 1 capsule by mouth.

## 2023-08-25 ENCOUNTER — OFFICE VISIT (OUTPATIENT)
Dept: ALLERGY | Facility: CLINIC | Age: 78
End: 2023-08-25
Payer: MEDICARE

## 2023-08-25 VITALS — OXYGEN SATURATION: 95 % | BODY MASS INDEX: 22.19 KG/M2 | HEART RATE: 97 BPM | WEIGHT: 113 LBS | HEIGHT: 60 IN

## 2023-08-25 DIAGNOSIS — D72.18 EOSINOPHILIC DUODENITIS: Primary | ICD-10-CM

## 2023-08-25 DIAGNOSIS — K29.80 EOSINOPHILIC DUODENITIS: Primary | ICD-10-CM

## 2023-08-25 PROCEDURE — 99213 OFFICE O/P EST LOW 20 MIN: CPT | Mod: PBBFAC,PO | Performed by: STUDENT IN AN ORGANIZED HEALTH CARE EDUCATION/TRAINING PROGRAM

## 2023-08-25 PROCEDURE — 99205 OFFICE O/P NEW HI 60 MIN: CPT | Mod: S$GLB,,, | Performed by: STUDENT IN AN ORGANIZED HEALTH CARE EDUCATION/TRAINING PROGRAM

## 2023-08-25 PROCEDURE — 99999 PR PBB SHADOW E&M-EST. PATIENT-LVL III: ICD-10-PCS | Mod: PBBFAC,,, | Performed by: STUDENT IN AN ORGANIZED HEALTH CARE EDUCATION/TRAINING PROGRAM

## 2023-08-25 PROCEDURE — 99999 PR PBB SHADOW E&M-EST. PATIENT-LVL III: CPT | Mod: PBBFAC,,, | Performed by: STUDENT IN AN ORGANIZED HEALTH CARE EDUCATION/TRAINING PROGRAM

## 2023-08-25 PROCEDURE — 99205 PR OFFICE/OUTPT VISIT, NEW, LEVL V, 60-74 MIN: ICD-10-PCS | Mod: S$GLB,,, | Performed by: STUDENT IN AN ORGANIZED HEALTH CARE EDUCATION/TRAINING PROGRAM

## 2023-08-25 RX ORDER — CROMOLYN SODIUM 100 MG/5ML
200 SOLUTION, CONCENTRATE ORAL 4 TIMES DAILY
Qty: 1200 ML | Refills: 3 | Status: SHIPPED | OUTPATIENT
Start: 2023-08-25 | End: 2023-09-06

## 2023-08-28 ENCOUNTER — OFFICE VISIT (OUTPATIENT)
Dept: UROLOGY | Facility: CLINIC | Age: 78
End: 2023-08-28
Payer: MEDICARE

## 2023-08-28 VITALS — BODY MASS INDEX: 22.11 KG/M2 | WEIGHT: 112.63 LBS | HEIGHT: 60 IN

## 2023-08-28 DIAGNOSIS — R39.9 UTI SYMPTOMS: ICD-10-CM

## 2023-08-28 DIAGNOSIS — N39.0 FREQUENT UTI: Primary | ICD-10-CM

## 2023-08-28 LAB
BACTERIA #/AREA URNS HPF: ABNORMAL /HPF
BILIRUBIN, UA POC OHS: NEGATIVE
BLOOD, UA POC OHS: ABNORMAL
CLARITY, UA POC OHS: CLEAR
COLOR, UA POC OHS: YELLOW
GLUCOSE, UA POC OHS: NEGATIVE
KETONES, UA POC OHS: NEGATIVE
LEUKOCYTES, UA POC OHS: NEGATIVE
MICROSCOPIC COMMENT: ABNORMAL
NITRITE, UA POC OHS: NEGATIVE
PH, UA POC OHS: 6
PROTEIN, UA POC OHS: 30
RBC #/AREA URNS HPF: 2 /HPF (ref 0–4)
SPECIFIC GRAVITY, UA POC OHS: 1.02
SQUAMOUS #/AREA URNS HPF: 1 /HPF
UROBILINOGEN, UA POC OHS: 0.2
WBC #/AREA URNS HPF: 1 /HPF (ref 0–5)
WBC CLUMPS URNS QL MICRO: ABNORMAL

## 2023-08-28 PROCEDURE — 99999PBSHW POCT URINALYSIS(INSTRUMENT): ICD-10-PCS | Mod: PBBFAC,,,

## 2023-08-28 PROCEDURE — 87086 URINE CULTURE/COLONY COUNT: CPT

## 2023-08-28 PROCEDURE — 99999 PR PBB SHADOW E&M-EST. PATIENT-LVL IV: ICD-10-PCS | Mod: PBBFAC,,,

## 2023-08-28 PROCEDURE — 99999PBSHW POCT URINALYSIS(INSTRUMENT): Mod: PBBFAC,,,

## 2023-08-28 PROCEDURE — 99214 OFFICE O/P EST MOD 30 MIN: CPT | Mod: S$PBB,,,

## 2023-08-28 PROCEDURE — 99214 PR OFFICE/OUTPT VISIT, EST, LEVL IV, 30-39 MIN: ICD-10-PCS | Mod: S$PBB,,,

## 2023-08-28 PROCEDURE — 99214 OFFICE O/P EST MOD 30 MIN: CPT | Mod: PBBFAC,PO

## 2023-08-28 PROCEDURE — 99999 PR PBB SHADOW E&M-EST. PATIENT-LVL IV: CPT | Mod: PBBFAC,,,

## 2023-08-28 PROCEDURE — 81000 URINALYSIS NONAUTO W/SCOPE: CPT | Mod: PO

## 2023-08-28 PROCEDURE — 81003 URINALYSIS AUTO W/O SCOPE: CPT | Mod: PBBFAC,PO

## 2023-08-28 RX ORDER — ESTRADIOL 0.1 MG/G
1 CREAM VAGINAL DAILY
Qty: 30 G | Refills: 11 | Status: SHIPPED | OUTPATIENT
Start: 2023-08-28 | End: 2024-03-27

## 2023-08-28 NOTE — PROGRESS NOTES
[facility] Note  Staff: Quin Conroy FNP-C    PCP: MD Nohemi    Chief Complaint: Recurrent UTIs    Subjective:        HPI: Veronique Johnson is a 77 y.o. female new patient to me presents today for evaluation of recurrent UTIs. She is established to our office and has been seen by Dr. Cunningham. She has a history of IC. She states she was recently seen by PCP for a UTI. Her urine culture from 8/10/2023 was positive for e coli. She was initially treated with a 5 day course of macrobid. Her symptoms did not improve and she was then treated with a 7 day course of cipro. She states her symptoms were dysuria and urgency. She states her symptoms have improved but she does feel some lower abd pain. She denies dysuria, hematuria, fever, flank pain, incontinence, and difficulty urinating. She has also seen Dr. Burton and completed pelvic floor PT years ago. We discussed the use of estrogen vaginal cream as a preventative for UTIs. She denies a history of kidney stones. She is currently not taking any bladder medications.     Questions asked pt during office visit today:  Urgency:No, incontinence with urgency? No;   DysuriaNo  Gross HematuriaNo  History of UTI: Yes     History of Kidney Stones?:  No    Constipation issues?:  No    REVIEW OF SYSTEMS:  Review of Systems   Constitutional: Negative.  Negative for chills and fever.   HENT: Negative.     Eyes: Negative.    Respiratory: Negative.     Cardiovascular: Negative.    Gastrointestinal:  Positive for abdominal pain and diarrhea. Negative for constipation, nausea and vomiting.   Genitourinary: Negative.  Negative for dysuria, flank pain, frequency, hematuria and urgency.   Musculoskeletal: Negative.  Negative for back pain.   Skin: Negative.    Neurological: Negative.    Endo/Heme/Allergies: Negative.    Psychiatric/Behavioral: Negative.         PMHx:  Past Medical History:   Diagnosis Date    Anemia     Anxiety     Chronic bronchitis with COPD  (chronic obstructive pulmonary disease)     Esophageal spasm     Essential tremor     GERD (gastroesophageal reflux disease)     Headache     High cholesterol     Hypertension     Meniere disease     Multiple sclerosis     Muscle spasm     Pancreatic insufficiency        PSHx:  Past Surgical History:   Procedure Laterality Date    CATARACT EXTRACTION, BILATERAL  2006    COLONOSCOPY  09/05/2018    Dr. Leija in procedures: diverticulosis, 4 colon polyps removed, adenomatous polyps x3 & benign mucosal polyps    COLONOSCOPY N/A 4/22/2021    Procedure: COLONOSCOPY;  Surgeon: Dwayne Santoyo MD;  Location: [facility];  Service: Endoscopy;  Laterality: N/A;    CORONARY ANGIOGRAPHY N/A 10/14/2021    Procedure: ANGIOGRAM, CORONARY ARTERY;  Surgeon: Mustapha Manzo MD;  Location: [facility] CATH;  Service: Cardiology;  Laterality: N/A;    CYSTOSCOPY WITH HYDRODISTENSION OF BLADDER N/A 6/5/2019    Procedure: CYSTOSCOPY, WITH BLADDER HYDRODISTENSION;  Surgeon: Marko Burton MD;  Location: [facility] OR;  Service: OB/GYN;  Laterality: N/A;    DILATION AND CURETTAGE OF UTERUS  1966    ESOPHAGEAL MANOMETRY WITH MEASUREMENT OF IMPEDANCE N/A 1/6/2023    Procedure: MANOMETRY, ESOPHAGUS, WITH IMPEDANCE MEASUREMENT;  Surgeon: Vitaliy Hatfield MD;  Location: [facility] ([address] [place] [address]);  Service: Endoscopy;  Laterality: N/A;  instructions sent to [facility] Precall done. 0900 arrival time confirmed. SG  instr portal -ml    ESOPHAGOGASTRODUODENOSCOPY  09/05/2018    Dr. Leija, in procedures: gastritis; biopsy: duodenum unremarkable, with focal benign gastric metaplasia, stomach- minimal chronic gastritis, negative for h pylori    ESOPHAGOGASTRODUODENOSCOPY N/A 4/22/2021    Procedure: EGD (ESOPHAGOGASTRODUODENOSCOPY);  Surgeon: Dwayne Santoyo MD;  Location: [facility];  Service: Endoscopy;  Laterality: N/A;    ESOPHAGOGASTRODUODENOSCOPY N/A 3/10/2023    Procedure: EGD (ESOPHAGOGASTRODUODENOSCOPY);  Surgeon: Bere Mclean MD;  Location: [facility]  ENDO (2ND FLR);  Service: Endoscopy;  Laterality: N/A;  Endoflip  2nd floor due to availability  propofol only  instructions sent to [facility]    EYE SURGERY      HEMORRHOID SURGERY  1997    KNEE ARTHROSCOPY W/ MENISCECTOMY Right 8/13/2021    Procedure: ARTHROSCOPY, KNEE, WITH MENISCECTOMY;  Surgeon: Shelton Le MD;  Location: [facility] OR;  Service: Orthopedics;  Laterality: Right;    KNEE ARTHROSCOPY W/ MENISCECTOMY Right 4/8/2022    Procedure: ARTHROSCOPY, KNEE, WITH MENISCECTOMY;  Surgeon: Shelton Le MD;  Location: [facility] OR;  Service: Orthopedics;  Laterality: Right;  medial meniscectomy    LEFT HEART CATHETERIZATION Right 10/14/2021    Procedure: Left heart cath;  Surgeon: Mustapha Manzo MD;  Location: [facility] CATH;  Service: Cardiology;  Laterality: Right;    ROBOTIC ARTHROPLASTY, KNEE, UNICOMPARTMENTAL Right 8/16/2022    Procedure: ROBOTIC ARTHROPLASTY, KNEE, UNICOMPARTMENTAL;  Surgeon: Shelton Le MD;  Location: [facility] OR;  Service: Orthopedics;  Laterality: Right;    TONSILLECTOMY  1954    TUBAL LIGATION  1990       Fam Hx:   malignancies: No , gyn malignancies: No   kidney stones: No     Soc Hx:  , lives in [place]    Allergies:  Cephalosporins, Iodinated contrast media, Iodine, and Penicillins    Medications: reviewed     Objective:   There were no vitals filed for this visit.    Physical Exam  Constitutional:       Appearance: Normal appearance.   HENT:      Head: Normocephalic.      Mouth/Throat:      Mouth: Mucous membranes are moist.   Eyes:      Conjunctiva/sclera: Conjunctivae normal.   Pulmonary:      Effort: Pulmonary effort is normal.   Abdominal:      General: There is no distension.      Palpations: Abdomen is soft.      Tenderness: There is no abdominal tenderness. There is no right CVA tenderness or left CVA tenderness.   Musculoskeletal:         General: Normal range of motion.      Cervical back: Normal range of motion.   Skin:     General: Skin is  warm.   Neurological:      Mental Status: She is alert and oriented to person, place, and time.   Psychiatric:         Mood and Affect: Mood normal.         Behavior: Behavior normal.         LABS REVIEW:  UA today:  cath urine  Color:Clear, Yellow  Spec. Grav.  1.020  PH  6.0  Negative for leukocytes, nitrates, glucose, ketones, urobili, bili  Small blood  Positive protein    Assessment:       1. Frequent UTI    2. UTI symptoms          Plan:      Urine sent for micro UA and urine culture  Estrace vaginal cream (1 fingertip of cream at urethral opening first and then external vagina 3 times per week) prescribed to pt today as trial to see if med improves pt's current LUTS.  Benefits, risks and side affects were thoroughly explained to pt today in office with all questions answered.    F/u As Needed    [facility]: Active    SUMAN Cardoso

## 2023-08-28 NOTE — PATIENT INSTRUCTIONS
Thank you very much for reaching out with questions about your experience with recurrent urinary tract infections. We want to make sure that all of our patients have all of the information they need to properly deal with such a troublesome problem. Hopefully, this message will be helpful to you.     Urinary tract infections (UTI) are considered recurrent when they happen 3 (or more) times in a year or 2 (or more) times in 6 months. It's important to remember that infections should be associated with symptoms such as burning, pain, urinary frequency or new urinary incontinence. Each infection should also be confirmed with a urine culture. Cloudy urine or strong-smelling urine alone without other accompanying symptoms is generally not considered an indication of an infection.     Some patients with recurrent UTI will require an more extensive evaluation if there is a concern for an underlying abnormality. There are many reasons why this might be suspected but a previous urologic surgery or not emptying your bladder completely are the most common ones.  Additional studies might include a CT or ultrasound of the kidney. Some patients may need to have the bladder checked with a small scope. Much of the time, such testing is not needed.     Perhaps 10-20% of people may be found to have an abnormality of the urinary tract which explains their infections. But, for most people, there is no obvious problem found to clearly explain the infections. It may be that some people are naturally more likely than others to develop a UTI. For some, changes that occur with age, including menopause, may contribute to infections. It is very likely that recent use of antibiotics considerably increases the risk of another UTI.      Once a Urology evaluation is complete, you do not need to continue to follow in the Urology Department unless we have requested that you return. You do not need to return to the Urology Department to have  treatment for future infections.  We can provide you with advice on ways to reduce your risk of further infections. This might include over-the-counter medications or even prescriptions. Unfortunately, for some patients, it is not possible to eliminate their increased risk of infection and you may continue to experience frequent infections.     If you have symptoms of a urinary tract infection, an urgent care center is often the quickest place to be evaluated and treated for a urinary tract infection. An appointment with your primary care provider is an alternative if an urgent care center is not available. Unfortunately, the Urology Department is not an ideal location to have a suspected UTI evaluated and most of the time you will be asked to be evaluated at an urgent care center if you reach out to us about a potential infection. We do not recommend beginning antibiotic therapy for a possible UTI without checking a urine culture first.     If we found an abnormality at the time of your evaluation (such as a stone or incomplete emptying), we will work to correct the surgical problem. If you either did not need an extensive evaluation (U/S, CT, cystoscopy, etc) or have completed a normal evaluation (this may be 80-90%), several measures may help to reduce the risk of future infections:     Cranberry Juice and Extracts - Chemicals found in cranberry may bind to infection-causing bacteria and prevent them from adhering to the bladder. It is reasonable for patients worried about infections to use this as a preventative with important limitations. Cranberry juice can be high in sugar which may cause problems in diabetics. Cranberry cocktails that contain only a portion of cranberry juice, often do not contain enough PAC to have the intended effect. There are studies showing effectiveness of cranberry extract but it is difficult to know the amount of the active chemicals in cranberry pills.      D-mannose - D-mannose  acts in much the same way as cranberry. D-mannose is well-tolerated and has been shown in several studies to have a significant impact on reducing risk of recurrent infection after completion of an antibiotic. Like cranberry, this should be taken twice daily for effect. I usually recommend that you take 1000 mg of D-mannose twice daily. The brand does not matter.     Probiotics - The impact of probiotics is unclear, largely due to the variety of available strains. There is more evidence supporting the use of the specific strains Lactobacillus rhamnosus GR-1 and Lactobacillus reuteri RC-14.  Culturelle and Rep[employer]resh both make probiotics designed for urinary tract health that contain these bacteria.     Estrogen  -  Vaginal estrogen therapy, which replaces the effects of estrogen on the vaginal tissues, reduces the risk of recurrent UTI in menopausal women. Women who are near or past menopause and experience recurrent UTI should consider using vaginal estrogen therapy to reduce their risk of infections. Given the risks for some patients, this should be discussed with your gynecologist.      I hope that this has provided some insight as well as next steps that you can take to reduce the risk of urinary tract infections. If we can be of further help, please do not hesitate to make an office appointment so that we can further discuss your needs.     Kindest regards,  [facility]

## 2023-08-30 LAB — BACTERIA UR CULT: NO GROWTH

## 2023-08-31 ENCOUNTER — EXTERNAL CHRONIC CARE MANAGEMENT (OUTPATIENT)
Dept: PRIMARY CARE CLINIC | Facility: CLINIC | Age: 78
End: 2023-08-31
Payer: MEDICARE

## 2023-08-31 PROCEDURE — 99490 CHRNC CARE MGMT STAFF 1ST 20: CPT | Mod: PBBFAC,PO | Performed by: FAMILY MEDICINE

## 2023-08-31 PROCEDURE — 99439 CHRNC CARE MGMT STAF EA ADDL: CPT | Mod: S$PBB,,, | Performed by: FAMILY MEDICINE

## 2023-08-31 PROCEDURE — 99439 PR CHRONIC CARE MGMT, EA ADDTL 20 MIN: ICD-10-PCS | Mod: S$PBB,,, | Performed by: FAMILY MEDICINE

## 2023-08-31 PROCEDURE — 99439 CHRNC CARE MGMT STAF EA ADDL: CPT | Mod: PBBFAC,PO | Performed by: FAMILY MEDICINE

## 2023-08-31 PROCEDURE — 99490 PR CHRONIC CARE MGMT, 1ST 20 MIN: ICD-10-PCS | Mod: S$PBB,,, | Performed by: FAMILY MEDICINE

## 2023-08-31 PROCEDURE — 99490 CHRNC CARE MGMT STAFF 1ST 20: CPT | Mod: S$PBB,,, | Performed by: FAMILY MEDICINE

## 2023-09-05 ENCOUNTER — PATIENT MESSAGE (OUTPATIENT)
Dept: ALLERGY | Facility: CLINIC | Age: 78
End: 2023-09-05
Payer: MEDICARE

## 2023-09-06 RX ORDER — CROMOLYN SODIUM 100 MG/5ML
200 SOLUTION, CONCENTRATE ORAL 4 TIMES DAILY
Qty: 1200 ML | Refills: 3 | Status: SHIPPED | OUTPATIENT
Start: 2023-09-06 | End: 2023-10-06

## 2023-09-06 NOTE — TELEPHONE ENCOUNTER
New RX for Cromolyn faxed to [facility] at [phone] per pts request.  Original uploaded to .

## 2023-09-12 ENCOUNTER — LAB VISIT (OUTPATIENT)
Dept: LAB | Facility: HOSPITAL | Age: 78
End: 2023-09-12
Payer: MEDICARE

## 2023-09-12 ENCOUNTER — PATIENT MESSAGE (OUTPATIENT)
Dept: UROLOGY | Facility: CLINIC | Age: 78
End: 2023-09-12
Payer: MEDICARE

## 2023-09-12 ENCOUNTER — TELEPHONE (OUTPATIENT)
Dept: UROLOGY | Facility: CLINIC | Age: 78
End: 2023-09-12
Payer: MEDICARE

## 2023-09-12 DIAGNOSIS — R39.9 UTI SYMPTOMS: ICD-10-CM

## 2023-09-12 DIAGNOSIS — R39.9 UTI SYMPTOMS: Primary | ICD-10-CM

## 2023-09-12 PROCEDURE — 87086 URINE CULTURE/COLONY COUNT: CPT

## 2023-09-14 LAB — BACTERIA UR CULT: NO GROWTH

## 2023-09-14 RX ORDER — OMEPRAZOLE 20 MG/1
20 CAPSULE, DELAYED RELEASE ORAL DAILY
Qty: 90 CAPSULE | Refills: 3 | Status: SHIPPED | OUTPATIENT
Start: 2023-09-14

## 2023-09-14 NOTE — TELEPHONE ENCOUNTER
No care due was identified.  [facility] Embedded Care Due Messages. Reference number: [id number].   9/14/2023 1:06:48 PM CDT

## 2023-09-29 NOTE — TELEPHONE ENCOUNTER
This is what I send to pt      I have received the biopsy results from your recent endoscopic procedure(s). They include the following:      Slightly increased eosinophils (inflammatory cells) in small bowel      EGD or colonoscopy is not a perfect exam. Rarely a significant finding can be missed. Do not ignore symptoms GI such as blood with your bowel movements or abdominal pain. Report these symptoms to your doctor if they occur.      Please schedule next available appointment with me.       Sincerely,   Dr. Mclean    No

## 2023-09-30 ENCOUNTER — EXTERNAL CHRONIC CARE MANAGEMENT (OUTPATIENT)
Dept: PRIMARY CARE CLINIC | Facility: CLINIC | Age: 78
End: 2023-09-30
Payer: MEDICARE

## 2023-09-30 PROCEDURE — 99490 CHRNC CARE MGMT STAFF 1ST 20: CPT | Mod: PBBFAC,PO | Performed by: FAMILY MEDICINE

## 2023-09-30 PROCEDURE — 99439 CHRNC CARE MGMT STAF EA ADDL: CPT | Mod: PBBFAC,PO | Performed by: FAMILY MEDICINE

## 2023-09-30 PROCEDURE — 99490 PR CHRONIC CARE MGMT, 1ST 20 MIN: ICD-10-PCS | Mod: S$PBB,,, | Performed by: FAMILY MEDICINE

## 2023-09-30 PROCEDURE — 99439 CHRNC CARE MGMT STAF EA ADDL: CPT | Mod: S$PBB,,, | Performed by: FAMILY MEDICINE

## 2023-09-30 PROCEDURE — 99490 CHRNC CARE MGMT STAFF 1ST 20: CPT | Mod: S$PBB,,, | Performed by: FAMILY MEDICINE

## 2023-09-30 PROCEDURE — 99439 PR CHRONIC CARE MGMT, EA ADDTL 20 MIN: ICD-10-PCS | Mod: S$PBB,,, | Performed by: FAMILY MEDICINE

## 2023-10-02 DIAGNOSIS — K86.89 PANCREATIC INSUFFICIENCY: ICD-10-CM

## 2023-10-02 RX ORDER — PANCRELIPASE 36000; 180000; 114000 [USP'U]/1; [USP'U]/1; [USP'U]/1
CAPSULE, DELAYED RELEASE PELLETS ORAL
Qty: 240 CAPSULE | Refills: 11 | Status: SHIPPED | OUTPATIENT
Start: 2023-10-02

## 2023-10-17 ENCOUNTER — TELEPHONE (OUTPATIENT)
Dept: ALLERGY | Facility: CLINIC | Age: 78
End: 2023-10-17
Payer: MEDICARE

## 2023-10-17 NOTE — TELEPHONE ENCOUNTER
Spoke to Iza at [facility] who advised generic Cromolyn on back order with no release date at this time.

## 2023-10-27 DIAGNOSIS — F41.9 ANXIETY: ICD-10-CM

## 2023-10-27 DIAGNOSIS — F43.10 PTSD (POST-TRAUMATIC STRESS DISORDER): ICD-10-CM

## 2023-10-27 RX ORDER — BUPROPION HYDROCHLORIDE 300 MG/1
300 TABLET ORAL DAILY
Qty: 90 TABLET | Refills: 2 | Status: SHIPPED | OUTPATIENT
Start: 2023-10-27 | End: 2024-07-23

## 2023-10-27 RX ORDER — BUPROPION HYDROCHLORIDE 300 MG/1
300 TABLET ORAL DAILY
Qty: 90 TABLET | Refills: 3 | OUTPATIENT
Start: 2023-10-27 | End: 2024-10-26

## 2023-10-27 NOTE — TELEPHONE ENCOUNTER
No care due was identified.  [facility] Embedded Care Due Messages. Reference number: [id number].   10/27/2023 10:41:30 AM CDT

## 2023-10-27 NOTE — TELEPHONE ENCOUNTER
Refill Decision Note   Veronique Alex  is requesting a refill authorization.  Brief Assessment and Rationale for Refill:  Approve     Medication Therapy Plan:         Comments:     Note composed:1:35 PM 10/27/2023

## 2023-10-27 NOTE — TELEPHONE ENCOUNTER
Refill Decision Note   Veronique Aelx  is requesting a refill authorization.  Brief Assessment and Rationale for Refill:  Quick Discontinue     Medication Therapy Plan:         Comments:     Note composed:1:25 PM 10/27/2023

## 2023-10-27 NOTE — TELEPHONE ENCOUNTER
No care due was identified.  [facility] Embedded Care Due Messages. Reference number: [id number].   10/27/2023 10:03:56 AM CDT

## 2023-10-31 ENCOUNTER — EXTERNAL CHRONIC CARE MANAGEMENT (OUTPATIENT)
Dept: PRIMARY CARE CLINIC | Facility: CLINIC | Age: 78
End: 2023-10-31
Payer: MEDICARE

## 2023-10-31 PROCEDURE — 99439 CHRNC CARE MGMT STAF EA ADDL: CPT | Mod: S$PBB,,, | Performed by: FAMILY MEDICINE

## 2023-10-31 PROCEDURE — 99439 PR CHRONIC CARE MGMT, EA ADDTL 20 MIN: ICD-10-PCS | Mod: S$PBB,,, | Performed by: FAMILY MEDICINE

## 2023-10-31 PROCEDURE — 99439 CHRNC CARE MGMT STAF EA ADDL: CPT | Mod: PBBFAC,PO | Performed by: FAMILY MEDICINE

## 2023-10-31 PROCEDURE — 99490 CHRNC CARE MGMT STAFF 1ST 20: CPT | Mod: S$PBB,,, | Performed by: FAMILY MEDICINE

## 2023-10-31 PROCEDURE — 99490 CHRNC CARE MGMT STAFF 1ST 20: CPT | Mod: PBBFAC,25,PO | Performed by: FAMILY MEDICINE

## 2023-10-31 PROCEDURE — 99490 PR CHRONIC CARE MGMT, 1ST 20 MIN: ICD-10-PCS | Mod: S$PBB,,, | Performed by: FAMILY MEDICINE

## 2023-11-28 ENCOUNTER — LAB VISIT (OUTPATIENT)
Dept: LAB | Facility: HOSPITAL | Age: 78
End: 2023-11-28
Attending: STUDENT IN AN ORGANIZED HEALTH CARE EDUCATION/TRAINING PROGRAM
Payer: MEDICARE

## 2023-11-28 ENCOUNTER — OFFICE VISIT (OUTPATIENT)
Dept: ALLERGY | Facility: CLINIC | Age: 78
End: 2023-11-28
Payer: MEDICARE

## 2023-11-28 VITALS — HEIGHT: 60 IN | BODY MASS INDEX: 22 KG/M2

## 2023-11-28 DIAGNOSIS — D72.18 EOSINOPHILIC DUODENITIS: Primary | ICD-10-CM

## 2023-11-28 DIAGNOSIS — K29.80 EOSINOPHILIC DUODENITIS: Primary | ICD-10-CM

## 2023-11-28 DIAGNOSIS — K29.80 EOSINOPHILIC DUODENITIS: ICD-10-CM

## 2023-11-28 DIAGNOSIS — T78.40XA ALLERGY, UNSPECIFIED, INITIAL ENCOUNTER: ICD-10-CM

## 2023-11-28 DIAGNOSIS — D72.18 EOSINOPHILIC DUODENITIS: ICD-10-CM

## 2023-11-28 LAB
BASOPHILS # BLD AUTO: 0.04 K/UL (ref 0–0.2)
BASOPHILS NFR BLD: 0.9 % (ref 0–1.9)
DIFFERENTIAL METHOD: NORMAL
EOSINOPHIL # BLD AUTO: 0.4 K/UL (ref 0–0.5)
EOSINOPHIL NFR BLD: 7.8 % (ref 0–8)
ERYTHROCYTE [DISTWIDTH] IN BLOOD BY AUTOMATED COUNT: 13.1 % (ref 11.5–14.5)
HCT VFR BLD AUTO: 41.8 % (ref 37–48.5)
HGB BLD-MCNC: 13.4 G/DL (ref 12–16)
IMM GRANULOCYTES # BLD AUTO: 0 K/UL (ref 0–0.04)
IMM GRANULOCYTES NFR BLD AUTO: 0 % (ref 0–0.5)
LYMPHOCYTES # BLD AUTO: 1.2 K/UL (ref 1–4.8)
LYMPHOCYTES NFR BLD: 25.5 % (ref 18–48)
MCH RBC QN AUTO: 29.6 PG (ref 27–31)
MCHC RBC AUTO-ENTMCNC: 32.1 G/DL (ref 32–36)
MCV RBC AUTO: 93 FL (ref 82–98)
MONOCYTES # BLD AUTO: 0.4 K/UL (ref 0.3–1)
MONOCYTES NFR BLD: 9.5 % (ref 4–15)
NEUTROPHILS # BLD AUTO: 2.6 K/UL (ref 1.8–7.7)
NEUTROPHILS NFR BLD: 56.3 % (ref 38–73)
NRBC BLD-RTO: 0 /100 WBC
PLATELET # BLD AUTO: 250 K/UL (ref 150–450)
PMV BLD AUTO: 11.3 FL (ref 9.2–12.9)
RBC # BLD AUTO: 4.52 M/UL (ref 4–5.4)
WBC # BLD AUTO: 4.62 K/UL (ref 3.9–12.7)

## 2023-11-28 PROCEDURE — 85025 COMPLETE CBC W/AUTO DIFF WBC: CPT | Performed by: STUDENT IN AN ORGANIZED HEALTH CARE EDUCATION/TRAINING PROGRAM

## 2023-11-28 PROCEDURE — 99999 PR PBB SHADOW E&M-EST. PATIENT-LVL III: CPT | Mod: PBBFAC,,, | Performed by: STUDENT IN AN ORGANIZED HEALTH CARE EDUCATION/TRAINING PROGRAM

## 2023-11-28 PROCEDURE — 99215 PR OFFICE/OUTPT VISIT, EST, LEVL V, 40-54 MIN: ICD-10-PCS | Mod: S$PBB,,, | Performed by: STUDENT IN AN ORGANIZED HEALTH CARE EDUCATION/TRAINING PROGRAM

## 2023-11-28 PROCEDURE — 86003 ALLG SPEC IGE CRUDE XTRC EA: CPT | Performed by: STUDENT IN AN ORGANIZED HEALTH CARE EDUCATION/TRAINING PROGRAM

## 2023-11-28 PROCEDURE — 99215 OFFICE O/P EST HI 40 MIN: CPT | Mod: S$PBB,,, | Performed by: STUDENT IN AN ORGANIZED HEALTH CARE EDUCATION/TRAINING PROGRAM

## 2023-11-28 PROCEDURE — 99999 PR PBB SHADOW E&M-EST. PATIENT-LVL III: ICD-10-PCS | Mod: PBBFAC,,, | Performed by: STUDENT IN AN ORGANIZED HEALTH CARE EDUCATION/TRAINING PROGRAM

## 2023-11-28 PROCEDURE — 99213 OFFICE O/P EST LOW 20 MIN: CPT | Mod: PBBFAC,PO | Performed by: STUDENT IN AN ORGANIZED HEALTH CARE EDUCATION/TRAINING PROGRAM

## 2023-11-28 PROCEDURE — 86003 ALLG SPEC IGE CRUDE XTRC EA: CPT | Mod: 59 | Performed by: STUDENT IN AN ORGANIZED HEALTH CARE EDUCATION/TRAINING PROGRAM

## 2023-11-28 PROCEDURE — 36415 COLL VENOUS BLD VENIPUNCTURE: CPT | Mod: PO | Performed by: STUDENT IN AN ORGANIZED HEALTH CARE EDUCATION/TRAINING PROGRAM

## 2023-11-28 RX ORDER — PREDNISONE 20 MG/1
20 TABLET ORAL DAILY
Qty: 14 TABLET | Refills: 0 | Status: SHIPPED | OUTPATIENT
Start: 2023-11-28 | End: 2023-12-12

## 2023-11-28 RX ORDER — PREDNISONE 20 MG/1
20 TABLET ORAL DAILY
Qty: 14 TABLET | Refills: 0 | Status: SHIPPED | OUTPATIENT
Start: 2023-11-28 | End: 2023-11-28 | Stop reason: SDUPTHER

## 2023-11-28 RX ORDER — CROMOLYN SODIUM 100 MG/5ML
SOLUTION, CONCENTRATE ORAL
COMMUNITY
End: 2023-11-28 | Stop reason: SDUPTHER

## 2023-11-28 RX ORDER — BENZONATATE 200 MG/1
200 CAPSULE ORAL
COMMUNITY
Start: 2023-11-07 | End: 2024-01-18

## 2023-11-28 RX ORDER — CROMOLYN SODIUM 100 MG/5ML
200 SOLUTION, CONCENTRATE ORAL
Qty: 300 ML | Refills: 3 | Status: SHIPPED | OUTPATIENT
Start: 2023-11-28 | End: 2023-12-28

## 2023-11-28 NOTE — PROGRESS NOTES
ALLERGY & IMMUNOLOGY CLINIC -  Established Patient     HISTORY OF PRESENT ILLNESS     Patient ID: Veronique Johnson is a 78 y.o. female    CC: follow up visit    HPI: Veronique Johnson is a 78 y.o. female presents for evaluation of:    Office Visit 11/28/2023  Cromolyn only used for one month with little improvement. Practicing wheat and cow's milk avoidance and believes the quantity has decreased somewhat.       08/25/2023  States she has been experiencing esophageal spasms and dysphagia symptoms for the previous 8 months. Around that time was also diagnosed with pancreatic insufficiency and was started on Creon. She has continued to experience bouts of watery diarrhea and has had endoscopy performed showing eosinophilic microabscesses. Endorses >30 pound weight loss as well during that time. Denies nausea, vomiting. Also has been diagnosed with lactose intolerance as well for which she now takes lactaid. For the previous month, she has noticed her diarrhea illness has been improving. For the previous 3 weeks, she had been adhering to a strict 6- food elimination diet.         H/o Asthma: denies  H/o Eczema: denies  H/o Rhinitis: +Dust mites, Lasted tested 70 years   Oral Allergy:  denies  Food Allergy: denies  Venom Allergy: denies  Latex Allergy: denies  Env/Occ: denies any environmental or occupational exposures     REVIEW OF SYSTEMS     CONST: no F/C/NS, no unintentional weight changes  Balance of review of systems negative except as mentioned above     MEDICAL HISTORY     MedHx: active problems reviewed  SurgHx:   Past Surgical History:   Procedure Laterality Date    CATARACT EXTRACTION, BILATERAL  2006    COLONOSCOPY  09/05/2018    Dr. Leija, in procedures: diverticulosis, 4 colon polyps removed, adenomatous polyps x3 & benign mucosal polyps    COLONOSCOPY N/A 4/22/2021    Procedure: COLONOSCOPY;  Surgeon: Dwayne Santoyo MD;  Location: [facility];  Service: Endoscopy;  Laterality: N/A;    CORONARY  ANGIOGRAPHY N/A 10/14/2021    Procedure: ANGIOGRAM, CORONARY ARTERY;  Surgeon: Mustapha Manzo MD;  Location: [facility] CATH;  Service: Cardiology;  Laterality: N/A;    CYSTOSCOPY WITH HYDRODISTENSION OF BLADDER N/A 6/5/2019    Procedure: CYSTOSCOPY, WITH BLADDER HYDRODISTENSION;  Surgeon: Marko Burton MD;  Location: [facility] OR;  Service: OB/GYN;  Laterality: N/A;    DILATION AND CURETTAGE OF UTERUS  1966    ESOPHAGEAL MANOMETRY WITH MEASUREMENT OF IMPEDANCE N/A 1/6/2023    Procedure: MANOMETRY, ESOPHAGUS, WITH IMPEDANCE MEASUREMENT;  Surgeon: Vitaliy Hatfield MD;  Location: [facility] (4TH FLR);  Service: Endoscopy;  Laterality: N/A;  instructions sent to [facility]  Precall done. 0900 arrival time confirmed. SG  instr portal -ml    ESOPHAGOGASTRODUODENOSCOPY  09/05/2018    Dr. Leija, in procedures: gastritis; biopsy: duodenum unremarkable, with focal benign gastric metaplasia, stomach- minimal chronic gastritis, negative for h pylori    ESOPHAGOGASTRODUODENOSCOPY N/A 4/22/2021    Procedure: EGD (ESOPHAGOGASTRODUODENOSCOPY);  Surgeon: Dwayne Santoyo MD;  Location: [facility];  Service: Endoscopy;  Laterality: N/A;    ESOPHAGOGASTRODUODENOSCOPY N/A 3/10/2023    Procedure: EGD (ESOPHAGOGASTRODUODENOSCOPY);  Surgeon: Bere Mclean MD;  Location: [facility] (2ND FLR);  Service: Endoscopy;  Laterality: N/A;  Endoflip  2nd floor due to availability  propofol only  instructions sent to [facility]    EYE SURGERY      HEMORRHOID SURGERY  1997    KNEE ARTHROSCOPY W/ MENISCECTOMY Right 8/13/2021    Procedure: ARTHROSCOPY, KNEE, WITH MENISCECTOMY;  Surgeon: Shelton Le MD;  Location: [facility] OR;  Service: Orthopedics;  Laterality: Right;    KNEE ARTHROSCOPY W/ MENISCECTOMY Right 4/8/2022    Procedure: ARTHROSCOPY, KNEE, WITH MENISCECTOMY;  Surgeon: Shelton Le MD;  Location: [facility] OR;  Service: Orthopedics;  Laterality: Right;  medial meniscectomy    LEFT HEART CATHETERIZATION Right 10/14/2021     Procedure: Left heart cath;  Surgeon: Mustapha Manzo MD;  Location: [facility] CATH;  Service: Cardiology;  Laterality: Right;    ROBOTIC ARTHROPLASTY, KNEE, UNICOMPARTMENTAL Right 8/16/2022    Procedure: ROBOTIC ARTHROPLASTY, KNEE, UNICOMPARTMENTAL;  Surgeon: Shelton Le MD;  Location: [facility] OR;  Service: Orthopedics;  Laterality: Right;    TONSILLECTOMY  1954    TUBAL LIGATION  1990     Allergies: see below  Medications: MAR reviewed    No pertinent allergy changes in medical history since last visit     PHYSICAL EXAM     VS: Ht 5' (1.524 m)   BMI 22.00 kg/m²   GENERAL: awake, alert, cooperative with exam  ABDOMEN: Normoactive bowel sounds, soft, non-tender, non-distended, no HSM  EXTREMITIES: +2 distal pulses, no c/c/e  DERM: no rashes, no skin breaks     LABORATORY STUDIES     3/10/2023  RELIAPATH DIAGNOSIS:   A.   DUODENUM, 2ND PORTION, BIOPSY:          - Benign duodenal mucosa showing a focal eosinophilic microabscess   within epithelium, see comment.          - No intraepithelial lymphocytosis, villous blunting or features of   celiac disease identified.   B.   STOMACH, ANTRUM/BODY/ANGULARIS, BIOPSY:          - Benign gastric mucosa showing no diagnostic abnormality.          - No intestinal metaplasia or dysplasia identified.          - No Helicobacter organisms or increased eosinophils identified.   C.   ESOPHAGUS, PROXIMAL/MID, BIOPSY:          - Benign squamous esophageal mucosa showing mild reactive changes.          - No increased eosinophils identified.   COMMENT:   A). The clustering of eosinophils within the crypt epithelium can be seen in   eosinophilic enteritis but as an isolated finding is not diagnostic.   Eosinophils are not increased in the other biopsied areas. Clinical follow up   and correlation are recommended.      ASSESSMENT/PLAN     Veronique Johnson is a 78 y.o. female with       1. Eosinophilic duodenitis    2. Allergy, unspecified, initial encounter      Continued episodes of  diarrhea despite 2 food elimination diet, unable to adhere to Cromolyn due to supply issues so has not been able to take aside from 30 day supply but did notice some improvement. Unclear whether pancreatic insufficiency vs EGID is contributing more to her ongoing symptoms, but does have a visit with Gastroenterology in several weeks. I would be hesitant to prescribe long term systemic steroids due to the multiple side effects. Could certainly consider immune modulators such as Azathioprine which has been used in several case reports of EGID. Would like to trial anti-IL5 medications such as Mepolizumab or Anti-IL4/13 medication such as Dupilumab, but approval may be difficult as they are not currently FDA approved for EGID. At this time, will continue current food elimination and I reached out to [facility] which does carry Cromlyn which she will re-start prior to GI appt. Additionally will trial short 2 week course of prednisone and can consider tapering if efficacy is documented      Follow up: 3 Months-Will message me efficacy of prednisone in 2 weeks to determine tapering      Amarjit Villarreal MD    I spent a total of 40 minutes on the day of the visit. This includes face to face time and non-face to face time preparing to see the patient (eg, review of tests), obtaining and/or reviewing separately obtained history, documenting clinical information in the electronic or other health record, independently interpreting results and communicating results to the patient/family/caregiver, or care coordinator.

## 2023-11-30 ENCOUNTER — EXTERNAL CHRONIC CARE MANAGEMENT (OUTPATIENT)
Dept: PRIMARY CARE CLINIC | Facility: CLINIC | Age: 78
End: 2023-11-30
Payer: MEDICARE

## 2023-11-30 ENCOUNTER — PATIENT MESSAGE (OUTPATIENT)
Dept: ALLERGY | Facility: CLINIC | Age: 78
End: 2023-11-30
Payer: MEDICARE

## 2023-11-30 PROCEDURE — 99490 CHRNC CARE MGMT STAFF 1ST 20: CPT | Mod: PBBFAC,PO | Performed by: FAMILY MEDICINE

## 2023-11-30 PROCEDURE — 99490 PR CHRONIC CARE MGMT, 1ST 20 MIN: ICD-10-PCS | Mod: S$PBB,,, | Performed by: FAMILY MEDICINE

## 2023-11-30 PROCEDURE — 99490 CHRNC CARE MGMT STAFF 1ST 20: CPT | Mod: S$PBB,,, | Performed by: FAMILY MEDICINE

## 2023-12-01 LAB
A-LACTALB IGE QN: <0.1 KU/L
B-LACTALB IGE QN: <0.1 KU/L
CASEIN IGE QN: <0.1 KU/L
CASHEW NUT IGE QN: <0.1 KU/L
COW MILK IGE QN: <0.1 KU/L
COW MILK IGE QN: <0.1 KU/L
DEPRECATED A-LACTALB IGE RAST QL: NORMAL
DEPRECATED B-LACTALB IGE RAST QL: NORMAL
DEPRECATED CASEIN IGE RAST QL: NORMAL
DEPRECATED CASHEW NUT IGE RAST QL: NORMAL
DEPRECATED COW MILK IGE RAST QL: NORMAL
DEPRECATED COW MILK IGE RAST QL: NORMAL
DEPRECATED EGG WHITE IGE RAST QL: NORMAL
DEPRECATED PEANUT IGE RAST QL: NORMAL
DEPRECATED PECAN/HICK NUT IGE RAST QL: NORMAL
DEPRECATED PECAN/HICK TREE IGE RAST QL: NORMAL
DEPRECATED SHRIMP IGE RAST QL: NORMAL
DEPRECATED SOYBEAN IGE RAST QL: NORMAL
DEPRECATED TUNA IGE RAST QL: NORMAL
DEPRECATED WHEAT IGE RAST QL: NORMAL
EGG WHITE IGE QN: <0.1 KU/L
PEANUT IGE QN: <0.1 KU/L
PECAN/HICK NUT IGE QN: <0.1 KU/L
PECAN/HICK TREE IGE QN: <0.1 KU/L
SHRIMP IGE QN: <0.1 KU/L
SOYBEAN IGE QN: <0.1 KU/L
TUNA IGE QN: <0.1 KU/L
WHEAT IGE QN: <0.1 KU/L

## 2023-12-14 ENCOUNTER — PATIENT MESSAGE (OUTPATIENT)
Dept: ALLERGY | Facility: CLINIC | Age: 78
End: 2023-12-14
Payer: MEDICARE

## 2023-12-19 ENCOUNTER — OFFICE VISIT (OUTPATIENT)
Dept: GASTROENTEROLOGY | Facility: CLINIC | Age: 78
End: 2023-12-19
Payer: MEDICARE

## 2023-12-19 VITALS — BODY MASS INDEX: 22.07 KG/M2 | WEIGHT: 112.44 LBS | HEIGHT: 60 IN

## 2023-12-19 DIAGNOSIS — R19.7 DIARRHEA, UNSPECIFIED TYPE: Primary | ICD-10-CM

## 2023-12-19 PROCEDURE — 99999 PR PBB SHADOW E&M-EST. PATIENT-LVL III: CPT | Mod: PBBFAC,,, | Performed by: INTERNAL MEDICINE

## 2023-12-19 PROCEDURE — 99999 PR PBB SHADOW E&M-EST. PATIENT-LVL III: ICD-10-PCS | Mod: PBBFAC,,, | Performed by: INTERNAL MEDICINE

## 2023-12-19 PROCEDURE — 99214 OFFICE O/P EST MOD 30 MIN: CPT | Mod: S$PBB,,, | Performed by: INTERNAL MEDICINE

## 2023-12-19 PROCEDURE — 99214 PR OFFICE/OUTPT VISIT, EST, LEVL IV, 30-39 MIN: ICD-10-PCS | Mod: S$PBB,,, | Performed by: INTERNAL MEDICINE

## 2023-12-19 PROCEDURE — 99213 OFFICE O/P EST LOW 20 MIN: CPT | Mod: PBBFAC,PO | Performed by: INTERNAL MEDICINE

## 2023-12-19 RX ORDER — CHOLESTYRAMINE 4 G/9G
4 POWDER, FOR SUSPENSION ORAL DAILY
Qty: 30 PACKET | Refills: 2 | Status: SHIPPED | OUTPATIENT
Start: 2023-12-19 | End: 2024-02-19 | Stop reason: SDUPTHER

## 2023-12-19 NOTE — PROGRESS NOTES
Pt returns to GI clinic re: chronic diarrhea. Prior GI evaluation reviewed in detail. Questionable eosinophilic enteritis, although biopsies not impressive nor definitive. Dysphagia stable. Main issue is chronic diarrhea and intermittent fecal incontinence. Positive nocturnal diarrhea. No rectal bleeding. No fever or jaundice. Prior stool evaluation unremarkable. Feels Creon partly beneficial. Tried restricting dairy and gluten. Short course of steroids not beneficial. Last EGD and C-scope reviewed. No evidence of celiac disease. Prior weight loss noted, stable past 5 months. Abd CT scan 2022 unremarkable. Diarrhea dates back approx 6 years. No constipation. Pt does not feel related to life stressors.     REVIEW OF SYSTEMS:   Constitutional: Negative for fever, appetite change and unexpected weight change.  HENT: Negative for sore throat and trouble swallowing.  Eyes: Negative for visual disturbance.  Respiratory: Negative for chest tightness, shortness of breath and wheezing.  Cardiovascular: Negative for chest pain.  Gastrointestinal:  as per HPI    PHYSICAL EXAMINATION:                                                        GENERAL:  Comfortable, in no acute distress.      SKIN: Non-jaundiced.                             HEENT EXAM:  Nonicteric.  No adenopathy.  Oropharynx is clear.               NECK:  Supple.                                                               LUNGS:  Clear.                                                               CARDIAC:  Regular rate and rhythm.  S1, S2.  No murmur.                      ABDOMEN:  Soft, positive bowel sounds, nontender.  No hepatosplenomegaly or masses.  No rebound or guarding.                                             EXTREMITIES:  No edema.       IMP: 1. Chronic diarrhea - etiology unclear, although suggestive of IBS. Nocturnal component noted. I am not convinced biopsies are diagnostic of an eosinophilic disorder.    PLAN: 1. Trial of questran, starting at  low dose (4 gm daily). Pt will update office on status in 2 weeks.

## 2023-12-20 ENCOUNTER — PATIENT MESSAGE (OUTPATIENT)
Dept: ALLERGY | Facility: CLINIC | Age: 78
End: 2023-12-20
Payer: MEDICARE

## 2023-12-31 ENCOUNTER — EXTERNAL CHRONIC CARE MANAGEMENT (OUTPATIENT)
Dept: PRIMARY CARE CLINIC | Facility: CLINIC | Age: 78
End: 2023-12-31
Payer: MEDICARE

## 2023-12-31 PROCEDURE — 99490 CHRNC CARE MGMT STAFF 1ST 20: CPT | Mod: PBBFAC,PO | Performed by: FAMILY MEDICINE

## 2023-12-31 PROCEDURE — 99439 CHRNC CARE MGMT STAF EA ADDL: CPT | Mod: S$PBB,,, | Performed by: FAMILY MEDICINE

## 2023-12-31 PROCEDURE — 99490 CHRNC CARE MGMT STAFF 1ST 20: CPT | Mod: S$PBB,,, | Performed by: FAMILY MEDICINE

## 2023-12-31 PROCEDURE — 99439 CHRNC CARE MGMT STAF EA ADDL: CPT | Mod: PBBFAC,PO | Performed by: FAMILY MEDICINE

## 2024-01-03 ENCOUNTER — PATIENT MESSAGE (OUTPATIENT)
Dept: GASTROENTEROLOGY | Facility: CLINIC | Age: 79
End: 2024-01-03
Payer: MEDICARE

## 2024-01-03 DIAGNOSIS — F41.9 ANXIETY: ICD-10-CM

## 2024-01-03 DIAGNOSIS — I70.0 ATHEROSCLEROSIS OF AORTA: ICD-10-CM

## 2024-01-04 RX ORDER — ALPRAZOLAM 0.25 MG/1
0.25 TABLET ORAL 2 TIMES DAILY PRN
Qty: 60 TABLET | Refills: 0 | Status: SHIPPED | OUTPATIENT
Start: 2024-01-05 | End: 2024-01-18

## 2024-01-04 RX ORDER — ROSUVASTATIN CALCIUM 20 MG/1
20 TABLET, COATED ORAL NIGHTLY
Qty: 90 TABLET | Refills: 1 | Status: SHIPPED | OUTPATIENT
Start: 2024-01-04

## 2024-01-04 NOTE — TELEPHONE ENCOUNTER
(Observe) Observe for now without intervention. The patient was advised to contact office with any change or worsening of vision. No care due was identified.  [facility] Embedded Care Due Messages. Reference number: [id number].   1/03/2024 9:14:51 PM CST

## 2024-01-06 DIAGNOSIS — F41.9 ANXIETY: ICD-10-CM

## 2024-01-06 RX ORDER — ALPRAZOLAM 0.25 MG/1
0.25 TABLET ORAL 2 TIMES DAILY PRN
Qty: 60 TABLET | Refills: 5 | OUTPATIENT
Start: 2024-01-06 | End: 2024-02-05

## 2024-01-06 NOTE — TELEPHONE ENCOUNTER
No care due was identified.  Health [facility] Embedded Care Due Messages. Reference number: [id number].   1/06/2024 10:42:44 AM CST

## 2024-01-06 NOTE — TELEPHONE ENCOUNTER
Denied refill request.   Medication was refilled on 1-4-24 with dispense date of 1-5-24  Message sent to pharmacy

## 2024-01-11 DIAGNOSIS — Z00.00 ENCOUNTER FOR MEDICARE ANNUAL WELLNESS EXAM: ICD-10-CM

## 2024-01-15 ENCOUNTER — TELEPHONE (OUTPATIENT)
Dept: FAMILY MEDICINE | Facility: CLINIC | Age: 79
End: 2024-01-15
Payer: MEDICARE

## 2024-01-17 ENCOUNTER — TELEPHONE (OUTPATIENT)
Dept: FAMILY MEDICINE | Facility: CLINIC | Age: 79
End: 2024-01-17
Payer: MEDICARE

## 2024-01-17 NOTE — TELEPHONE ENCOUNTER
----- Message from Yamileth Queen sent at 1/17/2024 10:25 AM CST -----  Type:  Patient Returning Call    Who Called:  pt  Who Left Message for Patient:   Lois  Does the patient know what this is regarding?:   yes/ an appt  Best Call Back Number:   [phone]  Additional Information:  pl call bk to advise thanks

## 2024-01-18 ENCOUNTER — OFFICE VISIT (OUTPATIENT)
Dept: FAMILY MEDICINE | Facility: CLINIC | Age: 79
End: 2024-01-18
Payer: MEDICARE

## 2024-01-18 VITALS
RESPIRATION RATE: 18 BRPM | HEIGHT: 60 IN | OXYGEN SATURATION: 96 % | SYSTOLIC BLOOD PRESSURE: 120 MMHG | WEIGHT: 115.31 LBS | BODY MASS INDEX: 22.64 KG/M2 | HEART RATE: 84 BPM | DIASTOLIC BLOOD PRESSURE: 60 MMHG

## 2024-01-18 DIAGNOSIS — I70.0 ATHEROSCLEROSIS OF AORTA: ICD-10-CM

## 2024-01-18 DIAGNOSIS — R70.0 ESR RAISED: Primary | ICD-10-CM

## 2024-01-18 DIAGNOSIS — E78.49 OTHER HYPERLIPIDEMIA: ICD-10-CM

## 2024-01-18 DIAGNOSIS — B00.1 RECURRENT COLD SORES: ICD-10-CM

## 2024-01-18 DIAGNOSIS — G35 MS (MULTIPLE SCLEROSIS): ICD-10-CM

## 2024-01-18 DIAGNOSIS — J43.1 PANLOBULAR EMPHYSEMA: ICD-10-CM

## 2024-01-18 DIAGNOSIS — F41.9 ANXIETY: ICD-10-CM

## 2024-01-18 PROCEDURE — 99999 PR PBB SHADOW E&M-EST. PATIENT-LVL IV: CPT | Mod: PBBFAC,,, | Performed by: FAMILY MEDICINE

## 2024-01-18 PROCEDURE — 99214 OFFICE O/P EST MOD 30 MIN: CPT | Mod: S$PBB,,, | Performed by: FAMILY MEDICINE

## 2024-01-18 PROCEDURE — 99214 OFFICE O/P EST MOD 30 MIN: CPT | Mod: PBBFAC,PO | Performed by: FAMILY MEDICINE

## 2024-01-18 RX ORDER — VALACYCLOVIR HYDROCHLORIDE 1 G/1
2000 TABLET, FILM COATED ORAL EVERY 12 HOURS
Qty: 4 TABLET | Refills: 5 | Status: SHIPPED | OUTPATIENT
Start: 2024-01-18

## 2024-01-18 RX ORDER — ALPRAZOLAM 0.25 MG/1
0.25 TABLET ORAL 2 TIMES DAILY PRN
Qty: 60 TABLET | Refills: 5 | Status: SHIPPED | OUTPATIENT
Start: 2024-02-05

## 2024-01-18 NOTE — PROGRESS NOTES
Assessment:       1. ESR raised    2. Panlobular emphysema    3. MS (multiple sclerosis)    4. Atherosclerosis of aorta    5. Anxiety    6. Other hyperlipidemia    7. Recurrent cold sores        Plan:       ESR raised:  Improved  -     Sedimentation rate; Future; Expected date: 01/18/2024    Panlobular emphysema:  Stable  -     X-Ray Chest PA And Lateral; Future; Expected date: 01/18/2024    MS (multiple sclerosis):  Stable    Atherosclerosis of aorta:  Stable  -     Comprehensive Metabolic Panel; Future; Expected date: 01/18/2024  -     Lipid Panel; Future; Expected date: 01/18/2024  -     CRP, High Sensitivity; Future; Expected date: 01/18/2024    Anxiety:  Stable  -     ALPRAZolam (XANAX) 0.25 MG tablet; Take 1 tablet (0.25 mg total) by mouth 2 (two) times daily as needed for Anxiety.  Dispense: 60 tablet; Refill: 5    Other hyperlipidemia:  Stable  -     CRP, High Sensitivity; Future; Expected date: 01/18/2024    Recurrent cold sores:  Worsening  -     valACYclovir (VALTREX) 1000 MG tablet; Take 2 tablets (2,000 mg total) by mouth every 12 (twelve) hours. X 1 day  Dispense: 4 tablet; Refill: 5         [place] prescription monitoring program was checked and okay, Xanax was prescribed for the patient, Valtrex for cold sores.  Healthy habits, avoid any ultra processed foods.  The patient's BMI has been recorded in the chart. The patient has been provided educational materials regarding the benefits of attaining and maintaining a normal weight. We will continue to address and follow this issue during follow up visits.   Patient agreed with assessment and plan. Patient verbalized understanding.     Subjective:       Patient ID: Veronique Johnson is a 78 y.o. female.    Chief Complaint: Follow-up and medication refill.    HPI    The patient is coming here today for a follow-up and medication refill.  The patient has multiple sclerosis but is not taking any medications for this problem, she was diagnosed many years  ago.  The patient has anxiety and taking Xanax, denies any side effects of the medication, she will need a new refill.  Her  has dementia and she is under of stress, she is having recurrent cold sores in the lid.  Upon review of the last blood work, the patient has increase ESR levels and increase CRP levels, these were improved from previous.  The last cholesterol levels were therapeutic.    Past medical history, past social history was reviewed and discussed with the patient.    Review of Systems   Constitutional:  Negative for activity change and appetite change.   HENT:  Negative for congestion and ear discharge.    Eyes:  Negative for discharge and itching.   Respiratory:  Negative for choking and chest tightness.    Cardiovascular:  Negative for chest pain, palpitations and leg swelling.   Gastrointestinal:  Negative for abdominal distention, abdominal pain and constipation.   Endocrine: Negative for cold intolerance and heat intolerance.   Genitourinary:  Negative for dysuria and flank pain.   Musculoskeletal:  Negative for arthralgias and back pain.   Skin:  Negative for pallor and rash.   Allergic/Immunologic: Negative for environmental allergies and food allergies.   Neurological:  Negative for dizziness, facial asymmetry and headaches.   Hematological:  Negative for adenopathy. Does not bruise/bleed easily.   Psychiatric/Behavioral:  Positive for dysphoric mood. Negative for agitation and confusion. The patient is nervous/anxious.        Objective:      Physical Exam  Vitals and nursing note reviewed.   Constitutional:       General: She is not in acute distress.     Appearance: Normal appearance. She is well-developed and normal weight. She is not diaphoretic.   HENT:      Head: Normocephalic and atraumatic.      Right Ear: External ear normal.      Left Ear: External ear normal.      Nose: Nose normal.   Eyes:      General: No scleral icterus.        Right eye: No discharge.         Left eye: No  discharge.      Conjunctiva/sclera: Conjunctivae normal.   Cardiovascular:      Rate and Rhythm: Normal rate and regular rhythm.      Heart sounds: Normal heart sounds.   Pulmonary:      Effort: Pulmonary effort is normal. No respiratory distress.      Breath sounds: Normal breath sounds. No wheezing.   Musculoskeletal:         General: No tenderness or deformity.      Cervical back: Neck supple.   Skin:     Coloration: Skin is not pale.      Findings: No erythema.   Neurological:      Mental Status: She is alert.      Coordination: Coordination normal.   Psychiatric:         Behavior: Behavior normal.         Thought Content: Thought content normal.         Judgment: Judgment normal.

## 2024-01-22 ENCOUNTER — HOSPITAL ENCOUNTER (OUTPATIENT)
Dept: RADIOLOGY | Facility: HOSPITAL | Age: 79
Discharge: HOME | End: 2024-01-22
Attending: FAMILY MEDICINE
Payer: MEDICARE

## 2024-01-22 ENCOUNTER — PATIENT MESSAGE (OUTPATIENT)
Dept: PSYCHIATRY | Facility: CLINIC | Age: 79
End: 2024-01-22
Payer: MEDICARE

## 2024-01-22 DIAGNOSIS — J43.1 PANLOBULAR EMPHYSEMA: ICD-10-CM

## 2024-01-22 PROCEDURE — 71046 X-RAY EXAM CHEST 2 VIEWS: CPT | Mod: 26,,, | Performed by: RADIOLOGY

## 2024-01-22 PROCEDURE — 71046 X-RAY EXAM CHEST 2 VIEWS: CPT | Mod: TC,FY,PO

## 2024-01-30 DIAGNOSIS — I10 ESSENTIAL HYPERTENSION: ICD-10-CM

## 2024-01-30 RX ORDER — AMLODIPINE BESYLATE 5 MG/1
5 TABLET ORAL DAILY
Qty: 90 TABLET | Refills: 3 | Status: SHIPPED | OUTPATIENT
Start: 2024-01-30 | End: 2024-04-17 | Stop reason: DRUGHIGH

## 2024-01-30 NOTE — TELEPHONE ENCOUNTER
Refill Decision Note   Veronique Alex  is requesting a refill authorization.  Brief Assessment and Rationale for Refill:  Approve     Medication Therapy Plan:         Comments:     Note composed:1:12 PM 01/30/2024

## 2024-01-30 NOTE — TELEPHONE ENCOUNTER
No care due was identified.  Health [facility] Embedded Care Due Messages. Reference number: [id number].   1/30/2024 11:42:48 AM CST

## 2024-01-31 ENCOUNTER — EXTERNAL CHRONIC CARE MANAGEMENT (OUTPATIENT)
Dept: PRIMARY CARE CLINIC | Facility: CLINIC | Age: 79
End: 2024-01-31
Payer: MEDICARE

## 2024-01-31 PROCEDURE — 99439 CHRNC CARE MGMT STAF EA ADDL: CPT | Mod: S$PBB,,, | Performed by: FAMILY MEDICINE

## 2024-01-31 PROCEDURE — 99490 CHRNC CARE MGMT STAFF 1ST 20: CPT | Mod: S$PBB,,, | Performed by: FAMILY MEDICINE

## 2024-01-31 PROCEDURE — 99490 CHRNC CARE MGMT STAFF 1ST 20: CPT | Mod: PBBFAC,PO | Performed by: FAMILY MEDICINE

## 2024-01-31 PROCEDURE — 99439 CHRNC CARE MGMT STAF EA ADDL: CPT | Mod: PBBFAC,PO | Performed by: FAMILY MEDICINE

## 2024-02-07 ENCOUNTER — OFFICE VISIT (OUTPATIENT)
Dept: CARDIOLOGY | Facility: CLINIC | Age: 79
End: 2024-02-07
Payer: MEDICARE

## 2024-02-07 VITALS
HEART RATE: 89 BPM | BODY MASS INDEX: 22.38 KG/M2 | SYSTOLIC BLOOD PRESSURE: 142 MMHG | WEIGHT: 114 LBS | HEIGHT: 60 IN | DIASTOLIC BLOOD PRESSURE: 82 MMHG

## 2024-02-07 DIAGNOSIS — I25.10 CORONARY ARTERY DISEASE, UNSPECIFIED VESSEL OR LESION TYPE, UNSPECIFIED WHETHER ANGINA PRESENT, UNSPECIFIED WHETHER NATIVE OR TRANSPLANTED HEART: Primary | ICD-10-CM

## 2024-02-07 DIAGNOSIS — M43.12 SPONDYLOLISTHESIS OF CERVICAL REGION: ICD-10-CM

## 2024-02-07 DIAGNOSIS — F41.9 ANXIETY: ICD-10-CM

## 2024-02-07 DIAGNOSIS — I70.0 ATHEROSCLEROSIS OF AORTA: ICD-10-CM

## 2024-02-07 DIAGNOSIS — I10 ESSENTIAL HYPERTENSION: ICD-10-CM

## 2024-02-07 DIAGNOSIS — G35 MS (MULTIPLE SCLEROSIS): Primary | ICD-10-CM

## 2024-02-07 DIAGNOSIS — R06.09 DOE (DYSPNEA ON EXERTION): ICD-10-CM

## 2024-02-07 DIAGNOSIS — H04.129 DRY EYE: ICD-10-CM

## 2024-02-07 DIAGNOSIS — I67.1 CEREBRAL ANEURYSM: ICD-10-CM

## 2024-02-07 DIAGNOSIS — E78.2 MIXED HYPERLIPIDEMIA: ICD-10-CM

## 2024-02-07 PROCEDURE — 93010 ELECTROCARDIOGRAM REPORT: CPT | Mod: ,,, | Performed by: INTERNAL MEDICINE

## 2024-02-07 PROCEDURE — 99213 OFFICE O/P EST LOW 20 MIN: CPT | Mod: PBBFAC,PO,25 | Performed by: INTERNAL MEDICINE

## 2024-02-07 PROCEDURE — 99214 OFFICE O/P EST MOD 30 MIN: CPT | Mod: S$PBB,,, | Performed by: INTERNAL MEDICINE

## 2024-02-07 PROCEDURE — 93005 ELECTROCARDIOGRAM TRACING: CPT | Mod: PO

## 2024-02-07 PROCEDURE — 99999 PR PBB SHADOW E&M-EST. PATIENT-LVL III: CPT | Mod: PBBFAC,,, | Performed by: INTERNAL MEDICINE

## 2024-02-07 RX ORDER — SODIUM CHLORIDE 9 MG/ML
INJECTION, SOLUTION INTRAVENOUS ONCE
Status: CANCELLED | OUTPATIENT
Start: 2024-02-07 | End: 2024-02-07

## 2024-02-07 RX ORDER — SODIUM CHLORIDE 0.9 % (FLUSH) 0.9 %
10 SYRINGE (ML) INJECTION
Status: SHIPPED | OUTPATIENT
Start: 2024-02-07

## 2024-02-07 NOTE — PATIENT INSTRUCTIONS
Angiogram    Arrive for procedure at: [facility] on 2/23/24 at 7 am. Your procedure is scheduled for 9 am with Dr Samson Light.    You will receive a phone call from [facility] Pre-Op Department with further instructions and exact arrival time prior to your scheduled procedure.    Notify the nurse if you are ALLERGIC TO IODINE.    FASTING: You MAY NOT have anything to eat or drink AFTER MIDNIGHT the day before your procedure. If your procedure is scheduled in the afternoon, you may have a LIGHT BREAKFAST 6-8 hours prior to your procedure.  For example: Two slices of toast; black coffee or black tea.    MEDICATIONS: You may take your regular morning medications with water. If there are any medications that you should not take, you will be instructed to hold them for that morning.    CARDIOLOGY PRE-PROCEDURE MEDICATION ORDERS:  *  CONTINUE the Following Medications   Please continue all your medications    WHAT TO EXPECT:    How long will the procedure take?  The procedure will take an average of 1 - 2 hours to perform.  After the procedure, you will need to lay flat for around 4 - 6 hours to minimize bleeding from the puncture site. If the wrist is accessed you will need to keep your arm still as instructed by the nurse.    When can I go home?  You may be able to be discharged home that same afternoon if there were no complications.  If you have one of the following: balloon; stent; pacemaker or defibrillator procedures, you may spend one night for observation.  Your doctor will determine your discharge based upon your progress.  The results of your procedure will be discussed with you before you are discharged.  Any further testing or procedures will be scheduled for you either before you leave or you will be instructed to call for a future appointment.      TRANSPORTATION:  PLEASE ARRANGE TO HAVE SOMEONE DRIVE YOU HOME FOLLOWING YOUR PROCEDURE, YOU WILL NOT BE ALLOWED TO DRIVE.

## 2024-02-07 NOTE — PROGRESS NOTES
Subjective:    Patient ID:  Veronique Johnson is a 78 y.o. female patient here for evaluation Shortness of Breath      History of Present Illness:  Cardiology follow-up.  Known coronary disease.  Left heart catheterization 10/2021 , moderate mid LAD segment stenosis an IFR 0.84.  Medical therapy recommended.    Risk factors include family history, past tobacco use hypertension dyslipidemia.    History of carotid disease, MRI of the neck and had stable 12/2021, no significant extracranial carotid artery stenosis or abnormalities.             Review of patient's allergies indicates:   Allergen Reactions    Cephalosporins Anaphylaxis    Iodinated contrast media Hives and Swelling    Iodine Hives    Penicillins Rash       Past Medical History:   Diagnosis Date    Anemia     Anxiety     Chronic bronchitis with COPD (chronic obstructive pulmonary disease)     Esophageal spasm     Essential tremor     GERD (gastroesophageal reflux disease)     Headache     High cholesterol     Hypertension     Meniere disease     Multiple sclerosis     Muscle spasm     Pancreatic insufficiency      Past Surgical History:   Procedure Laterality Date    CATARACT EXTRACTION, BILATERAL  2006    COLONOSCOPY  09/05/2018    Dr. Leija, in procedures: diverticulosis, 4 colon polyps removed, adenomatous polyps x3 & benign mucosal polyps    COLONOSCOPY N/A 4/22/2021    Procedure: COLONOSCOPY;  Surgeon: Dwayne Santooy MD;  Location: [facility] ENDO;  Service: Endoscopy;  Laterality: N/A;    CORONARY ANGIOGRAPHY N/A 10/14/2021    Procedure: ANGIOGRAM, CORONARY ARTERY;  Surgeon: Mustapha Manzo MD;  Location: [facility] CATH;  Service: Cardiology;  Laterality: N/A;    CYSTOSCOPY WITH HYDRODISTENSION OF BLADDER N/A 6/5/2019    Procedure: CYSTOSCOPY, WITH BLADDER HYDRODISTENSION;  Surgeon: Marko Burton MD;  Location: [facility] OR;  Service: OB/GYN;  Laterality: N/A;    DILATION AND CURETTAGE OF UTERUS  1966    ESOPHAGEAL MANOMETRY WITH MEASUREMENT OF IMPEDANCE N/A  1/6/2023    Procedure: MANOMETRY, ESOPHAGUS, WITH IMPEDANCE MEASUREMENT;  Surgeon: Vitaliy Hatfield MD;  Location: [facility] ENDO (4TH FLR);  Service: Endoscopy;  Laterality: N/A;  instructions sent to [facility]  Precall done. 0900 arrival time confirmed. SG  instr portal -ml    ESOPHAGOGASTRODUODENOSCOPY  09/05/2018    Dr. Leija, in procedures: gastritis; biopsy: duodenum unremarkable, with focal benign gastric metaplasia, stomach- minimal chronic gastritis, negative for h pylori    ESOPHAGOGASTRODUODENOSCOPY N/A 4/22/2021    Procedure: EGD (ESOPHAGOGASTRODUODENOSCOPY);  Surgeon: Dwayne Santoyo MD;  Location: [facility];  Service: Endoscopy;  Laterality: N/A;    ESOPHAGOGASTRODUODENOSCOPY N/A 3/10/2023    Procedure: EGD (ESOPHAGOGASTRODUODENOSCOPY);  Surgeon: Bere Mclean MD;  Location: [facility] (2ND FLR);  Service: Endoscopy;  Laterality: N/A;  Endoflip  2nd floor due to availability  propofol only  instructions sent to [facility]    EYE SURGERY      HEMORRHOID SURGERY  1997    KNEE ARTHROSCOPY W/ MENISCECTOMY Right 8/13/2021    Procedure: ARTHROSCOPY, KNEE, WITH MENISCECTOMY;  Surgeon: Shelton Le MD;  Location: [facility] OR;  Service: Orthopedics;  Laterality: Right;    KNEE ARTHROSCOPY W/ MENISCECTOMY Right 4/8/2022    Procedure: ARTHROSCOPY, KNEE, WITH MENISCECTOMY;  Surgeon: Shelton Le MD;  Location: [facility] OR;  Service: Orthopedics;  Laterality: Right;  medial meniscectomy    LEFT HEART CATHETERIZATION Right 10/14/2021    Procedure: Left heart cath;  Surgeon: Mustapha Manzo MD;  Location: [facility] CATH;  Service: Cardiology;  Laterality: Right;    ROBOTIC ARTHROPLASTY, KNEE, UNICOMPARTMENTAL Right 8/16/2022    Procedure: ROBOTIC ARTHROPLASTY, KNEE, UNICOMPARTMENTAL;  Surgeon: Shelotn Le MD;  Location: [facility] OR;  Service: Orthopedics;  Laterality: Right;    TONSILLECTOMY  1954    TUBAL LIGATION  1990     Social History     Tobacco Use    Smoking status: Former     Current  packs/day: 0.00     Average packs/day: 2.0 packs/day for 36.3 years (72.7 ttl pk-yrs)     Types: Cigarettes     Start date: 1964     Quit date:      Years since quittin.1    Smokeless tobacco: Never   Substance Use Topics    Alcohol use: Yes     Alcohol/week: 7.0 standard drinks of alcohol     Types: 7 Glasses of wine per week    Drug use: Never        Review of Systems:    As noted in HPI in addition      REVIEW OF SYSTEMS  Review of Systems   Constitutional: Negative for decreased appetite, diaphoresis, night sweats, weight gain and weight loss.   HENT:  Negative for nosebleeds and odynophagia.    Eyes:  Negative for double vision and photophobia.   Cardiovascular:  Positive for chest pain. Negative for claudication, cyanosis, dyspnea on exertion, irregular heartbeat, leg swelling, near-syncope, orthopnea, palpitations, paroxysmal nocturnal dyspnea and syncope.   Respiratory:  Positive for shortness of breath. Negative for cough, hemoptysis and wheezing.    Hematologic/Lymphatic: Negative for adenopathy.   Skin:  Negative for flushing, skin cancer and suspicious lesions.   Musculoskeletal:  Negative for gout, myalgias and neck pain.   Gastrointestinal:  Negative for abdominal pain, heartburn, hematemesis and hematochezia.   Genitourinary:  Negative for bladder incontinence, hesitancy and nocturia.   Neurological:  Negative for focal weakness, headaches, light-headedness and paresthesias.   Psychiatric/Behavioral:  Negative for memory loss and substance abuse.               Objective:        Vitals:    24 1409   BP: (!) 142/82   Pulse: 89       Lab Results   Component Value Date    WBC 4.62 2023    HGB 13.4 2023    HCT 41.8 2023     2023    CHOL 153 2024    TRIG 112 2024    HDL 70 2024    ALT 17 2024    AST 25 2024     2024    K 3.6 2024     2024    CREATININE 0.9 2024    BUN 10 2024    CO2 27  01/22/2024    TSH 2.824 01/04/2023    INR 0.9 12/01/2021    HGBA1C 5.4 12/02/2021        ECHOCARDIOGRAM RESULTS  No results found for this or any previous visit.        CURRENT/PREVIOUS VISIT EKG  Results for orders placed or performed during the hospital encounter of 08/08/22   EKG 12-lead    Collection Time: 08/08/22 12:09 PM    Narrative    Test Reason : Z01.818,M17.10,    Vent. Rate : 098 BPM     Atrial Rate : 098 BPM     P-R Int : 124 ms          QRS Dur : 104 ms      QT Int : 344 ms       P-R-T Axes : 054 041 060 degrees     QTc Int : 439 ms    Normal sinus rhythm  Normal ECG  When compared with ECG of 15-OCT-2021 06:49,  No significant change was found  Confirmed by Phuc Ambriz MD ([id number]) on 8/10/2022 12:32:26 PM    Referred By: TRACY LOVE           Confirmed By:Phuc Ambriz MD     No valid procedures specified.   No results found for this or any previous visit.    No valid procedures specified.    PHYSICAL EXAM  CONSTITUTIONAL: Well built, well nourished in no apparent distress  NECK: no carotid bruit, no JVD  LUNGS: CTA  CHEST WALL: no tenderness,  HEART: regular rate and rhythm, S1, S2 normal, no murmur, click, rub or gallop   ABDOMEN: soft, non-tender; bowel sounds normal; no masses,  no organomegaly  EXTREMITIES: Extremities normal, no edema, no calf tenderness noted  NEURO: AAO X 3    I HAVE REVIEWED :    The vital signs, nurses notes, and all the pertinent radiology and labs.         Current Outpatient Medications   Medication Instructions    albuterol (PROVENTIL/VENTOLIN HFA) 90 mcg/actuation inhaler 2 puffs, Inhalation    ALPRAZolam (XANAX) 0.25 mg, Oral, 2 times daily PRN    amLODIPine (NORVASC) 5 mg, Oral, Daily    buPROPion (WELLBUTRIN XL) 300 mg, Oral, Daily    cholestyramine (QUESTRAN) 4 gram packet 4 g, Oral, Daily    estradioL (ESTRACE) 1 g, Vaginal, Daily    fluticasone furoate-vilanteroL (BREO) 100-25 mcg/dose diskus inhaler 1 puff, Inhalation, Daily, Controller    hyoscyamine  (LEVSIN) 125 mcg, Oral, Every 4 hours PRN    lipase-protease-amylase (CREON) 36,000-114,000- 180,000 unit CpDR TAKE 2 CAPSULE BY MOUTH 3 (THREE) TIMES DAILY WITH MEALS. & 1 CAPSULE WITH SNACKS    loperamide (IMODIUM) 1 mg/5 mL solution Oral, Every 6 hours PRN    montelukast (SINGULAIR) 10 mg, Oral, Nightly    omeprazole (PRILOSEC) 20 mg, Oral, Daily    rosuvastatin (CRESTOR) 20 mg, Oral, Nightly    valACYclovir (VALTREX) 2,000 mg, Oral, Every 12 hours, X 1 day          Assessment:   Coronary disease.  Moderate mid LAD stenosis with IFR 0.84 by left heart catheterization 2021.  Medical therapy recommended.    1-2 month history of progressive SIMMONS, referred elbow pain, rule out angina.    Carotid artery stenosis with stable non critical MRI imaging 2021.  Asymptomatic.    Hypertension, dyslipidemia        Plan:   Repeat left heart catheterization with probable repeat IFR LAD.    Delayed perioral edema with last left heart catheterization will pretreated for iodine allergy prior to left heart catheterization      No follow-ups on file.

## 2024-02-10 LAB
OHS QRS DURATION: 88 MS
OHS QTC CALCULATION: 425 MS

## 2024-02-19 RX ORDER — CHOLESTYRAMINE 4 G/9G
4 POWDER, FOR SUSPENSION ORAL DAILY
Qty: 30 PACKET | Refills: 2 | Status: SHIPPED | OUTPATIENT
Start: 2024-02-19 | End: 2025-02-18

## 2024-02-24 ENCOUNTER — PATIENT MESSAGE (OUTPATIENT)
Dept: CARDIOLOGY | Facility: CLINIC | Age: 79
End: 2024-02-24
Payer: MEDICARE

## 2024-02-29 ENCOUNTER — EXTERNAL CHRONIC CARE MANAGEMENT (OUTPATIENT)
Dept: PRIMARY CARE CLINIC | Facility: CLINIC | Age: 79
End: 2024-02-29
Payer: MEDICARE

## 2024-02-29 PROCEDURE — 99490 CHRNC CARE MGMT STAFF 1ST 20: CPT | Mod: PBBFAC,PO | Performed by: FAMILY MEDICINE

## 2024-02-29 PROCEDURE — 99490 CHRNC CARE MGMT STAFF 1ST 20: CPT | Mod: S$PBB,,, | Performed by: FAMILY MEDICINE

## 2024-03-07 ENCOUNTER — PATIENT MESSAGE (OUTPATIENT)
Dept: ALLERGY | Facility: CLINIC | Age: 79
End: 2024-03-07
Payer: MEDICARE

## 2024-03-27 ENCOUNTER — OFFICE VISIT (OUTPATIENT)
Dept: CARDIOLOGY | Facility: CLINIC | Age: 79
End: 2024-03-27
Payer: MEDICARE

## 2024-03-27 VITALS
SYSTOLIC BLOOD PRESSURE: 142 MMHG | WEIGHT: 115.5 LBS | HEIGHT: 60 IN | DIASTOLIC BLOOD PRESSURE: 75 MMHG | HEART RATE: 76 BPM | BODY MASS INDEX: 22.68 KG/M2

## 2024-03-27 DIAGNOSIS — E78.2 MIXED HYPERLIPIDEMIA: ICD-10-CM

## 2024-03-27 DIAGNOSIS — M79.18 CERVICAL MYOFASCIAL PAIN SYNDROME: ICD-10-CM

## 2024-03-27 DIAGNOSIS — R26.9 GAIT DISTURBANCE: ICD-10-CM

## 2024-03-27 DIAGNOSIS — I10 ESSENTIAL HYPERTENSION: ICD-10-CM

## 2024-03-27 DIAGNOSIS — I25.10 CORONARY ARTERY DISEASE, UNSPECIFIED VESSEL OR LESION TYPE, UNSPECIFIED WHETHER ANGINA PRESENT, UNSPECIFIED WHETHER NATIVE OR TRANSPLANTED HEART: ICD-10-CM

## 2024-03-27 DIAGNOSIS — I25.119 ATHEROSCLEROSIS OF NATIVE CORONARY ARTERY OF NATIVE HEART WITH ANGINA PECTORIS: ICD-10-CM

## 2024-03-27 DIAGNOSIS — I70.0 ATHEROSCLEROSIS OF AORTA: Primary | ICD-10-CM

## 2024-03-27 PROCEDURE — 99214 OFFICE O/P EST MOD 30 MIN: CPT | Mod: S$PBB,,, | Performed by: INTERNAL MEDICINE

## 2024-03-27 PROCEDURE — 99999 PR PBB SHADOW E&M-EST. PATIENT-LVL III: CPT | Mod: PBBFAC,,, | Performed by: INTERNAL MEDICINE

## 2024-03-27 PROCEDURE — 99213 OFFICE O/P EST LOW 20 MIN: CPT | Mod: PBBFAC,PO | Performed by: INTERNAL MEDICINE

## 2024-03-27 NOTE — PROGRESS NOTES
Subjective:    Patient ID:  Veronique Johnson is a 78 y.o. female patient here for evaluation Follow-up      History of Present Illness:  Allergy follow-up.  Left heart catheterization.  Nonobstructive CAD.  Prior heart catheterization in 1021 with monitor mid LAD stenosis, IFR 0.84.    Repeat catheterization done because chest pain.    History of carotid disease, stable MRI of the neck and brain 12/20.  Neurologic symptomatology.             Review of patient's allergies indicates:   Allergen Reactions    Cephalosporins Anaphylaxis    Iodinated contrast media Hives and Swelling    Penicillins Rash       Past Medical History:   Diagnosis Date    Anemia     Anxiety     Chronic bronchitis with COPD (chronic obstructive pulmonary disease)     Esophageal spasm     Essential tremor     GERD (gastroesophageal reflux disease)     Headache     High cholesterol     Hypertension     Meniere disease     Multiple sclerosis     Muscle spasm     Pancreatic insufficiency      Past Surgical History:   Procedure Laterality Date    ANGIOGRAM, CORONARY, WITH LEFT HEART CATHETERIZATION  2/23/2024    Procedure: Left Heart Cath;  Surgeon: Chau Light MD;  Location: [facility] CATH;  Service: Cardiology;;    CATARACT EXTRACTION, BILATERAL  2006    COLONOSCOPY  09/05/2018    Dr. Leija, in procedures: diverticulosis, 4 colon polyps removed, adenomatous polyps x3 & benign mucosal polyps    COLONOSCOPY N/A 04/22/2021    Procedure: COLONOSCOPY;  Surgeon: Dwayne Santoyo MD;  Location: [facility] ENDO;  Service: Endoscopy;  Laterality: N/A;    CORONARY ANGIOGRAPHY N/A 10/14/2021    Procedure: ANGIOGRAM, CORONARY ARTERY;  Surgeon: Mustapha Manzo MD;  Location: [facility] CATH;  Service: Cardiology;  Laterality: N/A;    CYSTOSCOPY WITH HYDRODISTENSION OF BLADDER N/A 06/05/2019    Procedure: CYSTOSCOPY, WITH BLADDER HYDRODISTENSION;  Surgeon: Marko Burton MD;  Location: [facility] OR;  Service: OB/GYN;  Laterality: N/A;    DILATION AND CURETTAGE OF  UTERUS  1966    ESOPHAGEAL MANOMETRY WITH MEASUREMENT OF IMPEDANCE N/A 01/06/2023    Procedure: MANOMETRY, ESOPHAGUS, WITH IMPEDANCE MEASUREMENT;  Surgeon: Vitaliy Hatfield MD;  Location: [facility] (4TH FLR);  Service: Endoscopy;  Laterality: N/A;  instructions sent to [facility]  Precall done. 0900 arrival time confirmed. SG  instr portal -ml    ESOPHAGOGASTRODUODENOSCOPY  09/05/2018    Dr. Leija, in procedures: gastritis; biopsy: duodenum unremarkable, with focal benign gastric metaplasia, stomach- minimal chronic gastritis, negative for h pylori    ESOPHAGOGASTRODUODENOSCOPY N/A 04/22/2021    Procedure: EGD (ESOPHAGOGASTRODUODENOSCOPY);  Surgeon: Dwayne Santoyo MD;  Location: [facility];  Service: Endoscopy;  Laterality: N/A;    ESOPHAGOGASTRODUODENOSCOPY N/A 03/10/2023    Procedure: EGD (ESOPHAGOGASTRODUODENOSCOPY);  Surgeon: Bere Mclean MD;  Location: [facility] (2ND FLR);  Service: Endoscopy;  Laterality: N/A;  Endoflip  2nd floor due to availability  propofol only  instructions sent to [facility]    EYE SURGERY      HEMORRHOID SURGERY  1997    JOINT REPLACEMENT Right     knee    KNEE ARTHROSCOPY W/ MENISCECTOMY Right 08/13/2021    Procedure: ARTHROSCOPY, KNEE, WITH MENISCECTOMY;  Surgeon: Shelton Le MD;  Location: [facility] OR;  Service: Orthopedics;  Laterality: Right;    KNEE ARTHROSCOPY W/ MENISCECTOMY Right 04/08/2022    Procedure: ARTHROSCOPY, KNEE, WITH MENISCECTOMY;  Surgeon: Shelton Le MD;  Location: [facility] OR;  Service: Orthopedics;  Laterality: Right;  medial meniscectomy    LEFT HEART CATHETERIZATION Right 10/14/2021    Procedure: Left heart cath;  Surgeon: Mustapha Manzo MD;  Location: [facility] CATH;  Service: Cardiology;  Laterality: Right;    ROBOTIC ARTHROPLASTY, KNEE, UNICOMPARTMENTAL Right 08/16/2022    Procedure: ROBOTIC ARTHROPLASTY, KNEE, UNICOMPARTMENTAL;  Surgeon: Shelton Le MD;  Location: [facility] OR;  Service: Orthopedics;  Laterality: Right;     TONSILLECTOMY      TUBAL LIGATION       Social History     Tobacco Use    Smoking status: Former     Current packs/day: 0.00     Average packs/day: 2.0 packs/day for 36.3 years (72.7 ttl pk-yrs)     Types: Cigarettes     Start date: 1964     Quit date:      Years since quittin.2    Smokeless tobacco: Never   Substance Use Topics    Alcohol use: Yes     Alcohol/week: 7.0 standard drinks of alcohol     Types: 7 Glasses of wine per week    Drug use: Never        Review of Systems:    As noted in HPI in addition      REVIEW OF SYSTEMS  Review of Systems   Constitutional: Negative for decreased appetite, diaphoresis, night sweats, weight gain and weight loss.   HENT:  Negative for nosebleeds and odynophagia.    Eyes:  Negative for double vision and photophobia.   Cardiovascular:  Negative for chest pain, claudication, cyanosis, dyspnea on exertion, irregular heartbeat, leg swelling, near-syncope, orthopnea, palpitations, paroxysmal nocturnal dyspnea and syncope.   Respiratory:  Negative for cough, hemoptysis, shortness of breath and wheezing.    Hematologic/Lymphatic: Negative for adenopathy.   Skin:  Negative for flushing, skin cancer and suspicious lesions.   Musculoskeletal:  Negative for gout, myalgias and neck pain.   Gastrointestinal:  Negative for abdominal pain, heartburn, hematemesis and hematochezia.   Genitourinary:  Negative for bladder incontinence, hesitancy and nocturia.   Neurological:  Negative for focal weakness, headaches, light-headedness and paresthesias.   Psychiatric/Behavioral:  Negative for memory loss and substance abuse.               Objective:        Vitals:    24 1119   BP: (!) 142/75   Pulse: 76       Lab Results   Component Value Date    WBC 4.89 2024    HGB 13.6 2024    HCT 41.6 2024     2024    CHOL 153 2024    TRIG 112 2024    HDL 70 2024    ALT 24 2024    AST 46 (H) 2024     2024    K  3.8 02/23/2024     02/23/2024    CREATININE 1.00 02/23/2024    BUN 17 02/23/2024    CO2 28 02/23/2024    TSH 2.824 01/04/2023    INR 0.9 12/01/2021    HGBA1C 5.4 12/02/2021        ECHOCARDIOGRAM RESULTS  No results found for this or any previous visit.    Results for orders placed during the hospital encounter of 02/23/24    Cardiac catheterization    Conclusion    There was non-obstructive coronary artery disease..    The left ventricular systolic function was normal.    The left ventricular end diastolic pressure was elevated.    The procedure log was documented by Documenter: Eyad Polanco RT and verified by Chau Light MD.    Date: 2/23/2024  Time: 9:10 AM          CURRENT/PREVIOUS VISIT EKG  Results for orders placed or performed during the hospital encounter of 02/23/24   EKG 12-lead    Collection Time: 02/23/24  7:35 AM   Result Value Ref Range    QRS Duration 96 ms    [facility] QTC Calculation 413 ms    Narrative    Test Reason : I25.10,    Vent. Rate : 083 BPM     Atrial Rate : 083 BPM     P-R Int : 134 ms          QRS Dur : 096 ms      QT Int : 352 ms       P-R-T Axes : 056 027 049 degrees     QTc Int : 413 ms    Normal sinus rhythm  Normal ECG  When compared with ECG of 07-FEB-2024 14:12,  No significant change was found  Confirmed by Luis Alfredo Hickey ([id number]) on 2/23/2024 10:11:33 PM    Referred By:  JUANY           Confirmed By:Luis Alfredo Hickey     No valid procedures specified.   No results found for this or any previous visit.    No valid procedures specified.    PHYSICAL EXAM  GENERAL: well built, well nourished, well-developed in no apparent distress alert and oriented.   HEENT: Normocephalic. Pupils normal and conjunctivae normal.  Mucous membranes normal, no cyanosis or icterus, trachea central,no pallor or icterus is noted..   NECK: No JVD. No bruit..   THYROID: Thyroid not enlarged. No nodules present..   CARDIAC:  Normal S1-S2.  No murmur rub click or gallop.  PMI  nondisplaced.  CHEST ANATOMY: normal.   LUNGS: Clear to auscultation. No wheezing or rhonchi..   ABDOMEN: Soft no masses or organomegaly.  No abdomen pulsations or bruits.  Normal bowel sounds. No pulsations and no masses felt, No guarding or rebound.   URINARY: No rust catheter   EXTREMITIES: No cyanosis, clubbing or edema noted at this time., no calf tenderness bilaterally.   PERIPHERAL VASCULAR SYSTEM: Good palpable distal pulses.  2+ femoral, popliteal and pedal pulses.  No bruits    CENTRAL NERVOUS SYSTEM: No focal motor or sensory deficits noted.   SKIN: Skin without lesions, moist, well perfused.   MUSCLE STRENGTH & TONE: No noteable weakness, atrophy or abnormal movement    I HAVE REVIEWED :    The vital signs, nurses notes, and all the pertinent radiology and labs.         Current Outpatient Medications   Medication Instructions    albuterol (PROVENTIL/VENTOLIN HFA) 90 mcg/actuation inhaler 2 puffs, Inhalation    ALPRAZolam (XANAX) 0.25 mg, Oral, 2 times daily PRN    amLODIPine (NORVASC) 5 mg, Oral, Daily    buPROPion (WELLBUTRIN XL) 300 mg, Oral, Daily    cholestyramine (QUESTRAN) 4 gram packet 4 g, Oral, Daily    estradioL (ESTRACE) 1 g, Vaginal, Daily    fluticasone furoate-vilanteroL (BREO) 100-25 mcg/dose diskus inhaler 1 puff, Inhalation, Daily, Controller    hyoscyamine (LEVSIN) 125 mcg, Oral, Every 4 hours PRN    lipase-protease-amylase (CREON) 36,000-114,000- 180,000 unit CpDR TAKE 2 CAPSULE BY MOUTH 3 (THREE) TIMES DAILY WITH MEALS. & 1 CAPSULE WITH SNACKS    loperamide (IMODIUM) 1 mg/5 mL solution Oral, Every 6 hours PRN    montelukast (SINGULAIR) 10 mg, Oral, Nightly    omeprazole (PRILOSEC) 20 mg, Oral, Daily    rosuvastatin (CRESTOR) 20 mg, Oral, Nightly    valACYclovir (VALTREX) 2,000 mg, Oral, Every 12 hours, X 1 day          Assessment:   Coronary artery disease.  Left heart catheterization 03/2024 with nonobstructive CAD.  EF normal.    Carotid artery disease, nonobstructive.    Repeat  blood pressure by me 132/70        Plan:   Continue risk factor modification.  Weight loss, diet, exercise.  Continue Norvasc 5 mg daily, Crestor 20 daily.          No follow-ups on file.

## 2024-04-17 ENCOUNTER — TELEPHONE (OUTPATIENT)
Dept: FAMILY MEDICINE | Facility: CLINIC | Age: 79
End: 2024-04-17
Payer: MEDICARE

## 2024-04-17 VITALS — DIASTOLIC BLOOD PRESSURE: 66 MMHG | SYSTOLIC BLOOD PRESSURE: 127 MMHG

## 2024-04-17 RX ORDER — AMLODIPINE BESYLATE 10 MG/1
5 TABLET ORAL 2 TIMES DAILY
COMMUNITY
End: 2024-05-01

## 2024-05-01 ENCOUNTER — HOSPITAL ENCOUNTER (OUTPATIENT)
Dept: RADIOLOGY | Facility: HOSPITAL | Age: 79
Discharge: HOME | End: 2024-05-01
Attending: FAMILY MEDICINE
Payer: MEDICARE

## 2024-05-01 ENCOUNTER — OFFICE VISIT (OUTPATIENT)
Dept: FAMILY MEDICINE | Facility: CLINIC | Age: 79
End: 2024-05-01
Payer: MEDICARE

## 2024-05-01 VITALS
DIASTOLIC BLOOD PRESSURE: 64 MMHG | OXYGEN SATURATION: 97 % | HEART RATE: 91 BPM | BODY MASS INDEX: 22.95 KG/M2 | WEIGHT: 117.5 LBS | SYSTOLIC BLOOD PRESSURE: 132 MMHG

## 2024-05-01 DIAGNOSIS — I70.0 ATHEROSCLEROSIS OF AORTA: ICD-10-CM

## 2024-05-01 DIAGNOSIS — J43.1 PANLOBULAR EMPHYSEMA: ICD-10-CM

## 2024-05-01 DIAGNOSIS — Z12.31 ENCOUNTER FOR SCREENING MAMMOGRAM FOR BREAST CANCER: ICD-10-CM

## 2024-05-01 DIAGNOSIS — I25.10 CORONARY ARTERY DISEASE, UNSPECIFIED VESSEL OR LESION TYPE, UNSPECIFIED WHETHER ANGINA PRESENT, UNSPECIFIED WHETHER NATIVE OR TRANSPLANTED HEART: ICD-10-CM

## 2024-05-01 DIAGNOSIS — I10 ESSENTIAL HYPERTENSION: Primary | ICD-10-CM

## 2024-05-01 DIAGNOSIS — E78.2 MIXED HYPERLIPIDEMIA: ICD-10-CM

## 2024-05-01 PROCEDURE — 99999 PR PBB SHADOW E&M-EST. PATIENT-LVL III: CPT | Mod: PBBFAC,,,

## 2024-05-01 PROCEDURE — 77067 SCR MAMMO BI INCL CAD: CPT | Mod: TC,PO

## 2024-05-01 PROCEDURE — 99214 OFFICE O/P EST MOD 30 MIN: CPT | Mod: S$PBB,,,

## 2024-05-01 PROCEDURE — 77063 BREAST TOMOSYNTHESIS BI: CPT | Mod: 26,,, | Performed by: RADIOLOGY

## 2024-05-01 PROCEDURE — 99213 OFFICE O/P EST LOW 20 MIN: CPT | Mod: PBBFAC,PO

## 2024-05-01 PROCEDURE — 77067 SCR MAMMO BI INCL CAD: CPT | Mod: 26,,, | Performed by: RADIOLOGY

## 2024-05-01 RX ORDER — AMLODIPINE BESYLATE 5 MG/1
5 TABLET ORAL 2 TIMES DAILY
Qty: 180 TABLET | Refills: 3 | Status: SHIPPED | OUTPATIENT
Start: 2024-05-01 | End: 2025-05-01

## 2024-05-01 NOTE — PROGRESS NOTES
Name: Veronique Johnson  MRN: [id number]  : 1945  PCP: Becky Song MD    HPI    Patient follows with Dr. Song, new to me. She presents for BP follow up. She was increased to Norvasc 5 mg BID. She denies any issues with medication. He BP at home have been ranging in the 130s-120s systolic. BP today is stable. She would like to enroll in digital BP program. She denies any leg swelling, headaches or lightheadedness with new medication changes.     Review of Systems   Respiratory:  Negative for shortness of breath.    Cardiovascular:  Negative for chest pain, palpitations and leg swelling.   Gastrointestinal:  Negative for nausea and vomiting.   Neurological:  Negative for light-headedness and headaches.       Patient Active Problem List   Diagnosis    Essential hypertension    Anxiety    Iron deficiency anemia    Dry eye    Arthralgia of both hands    Atherosclerosis of aorta    Urinary frequency    MS (multiple sclerosis)    Paresthesia    Gait disturbance    Cervical myofascial pain syndrome    Spondylolisthesis of cervical region    Cerebral aneurysm    Diarrhea    Acute medial meniscal tear, right, initial encounter    COPD (chronic obstructive pulmonary disease)    Hyperlipidemia    ACP (advance care planning)    Decreased range of motion (ROM) of knee    Coronary artery disease    Expressive aphasia    Dizziness    Meniere disease    Atherosclerosis of native coronary artery of native heart with angina pectoris    Osteopenia of multiple sites       Vitals:    24 1353   BP: 132/64   Pulse: 91       Physical Exam  Constitutional:       General: She is not in acute distress.     Appearance: She is well-developed.   HENT:      Head: Normocephalic and atraumatic.      Right Ear: External ear normal.      Left Ear: External ear normal.   Eyes:      Conjunctiva/sclera: Conjunctivae normal.      Pupils: Pupils are equal, round, and reactive to light.   Neck:      Thyroid: No thyromegaly.   Cardiovascular:       Rate and Rhythm: Normal rate and regular rhythm.      Pulses: Normal pulses.      Heart sounds: Normal heart sounds, S1 normal and S2 normal.   Pulmonary:      Effort: Pulmonary effort is normal. No respiratory distress.      Breath sounds: Normal breath sounds.   Chest:      Chest wall: No tenderness.   Musculoskeletal:         General: No swelling or tenderness. Normal range of motion.      Cervical back: Normal range of motion and neck supple.   Skin:     General: Skin is warm and dry.      Coloration: Skin is not jaundiced or pale.   Neurological:      General: No focal deficit present.      Mental Status: She is alert and oriented to person, place, and time.      Cranial Nerves: No cranial nerve deficit.   Psychiatric:         Mood and Affect: Mood normal.         Behavior: Behavior normal.         1. Essential hypertension  -     Hypertension Digital Medicine (HDMP) Enrollment Order  -     amLODIPine (NORVASC) 5 MG tablet; Take 1 tablet (5 mg total) by mouth 2 (two) times daily.  Dispense: 180 tablet; Refill: 3    2. Mixed hyperlipidemia   Continue with statin therapy    3. Coronary artery disease, unspecified vessel or lesion type, unspecified whether angina present, unspecified whether native or transplanted heart   Stable. Continue with current medications    4. Atherosclerosis of aorta   Stable. Continue with current medications    5. Panlobular emphysema   Stable with her inhalers       Follow up in 3 months as scheduled       MILES Jones  05/01/2024

## 2024-05-02 ENCOUNTER — PATIENT MESSAGE (OUTPATIENT)
Dept: PSYCHIATRY | Facility: CLINIC | Age: 79
End: 2024-05-02
Payer: MEDICARE

## 2024-05-08 ENCOUNTER — PATIENT MESSAGE (OUTPATIENT)
Dept: FAMILY MEDICINE | Facility: CLINIC | Age: 79
End: 2024-05-08
Payer: MEDICARE

## 2024-05-20 ENCOUNTER — PATIENT MESSAGE (OUTPATIENT)
Dept: PSYCHIATRY | Facility: CLINIC | Age: 79
End: 2024-05-20
Payer: MEDICARE

## 2024-05-22 ENCOUNTER — OFFICE VISIT (OUTPATIENT)
Dept: FAMILY MEDICINE | Facility: CLINIC | Age: 79
End: 2024-05-22
Payer: MEDICARE

## 2024-05-22 VITALS
DIASTOLIC BLOOD PRESSURE: 64 MMHG | BODY MASS INDEX: 22.88 KG/M2 | HEART RATE: 87 BPM | SYSTOLIC BLOOD PRESSURE: 130 MMHG | OXYGEN SATURATION: 98 % | WEIGHT: 117.19 LBS

## 2024-05-22 DIAGNOSIS — I10 HYPERTENSION, UNSPECIFIED TYPE: Primary | ICD-10-CM

## 2024-05-22 DIAGNOSIS — I25.10 CORONARY ARTERY DISEASE, UNSPECIFIED VESSEL OR LESION TYPE, UNSPECIFIED WHETHER ANGINA PRESENT, UNSPECIFIED WHETHER NATIVE OR TRANSPLANTED HEART: ICD-10-CM

## 2024-05-22 DIAGNOSIS — E78.2 MIXED HYPERLIPIDEMIA: ICD-10-CM

## 2024-05-22 DIAGNOSIS — M79.89 LEG SWELLING: ICD-10-CM

## 2024-05-22 DIAGNOSIS — I70.0 ATHEROSCLEROSIS OF AORTA: ICD-10-CM

## 2024-05-22 PROCEDURE — 99213 OFFICE O/P EST LOW 20 MIN: CPT | Mod: PBBFAC,PO

## 2024-05-22 PROCEDURE — 99214 OFFICE O/P EST MOD 30 MIN: CPT | Mod: S$PBB,,,

## 2024-05-22 PROCEDURE — 99999 PR PBB SHADOW E&M-EST. PATIENT-LVL III: CPT | Mod: PBBFAC,,,

## 2024-05-22 NOTE — PROGRESS NOTES
Name: Veronique Johnson  MRN: [id number]  : 1945  PCP: Becky Song MD    HPI    Patient follows with Dr. Song, known to me. She has been taking Norvasc 5 mg daily. As 5 mg BID caused leg swelling. Selling improved after going down on the medication. She denies any SOB, lightheadedness, or chest pain at this time. She has started with digital medicine. Gave her the digital medicine support line phone number.     Review of Systems   Respiratory:  Negative for shortness of breath.    Cardiovascular:  Negative for chest pain, palpitations and leg swelling.   Gastrointestinal:  Negative for nausea and vomiting.   Neurological:  Negative for light-headedness.       Patient Active Problem List   Diagnosis    Essential hypertension    Anxiety    Iron deficiency anemia    Dry eye    Arthralgia of both hands    Atherosclerosis of aorta    Urinary frequency    MS (multiple sclerosis)    Paresthesia    Gait disturbance    Cervical myofascial pain syndrome    Spondylolisthesis of cervical region    Cerebral aneurysm    Diarrhea    Acute medial meniscal tear, right, initial encounter    COPD (chronic obstructive pulmonary disease)    Hyperlipidemia    ACP (advance care planning)    Decreased range of motion (ROM) of knee    Coronary artery disease    Expressive aphasia    Dizziness    Meniere disease    Atherosclerosis of native coronary artery of native heart with angina pectoris    Osteopenia of multiple sites       Vitals:    24 1324   BP: 130/64   Pulse: 87       Physical Exam  Constitutional:       General: She is not in acute distress.     Appearance: She is well-developed.   HENT:      Head: Normocephalic and atraumatic.      Right Ear: External ear normal.      Left Ear: External ear normal.   Eyes:      Conjunctiva/sclera: Conjunctivae normal.      Pupils: Pupils are equal, round, and reactive to light.   Neck:      Thyroid: No thyromegaly.   Cardiovascular:      Rate and Rhythm: Normal rate and regular  rhythm.      Pulses: Normal pulses.      Heart sounds: Normal heart sounds, S1 normal and S2 normal.   Pulmonary:      Effort: Pulmonary effort is normal. No respiratory distress.      Breath sounds: Normal breath sounds.   Chest:      Chest wall: No tenderness.   Musculoskeletal:         General: No swelling or tenderness. Normal range of motion.      Cervical back: Normal range of motion and neck supple.   Skin:     General: Skin is warm and dry.      Coloration: Skin is not jaundiced or pale.   Neurological:      General: No focal deficit present.      Mental Status: She is alert and oriented to person, place, and time.      Cranial Nerves: No cranial nerve deficit.   Psychiatric:         Mood and Affect: Mood normal.         Behavior: Behavior normal.         1. Hypertension, unspecified type   Stable with current medications. Will get her enrolled into the digital medicine program    2. Leg swelling   Resolved. Likely due to increase in amlodipine    3. Atherosclerosis of aorta   Continue with current medications    4. Mixed hyperlipidemia   Continue with statin therapy    5. Coronary artery disease, unspecified vessel or lesion type, unspecified whether angina present, unspecified whether native or transplanted heart   Continue with statin therapy. Stable      Follow up as needed or if symptoms fail to improve       MILES Jones  05/22/2024

## 2024-06-21 ENCOUNTER — PATIENT MESSAGE (OUTPATIENT)
Dept: OTHER | Facility: OTHER | Age: 79
End: 2024-06-21
Payer: MEDICARE

## 2024-07-18 ENCOUNTER — OFFICE VISIT (OUTPATIENT)
Dept: FAMILY MEDICINE | Facility: CLINIC | Age: 79
End: 2024-07-18
Payer: MEDICARE

## 2024-07-18 ENCOUNTER — HOSPITAL ENCOUNTER (OUTPATIENT)
Dept: RADIOLOGY | Facility: HOSPITAL | Age: 79
Discharge: HOME | End: 2024-07-18
Attending: FAMILY MEDICINE
Payer: MEDICARE

## 2024-07-18 VITALS
HEART RATE: 81 BPM | DIASTOLIC BLOOD PRESSURE: 62 MMHG | BODY MASS INDEX: 22.95 KG/M2 | OXYGEN SATURATION: 98 % | WEIGHT: 116.88 LBS | HEIGHT: 60 IN | SYSTOLIC BLOOD PRESSURE: 130 MMHG

## 2024-07-18 DIAGNOSIS — M79.671 PAIN OF BOTH HEELS: ICD-10-CM

## 2024-07-18 DIAGNOSIS — M79.672 PAIN OF BOTH HEELS: ICD-10-CM

## 2024-07-18 DIAGNOSIS — I10 ESSENTIAL HYPERTENSION: ICD-10-CM

## 2024-07-18 DIAGNOSIS — G47.09 OTHER INSOMNIA: ICD-10-CM

## 2024-07-18 DIAGNOSIS — R74.8 INCREASED LIVER ENZYMES: ICD-10-CM

## 2024-07-18 DIAGNOSIS — F41.9 ANXIETY: ICD-10-CM

## 2024-07-18 DIAGNOSIS — E78.49 OTHER HYPERLIPIDEMIA: Primary | ICD-10-CM

## 2024-07-18 PROCEDURE — 99214 OFFICE O/P EST MOD 30 MIN: CPT | Mod: PBBFAC,PO,25 | Performed by: FAMILY MEDICINE

## 2024-07-18 PROCEDURE — 73650 X-RAY EXAM OF HEEL: CPT | Mod: 26,50,, | Performed by: RADIOLOGY

## 2024-07-18 PROCEDURE — 99214 OFFICE O/P EST MOD 30 MIN: CPT | Mod: S$PBB,,, | Performed by: FAMILY MEDICINE

## 2024-07-18 PROCEDURE — 73650 X-RAY EXAM OF HEEL: CPT | Mod: TC,50,FY,PO

## 2024-07-18 PROCEDURE — 99999 PR PBB SHADOW E&M-EST. PATIENT-LVL IV: CPT | Mod: PBBFAC,,, | Performed by: FAMILY MEDICINE

## 2024-07-18 RX ORDER — ALPRAZOLAM 0.25 MG/1
0.25 TABLET ORAL 2 TIMES DAILY PRN
Qty: 60 TABLET | Refills: 5 | Status: SHIPPED | OUTPATIENT
Start: 2024-07-29

## 2024-07-18 RX ORDER — TRAZODONE HYDROCHLORIDE 50 MG/1
50 TABLET ORAL NIGHTLY
Qty: 30 TABLET | Refills: 11 | Status: SHIPPED | OUTPATIENT
Start: 2024-07-18 | End: 2025-07-18

## 2024-07-18 NOTE — PROGRESS NOTES
Assessment:       1. Other hyperlipidemia    2. Essential hypertension    3. Anxiety    4. Pain of both heels    5. Increased liver enzymes    6. Other insomnia        Assessment & Plan  Other hyperlipidemia:  Stable    Essential hypertension:  Stable    Anxiety: Worsening  -     ALPRAZolam (XANAX) 0.25 MG tablet; Take 1 tablet (0.25 mg total) by mouth 2 (two) times daily as needed for Anxiety.  Dispense: 60 tablet; Refill: 5    Pain of both heels: New problem workup needed  -     X-Ray Calcaneus Bilateral; Future; Expected date: 07/18/2024    Increased liver enzymes: New problem workup needed  -     Comprehensive Metabolic Panel; Future; Expected date: 07/18/2024    Other insomnia: Worsening  -     traZODone (DESYREL) 50 MG tablet; Take 1 tablet (50 mg total) by mouth every evening.  Dispense: 30 tablet; Refill: 11    [place] prescription monitoring program was checked and okay.  A 6-month supply of Xanax was prescribed, with 60 tablets and 5 refills. Blood work was ordered. Trazodone 50 mg was prescribed for insomnia, to be taken 30 minutes before bedtime.  An x-ray of the heels was ordered.  She was encouraged to engage in outdoor walking when feeling overwhelmed, relaxation techniques, continue yoga, continue exercising..    Follow-up  A follow-up appointment is scheduled in 6 weeks with Adalberto.        Patient agreed with assessment and plan. Patient verbalized understanding.     Subjective:       Patient ID: Veronique Johnson is a 78 y.o. female.    Chief Complaint: Follow-up (6 Month follow up )    HPI  History of Present Illness  The patient presents for evaluation of multiple medical concerns.    She is seeking a refill of her Xanax prescription. Her blood pressure medication was changed to Olmesartan by the [facility], which has been beneficial. Previously, she was instructed to take amlodipine twice daily, but this led to ankle swelling. After reducing her dosage to once daily, her blood  pressure remained high, but it has since stabilized. Her stress levels have been exacerbated by her 's dementia. She does not take baby aspirin. She takes Wellbutrin in the morning, blood pressure medication in the morning, a daily vitamin, and rosuvastatin at night. She has previously seen a therapist. She has recently started practicing yoga and working outdoors.    She has been experiencing pain in her heels for about a month, particularly at night. The soles of her foot are sore upon waking, but the pain subsides after 30 to 40 minutes. She denies any numbness. The pain is located behind her ankle and down her heel.    She often stays awake until 1:00 a.m. and struggles to turn her mind off at night. Despite abstaining from alcohol, she does not feel sleepy. She does not nap during the day.    Supplemental Information  She does have back problems, but she has not had any back pain in years. She learnt some exercises when she has sciatica and it helps her. She is taking cholestyramine for her diarrhea and it is working. She still has diarrhea occasionally.   She drinks alcohol a couple of times a week at night.     Past medical history, past social history was reviewed and discussed with the patient.    Review of Systems   Cardiovascular:  Negative for chest pain and leg swelling.   Gastrointestinal:  Negative for abdominal distention and abdominal pain.   Musculoskeletal:  Positive for arthralgias.   Psychiatric/Behavioral:  Positive for dysphoric mood and sleep disturbance. The patient is nervous/anxious.        Objective:      Physical Exam  Vitals and nursing note reviewed.   Constitutional:       General: She is not in acute distress.     Appearance: Normal appearance. She is well-developed and normal weight. She is not diaphoretic.   HENT:      Head: Normocephalic and atraumatic.      Right Ear: External ear normal.      Left Ear: External ear normal.      Nose: Nose normal.   Eyes:      General: No  scleral icterus.        Right eye: No discharge.         Left eye: No discharge.      Conjunctiva/sclera: Conjunctivae normal.   Cardiovascular:      Rate and Rhythm: Normal rate and regular rhythm.      Heart sounds: Normal heart sounds.   Pulmonary:      Effort: Pulmonary effort is normal. No respiratory distress.      Breath sounds: Normal breath sounds. No wheezing.   Musculoskeletal:         General: Tenderness (Bilateral heels) present.      Cervical back: Neck supple.   Neurological:      Mental Status: She is alert.   Psychiatric:         Behavior: Behavior normal.         Thought Content: Thought content normal.         Judgment: Judgment normal.         Physical Exam       Results  Laboratory Studies  GFR was 58. AST was abnormal.     This note was generated with the assistance of ambient listening technology. Verbal consent was obtained by the patient and accompanying visitor(s) for the recording of patient appointment to facilitate this note. I attest to having reviewed and edited the generated note for accuracy, though some syntax or spelling errors may persist. Please contact the author of this note for any clarification.

## 2024-07-19 DIAGNOSIS — N18.31 STAGE 3A CHRONIC KIDNEY DISEASE: Primary | ICD-10-CM

## 2024-07-24 ENCOUNTER — HOSPITAL ENCOUNTER (OUTPATIENT)
Dept: RADIOLOGY | Facility: HOSPITAL | Age: 79
Discharge: HOME | End: 2024-07-24
Attending: FAMILY MEDICINE
Payer: MEDICARE

## 2024-07-24 DIAGNOSIS — N18.31 STAGE 3A CHRONIC KIDNEY DISEASE: ICD-10-CM

## 2024-07-24 PROCEDURE — 76775 US EXAM ABDO BACK WALL LIM: CPT | Mod: 26,,, | Performed by: RADIOLOGY

## 2024-07-24 PROCEDURE — 76775 US EXAM ABDO BACK WALL LIM: CPT | Mod: TC,PO

## 2024-07-25 ENCOUNTER — PATIENT MESSAGE (OUTPATIENT)
Dept: PSYCHIATRY | Facility: CLINIC | Age: 79
End: 2024-07-25
Payer: MEDICARE

## 2024-07-30 DIAGNOSIS — F43.10 PTSD (POST-TRAUMATIC STRESS DISORDER): ICD-10-CM

## 2024-07-30 DIAGNOSIS — F41.9 ANXIETY: ICD-10-CM

## 2024-07-30 RX ORDER — BUPROPION HYDROCHLORIDE 300 MG/1
300 TABLET ORAL DAILY
Qty: 90 TABLET | Refills: 1 | Status: SHIPPED | OUTPATIENT
Start: 2024-07-30

## 2024-07-30 NOTE — TELEPHONE ENCOUNTER
No care due was identified.  [facility] Embedded Care Due Messages. Reference number: [id number].   7/30/2024 1:16:56 PM CDT

## 2024-08-05 ENCOUNTER — PATIENT MESSAGE (OUTPATIENT)
Dept: PSYCHIATRY | Facility: CLINIC | Age: 79
End: 2024-08-05
Payer: MEDICARE

## 2024-08-12 DIAGNOSIS — Z96.651 S/P RIGHT UNICOMPARTMENTAL KNEE REPLACEMENT: Primary | ICD-10-CM

## 2024-08-12 RX ORDER — CHOLESTYRAMINE 4 G/9G
4 POWDER, FOR SUSPENSION ORAL DAILY
Qty: 30 PACKET | Refills: 2 | Status: SHIPPED | OUTPATIENT
Start: 2024-08-12 | End: 2025-08-12

## 2024-08-15 ENCOUNTER — OFFICE VISIT (OUTPATIENT)
Dept: OPTOMETRY | Facility: CLINIC | Age: 79
End: 2024-08-15
Payer: MEDICARE

## 2024-08-15 DIAGNOSIS — H04.123 BILATERAL DRY EYES: ICD-10-CM

## 2024-08-15 DIAGNOSIS — Z96.1 PSEUDOPHAKIA OF BOTH EYES: ICD-10-CM

## 2024-08-15 DIAGNOSIS — G35 MS (MULTIPLE SCLEROSIS): ICD-10-CM

## 2024-08-15 DIAGNOSIS — H47.20 OPTIC ATROPHY, RIGHT EYE: Primary | ICD-10-CM

## 2024-08-15 DIAGNOSIS — H52.7 REFRACTIVE ERROR: ICD-10-CM

## 2024-08-15 PROCEDURE — 92004 COMPRE OPH EXAM NEW PT 1/>: CPT | Mod: S$PBB,,, | Performed by: OPTOMETRIST

## 2024-08-15 PROCEDURE — 99999 PR PBB SHADOW E&M-EST. PATIENT-LVL III: CPT | Mod: PBBFAC,,, | Performed by: OPTOMETRIST

## 2024-08-15 PROCEDURE — 99213 OFFICE O/P EST LOW 20 MIN: CPT | Mod: PBBFAC,PO | Performed by: OPTOMETRIST

## 2024-08-15 PROCEDURE — 92015 DETERMINE REFRACTIVE STATE: CPT | Mod: ,,, | Performed by: OPTOMETRIST

## 2024-08-15 NOTE — PROGRESS NOTES
HPI     Eye Problem     Additional comments: Blur ou at near x mos, no assoc pain or red,   constant, no relief over time.           Comments    Referral ocular health exam HAROON    Pt complains of blurred near va, pt noticed increase in straining when   reading. Pt uses otc readers but no relief. Pt denies eye pains, floaters,   flashes. Pt uses AT PRN. HTN controlled w aid          Last edited by Obi Adler, OD on 8/15/2024 11:08 AM.            Assessment /Plan     For exam results, see Encounter Report.    Optic atrophy, right eye    MS (multiple sclerosis)    Pseudophakia of both eyes    Bilateral dry eyes    Refractive error      1,2. Diagnosed in the 80's, fairly stable course, RTC for HVf(24-2)conrad fast if no help with vision with new specs  3. Monitor condition. Patient to report any changes. RTC 1 year recheck.  4. Recommend artificial tears. 1 drop 2x per day. Chronicity of disease and treatment discussed.  5. New Spectacle Rx given, discussed different options for glasses. RTC 1 year routine eye exam.

## 2024-08-28 ENCOUNTER — OFFICE VISIT (OUTPATIENT)
Dept: ORTHOPEDICS | Facility: CLINIC | Age: 79
End: 2024-08-28
Payer: MEDICARE

## 2024-08-28 ENCOUNTER — HOSPITAL ENCOUNTER (OUTPATIENT)
Dept: RADIOLOGY | Facility: HOSPITAL | Age: 79
Discharge: HOME | End: 2024-08-28
Attending: ORTHOPAEDIC SURGERY
Payer: MEDICARE

## 2024-08-28 VITALS — WEIGHT: 116.88 LBS | HEIGHT: 60 IN | BODY MASS INDEX: 22.95 KG/M2

## 2024-08-28 DIAGNOSIS — Z96.651 S/P RIGHT UNICOMPARTMENTAL KNEE REPLACEMENT: ICD-10-CM

## 2024-08-28 DIAGNOSIS — Z96.651 S/P RIGHT UNICOMPARTMENTAL KNEE REPLACEMENT: Primary | ICD-10-CM

## 2024-08-28 DIAGNOSIS — M17.10 ARTHRITIS OF KNEE: ICD-10-CM

## 2024-08-28 PROCEDURE — 99999 PR PBB SHADOW E&M-EST. PATIENT-LVL II: CPT | Mod: PBBFAC,,, | Performed by: ORTHOPAEDIC SURGERY

## 2024-08-28 PROCEDURE — 99212 OFFICE O/P EST SF 10 MIN: CPT | Mod: PBBFAC,25,PO | Performed by: ORTHOPAEDIC SURGERY

## 2024-08-28 PROCEDURE — 73564 X-RAY EXAM KNEE 4 OR MORE: CPT | Mod: TC,50,PO

## 2024-08-28 PROCEDURE — 73564 X-RAY EXAM KNEE 4 OR MORE: CPT | Mod: 26,50,, | Performed by: RADIOLOGY

## 2024-08-28 PROCEDURE — 99999PBSHW PR PBB SHADOW TECHNICAL ONLY FILED TO HB: Mod: PBBFAC,,,

## 2024-08-28 PROCEDURE — 20610 DRAIN/INJ JOINT/BURSA W/O US: CPT | Mod: PBBFAC,PO | Performed by: ORTHOPAEDIC SURGERY

## 2024-08-28 RX ORDER — TRIAMCINOLONE ACETONIDE 40 MG/ML
40 INJECTION, SUSPENSION INTRA-ARTICULAR; INTRAMUSCULAR
Status: DISCONTINUED | OUTPATIENT
Start: 2024-08-28 | End: 2024-08-28 | Stop reason: HOSPADM

## 2024-08-28 RX ADMIN — TRIAMCINOLONE ACETONIDE 40 MG: 40 INJECTION, SUSPENSION INTRA-ARTICULAR; INTRAMUSCULAR at 02:08

## 2024-08-28 NOTE — PROCEDURES
Large Joint Aspiration/Injection: R knee    Date/Time: 8/28/2024 2:45 PM    Performed by: Shelton Le MD  Authorized by: Shelton Le MD    Consent Done?:  Yes (Verbal)  Indications:  Pain  Site marked: the procedure site was marked    Timeout: prior to procedure the correct patient, procedure, and site was verified    Local anesthetic: Ropivicaine.  Anesthetic total (ml):  3      Details:  Needle Size:  20 G  Approach:  Anterolateral  Location:  Knee  Site:  R knee  Medications:  40 mg triamcinolone acetonide 40 mg/mL  Patient tolerance:  Patient tolerated the procedure well with no immediate complications

## 2024-08-28 NOTE — PROGRESS NOTES
Past Medical History:   Diagnosis Date    Anemia     Anxiety     Chronic bronchitis with COPD (chronic obstructive pulmonary disease)     Esophageal spasm     Essential tremor     GERD (gastroesophageal reflux disease)     Headache     High cholesterol     Hypertension     Meniere disease     Multiple sclerosis     Muscle spasm     Pancreatic insufficiency        Past Surgical History:   Procedure Laterality Date    ANGIOGRAM, CORONARY, WITH LEFT HEART CATHETERIZATION  2/23/2024    Procedure: Left Heart Cath;  Surgeon: Chau Light MD;  Location: [facility] CATH;  Service: Cardiology;;    CATARACT EXTRACTION, BILATERAL  2006    COLONOSCOPY  09/05/2018    Dr. Leija, in procedures: diverticulosis, 4 colon polyps removed, adenomatous polyps x3 & benign mucosal polyps    COLONOSCOPY N/A 04/22/2021    Procedure: COLONOSCOPY;  Surgeon: Dwayne Santoyo MD;  Location: [facility] ENDO;  Service: Endoscopy;  Laterality: N/A;    CORONARY ANGIOGRAPHY N/A 10/14/2021    Procedure: ANGIOGRAM, CORONARY ARTERY;  Surgeon: Mustapha Manzo MD;  Location: [facility] CATH;  Service: Cardiology;  Laterality: N/A;    CYSTOSCOPY WITH HYDRODISTENSION OF BLADDER N/A 06/05/2019    Procedure: CYSTOSCOPY, WITH BLADDER HYDRODISTENSION;  Surgeon: Marko Burton MD;  Location: [facility] OR;  Service: OB/GYN;  Laterality: N/A;    DILATION AND CURETTAGE OF UTERUS  1966    ESOPHAGEAL MANOMETRY WITH MEASUREMENT OF IMPEDANCE N/A 01/06/2023    Procedure: MANOMETRY, ESOPHAGUS, WITH IMPEDANCE MEASUREMENT;  Surgeon: Vitaliy Hatfield MD;  Location: [facility] ([facility] FLR);  Service: Endoscopy;  Laterality: N/A;  instructions sent to [facility]  Precall done. 0900 arrival time confirmed. SG  instr portal -ml    ESOPHAGOGASTRODUODENOSCOPY  09/05/2018    Dr. Leija, in procedures: gastritis; biopsy: duodenum unremarkable, with focal benign gastric metaplasia, stomach- minimal chronic gastritis, negative for h pylori    ESOPHAGOGASTRODUODENOSCOPY N/A 04/22/2021     Procedure: EGD (ESOPHAGOGASTRODUODENOSCOPY);  Surgeon: Dwayne Santoyo MD;  Location: [facility] ENDO;  Service: Endoscopy;  Laterality: N/A;    ESOPHAGOGASTRODUODENOSCOPY N/A 03/10/2023    Procedure: EGD (ESOPHAGOGASTRODUODENOSCOPY);  Surgeon: Bere Mclean MD;  Location: [facility] ENDO (2ND FLR);  Service: Endoscopy;  Laterality: N/A;  Endoflip  2nd floor due to availability  propofol only  instructions sent to [facility]    EYE SURGERY      HEMORRHOID SURGERY  1997    JOINT REPLACEMENT Right     knee    KNEE ARTHROSCOPY W/ MENISCECTOMY Right 08/13/2021    Procedure: ARTHROSCOPY, KNEE, WITH MENISCECTOMY;  Surgeon: Shelton Le MD;  Location: [facility] OR;  Service: Orthopedics;  Laterality: Right;    KNEE ARTHROSCOPY W/ MENISCECTOMY Right 04/08/2022    Procedure: ARTHROSCOPY, KNEE, WITH MENISCECTOMY;  Surgeon: Shelton Le MD;  Location: [facility] OR;  Service: Orthopedics;  Laterality: Right;  medial meniscectomy    LEFT HEART CATHETERIZATION Right 10/14/2021    Procedure: Left heart cath;  Surgeon: Mustapha Manzo MD;  Location: [facility] CATH;  Service: Cardiology;  Laterality: Right;    ROBOTIC ARTHROPLASTY, KNEE, UNICOMPARTMENTAL Right 08/16/2022    Procedure: ROBOTIC ARTHROPLASTY, KNEE, UNICOMPARTMENTAL;  Surgeon: Shelton Le MD;  Location: [facility] OR;  Service: Orthopedics;  Laterality: Right;    TONSILLECTOMY  1954    TUBAL LIGATION  1990       Current Outpatient Medications   Medication Sig    albuterol (PROVENTIL/VENTOLIN HFA) 90 mcg/actuation inhaler Inhale 2 puffs into the lungs.    ALPRAZolam (XANAX) 0.25 MG tablet Take 1 tablet (0.25 mg total) by mouth 2 (two) times daily as needed for Anxiety.    buPROPion (WELLBUTRIN XL) 300 MG 24 hr tablet Take 1 tablet (300 mg total) by mouth once daily.    cholestyramine (QUESTRAN) 4 gram packet Take 1 packet (4 g total) by mouth once daily.    fluticasone furoate-vilanteroL (BREO) 100-25 mcg/dose diskus inhaler Inhale 1 puff into the lungs once  daily. Controller    hyoscyamine (LEVSIN) 0.125 mg/5 mL Elix Take 125 mcg by mouth every 4 (four) hours as needed.    lipase-protease-amylase (CREON) 36,000-114,000- 180,000 unit CpDR TAKE 2 CAPSULE BY MOUTH 3 (THREE) TIMES DAILY WITH MEALS. & 1 CAPSULE WITH SNACKS    loperamide (IMODIUM) 1 mg/5 mL solution Take by mouth every 6 (six) hours as needed for Diarrhea.    montelukast (SINGULAIR) 10 mg tablet Take 10 mg by mouth every evening.    olmesartan (BENICAR) 20 MG tablet Take 1 tablet (20 mg total) by mouth once daily.    omeprazole (PRILOSEC) 20 MG capsule TAKE 1 CAPSULE (20 MG TOTAL) BY MOUTH ONCE DAILY.    rosuvastatin (CRESTOR) 20 MG tablet Take 1 tablet (20 mg total) by mouth every evening.    traZODone (DESYREL) 50 MG tablet Take 1 tablet (50 mg total) by mouth every evening.    valACYclovir (VALTREX) 1000 MG tablet Take 2 tablets (2,000 mg total) by mouth every 12 (twelve) hours. X 1 day     No current facility-administered medications for this visit.       Review of patient's allergies indicates:   Allergen Reactions    Cephalosporins Anaphylaxis    Iodinated contrast media Hives and Swelling    Penicillins Rash       Family History   Problem Relation Name Age of Onset    Macular degeneration Mother Kalpana     Coronary artery disease Mother Kalpana     Heart disease Mother Kalpana 60        Bypass twice    Miscarriages / Stillbirths Mother Kalpana         Stillbirths    Vision loss Mother Kalpana         Macular Degeration    Heart attack Father Jason     Coronary artery disease Father Jason     Heart disease Father Jason 55        Several Heart Attack    Alcohol abuse Father Jason     Early death Father Jason         Heart Attack 55    Coronary artery disease Sister      Heart disease Sister  65        CAD    Celiac disease Neg Hx      Colon cancer Neg Hx      Crohn's disease Neg Hx      Ulcerative colitis Neg Hx      Amblyopia Neg Hx      Blindness Neg Hx      Cataracts Neg Hx      Retinal  detachment Neg Hx      Strabismus Neg Hx      Glaucoma Neg Hx         Social History     Socioeconomic History    Marital status:    Tobacco Use    Smoking status: Former     Current packs/day: 0.00     Average packs/day: 2.0 packs/day for 36.3 years (72.7 ttl pk-yrs)     Types: Cigarettes     Start date: 1964     Quit date:      Years since quittin.6    Smokeless tobacco: Never   Substance and Sexual Activity    Alcohol use: Yes     Alcohol/week: 7.0 standard drinks of alcohol     Types: 7 Glasses of wine per week    Drug use: Never    Sexual activity: Yes     Partners: Male     Birth control/protection: Post-menopausal     Social Determinants of Health     Financial Resource Strain: Low Risk  (2024)    Overall Financial Resource Strain (CARDIA)     Difficulty of Paying Living Expenses: Not hard at all   Food Insecurity: No Food Insecurity (2024)    Hunger Vital Sign     Worried About Running Out of Food in the Last Year: Never true     Ran Out of Food in the Last Year: Never true   Transportation Needs: No Transportation Needs (2024)    PRAPARE - Transportation     Lack of Transportation (Medical): No     Lack of Transportation (Non-Medical): No   Physical Activity: Inactive (2024)    Exercise Vital Sign     Days of Exercise per Week: 0 days     Minutes of Exercise per Session: 0 min   Stress: Stress Concern Present (2024)    [employer] [place] [employer] - Occupational Stress Questionnaire     Feeling of Stress : Very much   Housing Stability: Low Risk  (2023)    Housing Stability Vital Sign     Unable to Pay for Housing in the Last Year: No     Number of Places Lived in the Last Year: 1     Unstable Housing in the Last Year: No       Chief Complaint:   No chief complaint on file.      Date of surgery:  2022    History of present illness:  78-year-old female underwent right robot assisted unicompartmental knee replacement.  Patient is  complaining of more acute sharp pain that started a couple of months ago.  No injury or trauma.  Knee feels like it wants to buckle.  She gets sharp shooting pain laterally when she goes from flexion to extension.  Also has pain along the medial aspect of her knee with standing with her knee fully straight.  Pain is a 4/10.  Denies swelling or mechanical symptoms.      Review of Systems:    Musculoskeletal:  See HPI        Physical Examination:    Vital Signs:    There were no vitals filed for this visit.          There is no height or weight on file to calculate BMI.    This a well-developed, well nourished patient in no acute distress.  They are alert and oriented and cooperative to examination.  Pt. walks without an antalgic gait.      Examination of the right knee shows well-healed surgical incisions.  No erythema drainage.  Range of motion 0-130 degrees.   Stable to varus and valgus stress.  Negative drawer exam.     X-rays:  Four views of both knees are ordered and reviewed which show well-aligned unicompartmental knee Replacement without complication.  Small amount of cement behind the polyethylene.  No changes from previous x-ray.    CT scan of the right knee is reviewed and interpreted:1. Right knee arthroplasty hardware with no evidence for complication.  2. Degenerative change elsewhere on the knee including spurring along the medial tibial spine.  3. Small joint effusion.     Assessment::  Status post right unicompartmental knee replacement  Painful right knee.  Possible IT band issues.    Plan:  I reviewed the x-rays with her today.  Recommended a cortisone injection to see if it will not help with the sharp pain along the lateral portion of her knee.  Follow up as needed if not improving.    This note was created using [employer] voice recognition software that occasionally misinterpreted phrases or words.

## 2024-08-29 ENCOUNTER — OFFICE VISIT (OUTPATIENT)
Dept: FAMILY MEDICINE | Facility: CLINIC | Age: 79
End: 2024-08-29
Payer: MEDICARE

## 2024-08-29 VITALS
BODY MASS INDEX: 22.04 KG/M2 | SYSTOLIC BLOOD PRESSURE: 138 MMHG | DIASTOLIC BLOOD PRESSURE: 64 MMHG | WEIGHT: 112.88 LBS | OXYGEN SATURATION: 96 % | HEART RATE: 81 BPM

## 2024-08-29 DIAGNOSIS — G47.09 OTHER INSOMNIA: Primary | ICD-10-CM

## 2024-08-29 DIAGNOSIS — E78.2 MIXED HYPERLIPIDEMIA: ICD-10-CM

## 2024-08-29 DIAGNOSIS — F41.9 ANXIETY: ICD-10-CM

## 2024-08-29 DIAGNOSIS — I10 ESSENTIAL HYPERTENSION: ICD-10-CM

## 2024-08-29 DIAGNOSIS — I25.119 ATHEROSCLEROSIS OF NATIVE CORONARY ARTERY OF NATIVE HEART WITH ANGINA PECTORIS: ICD-10-CM

## 2024-08-29 PROCEDURE — 99213 OFFICE O/P EST LOW 20 MIN: CPT | Mod: PBBFAC,PO

## 2024-08-29 PROCEDURE — 99999 PR PBB SHADOW E&M-EST. PATIENT-LVL III: CPT | Mod: PBBFAC,,,

## 2024-08-29 PROCEDURE — 99214 OFFICE O/P EST MOD 30 MIN: CPT | Mod: S$PBB,,,

## 2024-08-29 RX ORDER — TRAZODONE HYDROCHLORIDE 100 MG/1
100 TABLET ORAL NIGHTLY
Qty: 30 TABLET | Refills: 11 | Status: SHIPPED | OUTPATIENT
Start: 2024-08-29 | End: 2025-08-29

## 2024-08-29 NOTE — PROGRESS NOTES
Name: Veronique Johnson  MRN: [id number]  : 1945  PCP: Becky Song MD    HPI    Patient follows with Dr. Song, known to me. She presents for 6 week follow up. She has issues with insomnia. She reports issues falling asleep more so than staying asleep. She was started on trazodone 50 mg by Dr. Song, she feels as though the medication is not strong enough. 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 She reports getting poor sleep and increased stress due to her caretaker responsibilities.     Review of Systems   Constitutional:  Negative for fever.   Respiratory:  Negative for shortness of breath.    Cardiovascular:  Negative for chest pain.   Gastrointestinal:  Negative for nausea and vomiting.   Genitourinary:  Negative for difficulty urinating.   Psychiatric/Behavioral:  Positive for sleep disturbance. The patient is nervous/anxious.        Patient Active Problem List   Diagnosis    Essential hypertension    Anxiety    Iron deficiency anemia    Dry eye    Arthralgia of both hands    Atherosclerosis of aorta    Urinary frequency    MS (multiple sclerosis)    Paresthesia    Gait disturbance    Cervical myofascial pain syndrome    Spondylolisthesis of cervical region    Cerebral aneurysm    Diarrhea    Acute medial meniscal tear, right, initial encounter    COPD (chronic obstructive pulmonary disease)    Hyperlipidemia    ACP (advance care planning)    Decreased range of motion (ROM) of knee    Coronary artery disease    Expressive aphasia    Dizziness    Meniere disease    Atherosclerosis of native coronary artery of native heart with angina pectoris    Osteopenia of multiple sites       Vitals:    24 1327   BP: 138/64   Pulse: 81       Physical Exam  Constitutional:       General: She is not in acute distress.     Appearance: She is well-developed.   HENT:      Head: Normocephalic and atraumatic.      Right Ear: External ear normal.      Left Ear: External ear normal.   Eyes:       Conjunctiva/sclera: Conjunctivae normal.      Pupils: Pupils are equal, round, and reactive to light.   Neck:      Thyroid: No thyromegaly.   Cardiovascular:      Rate and Rhythm: Normal rate and regular rhythm.      Pulses: Normal pulses.      Heart sounds: Normal heart sounds, S1 normal and S2 normal.   Pulmonary:      Effort: Pulmonary effort is normal. No respiratory distress.      Breath sounds: Normal breath sounds.   Chest:      Chest wall: No tenderness.   Musculoskeletal:         General: No swelling or tenderness. Normal range of motion.      Cervical back: Normal range of motion and neck supple.   Skin:     General: Skin is warm and dry.      Coloration: Skin is not jaundiced or pale.   Neurological:      General: No focal deficit present.      Mental Status: She is alert and oriented to person, place, and time.      Cranial Nerves: No cranial nerve deficit.   Psychiatric:         Mood and Affect: Mood normal.         Behavior: Behavior normal.         1. Other insomnia  -     traZODone (DESYREL) 100 MG tablet; Take 1 tablet (100 mg total) by mouth every evening.  Dispense: 30 tablet; Refill: 11  Uncontrolled. Will increase her trazodone dosage to 100 mg nightly. She will follow up with me in a few weeks     2. Essential hypertension   Stable with current medications    3. Mixed hyperlipidemia   Continue with statin therapy. Stable    4. Atherosclerosis of native coronary artery of native heart with angina pectoris   Continue with statin therapy    5. Anxiety   Controlled with xanax      Follow up in 6 weeks       MILES Jones  08/29/2024

## 2024-08-30 ENCOUNTER — OFFICE VISIT (OUTPATIENT)
Dept: PODIATRY | Facility: CLINIC | Age: 79
End: 2024-08-30
Payer: MEDICARE

## 2024-08-30 VITALS — HEIGHT: 60 IN | BODY MASS INDEX: 22.16 KG/M2 | WEIGHT: 112.88 LBS

## 2024-08-30 DIAGNOSIS — M77.52 RETROCALCANEAL BURSITIS (BACK OF HEEL), LEFT: ICD-10-CM

## 2024-08-30 DIAGNOSIS — M79.671 PAIN OF BOTH HEELS: ICD-10-CM

## 2024-08-30 DIAGNOSIS — M79.672 PAIN OF BOTH HEELS: ICD-10-CM

## 2024-08-30 DIAGNOSIS — M62.469 GASTROCNEMIUS EQUINUS, UNSPECIFIED LATERALITY: ICD-10-CM

## 2024-08-30 DIAGNOSIS — M89.8X7 RETROCALCANEAL EXOSTOSIS: Primary | ICD-10-CM

## 2024-08-30 PROCEDURE — 99213 OFFICE O/P EST LOW 20 MIN: CPT | Mod: PBBFAC,PO | Performed by: PODIATRIST

## 2024-08-30 PROCEDURE — 99999 PR PBB SHADOW E&M-EST. PATIENT-LVL III: CPT | Mod: PBBFAC,,, | Performed by: PODIATRIST

## 2024-08-30 NOTE — PATIENT INSTRUCTIONS
- Perform stretching exercises, see handout for details, to address tightness of calf muscles.      - Recommend wearing supportive shoes only.  Avoid barefoot walking, flip flops, and Crocs, as this will exacerbate current symptoms.      - Recommend wearing a heel lift on the Lt. Side only.    - Recommend icing the affected heel a minimum of 20 minutes daily.    - Avoid high impact activities such as squatting, stooping, and running as these activities will exacerbate symptoms.      - May consider applying a topical analgesic (Voltaren cream) to help with pain symptoms.

## 2024-08-30 NOTE — PROGRESS NOTES
Subjective:     Patient ID: Veronique Johnson is a 78 y.o. female.    Chief Complaint: Heel Pain    Veronique is a 78 y.o. female with a past medical history of Anemia, Anxiety, Chronic bronchitis with COPD (chronic obstructive pulmonary disease), Esophageal spasm, Essential tremor, GERD (gastroesophageal reflux disease), Headache, High cholesterol, Hypertension, Meniere disease, Multiple sclerosis, Muscle spasm, and Pancreatic insufficiency. The patient's chief complaint consists of Lt. Posterior heel pain that has been present for weeks.  Describes pain as sharp and rates currently as a 0/10 while at rest.  Symptoms are aggravated with weight bearing after periods of rest.  Notes symptoms begin to ease as she continues walking.  Denies sustaining trauma to the affected area.  Has not attempted to self treat.  Denies any additional pedal complaints.      Past Medical History:   Diagnosis Date    Anemia     Anxiety     Chronic bronchitis with COPD (chronic obstructive pulmonary disease)     Esophageal spasm     Essential tremor     GERD (gastroesophageal reflux disease)     Headache     High cholesterol     Hypertension     Meniere disease     Multiple sclerosis     Muscle spasm     Pancreatic insufficiency        Past Surgical History:   Procedure Laterality Date    ANGIOGRAM, CORONARY, WITH LEFT HEART CATHETERIZATION  2/23/2024    Procedure: Left Heart Cath;  Surgeon: Chau Light MD;  Location: [facility] CATH;  Service: Cardiology;;    CATARACT EXTRACTION, BILATERAL  2006    COLONOSCOPY  09/05/2018    Dr. Leija, in procedures: diverticulosis, 4 colon polyps removed, adenomatous polyps x3 & benign mucosal polyps    COLONOSCOPY N/A 04/22/2021    Procedure: COLONOSCOPY;  Surgeon: Dwayne Santoyo MD;  Location: [facility] ENDO;  Service: Endoscopy;  Laterality: N/A;    CORONARY ANGIOGRAPHY N/A 10/14/2021    Procedure: ANGIOGRAM, CORONARY ARTERY;  Surgeon: Mustapha Manzo MD;  Location: [facility] CATH;  Service: Cardiology;   Laterality: N/A;    CYSTOSCOPY WITH HYDRODISTENSION OF BLADDER N/A 06/05/2019    Procedure: CYSTOSCOPY, WITH BLADDER HYDRODISTENSION;  Surgeon: Marko Burton MD;  Location: [facility] OR;  Service: OB/GYN;  Laterality: N/A;    DILATION AND CURETTAGE OF UTERUS  1966    ESOPHAGEAL MANOMETRY WITH MEASUREMENT OF IMPEDANCE N/A 01/06/2023    Procedure: MANOMETRY, ESOPHAGUS, WITH IMPEDANCE MEASUREMENT;  Surgeon: Vitaliy Hatfield MD;  Location: [facility] (4TH FLR);  Service: Endoscopy;  Laterality: N/A;  instructions sent to [facility]  Precall done. 0900 arrival time confirmed. SG  instr portal -ml    ESOPHAGOGASTRODUODENOSCOPY  09/05/2018    Dr. Leija, in procedures: gastritis; biopsy: duodenum unremarkable, with focal benign gastric metaplasia, stomach- minimal chronic gastritis, negative for h pylori    ESOPHAGOGASTRODUODENOSCOPY N/A 04/22/2021    Procedure: EGD (ESOPHAGOGASTRODUODENOSCOPY);  Surgeon: Dwayne Santoyo MD;  Location: [facility];  Service: Endoscopy;  Laterality: N/A;    ESOPHAGOGASTRODUODENOSCOPY N/A 03/10/2023    Procedure: EGD (ESOPHAGOGASTRODUODENOSCOPY);  Surgeon: Bere Mclean MD;  Location: [facility] (2ND FLR);  Service: Endoscopy;  Laterality: N/A;  Endoflip  2nd floor due to availability  propofol only  instructions sent to [facility]    EYE SURGERY      HEMORRHOID SURGERY  1997    JOINT REPLACEMENT Right     knee    KNEE ARTHROSCOPY W/ MENISCECTOMY Right 08/13/2021    Procedure: ARTHROSCOPY, KNEE, WITH MENISCECTOMY;  Surgeon: Shelton Le MD;  Location: [facility] OR;  Service: Orthopedics;  Laterality: Right;    KNEE ARTHROSCOPY W/ MENISCECTOMY Right 04/08/2022    Procedure: ARTHROSCOPY, KNEE, WITH MENISCECTOMY;  Surgeon: Shelton Le MD;  Location: [facility] OR;  Service: Orthopedics;  Laterality: Right;  medial meniscectomy    LEFT HEART CATHETERIZATION Right 10/14/2021    Procedure: Left heart cath;  Surgeon: Mustapha Manzo MD;  Location: [facility] CATH;  Service: Cardiology;   Laterality: Right;    ROBOTIC ARTHROPLASTY, KNEE, UNICOMPARTMENTAL Right 2022    Procedure: ROBOTIC ARTHROPLASTY, KNEE, UNICOMPARTMENTAL;  Surgeon: Shelton Le MD;  Location: [facility];  Service: Orthopedics;  Laterality: Right;    TONSILLECTOMY      TUBAL LIGATION         Family History   Problem Relation Name Age of Onset    Macular degeneration Mother Kalpana     Coronary artery disease Mother Kalpana     Heart disease Mother Kalpana 60        Bypass twice    Miscarriages / Stillbirths Mother Kalpana         Stillbirths    Vision loss Mother Kalpana         Macular Degeration    Heart attack Father Jason     Coronary artery disease Father Jason     Heart disease Father Jason 55        Several Heart Attack    Alcohol abuse Father Jason     Early death Father Jason         Heart Attack 55    Coronary artery disease Sister      Heart disease Sister  65        CAD    Celiac disease Neg Hx      Colon cancer Neg Hx      Crohn's disease Neg Hx      Ulcerative colitis Neg Hx      Amblyopia Neg Hx      Blindness Neg Hx      Cataracts Neg Hx      Retinal detachment Neg Hx      Strabismus Neg Hx      Glaucoma Neg Hx         Social History     Socioeconomic History    Marital status:    Tobacco Use    Smoking status: Former     Current packs/day: 0.00     Average packs/day: 2.0 packs/day for 36.3 years (72.7 ttl pk-yrs)     Types: Cigarettes     Start date: 1964     Quit date:      Years since quittin.6    Smokeless tobacco: Never   Substance and Sexual Activity    Alcohol use: Yes     Alcohol/week: 7.0 standard drinks of alcohol     Types: 7 Glasses of wine per week    Drug use: Never    Sexual activity: Yes     Partners: Male     Birth control/protection: Post-menopausal     Social Determinants of Health     Financial Resource Strain: Low Risk  (2024)    Overall Financial Resource Strain (CARDIA)     Difficulty of Paying Living Expenses: Not hard at all   Food  Insecurity: No Food Insecurity (1/13/2024)    Hunger Vital Sign     Worried About Running Out of Food in the Last Year: Never true     Ran Out of Food in the Last Year: Never true   Transportation Needs: No Transportation Needs (1/13/2024)    PRAPARE - Transportation     Lack of Transportation (Medical): No     Lack of Transportation (Non-Medical): No   Physical Activity: Inactive (1/13/2024)    Exercise Vital Sign     Days of Exercise per Week: 0 days     Minutes of Exercise per Session: 0 min   Stress: Stress Concern Present (1/13/2024)    [facility] - Occupational Stress Questionnaire     Feeling of Stress : Very much   Housing Stability: Low Risk  (8/24/2023)    Housing Stability Vital Sign     Unable to Pay for Housing in the Last Year: No     Number of Places Lived in the Last Year: 1     Unstable Housing in the Last Year: No       Current Outpatient Medications   Medication Sig Dispense Refill    albuterol (PROVENTIL/VENTOLIN HFA) 90 mcg/actuation inhaler Inhale 2 puffs into the lungs.      ALPRAZolam (XANAX) 0.25 MG tablet Take 1 tablet (0.25 mg total) by mouth 2 (two) times daily as needed for Anxiety. 60 tablet 5    buPROPion (WELLBUTRIN XL) 300 MG 24 hr tablet Take 1 tablet (300 mg total) by mouth once daily. 90 tablet 1    cholestyramine (QUESTRAN) 4 gram packet Take 1 packet (4 g total) by mouth once daily. 30 packet 2    hyoscyamine (LEVSIN) 0.125 mg/5 mL Elix Take 125 mcg by mouth every 4 (four) hours as needed.      lipase-protease-amylase (CREON) 36,000-114,000- 180,000 unit CpDR TAKE 2 CAPSULE BY MOUTH 3 (THREE) TIMES DAILY WITH MEALS. & 1 CAPSULE WITH SNACKS 240 capsule 11    loperamide (IMODIUM) 1 mg/5 mL solution Take by mouth every 6 (six) hours as needed for Diarrhea.      montelukast (SINGULAIR) 10 mg tablet Take 10 mg by mouth every evening.      olmesartan (BENICAR) 20 MG tablet Take 1 tablet (20 mg total) by mouth once daily. 90 tablet 1    omeprazole  (PRILOSEC) 20 MG capsule TAKE 1 CAPSULE (20 MG TOTAL) BY MOUTH ONCE DAILY. 90 capsule 3    rosuvastatin (CRESTOR) 20 MG tablet Take 1 tablet (20 mg total) by mouth every evening. 90 tablet 1    traZODone (DESYREL) 100 MG tablet Take 1 tablet (100 mg total) by mouth every evening. 30 tablet 11    valACYclovir (VALTREX) 1000 MG tablet Take 2 tablets (2,000 mg total) by mouth every 12 (twelve) hours. X 1 day 4 tablet 5    fluticasone furoate-vilanteroL (BREO) 100-25 mcg/dose diskus inhaler Inhale 1 puff into the lungs once daily. Controller 90 each 1     No current facility-administered medications for this visit.       Review of patient's allergies indicates:   Allergen Reactions    Cephalosporins Anaphylaxis    Iodinated contrast media Hives and Swelling    Penicillins Rash        Hemoglobin A1C   Date Value Ref Range Status   12/02/2021 5.4 0.0 - 5.6 % Final     Comment:     Reference Interval:  5.0 - 5.6 Normal   5.7 - 6.4 High Risk   > 6.5 Diabetic      Hgb A1c results are standardized based on the (NGSP) National   Glycohemoglobin Standardization Program.      Hemoglobin A1C levels are related to mean serum/plasma glucose   during the preceding 2-3 months.            Review of Systems   Constitutional: Negative for chills and fever.   Skin:  Negative for color change and nail changes.   Musculoskeletal:  Positive for myalgias. Negative for muscle cramps and muscle weakness.   Gastrointestinal:  Negative for nausea and vomiting.   Neurological:  Negative for numbness and paresthesias.   Psychiatric/Behavioral:  Negative for altered mental status.         Objective:     Physical Exam  Constitutional:       Appearance: Normal appearance. She is not ill-appearing.   Cardiovascular:      Pulses:           Dorsalis pedis pulses are 2+ on the right side and 2+ on the left side.        Posterior tibial pulses are 2+ on the right side and 2+ on the left side.      Comments: CFT is < 3 seconds bilateral.  Pedal hair growth  is present bilateral.  No lower extremity edema noted bilateral.  Toes are warm to touch bilateral.    Musculoskeletal:         General: Tenderness and deformity present.      Right lower leg: No edema.      Left lower leg: No edema.      Comments: Muscle strength 5/5 in all muscle groups bilateral.  No tenderness nor crepitation with ROM of foot/ankle joints bilateral.  Lt. Sided gastrocnemius equinus noted.  Pain with palpation to the bursa overlying the Lt. Retrocalcaneal exostosis.     Skin:     Capillary Refill: Capillary refill takes 2 to 3 seconds.      Findings: No bruising, ecchymosis, erythema, signs of injury, laceration, lesion, petechiae, rash or wound.      Comments: Pedal skin has normal turgor, temperature, and texture bilateral.  Toenails x 10 appear normotrophic. Examination of the skin reveals no evidence of significant maceration, rashes, open lesions, suspicious appearing nevi or other concerning lesions.       Neurological:      General: No focal deficit present.      Mental Status: She is alert.      Sensory: No sensory deficit.      Motor: No weakness or atrophy.      Comments: Light touch is intact bilateral.             Assessment:      Encounter Diagnoses   Name Primary?    Pain of both heels     Retrocalcaneal exostosis Yes    Retrocalcaneal bursitis (back of heel), left     Gastrocnemius equinus, unspecified laterality      Plan:     Veronique was seen today for heel pain.    Diagnoses and all orders for this visit:    Retrocalcaneal exostosis    Pain of both heels  -     Ambulatory referral/consult to Podiatry    Retrocalcaneal bursitis (back of heel), left    Gastrocnemius equinus, unspecified laterality      I counseled the patient on her conditions, their implications and medical management.    - Based on today's exam, patient has retrocalcaneal bursitis of the Lt. Heel.       - Discussed performing stretching exercises to address the equinus.     - Recommend wearing supportive shoes  only.  Discussed avoidance of barefoot walking, flip flops, and Crocs, as this will exacerbate current symptoms.       - Recommend wearing a 1/4 inch heel lift on the Lt. Side QD for the next 6 weeks.    - Recommend icing the affected heel a minimum of 20 minutes daily.    - Discussed avoidance of high impact activities such as squatting, stooping, and running as these activities will exacerbate symptoms.       - May consider applying a topical analgesic (Voltaren cream) to help with pain symptoms.       - RTC prn or sooner if symptoms fail to resolve within 6 weeks.  Will consider PT at that time.     Scott Del Angel DPM

## 2024-09-03 DIAGNOSIS — I70.0 ATHEROSCLEROSIS OF AORTA: ICD-10-CM

## 2024-09-03 RX ORDER — ROSUVASTATIN CALCIUM 20 MG/1
20 TABLET, COATED ORAL NIGHTLY
Qty: 90 TABLET | Refills: 3 | Status: SHIPPED | OUTPATIENT
Start: 2024-09-03

## 2024-09-03 NOTE — TELEPHONE ENCOUNTER
No care due was identified.  [facility] Embedded Care Due Messages. Reference number: [id number].   9/03/2024 7:25:36 AM CDT

## 2024-09-19 ENCOUNTER — TELEPHONE (OUTPATIENT)
Dept: FAMILY MEDICINE | Facility: CLINIC | Age: 79
End: 2024-09-19
Payer: MEDICARE

## 2024-09-19 ENCOUNTER — NURSE TRIAGE (OUTPATIENT)
Dept: ADMINISTRATIVE | Facility: CLINIC | Age: 79
End: 2024-09-19
Payer: MEDICARE

## 2024-09-19 NOTE — TELEPHONE ENCOUNTER
Pt took 8 bp medication that belongs to her . Pt stated she called the pharmacist to see what medications she took and if she should be concerned. Pt stated they told her she should be concerned about the 3 bp medications and to monitor her bp.  Pt took her husbands Benicar 40 mg, Nifedipine er 90 mg, metoprolol er 50 mg. Synthroid 150 mcg,  gemfibrozil 600mg, Potassium Chlo 20 meq, calcitrol 0.25 mcg, gabapentin 300 mg and a multivitamin. Pt stated she did vomit and it was some medication and it was yellow/orange which is the color of the gabapentin.   She also took her own medications which included Wellbutrin, Xanax 0.25, and Omeprazole 20 mg. Pt denies any symptoms at present time. Care advice recommend pt call poison control center. Triage nurse connected pt to poison control.       Reason for Disposition   Took another person's prescription drug    Additional Information   Negative: Intentional drug overdose and suicidal thoughts or ideas   Negative: MORE THAN A DOUBLE DOSE of a prescription or over-the-counter (OTC) drug   Negative: DOUBLE DOSE (an extra dose or lesser amount) of prescription drug and any symptoms (e.g., dizziness, nausea, pain, sleepiness)   Negative: DOUBLE DOSE (an extra dose or lesser amount) of over-the-counter (OTC) drug and any symptoms (e.g., dizziness, nausea, pain, sleepiness)    Protocols used: Medication Question Call-A-[place]

## 2024-09-19 NOTE — TELEPHONE ENCOUNTER
----- Message from Marti Arizmendi sent at 9/19/2024  9:45 AM CDT -----  Contact: PT  Type:  Needs Medical Advice    Who Called: PT   Symptoms (please be specific): PT TOOK HER  BP MEDICATION IN ERROR   PT SPOKE WITH THE PHARMACY AND WOULD LIKE TO SPEAK WITH THE OFFICE   How long has patient had these symptoms:  THIS MORNING   Pharmacy name and phone #:  N/A  Would the patient rather a call back or a response via [facility]? CALL   Best Call Back Number: [phone]  Additional Information: PT TRANSFERRED TO [facility] NURSE ON CALL     Symptoms: Medication Question  Outcome: Instruct patient to Call 911 NOW!  Reason: Overdose suspected    The caller rejected this outcome.

## 2024-09-19 NOTE — TELEPHONE ENCOUNTER
Spoke to pt.  She denies weakness and dizzy. She spoke to poison control today they will be calling her in about 2 hours to check in on her to see how she is feeling. BP has been ranging 113/65. Pt stated she is about to leave for [place]. I informed her that  does not advise her traveling due to her BP. Pt verbalized understanding and will monitor her BP and symptoms. She will go to the ER if she feels bad.

## 2024-10-01 ENCOUNTER — OFFICE VISIT (OUTPATIENT)
Dept: CARDIOLOGY | Facility: CLINIC | Age: 79
End: 2024-10-01
Payer: MEDICARE

## 2024-10-01 VITALS
SYSTOLIC BLOOD PRESSURE: 138 MMHG | DIASTOLIC BLOOD PRESSURE: 80 MMHG | WEIGHT: 110.88 LBS | BODY MASS INDEX: 21.77 KG/M2 | HEART RATE: 85 BPM | HEIGHT: 60 IN

## 2024-10-01 DIAGNOSIS — I25.119 ATHEROSCLEROSIS OF NATIVE CORONARY ARTERY OF NATIVE HEART WITH ANGINA PECTORIS: ICD-10-CM

## 2024-10-01 DIAGNOSIS — I25.10 CORONARY ARTERY DISEASE, UNSPECIFIED VESSEL OR LESION TYPE, UNSPECIFIED WHETHER ANGINA PRESENT, UNSPECIFIED WHETHER NATIVE OR TRANSPLANTED HEART: ICD-10-CM

## 2024-10-01 DIAGNOSIS — I10 ESSENTIAL HYPERTENSION: ICD-10-CM

## 2024-10-01 DIAGNOSIS — I70.0 ATHEROSCLEROSIS OF AORTA: ICD-10-CM

## 2024-10-01 DIAGNOSIS — G35 MS (MULTIPLE SCLEROSIS): Primary | ICD-10-CM

## 2024-10-01 PROCEDURE — 99999 PR PBB SHADOW E&M-EST. PATIENT-LVL III: CPT | Mod: PBBFAC,,, | Performed by: INTERNAL MEDICINE

## 2024-10-01 PROCEDURE — 99213 OFFICE O/P EST LOW 20 MIN: CPT | Mod: PBBFAC,PO | Performed by: INTERNAL MEDICINE

## 2024-10-01 PROCEDURE — 99214 OFFICE O/P EST MOD 30 MIN: CPT | Mod: S$PBB,,, | Performed by: INTERNAL MEDICINE

## 2024-10-01 NOTE — PROGRESS NOTES
Subjective:    Patient ID:  Veronique Johnson is a 79 y.o. female patient here for evaluation Follow-up      History of Present Illness:  Cardiology follow up exam.  Coronary artery disease.  Repeat left heart catheterization 2024 with nonobstructive CAD.  No change from previous.    No angina, dyspnea.  No arrhythmia.             Review of patient's allergies indicates:   Allergen Reactions    Cephalosporins Anaphylaxis    Iodinated contrast media Hives and Swelling    Penicillins Rash       Past Medical History:   Diagnosis Date    Anemia     Anxiety     Chronic bronchitis with COPD (chronic obstructive pulmonary disease)     Esophageal spasm     Essential tremor     GERD (gastroesophageal reflux disease)     Headache     High cholesterol     Hypertension     Meniere disease     Multiple sclerosis     Muscle spasm     Pancreatic insufficiency      Past Surgical History:   Procedure Laterality Date    ANGIOGRAM, CORONARY, WITH LEFT HEART CATHETERIZATION  2/23/2024    Procedure: Left Heart Cath;  Surgeon: Chau Light MD;  Location: [facility] CATH;  Service: Cardiology;;    CATARACT EXTRACTION, BILATERAL  2006    COLONOSCOPY  09/05/2018    Dr. Leija, in procedures: diverticulosis, 4 colon polyps removed, adenomatous polyps x3 & benign mucosal polyps    COLONOSCOPY N/A 04/22/2021    Procedure: COLONOSCOPY;  Surgeon: Dwayne Santoyo MD;  Location: [facility] ENDO;  Service: Endoscopy;  Laterality: N/A;    CORONARY ANGIOGRAPHY N/A 10/14/2021    Procedure: ANGIOGRAM, CORONARY ARTERY;  Surgeon: Mustapha Manzo MD;  Location: [facility] CATH;  Service: Cardiology;  Laterality: N/A;    CYSTOSCOPY WITH HYDRODISTENSION OF BLADDER N/A 06/05/2019    Procedure: CYSTOSCOPY, WITH BLADDER HYDRODISTENSION;  Surgeon: Marko Burton MD;  [facility];  Service: OB/GYN;  Laterality: N/A;    DILATION AND CURETTAGE OF UTERUS  1966    ESOPHAGEAL MANOMETRY WITH MEASUREMENT OF IMPEDANCE N/A 01/06/2023    Procedure: MANOMETRY, ESOPHAGUS,  WITH IMPEDANCE MEASUREMENT;  Surgeon: Vitaliy Hatfield MD;  Location: [facility] (4TH FLR);  Service: Endoscopy;  Laterality: N/A;  instructions sent to [facility]  Precall done. 0900 arrival time confirmed. SG  instr portal -ml    ESOPHAGOGASTRODUODENOSCOPY  09/05/2018    Dr. Leija, in procedures: gastritis; biopsy: duodenum unremarkable, with focal benign gastric metaplasia, stomach- minimal chronic gastritis, negative for h pylori    ESOPHAGOGASTRODUODENOSCOPY N/A 04/22/2021    Procedure: EGD (ESOPHAGOGASTRODUODENOSCOPY);  Surgeon: Dwayne Santoyo MD;  Location: [facility];  Service: Endoscopy;  Laterality: N/A;    ESOPHAGOGASTRODUODENOSCOPY N/A 03/10/2023    Procedure: EGD (ESOPHAGOGASTRODUODENOSCOPY);  Surgeon: Bere Mclean MD;  Location: [facility] (2ND FLR);  Service: Endoscopy;  Laterality: N/A;  Endoflip  2nd floor due to availability  propofol only  instructions sent to [facility]    EYE SURGERY      HEMORRHOID SURGERY  1997    JOINT REPLACEMENT Right     knee    KNEE ARTHROSCOPY W/ MENISCECTOMY Right 08/13/2021    Procedure: ARTHROSCOPY, KNEE, WITH MENISCECTOMY;  Surgeon: Shelton Le MD;  Location: [facility];  Service: Orthopedics;  Laterality: Right;    KNEE ARTHROSCOPY W/ MENISCECTOMY Right 04/08/2022    Procedure: ARTHROSCOPY, KNEE, WITH MENISCECTOMY;  Surgeon: Shelton Le MD;  Location: [facility] OR;  Service: Orthopedics;  Laterality: Right;  medial meniscectomy    LEFT HEART CATHETERIZATION Right 10/14/2021    Procedure: Left heart cath;  Surgeon: Mustapha Manzo MD;  Location: [facility] CATH;  Service: Cardiology;  Laterality: Right;    ROBOTIC ARTHROPLASTY, KNEE, UNICOMPARTMENTAL Right 08/16/2022    Procedure: ROBOTIC ARTHROPLASTY, KNEE, UNICOMPARTMENTAL;  Surgeon: Shelton Le MD;  Location: [facility] OR;  Service: Orthopedics;  Laterality: Right;    TONSILLECTOMY  1954    TUBAL LIGATION  1990     Social History     Tobacco Use    Smoking status: Former     Current  packs/day: 0.00     Average packs/day: 2.0 packs/day for 36.3 years (72.7 ttl pk-yrs)     Types: Cigarettes     Start date: 1964     Quit date:      Years since quittin.7    Smokeless tobacco: Never   Substance Use Topics    Alcohol use: Yes     Alcohol/week: 7.0 standard drinks of alcohol     Types: 7 Glasses of wine per week    Drug use: Never        Review of Systems:    As noted in HPI in addition      REVIEW OF SYSTEMS  Review of Systems   Constitutional: Negative for decreased appetite, diaphoresis, night sweats, weight gain and weight loss.   HENT:  Negative for nosebleeds and odynophagia.    Eyes:  Negative for double vision and photophobia.   Cardiovascular:  Negative for chest pain, claudication, cyanosis, dyspnea on exertion, irregular heartbeat, leg swelling, near-syncope, orthopnea, palpitations, paroxysmal nocturnal dyspnea and syncope.   Respiratory:  Negative for cough, hemoptysis, shortness of breath and wheezing.    Hematologic/Lymphatic: Negative for adenopathy.   Skin:  Negative for flushing, skin cancer and suspicious lesions.   Musculoskeletal:  Negative for gout, myalgias and neck pain.   Gastrointestinal:  Negative for abdominal pain, heartburn, hematemesis and hematochezia.   Genitourinary:  Negative for bladder incontinence, hesitancy and nocturia.   Neurological:  Negative for focal weakness, headaches, light-headedness and paresthesias.   Psychiatric/Behavioral:  Negative for memory loss and substance abuse.               Objective:        Vitals:    10/01/24 1312   BP: 138/80   Pulse: 85       Lab Results   Component Value Date    WBC 4.89 2024    HGB 13.6 2024    HCT 41.6 2024     2024    CHOL 153 2024    TRIG 112 2024    HDL 70 2024    ALT 27 2024    AST 32 2024     2024    K 4.4 2024     2024    CREATININE 1.0 2024    BUN 20 2024    CO2 24 2024    TSH 2.824  01/04/2023    INR 0.9 12/01/2021    HGBA1C 5.4 12/02/2021        ECHOCARDIOGRAM RESULTS  No results found for this or any previous visit.    Results for orders placed during the hospital encounter of 02/23/24    Cardiac catheterization    Conclusion    There was non-obstructive coronary artery disease..    The left ventricular systolic function was normal.    The left ventricular end diastolic pressure was elevated.    The procedure log was documented by Documenter: Eyad Polanco RT and verified by Chau Light MD.    Date: 2/23/2024  Time: 9:10 AM          CURRENT/PREVIOUS VISIT EKG  Results for orders placed or performed during the hospital encounter of 02/23/24   EKG 12-lead    Collection Time: 02/23/24  7:35 AM   Result Value Ref Range    QRS Duration 96 ms    [facility]HS QTC Calculation 413 ms    Narrative    Test Reason : I25.10,    Vent. Rate : 083 BPM     Atrial Rate : 083 BPM     P-R Int : 134 ms          QRS Dur : 096 ms      QT Int : 352 ms       P-R-T Axes : 056 027 049 degrees     QTc Int : 413 ms    Normal sinus rhythm  Normal ECG  When compared with ECG of 07-FEB-2024 14:12,  No significant change was found  Confirmed by Luis Alfredo Hickey ([id number]) on 2/23/2024 10:11:33 PM    Referred By:  JUANY           Confirmed By:Luis Alfredo Hickey     No valid procedures specified.   No results found for this or any previous visit.    No valid procedures specified.    PHYSICAL EXAM  GENERAL: well built, well nourished, well-developed in no apparent distress alert and oriented.   HEENT: Normocephalic. Pupils normal and conjunctivae normal.  Mucous membranes normal, no cyanosis or icterus, trachea central,no pallor or icterus is noted..   NECK: No JVD. No bruit..   THYROID: Thyroid not enlarged. No nodules present..   CARDIAC:  Normal S1-S2.  No murmur rub click or gallop.  PMI nondisplaced.  LUNGS: Clear to auscultation. No wheezing or rhonchi..   ABDOMEN: Soft no masses or organomegaly.  No abdomen pulsations  or bruits.  Normal bowel sounds. No pulsations and no masses felt, No guarding or rebound.   URINARY: No rust catheter   EXTREMITIES: No cyanosis, clubbing or edema noted at this time., no calf tenderness bilaterally.   PERIPHERAL VASCULAR SYSTEM: Good palpable distal pulses.  2+ femoral, popliteal and pedal pulses.  No bruits    CENTRAL NERVOUS SYSTEM: No focal motor or sensory deficits noted.   SKIN: Skin without lesions, moist, well perfused.   MUSCLE STRENGTH & TONE: No noteable weakness, atrophy or abnormal movement    I HAVE REVIEWED :    The vital signs, nurses notes, and all the pertinent radiology and labs.         Current Outpatient Medications   Medication Instructions    albuterol (PROVENTIL/VENTOLIN HFA) 90 mcg/actuation inhaler 2 puffs    ALPRAZolam (XANAX) 0.25 mg, Oral, 2 times daily PRN    buPROPion (WELLBUTRIN XL) 300 mg, Oral, Daily    cholestyramine (QUESTRAN) 4 gram packet 4 g, Oral, Daily    fluticasone furoate-vilanteroL (BREO) 100-25 mcg/dose diskus inhaler 1 puff, Inhalation, Daily, Controller    hyoscyamine (LEVSIN) 125 mcg, Every 4 hours PRN    lipase-protease-amylase (CREON) 36,000-114,000- 180,000 unit CpDR TAKE 2 CAPSULE BY MOUTH 3 (THREE) TIMES DAILY WITH MEALS. & 1 CAPSULE WITH SNACKS    loperamide (IMODIUM) 1 mg/5 mL solution Every 6 hours PRN    montelukast (SINGULAIR) 10 mg, Nightly    olmesartan (BENICAR) 20 mg, Oral, Daily    omeprazole (PRILOSEC) 20 mg, Oral, Daily    rosuvastatin (CRESTOR) 20 mg, Oral, Nightly    traZODone (DESYREL) 100 mg, Oral, Nightly    valACYclovir (VALTREX) 2,000 mg, Oral, Every 12 hours, X 1 day          Assessment:   Coronary artery disease.  Stable left heart catheterization due/2024.  EF normal.    Recent diagnosis of pancreatic insufficiency, followed by primary care.    Plan:   Change current medications.  Continue risk factor modification.          No follow-ups on file.

## 2024-10-08 ENCOUNTER — TELEPHONE (OUTPATIENT)
Dept: FAMILY MEDICINE | Facility: CLINIC | Age: 79
End: 2024-10-08
Payer: MEDICARE

## 2024-10-08 ENCOUNTER — PATIENT MESSAGE (OUTPATIENT)
Dept: FAMILY MEDICINE | Facility: CLINIC | Age: 79
End: 2024-10-08
Payer: MEDICARE

## 2024-10-08 NOTE — TELEPHONE ENCOUNTER
Spoke to patient clarified that pt should taking olmesartan, confirmed pt was taking correct medication.

## 2024-10-10 ENCOUNTER — OFFICE VISIT (OUTPATIENT)
Dept: FAMILY MEDICINE | Facility: CLINIC | Age: 79
End: 2024-10-10
Payer: MEDICARE

## 2024-10-10 VITALS
HEART RATE: 70 BPM | WEIGHT: 111.56 LBS | SYSTOLIC BLOOD PRESSURE: 140 MMHG | DIASTOLIC BLOOD PRESSURE: 74 MMHG | OXYGEN SATURATION: 96 % | BODY MASS INDEX: 21.79 KG/M2

## 2024-10-10 DIAGNOSIS — F41.9 ANXIETY: ICD-10-CM

## 2024-10-10 DIAGNOSIS — I10 ESSENTIAL HYPERTENSION: ICD-10-CM

## 2024-10-10 DIAGNOSIS — G47.09 OTHER INSOMNIA: ICD-10-CM

## 2024-10-10 DIAGNOSIS — R51.9 FREQUENT HEADACHES: Primary | ICD-10-CM

## 2024-10-10 PROCEDURE — 99213 OFFICE O/P EST LOW 20 MIN: CPT | Mod: PBBFAC,PO

## 2024-10-10 PROCEDURE — 99214 OFFICE O/P EST MOD 30 MIN: CPT | Mod: S$PBB,,,

## 2024-10-10 PROCEDURE — 99999 PR PBB SHADOW E&M-EST. PATIENT-LVL III: CPT | Mod: PBBFAC,,,

## 2024-10-10 RX ORDER — TRAZODONE HYDROCHLORIDE 150 MG/1
150 TABLET ORAL NIGHTLY
Qty: 30 TABLET | Refills: 11 | Status: SHIPPED | OUTPATIENT
Start: 2024-10-10 | End: 2025-10-10

## 2024-10-10 RX ORDER — OMEPRAZOLE 20 MG/1
20 CAPSULE, DELAYED RELEASE ORAL DAILY
Qty: 90 CAPSULE | Refills: 3 | Status: SHIPPED | OUTPATIENT
Start: 2024-10-10

## 2024-10-10 NOTE — TELEPHONE ENCOUNTER
No care due was identified.  Health system Embedded Care Due Messages. Reference number: [id number].   10/10/2024 2:18:03 PM CDT

## 2024-10-10 NOTE — PROGRESS NOTES
Name: Veronique Johnson  MRN: [id number]  : 1945  PCP: Becky Song MD    HPI    Patient follows with Dr. Song, known to me. She presents for 6 week follow up on her sleep medicine. She has been taking 150 mg of trazodone with improvement in her insomnia. She also complains of intermittent headache almost every day for the last 2 weeks. She reports headaches last 15 mins then typically resolves on their own. She denies any vision changes, N/V or neck pain.     Review of Systems   Constitutional:  Negative for fever.   Respiratory:  Negative for shortness of breath.    Cardiovascular:  Negative for chest pain.   Gastrointestinal:  Negative for nausea and vomiting.   Neurological:  Positive for headaches.   Psychiatric/Behavioral:  Negative for sleep disturbance.        Patient Active Problem List   Diagnosis    Essential hypertension    Anxiety    Iron deficiency anemia    Dry eye    Arthralgia of both hands    Atherosclerosis of aorta    Urinary frequency    MS (multiple sclerosis)    Paresthesia    Gait disturbance    Cervical myofascial pain syndrome    Spondylolisthesis of cervical region    Cerebral aneurysm    Diarrhea    Acute medial meniscal tear, right, initial encounter    COPD (chronic obstructive pulmonary disease)    Hyperlipidemia    ACP (advance care planning)    Decreased range of motion (ROM) of knee    Coronary artery disease    Expressive aphasia    Dizziness    Meniere disease    Atherosclerosis of native coronary artery of native heart with angina pectoris    Osteopenia of multiple sites       Vitals:    10/10/24 1326   BP: (!) 140/74   Pulse: 70       Physical Exam  Constitutional:       General: She is not in acute distress.     Appearance: She is well-developed.   HENT:      Head: Normocephalic and atraumatic.      Right Ear: External ear normal.      Left Ear: External ear normal.   Eyes:      Conjunctiva/sclera: Conjunctivae normal.      Pupils: Pupils are equal, round, and reactive  to light.   Neck:      Thyroid: No thyromegaly.   Cardiovascular:      Rate and Rhythm: Normal rate and regular rhythm.      Pulses: Normal pulses.      Heart sounds: Normal heart sounds, S1 normal and S2 normal.   Pulmonary:      Effort: Pulmonary effort is normal. No respiratory distress.      Breath sounds: Normal breath sounds.   Chest:      Chest wall: No tenderness.   Musculoskeletal:         General: No swelling or tenderness. Normal range of motion.      Cervical back: Normal range of motion and neck supple.   Skin:     General: Skin is warm and dry.      Coloration: Skin is not jaundiced or pale.   Neurological:      General: No focal deficit present.      Mental Status: She is alert and oriented to person, place, and time.      Cranial Nerves: No cranial nerve deficit.   Psychiatric:         Mood and Affect: Mood normal.         Behavior: Behavior normal.         1. Frequent headaches  Headaches have resolved over the last 2 days. She was instructed to start a headache journal for the next few weeks to evaluate headache pattern    2. Other insomnia  -     traZODone (DESYREL) 150 MG tablet; Take 1 tablet (150 mg total) by mouth every evening.  Dispense: 30 tablet; Refill: 11    3. Essential hypertension   Continue with current medications    4. Anxiety   Continue with current medications      Follow up as scheduled       MILES Jones  10/10/2024

## 2024-10-11 NOTE — TELEPHONE ENCOUNTER
Refill Decision Note   Veronique Alex  is requesting a refill authorization.  Brief Assessment and Rationale for Refill:  Approve     Medication Therapy Plan:         Comments:     Note composed:11:00 PM 10/10/2024

## 2024-10-25 ENCOUNTER — PATIENT MESSAGE (OUTPATIENT)
Dept: FAMILY MEDICINE | Facility: CLINIC | Age: 79
End: 2024-10-25
Payer: MEDICARE

## 2024-10-28 RX ORDER — CHOLESTYRAMINE 4 G/9G
4 POWDER, FOR SUSPENSION ORAL DAILY
Qty: 30 PACKET | Refills: 2 | Status: SHIPPED | OUTPATIENT
Start: 2024-10-28 | End: 2024-10-30 | Stop reason: DRUGHIGH

## 2024-10-30 ENCOUNTER — OFFICE VISIT (OUTPATIENT)
Dept: GASTROENTEROLOGY | Facility: CLINIC | Age: 79
End: 2024-10-30
Payer: MEDICARE

## 2024-10-30 VITALS — HEIGHT: 60 IN | WEIGHT: 110.69 LBS | BODY MASS INDEX: 21.73 KG/M2

## 2024-10-30 DIAGNOSIS — R63.4 WEIGHT LOSS: ICD-10-CM

## 2024-10-30 DIAGNOSIS — R15.1 FECAL SMEARING: ICD-10-CM

## 2024-10-30 DIAGNOSIS — K58.0 IRRITABLE BOWEL SYNDROME WITH DIARRHEA: ICD-10-CM

## 2024-10-30 DIAGNOSIS — Z87.19 HISTORY OF GASTROESOPHAGEAL REFLUX (GERD): ICD-10-CM

## 2024-10-30 DIAGNOSIS — E73.9 ADULT LACTASE DEFICIENCY: ICD-10-CM

## 2024-10-30 DIAGNOSIS — R13.14 PHARYNGOESOPHAGEAL DYSPHAGIA: ICD-10-CM

## 2024-10-30 DIAGNOSIS — R19.7 DIARRHEA, UNSPECIFIED TYPE: Primary | ICD-10-CM

## 2024-10-30 DIAGNOSIS — K22.4 ESOPHAGEAL DYSMOTILITY: ICD-10-CM

## 2024-10-30 PROCEDURE — 99213 OFFICE O/P EST LOW 20 MIN: CPT | Mod: PBBFAC,PO | Performed by: NURSE PRACTITIONER

## 2024-10-30 PROCEDURE — 99999 PR PBB SHADOW E&M-EST. PATIENT-LVL III: CPT | Mod: PBBFAC,,, | Performed by: NURSE PRACTITIONER

## 2024-10-30 PROCEDURE — 99214 OFFICE O/P EST MOD 30 MIN: CPT | Mod: S$PBB,,, | Performed by: NURSE PRACTITIONER

## 2024-10-30 RX ORDER — CHOLESTYRAMINE 4 G/9G
4 POWDER, FOR SUSPENSION ORAL DAILY
Qty: 120 G | Refills: 2 | Status: SHIPPED | OUTPATIENT
Start: 2024-10-30 | End: 2024-10-31 | Stop reason: SDUPTHER

## 2024-10-31 ENCOUNTER — PATIENT MESSAGE (OUTPATIENT)
Dept: GASTROENTEROLOGY | Facility: CLINIC | Age: 79
End: 2024-10-31
Payer: MEDICARE

## 2024-10-31 ENCOUNTER — LAB VISIT (OUTPATIENT)
Dept: LAB | Facility: HOSPITAL | Age: 79
End: 2024-10-31
Attending: NURSE PRACTITIONER
Payer: MEDICARE

## 2024-10-31 DIAGNOSIS — R15.1 FECAL SMEARING: ICD-10-CM

## 2024-10-31 DIAGNOSIS — K58.0 IRRITABLE BOWEL SYNDROME WITH DIARRHEA: ICD-10-CM

## 2024-10-31 DIAGNOSIS — R19.7 DIARRHEA, UNSPECIFIED TYPE: ICD-10-CM

## 2024-10-31 PROCEDURE — 87329 GIARDIA AG IA: CPT | Performed by: NURSE PRACTITIONER

## 2024-10-31 PROCEDURE — 87449 NOS EACH ORGANISM AG IA: CPT | Mod: 91 | Performed by: NURSE PRACTITIONER

## 2024-10-31 PROCEDURE — 87045 FECES CULTURE AEROBIC BACT: CPT | Performed by: NURSE PRACTITIONER

## 2024-10-31 PROCEDURE — 87046 STOOL CULTR AEROBIC BACT EA: CPT | Performed by: NURSE PRACTITIONER

## 2024-10-31 PROCEDURE — 82705 FATS/LIPIDS FECES QUAL: CPT | Performed by: NURSE PRACTITIONER

## 2024-10-31 PROCEDURE — 87449 NOS EACH ORGANISM AG IA: CPT | Performed by: NURSE PRACTITIONER

## 2024-10-31 PROCEDURE — 87427 SHIGA-LIKE TOXIN AG IA: CPT | Performed by: NURSE PRACTITIONER

## 2024-10-31 PROCEDURE — 82653 EL-1 FECAL QUANTITATIVE: CPT | Performed by: NURSE PRACTITIONER

## 2024-10-31 RX ORDER — CHOLESTYRAMINE 4 G/9G
4 POWDER, FOR SUSPENSION ORAL
Qty: 240 G | Refills: 2 | Status: SHIPPED | OUTPATIENT
Start: 2024-10-31 | End: 2024-11-30

## 2024-11-01 LAB
C DIFF GDH STL QL: NEGATIVE
C DIFF TOX A+B STL QL IA: NEGATIVE
CRYPTOSP AG STL QL IA: NEGATIVE
G LAMBLIA AG STL QL IA: NEGATIVE

## 2024-11-02 LAB
E COLI SXT1 STL QL IA: NEGATIVE
E COLI SXT2 STL QL IA: NEGATIVE

## 2024-11-03 LAB
FAT STL QL: NORMAL
NEUTRAL FAT STL QL: NORMAL

## 2024-11-04 LAB — BACTERIA STL CULT: NORMAL

## 2024-11-05 LAB — ELASTASE 1, FECAL: 99 MCG/G

## 2024-11-07 LAB — O+P STL MICRO: NORMAL

## 2024-11-14 ENCOUNTER — TELEPHONE (OUTPATIENT)
Dept: GASTROENTEROLOGY | Facility: CLINIC | Age: 79
End: 2024-11-14
Payer: MEDICARE

## 2024-11-14 DIAGNOSIS — K86.89 PANCREATIC INSUFFICIENCY: ICD-10-CM

## 2024-11-14 DIAGNOSIS — Z91.041 ALLERGY TO IODINATED CONTRAST: Primary | ICD-10-CM

## 2024-11-14 NOTE — TELEPHONE ENCOUNTER
----- Message from HARITHA Christiansen sent at 11/14/2024  8:29 AM CST -----  Please call to inform & review the results with the patient- Remaining stool studies showed negative for parasites and decreased fecal elastase which is consistent with pancreatic insufficiency. This can be contributing to your GI symptoms. Continue Creon as prescribed.  Recommend endoscopic ultrasound to further evaluate this finding. PLEASE ASSIST PATIENT WITH SCHEDULING ENDOSCOPIC ULTRASOUND.  Here is a web site that explains pancreatic insufficiency in further detail: [id number]  Continue with previous recommendations.  Thanks,  Chiara WILL

## 2024-11-15 DIAGNOSIS — K86.89 PANCREATIC INSUFFICIENCY: ICD-10-CM

## 2024-11-18 DIAGNOSIS — K86.89 PANCREATIC INSUFFICIENCY: ICD-10-CM

## 2024-11-18 RX ORDER — PANCRELIPASE 36000; 180000; 114000 [USP'U]/1; [USP'U]/1; [USP'U]/1
CAPSULE, DELAYED RELEASE PELLETS ORAL
Qty: 240 CAPSULE | Refills: 11 | OUTPATIENT
Start: 2024-11-18

## 2024-11-18 RX ORDER — PANCRELIPASE 36000; 180000; 114000 [USP'U]/1; [USP'U]/1; [USP'U]/1
CAPSULE, DELAYED RELEASE PELLETS ORAL
Qty: 240 CAPSULE | Refills: 11 | Status: SHIPPED | OUTPATIENT
Start: 2024-11-18

## 2024-11-19 ENCOUNTER — TELEPHONE (OUTPATIENT)
Dept: GASTROENTEROLOGY | Facility: CLINIC | Age: 79
End: 2024-11-19
Payer: MEDICARE

## 2024-11-19 NOTE — TELEPHONE ENCOUNTER
----- Message from HARITHA Christiansen sent at 11/14/2024  8:29 AM CST -----  Please call to inform & review the results with the patient- Remaining stool studies showed negative for parasites and decreased fecal elastase which is consistent with pancreatic insufficiency. This can be contributing to your GI symptoms. Continue Creon as prescribed.  Recommend endoscopic ultrasound to further evaluate this finding. PLEASE ASSIST PATIENT WITH SCHEDULING ENDOSCOPIC ULTRASOUND.  Here is a web site that explains pancreatic insufficiency in further detail: [email or web address]  Continue with previous recommendations.  Thanks,  Chiara WILL

## 2024-11-27 ENCOUNTER — PATIENT MESSAGE (OUTPATIENT)
Dept: ADMINISTRATIVE | Facility: OTHER | Age: 79
End: 2024-11-27
Payer: MEDICARE

## 2024-11-27 ENCOUNTER — TELEPHONE (OUTPATIENT)
Dept: GASTROENTEROLOGY | Facility: CLINIC | Age: 79
End: 2024-11-27
Payer: MEDICARE

## 2024-11-27 ENCOUNTER — TELEPHONE (OUTPATIENT)
Dept: RADIOLOGY | Facility: HOSPITAL | Age: 79
End: 2024-11-27

## 2024-11-27 ENCOUNTER — PATIENT MESSAGE (OUTPATIENT)
Dept: ADMINISTRATIVE | Facility: HOSPITAL | Age: 79
End: 2024-11-27
Payer: MEDICARE

## 2024-11-27 NOTE — TELEPHONE ENCOUNTER
Patient declined to schedule the colonoscopy that FAHAD Howard ordered today at this time.  
Constitutional: no fever, chills, no recent weight loss, change in appetite   Eyes: no redness/discharge/pain/vision changes  ENT: no rhinorrhea/ear pain/sore throat  Cardiac: No chest pain, SOB or edema.  Respiratory: No cough or respiratory distress  GI: No nausea, vomiting, diarrhea or abdominal pain.  : No dysuria, frequency, urgency or hematuria  MS: no pain to back or extremities, no loss of ROM, no weakness  Neuro: No headache or weakness. No LOC.  Skin: No skin rash.  Except as documented in the HPI, all other systems are negative.

## 2024-11-29 ENCOUNTER — HOSPITAL ENCOUNTER (OUTPATIENT)
Dept: RADIOLOGY | Facility: HOSPITAL | Age: 79
Discharge: HOME | End: 2024-11-29
Attending: NURSE PRACTITIONER
Payer: MEDICARE

## 2024-11-29 DIAGNOSIS — R19.7 DIARRHEA, UNSPECIFIED TYPE: ICD-10-CM

## 2024-11-29 DIAGNOSIS — R10.9 ABDOMINAL PAIN, UNSPECIFIED ABDOMINAL LOCATION: ICD-10-CM

## 2024-11-29 PROCEDURE — 74176 CT ABD & PELVIS W/O CONTRAST: CPT | Mod: TC

## 2024-11-29 PROCEDURE — 25500020 PHARM REV CODE 255

## 2024-11-29 PROCEDURE — A9698 NON-RAD CONTRAST MATERIALNOC: HCPCS

## 2024-11-29 PROCEDURE — 74176 CT ABD & PELVIS W/O CONTRAST: CPT | Mod: 26,,, | Performed by: RADIOLOGY

## 2024-11-29 RX ADMIN — BARIUM SULFATE 900 ML: 20 SUSPENSION ORAL at 08:11

## 2024-12-02 ENCOUNTER — TELEPHONE (OUTPATIENT)
Dept: GASTROENTEROLOGY | Facility: CLINIC | Age: 79
End: 2024-12-02
Payer: MEDICARE

## 2024-12-02 DIAGNOSIS — K31.89 GASTRIC WALL THICKENING: ICD-10-CM

## 2024-12-02 DIAGNOSIS — R19.7 DIARRHEA, UNSPECIFIED TYPE: ICD-10-CM

## 2024-12-02 DIAGNOSIS — R93.3 ABNORMAL CT SCAN, GASTROINTESTINAL TRACT: Primary | ICD-10-CM

## 2024-12-02 RX ORDER — SUCRALFATE 1 G/1
1 TABLET ORAL
Qty: 120 TABLET | Refills: 0 | Status: SHIPPED | OUTPATIENT
Start: 2024-12-02 | End: 2025-12-02

## 2024-12-02 NOTE — TELEPHONE ENCOUNTER
"Please call to inform & review the results with the patient- radiology report of the CT scan of the abdomen and pelvis showed wall thickening of the stomach. Recommend a course of carafate (sent to pharmacy on file) in addition to taking omeprazole as prescribed. Recommend EGD to be done sometime in the next month.  Diverticulosis noted without signs of diverticulitis (no signs of infection/inflammation). Recommend high fiber diet (20-30 grams of fiber daily)/OTC fiber supplements daily as directed, such as Benefiber or Metamucil.  Nonspecific mild dilatation of small bowel which the radiology report favors more mild ileus (part of intestines that is not moving like it should) or enteritis (inflammation/infection of intestines). Recommend completing stool studies and if diarrhea has worsened, if you start having fever, or if you starting vomiting and/or unable to have a bowel movement, recommend going to the ER for further evaluation and management. Recommend abdominal x-ray to follow-up on finding if GI symptoms have persisted.    Other findings showed heavy calcifications in the abdominal aorta: Recommend follow-up with cardiology, Dr. Wilkins, for continued evaluation and management of this finding.  "Mildly prominent right renal pelvis which is extrarenal in location in the appearance not significantly changed" & "Urinary bladder mildly distended at time of the exam and as visualized is unremarkable appearance": Recommend follow-up with Primary Care Provider/urology for continued evaluation and management of these findings.  "Osseous structures; degenerative changes.  Straightening of the usual lordosis.  Mild retrolisthesis L5-S1.  Grade 1 spondylolisthesis L1-L2 and L2-L3": Recommend follow-up with Primary Care Provider/ortho for continued evaluation and management of these bone findings.    Continue with previous recommendations. If no improvement in symptoms or symptoms worsen, call/follow-up at clinic or go " to [facility],

## 2024-12-03 ENCOUNTER — HOSPITAL ENCOUNTER (OUTPATIENT)
Dept: RADIOLOGY | Facility: HOSPITAL | Age: 79
Discharge: HOME | End: 2024-12-03
Attending: NURSE PRACTITIONER
Payer: MEDICARE

## 2024-12-03 DIAGNOSIS — R93.3 ABNORMAL CT SCAN, GASTROINTESTINAL TRACT: ICD-10-CM

## 2024-12-03 PROCEDURE — 74019 RADEX ABDOMEN 2 VIEWS: CPT | Mod: 26,,, | Performed by: RADIOLOGY

## 2024-12-03 PROCEDURE — 74019 RADEX ABDOMEN 2 VIEWS: CPT | Mod: TC,FY,PO

## 2024-12-05 ENCOUNTER — TELEPHONE (OUTPATIENT)
Dept: GASTROENTEROLOGY | Facility: CLINIC | Age: 79
End: 2024-12-05
Payer: MEDICARE

## 2024-12-05 NOTE — PROGRESS NOTES
Chiara Mitchell., MEGHANAP  P Paula LATHAM Staff  Please call to inform & review the results with the patient- The radiology report of the abdominal x-ray showed no acute findings.  Continue with previous recommendations.  Thank you for choosing us to participate in your healthcare,  Chiara MG-C

## 2024-12-05 NOTE — TELEPHONE ENCOUNTER
----- Message from HARITHA Christiansen sent at 12/5/2024  9:45 AM CST -----  Please call to inform & review the results with the patient- The radiology report of the abdominal x-ray showed no acute findings.  Continue with previous recommendations.  Thank you for choosing us to participate in your healthcare,  Chiara WILL

## 2024-12-05 NOTE — TELEPHONE ENCOUNTER
Results sent in my chart message.    Chiara Mitchell., HARITHA  P Paula LATHAM Staff  Please call to inform & review the results with the patient- The radiology report of the abdominal x-ray showed no acute findings.  Continue with previous recommendations.  Thank you for choosing us to participate in your healthcare,  Chiara MG-C

## 2024-12-16 ENCOUNTER — PATIENT MESSAGE (OUTPATIENT)
Dept: ADMINISTRATIVE | Facility: OTHER | Age: 79
End: 2024-12-16
Payer: MEDICARE

## 2024-12-16 ENCOUNTER — IMMUNIZATION (OUTPATIENT)
Dept: FAMILY MEDICINE | Facility: CLINIC | Age: 79
End: 2024-12-16
Payer: MEDICARE

## 2024-12-16 ENCOUNTER — PATIENT MESSAGE (OUTPATIENT)
Dept: PSYCHIATRY | Facility: CLINIC | Age: 79
End: 2024-12-16
Payer: MEDICARE

## 2024-12-16 DIAGNOSIS — Z23 NEED FOR VACCINATION: Primary | ICD-10-CM

## 2024-12-16 DIAGNOSIS — Z23 IMMUNIZATION DUE: Primary | ICD-10-CM

## 2024-12-16 PROCEDURE — 99999PBSHW FLU VACCINE - ADJUVANTED: Mod: PBBFAC,,,

## 2024-12-16 PROCEDURE — 90653 IIV ADJUVANT VACCINE IM: CPT | Mod: PBBFAC,PO

## 2024-12-26 ENCOUNTER — HOSPITAL ENCOUNTER (OUTPATIENT)
Facility: HOSPITAL | Age: 79
Discharge: HOME | End: 2024-12-26
Attending: INTERNAL MEDICINE | Admitting: INTERNAL MEDICINE
Payer: MEDICARE

## 2024-12-26 ENCOUNTER — ANESTHESIA EVENT (OUTPATIENT)
Dept: ENDOSCOPY | Facility: HOSPITAL | Age: 79
End: 2024-12-26
Payer: MEDICARE

## 2024-12-26 ENCOUNTER — ANESTHESIA (OUTPATIENT)
Dept: ENDOSCOPY | Facility: HOSPITAL | Age: 79
End: 2024-12-26
Payer: MEDICARE

## 2024-12-26 DIAGNOSIS — R93.3 ABNORMAL CT SCAN, STOMACH: ICD-10-CM

## 2024-12-26 PROCEDURE — 88305 TISSUE EXAM BY PATHOLOGIST: CPT | Mod: PO | Performed by: PATHOLOGY

## 2024-12-26 PROCEDURE — 37000008 HC ANESTHESIA 1ST 15 MINUTES: Mod: PO | Performed by: INTERNAL MEDICINE

## 2024-12-26 PROCEDURE — 43239 EGD BIOPSY SINGLE/MULTIPLE: CPT | Mod: PO | Performed by: INTERNAL MEDICINE

## 2024-12-26 PROCEDURE — 43239 EGD BIOPSY SINGLE/MULTIPLE: CPT | Mod: ,,, | Performed by: INTERNAL MEDICINE

## 2024-12-26 PROCEDURE — 63600175 PHARM REV CODE 636 W HCPCS: Mod: PO | Performed by: INTERNAL MEDICINE

## 2024-12-26 PROCEDURE — 27201012 HC FORCEPS, HOT/COLD, DISP: Mod: PO | Performed by: INTERNAL MEDICINE

## 2024-12-26 PROCEDURE — 63600175 PHARM REV CODE 636 W HCPCS: Mod: PO | Performed by: NURSE ANESTHETIST, CERTIFIED REGISTERED

## 2024-12-26 RX ORDER — LIDOCAINE HYDROCHLORIDE 20 MG/ML
INJECTION INTRAVENOUS
Status: DISCONTINUED | OUTPATIENT
Start: 2024-12-26 | End: 2024-12-26

## 2024-12-26 RX ORDER — PROPOFOL 10 MG/ML
VIAL (ML) INTRAVENOUS
Status: DISCONTINUED | OUTPATIENT
Start: 2024-12-26 | End: 2024-12-26

## 2024-12-26 RX ORDER — SODIUM CHLORIDE 0.9 % (FLUSH) 0.9 %
10 SYRINGE (ML) INJECTION
Status: DISCONTINUED | OUTPATIENT
Start: 2024-12-26 | End: 2024-12-26 | Stop reason: HOSPADM

## 2024-12-26 RX ORDER — SODIUM CHLORIDE, SODIUM LACTATE, POTASSIUM CHLORIDE, CALCIUM CHLORIDE 600; 310; 30; 20 MG/100ML; MG/100ML; MG/100ML; MG/100ML
INJECTION, SOLUTION INTRAVENOUS CONTINUOUS
Status: DISCONTINUED | OUTPATIENT
Start: 2024-12-26 | End: 2024-12-26 | Stop reason: HOSPADM

## 2024-12-26 RX ADMIN — PROPOFOL 25 MG: 10 INJECTION, EMULSION INTRAVENOUS at 07:12

## 2024-12-26 RX ADMIN — LIDOCAINE HYDROCHLORIDE 100 MG: 20 INJECTION INTRAVENOUS at 07:12

## 2024-12-26 RX ADMIN — SODIUM CHLORIDE, POTASSIUM CHLORIDE, SODIUM LACTATE AND CALCIUM CHLORIDE: 600; 310; 30; 20 INJECTION, SOLUTION INTRAVENOUS at 07:12

## 2024-12-26 RX ADMIN — PROPOFOL 100 MG: 10 INJECTION, EMULSION INTRAVENOUS at 07:12

## 2024-12-26 NOTE — ANESTHESIA PREPROCEDURE EVALUATION
12/26/2024  Veronique Johnson is a 79 y.o., female here for EGD/endoflip.    Per Cardiology Note:  Cardiology follow-up coronary disease. Left heart catheterization 10/2021, diffuse three-vessel moderate disease. Lad was demonstrated 50% stenosis at the takeoff of 1st diagonal. Follow-up fractional flow reserve was unremarkable at 0.84. Medical therapy recommended. EF normal.       Past Medical History:   Diagnosis Date    Anemia     Anxiety     Chronic bronchitis with COPD (chronic obstructive pulmonary disease)     Esophageal spasm     Essential tremor     GERD (gastroesophageal reflux disease)     Headache     High cholesterol     Hypertension     Meniere disease     Multiple sclerosis     Muscle spasm     Pancreatic insufficiency      No results found for this or any previous visit.        Pre-op Assessment    I have reviewed the Patient Summary Reports.     I have reviewed the Nursing Notes. I have reviewed the NPO Status.   I have reviewed the Medications.     Review of Systems  Anesthesia Hx:  No problems with previous Anesthesia                Social:  Non-Smoker       Cardiovascular:     Hypertension   CAD       Denies Angina.        ECG has been reviewed. ·  There was non-obstructive coronary artery disease..  ·  The left ventricular systolic function was normal.  ·  The left ventricular end diastolic pressure was elevated.                             Pulmonary:   COPD                     Renal/:  Chronic Renal Disease, CKD                Hepatic/GI:     GERD, well controlled   Abnormal abd CT             Neurological:    Neuromuscular Disease,  Headaches     Multiple sclerosis                            Endocrine:  Endocrine Normal                Physical Exam  General: Well nourished    Airway:  Mallampati: II   Mouth Opening: Normal  TM Distance: Normal  Tongue: Normal  Neck ROM: Normal  ROM    Dental:  Intact    Chest/Lungs:  Clear to auscultation, Normal Respiratory Rate        Anesthesia Plan  Type of Anesthesia, risks & benefits discussed:    Anesthesia Type: Gen Natural Airway  Intra-op Monitoring Plan: Standard ASA Monitors  Induction:  IV  Airway Plan: Direct  Informed Consent: Informed consent signed with the Patient and all parties understand the risks and agree with anesthesia plan.  All questions answered. Patient consented to blood products? No  ASA Score: 3    Ready For Surgery From Anesthesia Perspective.     .

## 2024-12-26 NOTE — PROVATION PATIENT INSTRUCTIONS
Discharge Summary/Instructions after an Endoscopic Procedure  Patient Name: Veronique Johnson  Patient MRN: [id number]  Patient YOB: 1945 Thursday, December 26, 2024  Maikol Persaud MD  Dear patient,  As a result of recent federal legislation (The Federal Cures Act), you may   receive lab or pathology results from your procedure in your [facility]   account before your physician is able to contact you. Your physician or   their representative will relay the results to you with their   recommendations at their soonest availability.  Thank you,  RESTRICTIONS:  During your procedure today, you received medications for sedation.  These   medications may affect your judgment, balance and coordination.  Therefore,   for 24 hours, you have the following restrictions:   - DO NOT drive a car, operate machinery, make legal/financial decisions,   sign important papers or drink alcohol.    ACTIVITY:  Today: no heavy lifting, straining or running due to procedural   sedation/anesthesia.  The following day: return to full activity including work.  DIET:  Eat and drink normally unless instructed otherwise.     TREATMENT FOR COMMON SIDE EFFECTS:  - Mild abdominal pain, nausea, belching, bloating or excessive gas:  rest,   eat lightly and use a heating pad.  - Sore Throat: treat with throat lozenges and/or gargle with warm salt   water.  - Because air was used during the procedure, expelling large amounts of air   from your rectum or belching is normal.  - If a bowel prep was taken, you may not have a bowel movement for 1-3 days.    This is normal.  SYMPTOMS TO WATCH FOR AND REPORT TO YOUR PHYSICIAN:  1. Abdominal pain or bloating, other than gas cramps.  2. Chest pain.  3. Back pain.  4. Signs of infection such as: chills or fever occurring within 24 hours   after the procedure.  5. Rectal bleeding, which would show as bright red, maroon, or black stools.   (A tablespoon of blood from the rectum is not serious,  especially if   hemorrhoids are present.)  6. Vomiting.  7. Weakness or dizziness.  GO DIRECTLY TO THE NEAREST EMERGENCY ROOM IF YOU HAVE ANY OF THE FOLLOWING:      Difficulty breathing              Chills and/or fever over 101 F   Persistent vomiting and/or vomiting blood   Severe abdominal pain   Severe chest pain   Black, tarry stools   Bleeding- more than one tablespoon   Any other symptom or condition that you feel may need urgent attention  Your doctor recommends these additional instructions:  If any biopsies were taken, your doctors clinic will contact you in 1 to 2   weeks with any results.  We are waiting for your pathology results.   Continue your present medications.   You are being discharged to home.  For questions, problems or results please call your physician - Maikol Persaud MD at Work:  [phone].  EMERGENCY PHONE NUMBER: [phone], LAB RESULTS: [phone]  IF A COMPLICATION OR EMERGENCY SITUATION ARISES AND YOU ARE UNABLE TO REACH   YOUR PHYSICIAN - GO DIRECTLY TO THE EMERGENCY ROOM.  ___________________________________________  Nurse Signature  ___________________________________________  Patient/Designated Responsible Party Signature  Maikol Persaud MD  12/26/2024 7:35:00 AM  This report has been verified and signed electronically.  Dear patient,  As a result of recent federal legislation (The Federal Cures Act), you may   receive lab or pathology results from your procedure in your [facility]   account before your physician is able to contact you. Your physician or   their representative will relay the results to you with their   recommendations at their soonest availability.  Thank you.  PROVATION

## 2024-12-26 NOTE — DISCHARGE SUMMARY
[facility] - Endoscopy  Discharge Note  Short Stay  Discharge Note  Short Stay      SUMMARY     Admit Date: 12/26/2024    Attending Physician: Maikol Persaud MD     Discharge Physician: Maikol Persaud MD    Discharge Date: 12/26/2024 7:35 AM    Final Diagnosis: Abnormal CT scan [R93.89]    Disposition: HOME OR SELF CARE    Patient Instructions:   Current Discharge Medication List        CONTINUE these medications which have NOT CHANGED    Details   ALPRAZolam (XANAX) 0.25 MG tablet Take 1 tablet (0.25 mg total) by mouth 2 (two) times daily as needed for Anxiety.  Qty: 60 tablet, Refills: 5    Associated Diagnoses: Anxiety      buPROPion (WELLBUTRIN XL) 300 MG 24 hr tablet Take 1 tablet (300 mg total) by mouth once daily.  Qty: 90 tablet, Refills: 1    Associated Diagnoses: Anxiety; PTSD (post-traumatic stress disorder)      cholestyramine, with sugar, 4 gram Powd Take 4 g by mouth 2 (two) times daily before meals. Take before a meal.  Qty: 240 g, Refills: 2    Associated Diagnoses: Diarrhea, unspecified type; Irritable bowel syndrome with diarrhea; Fecal smearing      hyoscyamine 0.125 mg Subl Place 1 tablet (0.125 mg total) under the tongue every 4 (four) hours as needed (abdominal pain/spasms).  Qty: 60 tablet, Refills: 2    Associated Diagnoses: Abdominal pain, unspecified abdominal location; Irritable bowel syndrome with diarrhea      lipase-protease-amylase (CREON) 36,000-114,000- 180,000 unit CpDR Take 3 capsules by mouth 3 (three) times daily with meals. & 2 capsule with snacks  Qty: 330 capsule, Refills: 1    Associated Diagnoses: Diarrhea, unspecified type; Pancreatic insufficiency      loperamide (IMODIUM) 1 mg/5 mL solution Take by mouth every 6 (six) hours as needed for Diarrhea.      montelukast (SINGULAIR) 10 mg tablet Take 10 mg by mouth every evening.      olmesartan (BENICAR) 40 MG tablet Take 1 tablet (40 mg total) by mouth once daily.  Qty: 90 tablet, Refills: 1    Comments:  .  Associated Diagnoses: Essential hypertension      omeprazole (PRILOSEC) 20 MG capsule TAKE 1 CAPSULE (20 MG TOTAL) BY MOUTH ONCE DAILY.  Qty: 90 capsule, Refills: 3      rosuvastatin (CRESTOR) 20 MG tablet Take 1 tablet (20 mg total) by mouth every evening.  Qty: 90 tablet, Refills: 3    Associated Diagnoses: Atherosclerosis of aorta      sucralfate (CARAFATE) 1 gram tablet Take 1 tablet (1 g total) by mouth 4 (four) times daily before meals and nightly.  Qty: 120 tablet, Refills: 0    Associated Diagnoses: Abnormal CT scan, gastrointestinal tract; Gastric wall thickening      traZODone (DESYREL) 150 MG tablet Take 1 tablet (150 mg total) by mouth every evening.  Qty: 30 tablet, Refills: 11    Associated Diagnoses: Other insomnia      albuterol (PROVENTIL/VENTOLIN HFA) 90 mcg/actuation inhaler Inhale 2 puffs into the lungs.      fluticasone furoate-vilanteroL (BREO) 100-25 mcg/dose diskus inhaler Inhale 1 puff into the lungs once daily. Controller  Qty: 90 each, Refills: 1    Associated Diagnoses: COPD exacerbation      valACYclovir (VALTREX) 1000 MG tablet Take 2 tablets (2,000 mg total) by mouth every 12 (twelve) hours. X 1 day  Qty: 4 tablet, Refills: 5    Associated Diagnoses: Recurrent cold sores             Discharge Procedure Orders (must include Diet, Follow-up, Activity)    Follow Up:  Follow up with PCP as previously scheduled  Resume routine diet.  Activity as tolerated.    No driving day of procedure.

## 2024-12-26 NOTE — H&P
History & Physical - Short Stay  Gastroenterology      SUBJECTIVE:     Procedure: EGD    Chief Complaint/Indication for Procedure: Abnormal CT    Facility-Administered Medications Prior to Admission   Medication    influenza (adjuvanted) (Fluad) 45 mcg/0.5 mL IM vaccine (> or = 66 yo) 0.5 mL     PTA Medications   Medication Sig    ALPRAZolam (XANAX) 0.25 MG tablet Take 1 tablet (0.25 mg total) by mouth 2 (two) times daily as needed for Anxiety.    buPROPion (WELLBUTRIN XL) 300 MG 24 hr tablet Take 1 tablet (300 mg total) by mouth once daily.    cholestyramine, with sugar, 4 gram Powd Take 4 g by mouth 2 (two) times daily before meals. Take before a meal.    hyoscyamine 0.125 mg Subl Place 1 tablet (0.125 mg total) under the tongue every 4 (four) hours as needed (abdominal pain/spasms).    lipase-protease-amylase (CREON) 36,000-114,000- 180,000 unit CpDR Take 3 capsules by mouth 3 (three) times daily with meals. & 2 capsule with snacks    loperamide (IMODIUM) 1 mg/5 mL solution Take by mouth every 6 (six) hours as needed for Diarrhea.    montelukast (SINGULAIR) 10 mg tablet Take 10 mg by mouth every evening.    olmesartan (BENICAR) 40 MG tablet Take 1 tablet (40 mg total) by mouth once daily.    omeprazole (PRILOSEC) 20 MG capsule TAKE 1 CAPSULE (20 MG TOTAL) BY MOUTH ONCE DAILY.    rosuvastatin (CRESTOR) 20 MG tablet Take 1 tablet (20 mg total) by mouth every evening.    sucralfate (CARAFATE) 1 gram tablet Take 1 tablet (1 g total) by mouth 4 (four) times daily before meals and nightly.    traZODone (DESYREL) 150 MG tablet Take 1 tablet (150 mg total) by mouth every evening.    albuterol (PROVENTIL/VENTOLIN HFA) 90 mcg/actuation inhaler Inhale 2 puffs into the lungs.    fluticasone furoate-vilanteroL (BREO) 100-25 mcg/dose diskus inhaler Inhale 1 puff into the lungs once daily. Controller    valACYclovir (VALTREX) 1000 MG tablet Take 2 tablets (2,000 mg total) by mouth every 12 (twelve) hours. X 1 day       Review  of patient's allergies indicates:   Allergen Reactions    Cephalosporins Anaphylaxis    Iodinated contrast media Hives and Swelling    Penicillins Rash        Past Medical History:   Diagnosis Date    Anemia     Anxiety     Chronic bronchitis with COPD (chronic obstructive pulmonary disease)     Esophageal spasm     Essential tremor     GERD (gastroesophageal reflux disease)     Headache     High cholesterol     Hypertension     Meniere disease     Multiple sclerosis     Muscle spasm     Pancreatic insufficiency      Past Surgical History:   Procedure Laterality Date    ANGIOGRAM, CORONARY, WITH LEFT HEART CATHETERIZATION  2/23/2024    Procedure: Left Heart Cath;  Surgeon: Chau Light MD;  Location: [facility] CATH;  Service: Cardiology;;    CATARACT EXTRACTION, BILATERAL  2006    COLONOSCOPY  09/05/2018    Dr. Leija in procedures: diverticulosis, 4 colon polyps removed, adenomatous polyps x3 & benign mucosal polyps    COLONOSCOPY N/A 04/22/2021    Procedure: COLONOSCOPY;  Surgeon: Dwayne Santoyo MD;  Location: [facility] ENDO;  Service: Endoscopy;  Laterality: N/A;    CORONARY ANGIOGRAPHY N/A 10/14/2021    Procedure: ANGIOGRAM, CORONARY ARTERY;  Surgeon: Mustapha Manzo MD;  Location: [facility] CATH;  Service: Cardiology;  Laterality: N/A;    CYSTOSCOPY WITH HYDRODISTENSION OF BLADDER N/A 06/05/2019    Procedure: CYSTOSCOPY, WITH BLADDER HYDRODISTENSION;  Surgeon: Marko Burton MD;  Location: [facility] OR;  Service: OB/GYN;  Laterality: N/A;    DILATION AND CURETTAGE OF UTERUS  1966    ESOPHAGEAL MANOMETRY WITH MEASUREMENT OF IMPEDANCE N/A 01/06/2023    Procedure: MANOMETRY, ESOPHAGUS, WITH IMPEDANCE MEASUREMENT;  Surgeon: Vitaliy Hatfield MD;  Location: [facility] ENDO ([address] [place] [address]);  Service: Endoscopy;  Laterality: N/A;  instructions sent to [facility]  Precall done. 0900 arrival time confirmed. SG  instr portal -ml    ESOPHAGOGASTRODUODENOSCOPY  09/05/2018    Dr. Leija in procedures: gastritis; biopsy: duodenum  unremarkable, with focal benign gastric metaplasia, stomach- minimal chronic gastritis, negative for h pylori    ESOPHAGOGASTRODUODENOSCOPY N/A 04/22/2021    Procedure: EGD (ESOPHAGOGASTRODUODENOSCOPY);  Surgeon: Dwayne Santoyo MD;  Location: [facility] ENDO;  Service: Endoscopy;  Laterality: N/A;    ESOPHAGOGASTRODUODENOSCOPY N/A 03/10/2023    Procedure: EGD (ESOPHAGOGASTRODUODENOSCOPY);  Surgeon: Bere Mclean MD;  Location: [facility] (2ND FLR);  Service: Endoscopy;  Laterality: N/A;  Endoflip  2nd floor due to availability  propofol only  instructions sent to [facility]    EYE SURGERY      HEMORRHOID SURGERY  1997    JOINT REPLACEMENT Right     knee    KNEE ARTHROSCOPY W/ MENISCECTOMY Right 08/13/2021    Procedure: ARTHROSCOPY, KNEE, WITH MENISCECTOMY;  Surgeon: Shelton Le MD;  Location: [facility] OR;  Service: Orthopedics;  Laterality: Right;    KNEE ARTHROSCOPY W/ MENISCECTOMY Right 04/08/2022    Procedure: ARTHROSCOPY, KNEE, WITH MENISCECTOMY;  Surgeon: Shelton Le MD;  Location: [facility] OR;  Service: Orthopedics;  Laterality: Right;  medial meniscectomy    LEFT HEART CATHETERIZATION Right 10/14/2021    Procedure: Left heart cath;  Surgeon: Mustapha Manzo MD;  Location: [facility] CATH;  Service: Cardiology;  Laterality: Right;    ROBOTIC ARTHROPLASTY, KNEE, UNICOMPARTMENTAL Right 08/16/2022    Procedure: ROBOTIC ARTHROPLASTY, KNEE, UNICOMPARTMENTAL;  Surgeon: Shelton Le MD;  Location: [facility] OR;  Service: Orthopedics;  Laterality: Right;    TONSILLECTOMY  1954    TUBAL LIGATION  1990     Family History   Problem Relation Name Age of Onset    Macular degeneration Mother Kalpana     Coronary artery disease Mother Kalpana     Heart disease Mother Kalpana 60        Bypass twice    Miscarriages / Stillbirths Mother Kalpana         Stillbirths    Vision loss Mother Kalpana         Macular Degeration    Heart attack Father Jason     Coronary artery disease Father Jason     Heart disease  Father Jason 55        Several Heart Attack    Alcohol abuse Father Jason     Early death Father Jason         Heart Attack 55    Coronary artery disease Sister      Heart disease Sister  65        CAD    Celiac disease Neg Hx      Colon cancer Neg Hx      Crohn's disease Neg Hx      Ulcerative colitis Neg Hx      Amblyopia Neg Hx      Blindness Neg Hx      Cataracts Neg Hx      Retinal detachment Neg Hx      Strabismus Neg Hx      Glaucoma Neg Hx       Social History     Tobacco Use    Smoking status: Former     Current packs/day: 0.00     Average packs/day: 2.0 packs/day for 36.3 years (72.7 ttl pk-yrs)     Types: Cigarettes     Start date: 1964     Quit date:      Years since quittin.0    Smokeless tobacco: Never   Substance Use Topics    Alcohol use: Yes     Alcohol/week: 7.0 standard drinks of alcohol     Types: 7 Glasses of wine per week    Drug use: Never         OBJECTIVE:     Vital Signs (Most Recent)  Temp: 97.3 °F (36.3 °C) (24)  Pulse: 85 (24)  Resp: 16 (24)  BP: (!) 166/69 (24)  SpO2: 96 % (24)    Physical Exam:                                                       GENERAL:  Comfortable, in no acute distress.                                 HEENT EXAM:  Nonicteric.  No adenopathy.  Oropharynx is clear.               NECK:  Supple.                                                               LUNGS:  Clear.                                                               CARDIAC:  Regular rate and rhythm.  S1, S2.  No murmur.                      ABDOMEN:  Soft, positive bowel sounds, nontender.  No hepatosplenomegaly or masses.  No rebound or guarding.                                             EXTREMITIES:  No edema.     MENTAL STATUS:  Normal, alert and oriented.      ASSESSMENT/PLAN:     Assessment: Abnormal CT    Plan: EGD    Anesthesia Plan: General    ASA Grade: ASA 2 - Patient with mild systemic disease with no functional  limitations    MALLAMPATI SCORE:  I (soft palate, uvula, fauces, and tonsillar pillars visible)

## 2024-12-26 NOTE — ANESTHESIA POSTPROCEDURE EVALUATION
Anesthesia Post Evaluation    Patient: Veronique Johnson    Procedure(s) Performed: Procedure(s) (LRB):  EGD (ESOPHAGOGASTRODUODENOSCOPY) (N/A)    Final Anesthesia Type: general      Patient location during evaluation: PACU  Patient participation: Yes- Able to Participate  Level of consciousness: sedated and awake  Post-procedure vital signs: reviewed and stable  Pain management: adequate  Airway patency: patent    PONV status at discharge: No PONV  Anesthetic complications: no      Cardiovascular status: blood pressure returned to baseline and hemodynamically stable  Respiratory status: spontaneous ventilation  Hydration status: euvolemic  Follow-up not needed.              Vitals Value Taken Time   /59 12/26/24 0738   Temp  12/26/24 0745   Pulse 68 12/26/24 0738   Resp 18 12/26/24 0738   SpO2 97 % 12/26/24 0738         No case tracking events are documented in the log.      Pain/Haylie Score: Haylie Score: 6 (12/26/2024  7:38 AM)

## 2024-12-26 NOTE — TRANSFER OF CARE
Anesthesia Transfer of Care Note    Patient: Veronique Johnson    Procedure(s) Performed: Procedure(s) (LRB):  EGD (ESOPHAGOGASTRODUODENOSCOPY) (N/A)    Patient location: PACU    Anesthesia Type: general    Transport from OR: Transported from OR on room air with adequate spontaneous ventilation    Post pain: adequate analgesia    Post assessment: no apparent anesthetic complications and tolerated procedure well    Post vital signs: stable    Level of consciousness: sedated and awake    Nausea/Vomiting: no nausea/vomiting    Complications: none    Transfer of care protocol was followed      Last vitals: Visit Vitals  BP (!) 166/69 (BP Location: Right arm, Patient Position: Lying)   Pulse 85   Temp 36.3 °C (97.3 °F) (Temporal)   Resp 16   Ht 5' (1.524 m)   Wt 46.3 kg (102 lb)   SpO2 96%   Breastfeeding No   BMI 19.92 kg/m²

## 2024-12-27 VITALS
BODY MASS INDEX: 20.03 KG/M2 | HEIGHT: 60 IN | WEIGHT: 102 LBS | HEART RATE: 76 BPM | OXYGEN SATURATION: 98 % | RESPIRATION RATE: 18 BRPM | SYSTOLIC BLOOD PRESSURE: 132 MMHG | DIASTOLIC BLOOD PRESSURE: 59 MMHG | TEMPERATURE: 97 F

## 2024-12-27 LAB
FINAL PATHOLOGIC DIAGNOSIS: NORMAL
GROSS: NORMAL
Lab: NORMAL

## 2024-12-29 NOTE — PATIENT INSTRUCTIONS
Cardiology Preventive Counseling and Referral of Other Preventative  (Italic type indicates deductible and co-insurance are waived)    Patient Name: Veronique Johnson  Today's Date: 12/13/2022    Health Maintenance       Date Due Completion Date    Aspirin/Antiplatelet Therapy Never done ---    COVID-19 Vaccine (4 - Booster for Simon series) 08/26/2022 7/1/2022    DEXA Scan 02/08/2025 2/8/2022    Lipid Panel 12/02/2026 12/2/2021    TETANUS VACCINE 02/05/2029 2/5/2019        No orders of the defined types were placed in this encounter.    The following information is provided to all patients.  This information is to help you find resources for any of the problems found today that may be affecting your health:                Living healthy guide: www.[facility].[place].gov      Understanding Diabetes: www.diabetes.org      Eating healthy: www.cdc.gov/healthyweight      [facility] home safety checklist: www.cdc.gov/steadi/patient.html      Agency on Aging: www.goea.[place].[facility]      Alcoholics anonymous (AA): www.aa.org      Physical Activity: www.amanda.nih.gov/ar1neuq      Tobacco use: www.quitwithusla.org

## 2025-01-06 DIAGNOSIS — F41.9 ANXIETY: ICD-10-CM

## 2025-01-06 RX ORDER — ALPRAZOLAM 0.25 MG/1
0.25 TABLET ORAL 2 TIMES DAILY PRN
Qty: 60 TABLET | Refills: 0 | Status: SHIPPED | OUTPATIENT
Start: 2025-01-11

## 2025-01-06 NOTE — TELEPHONE ENCOUNTER
No care due was identified.  [facility] Embedded Care Due Messages. Reference number: [id number].   1/06/2025 8:01:17 AM CST

## 2025-01-08 ENCOUNTER — PATIENT MESSAGE (OUTPATIENT)
Dept: GASTROENTEROLOGY | Facility: CLINIC | Age: 80
End: 2025-01-08
Payer: MEDICARE

## 2025-01-13 DIAGNOSIS — Z00.00 ENCOUNTER FOR MEDICARE ANNUAL WELLNESS EXAM: ICD-10-CM

## 2025-01-17 ENCOUNTER — OFFICE VISIT (OUTPATIENT)
Dept: GASTROENTEROLOGY | Facility: CLINIC | Age: 80
End: 2025-01-17
Payer: MEDICARE

## 2025-01-17 VITALS — WEIGHT: 107.81 LBS | HEIGHT: 60 IN | BODY MASS INDEX: 21.17 KG/M2

## 2025-01-17 DIAGNOSIS — Z87.19 HISTORY OF GASTROESOPHAGEAL REFLUX (GERD): ICD-10-CM

## 2025-01-17 DIAGNOSIS — R15.1 FECAL SMEARING: ICD-10-CM

## 2025-01-17 DIAGNOSIS — R93.2 ABNORMAL GALL BLADDER DIAGNOSTIC IMAGING: Primary | ICD-10-CM

## 2025-01-17 DIAGNOSIS — R11.0 NAUSEA: ICD-10-CM

## 2025-01-17 DIAGNOSIS — R63.4 WEIGHT LOSS: ICD-10-CM

## 2025-01-17 DIAGNOSIS — K86.89 PANCREATIC INSUFFICIENCY: ICD-10-CM

## 2025-01-17 DIAGNOSIS — R13.14 PHARYNGOESOPHAGEAL DYSPHAGIA: ICD-10-CM

## 2025-01-17 DIAGNOSIS — R19.7 INTERMITTENT DIARRHEA: ICD-10-CM

## 2025-01-17 DIAGNOSIS — E73.9 ADULT LACTASE DEFICIENCY: ICD-10-CM

## 2025-01-17 PROCEDURE — 99214 OFFICE O/P EST MOD 30 MIN: CPT | Mod: S$PBB,,, | Performed by: NURSE PRACTITIONER

## 2025-01-17 PROCEDURE — 99999 PR PBB SHADOW E&M-EST. PATIENT-LVL IV: CPT | Mod: PBBFAC,,, | Performed by: NURSE PRACTITIONER

## 2025-01-17 PROCEDURE — 99214 OFFICE O/P EST MOD 30 MIN: CPT | Mod: PBBFAC,PO | Performed by: NURSE PRACTITIONER
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
## 2025-01-24 ENCOUNTER — HOSPITAL ENCOUNTER (OUTPATIENT)
Dept: RADIOLOGY | Facility: HOSPITAL | Age: 80
Discharge: HOME | End: 2025-01-24
Attending: NURSE PRACTITIONER
Payer: MEDICARE

## 2025-01-24 DIAGNOSIS — R93.2 ABNORMAL GALL BLADDER DIAGNOSTIC IMAGING: ICD-10-CM

## 2025-01-24 DIAGNOSIS — R11.0 NAUSEA: ICD-10-CM

## 2025-01-24 PROCEDURE — 76705 ECHO EXAM OF ABDOMEN: CPT | Mod: TC,PO

## 2025-01-24 PROCEDURE — 76705 ECHO EXAM OF ABDOMEN: CPT | Mod: 26,,, | Performed by: STUDENT IN AN ORGANIZED HEALTH CARE EDUCATION/TRAINING PROGRAM

## 2025-01-27 DIAGNOSIS — R11.0 NAUSEA: ICD-10-CM

## 2025-01-27 DIAGNOSIS — R93.2 ABNORMAL GALL BLADDER DIAGNOSTIC IMAGING: Primary | ICD-10-CM

## 2025-01-27 DIAGNOSIS — K82.8 GALLBLADDER SLUDGE: ICD-10-CM

## 2025-01-28 ENCOUNTER — PATIENT MESSAGE (OUTPATIENT)
Dept: GASTROENTEROLOGY | Facility: CLINIC | Age: 80
End: 2025-01-28
Payer: MEDICARE

## 2025-01-28 NOTE — TELEPHONE ENCOUNTER
Spoke to pt regarding why referral was placed. Pt verbalized understanding, scheduled appt with gen surg.

## 2025-01-29 ENCOUNTER — OFFICE VISIT (OUTPATIENT)
Dept: FAMILY MEDICINE | Facility: CLINIC | Age: 80
End: 2025-01-29
Payer: MEDICARE

## 2025-01-29 VITALS
WEIGHT: 109.38 LBS | DIASTOLIC BLOOD PRESSURE: 64 MMHG | HEART RATE: 76 BPM | BODY MASS INDEX: 21.36 KG/M2 | OXYGEN SATURATION: 98 % | SYSTOLIC BLOOD PRESSURE: 136 MMHG

## 2025-01-29 DIAGNOSIS — N18.31 CHRONIC KIDNEY DISEASE, STAGE 3A: ICD-10-CM

## 2025-01-29 DIAGNOSIS — B00.1 RECURRENT COLD SORES: ICD-10-CM

## 2025-01-29 DIAGNOSIS — J30.89 NON-SEASONAL ALLERGIC RHINITIS DUE TO OTHER ALLERGIC TRIGGER: ICD-10-CM

## 2025-01-29 DIAGNOSIS — I25.119 ATHEROSCLEROSIS OF NATIVE CORONARY ARTERY OF NATIVE HEART WITH ANGINA PECTORIS: ICD-10-CM

## 2025-01-29 DIAGNOSIS — M85.89 OSTEOPENIA OF MULTIPLE SITES: Primary | ICD-10-CM

## 2025-01-29 DIAGNOSIS — I70.0 ATHEROSCLEROSIS OF AORTA: ICD-10-CM

## 2025-01-29 DIAGNOSIS — G35 MS (MULTIPLE SCLEROSIS): ICD-10-CM

## 2025-01-29 DIAGNOSIS — I67.1 BRAIN ANEURYSM: ICD-10-CM

## 2025-01-29 DIAGNOSIS — G35 MULTIPLE SCLEROSIS: ICD-10-CM

## 2025-01-29 DIAGNOSIS — J43.1 PANLOBULAR EMPHYSEMA: ICD-10-CM

## 2025-01-29 PROCEDURE — 99215 OFFICE O/P EST HI 40 MIN: CPT | Mod: PBBFAC,PO | Performed by: FAMILY MEDICINE

## 2025-01-29 PROCEDURE — 99214 OFFICE O/P EST MOD 30 MIN: CPT | Mod: S$PBB,,, | Performed by: FAMILY MEDICINE

## 2025-01-29 PROCEDURE — 99999 PR PBB SHADOW E&M-EST. PATIENT-LVL V: CPT | Mod: PBBFAC,,, | Performed by: FAMILY MEDICINE

## 2025-01-29 PROCEDURE — G2211 COMPLEX E/M VISIT ADD ON: HCPCS | Mod: S$PBB,,, | Performed by: FAMILY MEDICINE

## 2025-01-29 RX ORDER — MONTELUKAST SODIUM 10 MG/1
10 TABLET ORAL NIGHTLY
Qty: 90 TABLET | Refills: 3 | Status: SHIPPED | OUTPATIENT
Start: 2025-01-29

## 2025-01-29 RX ORDER — VALACYCLOVIR HYDROCHLORIDE 1 G/1
2000 TABLET, FILM COATED ORAL EVERY 12 HOURS PRN
Qty: 30 TABLET | Refills: 2 | Status: SHIPPED | OUTPATIENT
Start: 2025-01-29

## 2025-01-29 NOTE — PROGRESS NOTES
Assessment:       1. Osteopenia of multiple sites    2. Recurrent cold sores    3. Non-seasonal allergic rhinitis due to other allergic trigger    4. Chronic kidney disease, stage 3a    5. Panlobular emphysema    6. MS (multiple sclerosis)    7. Atherosclerosis of native coronary artery of native heart with angina pectoris    8. Atherosclerosis of aorta    9. Multiple sclerosis    10. Brain aneurysm        Assessment & Plan    Osteopenia of multiple sites:  Stable  -     DXA Bone Density Axial Skeleton 1 or more sites; Future; Expected date: 01/29/2025    Recurrent cold sores:  Stable  -     valACYclovir (VALTREX) 1000 MG tablet; Take 2 tablets (2,000 mg total) by mouth every 12 (twelve) hours as needed (fever blisters). X 1 day  Dispense: 30 tablet; Refill: 2    Non-seasonal allergic rhinitis due to other allergic trigger:  Stable  -     montelukast (SINGULAIR) 10 mg tablet; Take 1 tablet (10 mg total) by mouth every evening.  Dispense: 90 tablet; Refill: 3    Chronic kidney disease, stage 3a:  Stable    Panlobular emphysema:  Stable    MS (multiple sclerosis):  Stable  -     MRI Brain Without Contrast; Future; Expected date: 01/29/2025  -     Ambulatory referral/consult to Neurology; Future; Expected date: 02/05/2025  -     CBC Without Differential; Future; Expected date: 01/29/2025  -     Sedimentation rate; Future; Expected date: 01/29/2025    Atherosclerosis of native coronary artery of native heart with angina pectoris:  Stable  -     Lipid Panel; Future; Expected date: 01/29/2025  -     Comprehensive Metabolic Panel; Future; Expected date: 01/29/2025    Atherosclerosis of aorta:  Stable    Multiple sclerosis:  Worsening  -     MRI Brain Without Contrast; Future; Expected date: 01/29/2025    Brain aneurysm:  Stable  -     Ambulatory referral/consult to Neurosurgery; Future; Expected date: 02/05/2025       Assessed chronic kidney disease, currently stage 3A based on previous lab results  Evaluated need for MRI  and neurologist referral due to MS history and new balance issues  Considered Fosamax for osteopenia management, pending lab results  Reviewed recent gallbladder ultrasound results, noting minimal sludge  Assessed cardiovascular status, noting calcification on aorta    MS (MULTIPLE SCLEROSIS):  - Ordered MRI of the brain WO Contrast for MS evaluation.  - Patient has had MS since 1993 with minimal symptoms until recently.  - Assessed balance issues and abnormal gait, which could be related to MS progression.  - Referred the patient to Neurology (, an MS specialist, or Dr. Anders) for MS management.  - Also referred to Dr. Smith, a neurosurgeon.    PANLOBULAR EMPHYSEMA:  - Noted the patient's history of emphysema with no smoking for 25 years.  - Lung exam revealed clear lungs.    CHRONIC KIDNEY DISEASE, STAGE 3A:  - Last kidney function test showed a decline to 58, indicating stage 3a chronic kidney disease.  - Explained stages of chronic kidney disease and importance of hydration to the patient.  - Advised to drink more water and avoid Aleve and ibuprofen due to potential kidney damage.  - Ordered fasting labs to check kidney function and cholesterol levels.  - Discussed the impact of aging on kidney function.    HERPES SIMPLEX VIRUS (FEVER BLISTERS):  - Continued Valacyclovir (Valtrex) for fever blisters, 2000 mg twice daily for 1 day at onset of symptoms.  - Prescribed 30 tablets per month with refills.  - Advised the patient to take medication as soon as symptoms are felt, noting a recent episode affected the entire bottom lip despite taking medication.    ALLERGIES:  - Refilled Montelukast (Singulair) for allergies, 30 tablets per month with refills.    OSTEOPENIA:  - Educated the patient on the importance of regular exercise for bone health.  - Ordered fasting labs to check calcium levels.  - Considered prescribing Fosamax (once a week) to help increase bone density.    ANXIETY:  - Noted the patient is  taking Xanax for anxiety, with refills being managed.    GALLBLADDER ISSUES:  - Ultrasound results showed cysts on the outside of the gallbladder and minimal sludge inside.  - Explained that surgery is typically only performed for symptomatic gallbladder problems.  - Referred the patient to a general surgeon for follow-up.    DIARRHEA:  - Patient reports ongoing diarrhea problem for a few years, with improvement noted using current treatment.  - Continued treatment with a powder mixed with orange juice for cholesterol and diarrhea management.    BRAIN ANEURYSM:  - Patient has a history of two aneurysms.  - Advised the patient to contact neurosurgery office before appointment to discuss potential need for MRA.    CARDIOVASCULAR HEALTH:  - Noted calcification on the aortic valve.  - Heart exam revealed good heart sounds.  - Confirmed the patient is under the care of cardiologist Dr. Davies, with a follow-up visit scheduled for March.    PARTIAL KNEE REPLACEMENT:  - Noted the patient has a partial knee replacement.    GENERAL HEALTH MANAGEMENT:  - Discussed normal aging process and its effects on organ function, including muscle loss.  - Advised patient to continue exercising regularly to maintain muscle mass.  - Continued Tylenol for pain management as needed.    FOLLOW UP:  - Follow up in 6 months.  - Patient to contact the office to schedule appointments with neurology and neurosurgery.        Visit today included increased complexity associated with the care of the episodic problem chronic kidney disease, emphysema, MS addressed and managing the longitudinal care of the patient due to the serious and/or complex managed problem(s) multiple sclerosis, chronic kidney disease and emphysema.        Patient agreed with assessment and plan. Patient verbalized understanding.     Subjective:       Patient ID: Veronique Johnson is a 79 y.o. female.    Chief Complaint: Follow-up (Pt due for bone density. Pt needs refill on  montelukast. Pt states valacyclovir did not work last time she used it)      History of Present Illness    CHIEF COMPLAINT:  Patient presents for medication refills and to discuss various health concerns, including balance issues and recent test results.    HPI:  Patient requests refills for allergy medicine and valacyclovir for herpes labialis. She reports decreased efficacy of valacyclovir during her last outbreak, despite timely administration as prescribed. The outbreak affected her entire lower lip the following morning. Patient has a history of herpes labialis since childhood, typically triggered by cold and wind exposure.    Patient expresses concern about balance issues, describing difficulty walking with a side-to-side gait and widened stance for stability. She reports difficulty stopping when leaning forward and notes that these issues are more pronounced outdoors.    Patient reports a 2-year history of diarrhea, which has improved with medication and a cholesterol-lowering powder mixed with orange juice.    Regarding her multiple sclerosis (MS), patient reports minimal historical symptoms but notes recent onset of new symptoms. She expresses concern about potential sudden decline.    Patient reports recent endoscopies, including one with a camera. She also mentions a recent ultrasound due to gallbladder sludge.    Patient denies headaches, memory problems, leg swelling, or gallbladder-related symptoms such as postprandial stomach discomfort.    MEDICATIONS:  Patient is on Valacyclovir (Valtrex), taking 2,000 mg twice daily for 1 day as needed for herpes labialis. She is also on Montelukast (Singular) for allergies and Alprazolam (Xanax). She takes a cholesterol medication mixed with orange juice, which is also used for diarrhea. She takes Creon.    MEDICAL HISTORY:  Patient has a history of multiple sclerosis (MS), diagnosed in 1993. She also has osteopenia, emphysema, and chronic kidney disease stage 3A.  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    SURGICAL HISTORY:  Patient has undergone a partial knee replacement. She recently had two endoscopy procedures done on the same day, one standard and one with a camera.    TEST RESULTS:  Patient's kidney function was tested 11 months ago, showing results of 58-59, which is considered stage 3A chronic kidney disease. A previous test showed a result of >60, which was considered normal. Her cholesterol was last checked 1 year ago. Patient's last bone density scan showed osteopenia.    IMAGING:  Patient recently had an ultrasound that showed cysts on the outside of the gallbladder. She had an MRI in July 2023 or earlier.     ALLERGIES:  Patient experiences angioedema of the tongue and lips when exposed to IV or oral contrast.    SOCIAL HISTORY:  Patient quit smoking 25 years ago.      ROS:  ROS as indicated in HPI.          Past medical history, past social history was reviewed and discussed with the patient.        Objective:      Physical Exam      General: No acute distress. Well-developed. Well-nourished.  The patient has limited ambulation.  Eyes: EOMI. Sclerae anicteric.  HENT: Normocephalic. Atraumatic. Nares patent. Moist oral mucosa.  Cardiovascular: Regular rate. Regular rhythm.   Respiratory: Normal respiratory effort. Clear to auscultation bilaterally. No rales. No rhonchi. No wheezing.  Musculoskeletal: No  obvious deformity.  Extremities: No lower extremity edema.  Neurological: Alert & oriented x3. No slurred speech.  Broad based gait.  Psychiatric: Normal mood. Normal affect. Good insight. Good judgment.  Skin: Warm. Dry. No rash.                   This note was generated with the assistance of ambient listening technology. Verbal consent was obtained by the patient and accompanying visitor(s) for the recording of patient appointment to facilitate this note. I attest to having reviewed and edited the generated note for accuracy, though some  syntax or spelling errors may persist. Please contact the author of this note for any clarification.

## 2025-01-30 ENCOUNTER — TELEPHONE (OUTPATIENT)
Dept: NEUROSURGERY | Facility: CLINIC | Age: 80
End: 2025-01-30
Payer: MEDICARE

## 2025-01-30 NOTE — TELEPHONE ENCOUNTER
Called and s/w patient who was successfully scheduled with Dr. Smith for 02/12/25.  Patient v/u of date/time/location of appointment.  Patient was advised to contact clinic with any further needs.

## 2025-01-31 ENCOUNTER — HOSPITAL ENCOUNTER (OUTPATIENT)
Dept: RADIOLOGY | Facility: HOSPITAL | Age: 80
Discharge: HOME | End: 2025-01-31
Attending: FAMILY MEDICINE
Payer: MEDICARE

## 2025-01-31 DIAGNOSIS — M85.89 OSTEOPENIA OF MULTIPLE SITES: ICD-10-CM

## 2025-01-31 PROCEDURE — 77091 TBS TECHL CALCULATION ONLY: CPT | Mod: PO

## 2025-01-31 PROCEDURE — 77080 DXA BONE DENSITY AXIAL: CPT | Mod: 26,,, | Performed by: RADIOLOGY

## 2025-01-31 PROCEDURE — 77092 TBS I&R FX RSK QHP: CPT | Mod: ,,, | Performed by: RADIOLOGY

## 2025-02-03 DIAGNOSIS — F41.9 ANXIETY: ICD-10-CM

## 2025-02-03 DIAGNOSIS — M85.89 OSTEOPENIA OF MULTIPLE SITES: Primary | ICD-10-CM

## 2025-02-03 RX ORDER — ALPRAZOLAM 0.25 MG/1
0.25 TABLET ORAL 2 TIMES DAILY PRN
Qty: 60 TABLET | Refills: 5 | Status: SHIPPED | OUTPATIENT
Start: 2025-02-07

## 2025-02-03 RX ORDER — ALENDRONATE SODIUM 70 MG/1
70 TABLET ORAL
Qty: 12 TABLET | Refills: 3 | Status: SHIPPED | OUTPATIENT
Start: 2025-02-03 | End: 2026-02-03

## 2025-02-03 NOTE — TELEPHONE ENCOUNTER
No care due was identified.  [facility] Embedded Care Due Messages. Reference number: [id number].   2/03/2025 8:16:44 AM CST   Patient returned from MRI

## 2025-02-06 ENCOUNTER — PATIENT MESSAGE (OUTPATIENT)
Dept: GASTROENTEROLOGY | Facility: CLINIC | Age: 80
End: 2025-02-06
Payer: MEDICARE

## 2025-02-07 NOTE — TELEPHONE ENCOUNTER
Colonoscopy is recommended to further evaluate weight loss, diarrhea, and fecal incontinence.  KTP

## 2025-02-09 ENCOUNTER — PATIENT MESSAGE (OUTPATIENT)
Dept: FAMILY MEDICINE | Facility: CLINIC | Age: 80
End: 2025-02-09
Payer: MEDICARE

## 2025-02-10 ENCOUNTER — TELEPHONE (OUTPATIENT)
Dept: NEUROSURGERY | Facility: CLINIC | Age: 80
End: 2025-02-10
Payer: MEDICARE

## 2025-02-10 ENCOUNTER — HOSPITAL ENCOUNTER (OUTPATIENT)
Dept: RADIOLOGY | Facility: HOSPITAL | Age: 80
Discharge: HOME | End: 2025-02-10
Attending: FAMILY MEDICINE
Payer: MEDICARE

## 2025-02-10 DIAGNOSIS — G35 MS (MULTIPLE SCLEROSIS): ICD-10-CM

## 2025-02-10 DIAGNOSIS — G35 MULTIPLE SCLEROSIS: ICD-10-CM

## 2025-02-10 PROCEDURE — 70551 MRI BRAIN STEM W/O DYE: CPT | Mod: TC,PO

## 2025-02-10 PROCEDURE — 70551 MRI BRAIN STEM W/O DYE: CPT | Mod: 26,,, | Performed by: RADIOLOGY

## 2025-02-10 NOTE — TELEPHONE ENCOUNTER
----- Message from Rita sent at 2/10/2025  9:27 AM CST -----  Regarding: Neds return call  Type: Needs Medical Advice  Who Called:  Pt    Best Call Back Number: [phone]    Additional Information: Pt had to cancel her mri appt due to being sick, she needs to reschedule her appt to after he gets her mri, please call to perri  
DISPLAY PLAN FREE TEXT

## 2025-02-12 ENCOUNTER — PATIENT MESSAGE (OUTPATIENT)
Dept: FAMILY MEDICINE | Facility: CLINIC | Age: 80
End: 2025-02-12
Payer: MEDICARE

## 2025-02-12 ENCOUNTER — OFFICE VISIT (OUTPATIENT)
Dept: NEUROSURGERY | Facility: CLINIC | Age: 80
End: 2025-02-12
Attending: FAMILY MEDICINE
Payer: MEDICARE

## 2025-02-12 VITALS
DIASTOLIC BLOOD PRESSURE: 76 MMHG | HEIGHT: 60 IN | WEIGHT: 109.38 LBS | SYSTOLIC BLOOD PRESSURE: 144 MMHG | BODY MASS INDEX: 21.47 KG/M2 | HEART RATE: 76 BPM | RESPIRATION RATE: 18 BRPM

## 2025-02-12 DIAGNOSIS — G35 MULTIPLE SCLEROSIS: Primary | ICD-10-CM

## 2025-02-12 DIAGNOSIS — I67.1 BRAIN ANEURYSM: ICD-10-CM

## 2025-02-12 DIAGNOSIS — I67.1 CEREBRAL ANEURYSM, NONRUPTURED: ICD-10-CM

## 2025-02-12 PROCEDURE — 99214 OFFICE O/P EST MOD 30 MIN: CPT | Mod: S$PBB,,,

## 2025-02-12 NOTE — PROGRESS NOTES
Neurosurgery History & Physical    Patient ID: Veronique Johnson is a 79 y.o. female.    Chief Complaint   Patient presents with    Brain aneurysm     Patient present to clinic with c/o gait disturbances. Reports of having falls.        Interval HPI 02/12/2025:  Ms. Johnson returns today for gait disturbance.       She has PMH of MS and was attempting to schedule an appointment with Neurology as she feels that MS has progressed.  Reports that her children have commented on her abnormal gait and balance issues.  Reports that she leans to the left when walking, has some dizziness at times, and has recently suffered from multiple falls.  She has started ambulating with a cane PRN.  She has previously participated in PT to strengthen and improve her gait.     Denies headaches, confusion, numbness, tingling, weakness, visual disturbances, speech difficulties, and bladder/bowel incontinence.      Reports that she has suffered recent weight loss.  Was previously 143 lbs and now 103 lbs.  Is taking Creon d/t pancreatic insufficiency.  Following with GI and has recently undergone EGD with upcoming colonoscopy for further evaluation of weight loss.      She is a previous smoker who quit 25 years ago.  Follows with [facility] for HTN.  BP in clinic today 144/76.    HPI 06/16/2021:   Ms. Johnson is a 74 year old right handed  female with MS (diagnosed in 1993), essential tremor, HLD, COPD, IBS, HTN who presents today for annual imaging review of cerebral aneurysm. Following her last visit with our office, we obtained prior imaging from 2014 and imaging had remained stable. Annual followup was warranted. She denies any new neurologic issue. She does have some continued gait instability causing her to fall intermittently. She also has some neck pain only with certain neck maneuvers and quickly resolves.       She has a remote history of tobacco use (smoked for 35 years, stopped 21 years ago). She  reports HTN is well controlled, systolic is usually <130. She denies family history of aneurysm.     Review of Systems   Constitutional:  Negative for activity change and fever.   Eyes:  Negative for visual disturbance.   Respiratory:  Negative for shortness of breath.    Cardiovascular:  Negative for chest pain.   Gastrointestinal:  Negative for nausea and vomiting.   Endocrine: Negative for cold intolerance, heat intolerance, polydipsia, polyphagia and polyuria.   Genitourinary:  Negative for decreased urine volume, difficulty urinating and frequency.   Musculoskeletal:  Positive for gait problem. Negative for back pain and neck pain.   Neurological:  Positive for dizziness. Negative for tremors, seizures, syncope, facial asymmetry, speech difficulty, weakness, light-headedness, numbness and headaches.   Psychiatric/Behavioral:  Negative for confusion.        Past Medical History:   Diagnosis Date    Anemia     Anxiety     Chronic bronchitis with COPD (chronic obstructive pulmonary disease)     Esophageal spasm     Essential tremor     GERD (gastroesophageal reflux disease)     Headache     High cholesterol     Hypertension     Meniere disease     Multiple sclerosis     Muscle spasm     Pancreatic insufficiency      Social History     Socioeconomic History    Marital status:    Tobacco Use    Smoking status: Former     Current packs/day: 0.00     Average packs/day: 2.0 packs/day for 36.3 years (72.7 ttl pk-yrs)     Types: Cigarettes     Start date: 1964     Quit date:      Years since quittin.1    Smokeless tobacco: Never   Substance and Sexual Activity    Alcohol use: Yes     Alcohol/week: 7.0 standard drinks of alcohol     Types: 7 Glasses of wine per week    Drug use: Never    Sexual activity: Yes     Partners: Male     Birth control/protection: Post-menopausal     Social Drivers of Health     Financial Resource Strain: Low Risk  (2025)    Overall Financial Resource Strain (CARDIA)      Difficulty of Paying Living Expenses: Not hard at all   Food Insecurity: No Food Insecurity (1/22/2025)    Hunger Vital Sign     Worried About Running Out of Food in the Last Year: Never true     Ran Out of Food in the Last Year: Never true   Transportation Needs: No Transportation Needs (1/13/2024)    PRAPARE - Transportation     Lack of Transportation (Medical): No     Lack of Transportation (Non-Medical): No   Physical Activity: Inactive (1/22/2025)    Exercise Vital Sign     Days of Exercise per Week: 0 days     Minutes of Exercise per Session: 0 min   Stress: Stress Concern Present (1/22/2025)    [employer] [place] [employer] - Occupational Stress Questionnaire     Feeling of Stress : Very much   Housing Stability: Low Risk  (8/24/2023)    Housing Stability Vital Sign     Unable to Pay for Housing in the Last Year: No     Number of Places Lived in the Last Year: 1     Unstable Housing in the Last Year: No     Family History   Problem Relation Name Age of Onset    Macular degeneration Mother Kalpana     Coronary artery disease Mother Kalpana     Heart disease Mother Kalpana 60        Bypass twice    Miscarriages / Stillbirths Mother Kalpana         Stillbirths    Vision loss Mother Kalpana         Macular Degeration    Heart attack Father Jason     Coronary artery disease Father Jason     Heart disease Father Jason 55        Several Heart Attack    Alcohol abuse Father Jason     Early death Father Jason         Heart Attack 55    Coronary artery disease Sister      Heart disease Sister  65        CAD    Celiac disease Neg Hx      Colon cancer Neg Hx      Crohn's disease Neg Hx      Ulcerative colitis Neg Hx      Amblyopia Neg Hx      Blindness Neg Hx      Cataracts Neg Hx      Retinal detachment Neg Hx      Strabismus Neg Hx      Glaucoma Neg Hx       Review of patient's allergies indicates:   Allergen Reactions    Cephalosporins Anaphylaxis    Iodinated contrast media Hives and Swelling     Penicillins Rash       Current Outpatient Medications:     albuterol (PROVENTIL/VENTOLIN HFA) 90 mcg/actuation inhaler, Inhale 2 puffs into the lungs every 4 (four) hours as needed for Shortness of Breath., Disp: , Rfl:     alendronate (FOSAMAX) 70 MG tablet, Take 1 tablet (70 mg total) by mouth every 7 days., Disp: 12 tablet, Rfl: 3    ALPRAZolam (XANAX) 0.25 MG tablet, Take 1 tablet (0.25 mg total) by mouth 2 (two) times daily as needed for Anxiety., Disp: 60 tablet, Rfl: 5    buPROPion (WELLBUTRIN XL) 300 MG 24 hr tablet, Take 1 tablet (300 mg total) by mouth once daily., Disp: 90 tablet, Rfl: 1    cholestyramine (QUESTRAN) 4 gram packet, Take 4 g by mouth 2 (two) times daily., Disp: , Rfl:     hyoscyamine (ANASPAZ,LEVSIN) 0.125 mg Tab, Take 125 mcg by mouth every 4 (four) hours as needed (cramps)., Disp: , Rfl:     lipase-protease-amylase (CREON) 36,000-114,000- 180,000 unit CpDR, Take 3 capsules by mouth 3 (three) times daily with meals. & 2 capsule with snacks, Disp: 330 capsule, Rfl: 1    loperamide (IMODIUM) 1 mg/5 mL solution, Take 2 mg by mouth every 6 (six) hours as needed for Diarrhea., Disp: , Rfl:     montelukast (SINGULAIR) 10 mg tablet, Take 1 tablet (10 mg total) by mouth every evening., Disp: 90 tablet, Rfl: 3    multivitamin (THERAGRAN) per tablet, Take 1 tablet by mouth once daily., Disp: , Rfl:     olmesartan (BENICAR) 40 MG tablet, Take 1 tablet (40 mg total) by mouth once daily., Disp: 90 tablet, Rfl: 1    omeprazole (PRILOSEC) 20 MG capsule, TAKE 1 CAPSULE (20 MG TOTAL) BY MOUTH ONCE DAILY., Disp: 90 capsule, Rfl: 3    rosuvastatin (CRESTOR) 20 MG tablet, Take 1 tablet (20 mg total) by mouth every evening., Disp: 90 tablet, Rfl: 3    traZODone (DESYREL) 150 MG tablet, Take 1 tablet (150 mg total) by mouth every evening., Disp: 30 tablet, Rfl: 11    valACYclovir (VALTREX) 1000 MG tablet, Take 2 tablets (2,000 mg total) by mouth every 12 (twelve) hours as needed (fever blisters). X 1 day, Disp:  30 tablet, Rfl: 2    fluticasone furoate-vilanteroL (BREO) 100-25 mcg/dose diskus inhaler, Inhale 1 puff into the lungs once daily. Controller, Disp: 90 each, Rfl: 1    Current Facility-Administered Medications:     influenza (adjuvanted) (Fluad) 45 mcg/0.5 mL IM vaccine (> or = 66 yo) 0.5 mL, 0.5 mL, Intramuscular, 1 time in Clinic/HOD,   Blood pressure (!) 144/76, pulse 76, resp. rate 18, height 5' (1.524 m), weight 49.6 kg (109 lb 5.6 oz).      Neurological Exam  Mental Status  Awake, alert and oriented to person, place and time. Speech is normal. Language is fluent with no aphasia.    Cranial Nerves  CN II: Visual acuity is normal. Visual fields full to confrontation.  CN III, IV, VI: Extraocular movements intact bilaterally. Normal lids and orbits bilaterally. Pupils equal round and reactive to light bilaterally.  CN V: Facial sensation is normal.  CN VII: Full and symmetric facial movement.  CN VIII: Hearing is normal.  CN IX, X: Palate elevates symmetrically. Normal gag reflex.  CN XI: Shoulder shrug strength is normal.  CN XII: Tongue midline without atrophy or fasciculations.    Motor   Strength is 5/5 throughout all four extremities.    Sensory  Light touch is normal in upper and lower extremities.     Gait  Casual gait: Wide stance.    Physical Exam  Eyes:      General: Lids are normal.      Extraocular Movements: Extraocular movements intact.      Pupils: Pupils are equal, round, and reactive to light.   Neurological:      Motor: Motor strength is normal.  Psychiatric:         Speech: Speech normal.     Imaging:  MRI brain without contrast dated 02/10/2025 personally reviewed and discussed with patient.    FINDINGS:  Intracranial contents:There is no acute abnormality.  There is no intracranial hemorrhage.  There is no mass.  There are no regions of restricted diffusion to suggest acute infarction.  There is mild generalized volume loss.  There is a moderate burden of periventricular, deep and scattered  subcortical white matter FLAIR and T2 hyperintense signal with similar but very mild findings in the chapin.  Although nonspecific, the findings would be consistent with the provided diagnosis of multiple sclerosis without significant change compared to the prior study.  Postcontrast images were not obtained.  There is slightly more conspicuous FLAIR hyperintense signal in the periaqueductal gray region.  There is no abnormal extra-axial fluid collection.  The basilar cisterns are open.  Flow voids indicating patency are present in the major vessels at the base of the brain.  There is a small aneurysm projecting medially from the right supraclinoid internal carotid artery.  This is better demonstrated on brain MRA dated 02/07/2023.  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  Cerebellar tonsils are normal position.  Sellar structures are normal.     The patient has had bilateral lens replacement surgery.  The orbits are otherwise grossly normal.     Extracranial contents, calvarium, soft tissues:Baseline marrow signal is normal.  The paranasal sinuses and mastoid air cells are grossly clear.     Impression:     1. There is no acute abnormality and little if any change compared to the prior brain MRI considering differences in technique.  There is nonspecific white matter change which although nonspecific would be consistent with the provided diagnosis of multiple sclerosis with some degree of underlying chronic small vessel disease not excluded.  There is no hemorrhage, mass or acute infarction     Electronically signed by:Cody Early MD  Date:                                            02/11/2025  Time:                                           08:09    MRA brain without contrast dated 02/07/2023 personally reviewed and discussed with patient.    FINDINGS:  There is a stable appearance of the intracranial arteries when compared to multiple prior studies.     Anterior circulation: There is normal  flow related signal intensity within the petrous and cavernous segments of the internal carotid arteries.  There is stable fusiform prominence of the proximal right petrous carotid artery.  There is a stable small 3 x 4 mm aneurysm projecting medially at the junction of the cavernous and supraclinoid right internal carotid artery.  There is a persistent trigeminal artery on the right.  At the origin of this vessel there is bilobed prominence which could represent infundibulum at the origin of this vessel but with small stable aneurysm measuring 3 x 4 mm not excluded.  There is normal branching into the anterior and middle cerebral arteries.  These vessels are patent.  There is no critical stenosis, occlusion, thrombosis, dissection or other vascular malformation.     Posterior circulation: The vertebral arteries are patent.  The left vertebral artery is hypoplastic and terminates in a posteroinferior cerebellar artery.  The dominant and patent right vertebral artery mostly supplies the basilar artery.  The basilar artery is normal in caliber and contour.  The basilar tip is normal.  There is normal branching into the superior cerebellar and left posterior cerebral arteries.  There is fetal origin of the right posterior cerebral artery.  There is no critical stenosis, occlusion, thrombosis, dissection, or large aneurysm.     Impression:     1. There is a stable appearance of the intracranial arteries with a 3 x 4 mm aneurysm projecting medially from the right internal carotid artery at the junction of the cavernous and supraclinoid segments.  2. There is developmental variation with fetal origin of the right posterior cerebral artery.  3. There is a persistent right-sided trigeminal artery with prominence at the origin which could represent infundibulum in addition to a small 4 mm aneurysm.  These findings were present previously.  4. The left vertebral artery is hypoplastic and terminates in a posteroinferior  cerebellar artery.     Electronically signed by:Cody Early MD  Date:                                            02/07/2023  Time:                                           16:04    Assessment/Plan:    Ms. Johnson is a 78yo female with PMH of MS who is suffering from a progressively worsening gait.  She was attempting to schedule an appointment with Neurology but was scheduled with NSGY instead.  Thus, I placed a referral to Neurology with emphasis on MS for her today.  I reviewed symptoms of NPH with her and she denies both confusion and urinary incontinence, and MRI brain does not show enlarged or prominent ventricles in comparison to sulci.  Therefore, I do not feel that she suffers from NPH but discussed that a high-volume LP with pre- and post-LP PT would be the next step in diagnosing this condition.  I do not feel that high-volume LP is needed at this time due to lack of subjective symptoms and imaging findings and she agrees.      Ms. Johnson had previously been following with our office for cerebral aneurysms but has not been seen since 2021.  I discussed that we can obtain an updated MRA brain for routine image surveillance.  However, she would like to hold off at this time since she is currently addressing her progressively worsening gait and also, recent weight loss.  She will call our office when she is ready to schedule updated imaging.  I discussed that she should continue to remain a non-smoker and should focus on good blood pressure control.  Her BP in clinic today was 144/76.  I discussed that we recommend SBP < 130 on average.  She should follow up with her PCP for BP management.  She is agreeable to the plan and will notify our office if she has any questions or concerns in the meantime.  She should call [phone] for a sudden, severe headache.

## 2025-02-13 ENCOUNTER — TELEPHONE (OUTPATIENT)
Dept: NEUROSURGERY | Facility: CLINIC | Age: 80
End: 2025-02-13
Payer: MEDICARE

## 2025-02-13 NOTE — TELEPHONE ENCOUNTER
----- Message from Low sent at 2/13/2025  1:14 PM CST -----  Regarding: return call  Contact: patient  Type:  Patient Returning Call    Who Called:patient  Who Left Message for Patient:nurse  Does the patient know what this is regarding?:why did MD order a MRA no one told her about this  Would the patient rather a call back or a response via [facility]?   Best Call Back Number:[phone]  Additional Information:

## 2025-02-18 ENCOUNTER — OFFICE VISIT (OUTPATIENT)
Dept: SURGERY | Facility: CLINIC | Age: 80
End: 2025-02-18
Payer: MEDICARE

## 2025-02-18 VITALS
BODY MASS INDEX: 21.2 KG/M2 | WEIGHT: 108 LBS | HEART RATE: 84 BPM | DIASTOLIC BLOOD PRESSURE: 58 MMHG | HEIGHT: 60 IN | TEMPERATURE: 98 F | SYSTOLIC BLOOD PRESSURE: 126 MMHG

## 2025-02-18 DIAGNOSIS — R11.0 NAUSEA: ICD-10-CM

## 2025-02-18 DIAGNOSIS — K82.8 GALLBLADDER SLUDGE: ICD-10-CM

## 2025-02-18 DIAGNOSIS — R93.2 ABNORMAL GALL BLADDER DIAGNOSTIC IMAGING: ICD-10-CM

## 2025-02-18 PROCEDURE — 99214 OFFICE O/P EST MOD 30 MIN: CPT | Mod: PBBFAC,PO | Performed by: STUDENT IN AN ORGANIZED HEALTH CARE EDUCATION/TRAINING PROGRAM

## 2025-02-18 RX ORDER — CLOTRIMAZOLE AND BETAMETHASONE DIPROPIONATE 10; .64 MG/G; MG/G
CREAM TOPICAL 2 TIMES DAILY PRN
COMMUNITY
Start: 2025-01-27

## 2025-02-18 NOTE — PROGRESS NOTES
History & Physical    Subjective     History of Present Illness:  Patient is a 79 y.o. female presents with vague abdominal complaints including intermittent nausea, intermittent diarrhea, and weight loss.  Mild abdominal pain.  She was referred to me after imaging showed questionable gall sludge without secondary signs of cholecystitis.    Chief Complaint   Patient presents with    Consult     Gallbladder sludge       Review of patient's allergies indicates:   Allergen Reactions    Cephalosporins Anaphylaxis    Iodinated contrast media Hives and Swelling    Penicillins Rash       Current Medications[1]    Past Medical History:   Diagnosis Date    Anemia     Anxiety     Chronic bronchitis with COPD (chronic obstructive pulmonary disease)     Esophageal spasm     Essential tremor     GERD (gastroesophageal reflux disease)     Headache     High cholesterol     Hypertension     Meniere disease     Multiple sclerosis     Muscle spasm     Pancreatic insufficiency      Past Surgical History:   Procedure Laterality Date    ANGIOGRAM, CORONARY, WITH LEFT HEART CATHETERIZATION  2/23/2024    Procedure: Left Heart Cath;  Surgeon: Chau Light MD;  Location: [facility] CATH;  Service: Cardiology;;    CATARACT EXTRACTION, BILATERAL  2006    COLONOSCOPY  09/05/2018    Dr. Leija, in procedures: diverticulosis, 4 colon polyps removed, adenomatous polyps x3 & benign mucosal polyps    COLONOSCOPY N/A 04/22/2021    Procedure: COLONOSCOPY;  Surgeon: Dwayne Santoyo MD;  Location: [facility] ENDO;  Service: Endoscopy;  Laterality: N/A;    CORONARY ANGIOGRAPHY N/A 10/14/2021    Procedure: ANGIOGRAM, CORONARY ARTERY;  Surgeon: Mustapha Manzo MD;  Location: [facility] CATH;  Service: Cardiology;  Laterality: N/A;    CYSTOSCOPY WITH HYDRODISTENSION OF BLADDER N/A 06/05/2019    Procedure: CYSTOSCOPY, WITH BLADDER HYDRODISTENSION;  Surgeon: Marko Burton MD;  Location: [facility] OR;  Service: OB/GYN;  Laterality: N/A;    DILATION AND CURETTAGE OF  UTERUS  1966    ENDOSCOPIC ULTRASOUND OF UPPER GASTROINTESTINAL TRACT Left 1/7/2025    Procedure: ULTRASOUND, UPPER GI TRACT, ENDOSCOPIC;  Surgeon: Thom Lobato MD;  Location: [facility];  Service: Endoscopy;  Laterality: Left;    ESOPHAGEAL MANOMETRY WITH MEASUREMENT OF IMPEDANCE N/A 01/06/2023    Procedure: MANOMETRY, ESOPHAGUS, WITH IMPEDANCE MEASUREMENT;  Surgeon: Vitaliy Hatfield MD;  Location: [facility] (4TH FLR);  Service: Endoscopy;  Laterality: N/A;  instructions sent to [facility]  Precall done. 0900 arrival time confirmed. SG  instr portal -ml    ESOPHAGOGASTRODUODENOSCOPY  09/05/2018    Dr. Leija, in procedures: gastritis; biopsy: duodenum unremarkable, with focal benign gastric metaplasia, stomach- minimal chronic gastritis, negative for h pylori    ESOPHAGOGASTRODUODENOSCOPY N/A 04/22/2021    Procedure: EGD (ESOPHAGOGASTRODUODENOSCOPY);  Surgeon: Dwayne Santoyo MD;  Location: [facility];  Service: Endoscopy;  Laterality: N/A;    ESOPHAGOGASTRODUODENOSCOPY N/A 03/10/2023    Procedure: EGD (ESOPHAGOGASTRODUODENOSCOPY);  Surgeon: Bere Mclean MD;  Location: [facility] (2ND FLR);  Service: Endoscopy;  Laterality: N/A;  Endofl  2nd floor due to availability  propofol only  instructions sent to [facility]    ESOPHAGOGASTRODUODENOSCOPY N/A 12/26/2024    Procedure: EGD (ESOPHAGOGASTRODUODENOSCOPY);  Surgeon: Maikol Persaud MD;  Location: [facility];  Service: Gastroenterology;  Laterality: N/A;    EYE SURGERY      HEMORRHOID SURGERY  1997    JOINT REPLACEMENT Right     knee    KNEE ARTHROSCOPY W/ MENISCECTOMY Right 08/13/2021    Procedure: ARTHROSCOPY, KNEE, WITH MENISCECTOMY;  Surgeon: Shelton Le MD;  Location: [facility];  Service: Orthopedics;  Laterality: Right;    KNEE ARTHROSCOPY W/ MENISCECTOMY Right 04/08/2022    Procedure: ARTHROSCOPY, KNEE, WITH MENISCECTOMY;  Surgeon: Shelton Le MD;  Location: [facility] OR;  Service: Orthopedics;  Laterality: Right;   medial meniscectomy    LEFT HEART CATHETERIZATION Right 10/14/2021    Procedure: Left heart cath;  Surgeon: Mustapha Manzo MD;  Location: [facility] CATH;  Service: Cardiology;  Laterality: Right;    ROBOTIC ARTHROPLASTY, KNEE, UNICOMPARTMENTAL Right 08/16/2022    Procedure: ROBOTIC ARTHROPLASTY, KNEE, UNICOMPARTMENTAL;  Surgeon: Shelton Le MD;  Location: [facility] OR;  Service: Orthopedics;  Laterality: Right;    TONSILLECTOMY  1954    TUBAL LIGATION  1990     Family History   Problem Relation Name Age of Onset    Macular degeneration Mother Kalpana     Coronary artery disease Mother Kalpana     Heart disease Mother Kalpana 60        Bypass twice    Miscarriages / Stillbirths Mother Kalpana         Stillbirths    Vision loss Mother Kalpana         Macular Degeration    Heart attack Father Jason     Coronary artery disease Father Jason     Heart disease Father Jason 55        Several Heart Attack    Alcohol abuse Father Jason     Early death Father Jason         Heart Attack 55    Coronary artery disease Sister      Heart disease Sister  65        CAD    Celiac disease Neg Hx      Colon cancer Neg Hx      Crohn's disease Neg Hx      Ulcerative colitis Neg Hx      Amblyopia Neg Hx      Blindness Neg Hx      Cataracts Neg Hx      Retinal detachment Neg Hx      Strabismus Neg Hx      Glaucoma Neg Hx       Social History[2]     Review of Systems:  Review of Systems   Constitutional:  Negative for fever.   HENT: Negative.     Eyes: Negative.    Respiratory: Negative.     Cardiovascular: Negative.    Gastrointestinal:  Positive for abdominal pain, diarrhea and nausea.   Endocrine: Negative.    Genitourinary: Negative.    Musculoskeletal: Negative.    Skin: Negative.    Allergic/Immunologic: Negative.    Neurological: Negative.    Hematological: Negative.    Psychiatric/Behavioral: Negative.            Objective     Vital Signs (Most Recent)  Temp: 97.9 °F (36.6 °C) (02/18/25 0947)  Pulse: 84 (02/18/25 0947)  BP: (!)  126/58 (02/18/25 0947)  5' (1.524 m)  49 kg (108 lb 0.4 oz)     Physical Exam:  Physical Exam  Constitutional:       General: She is not in acute distress.     Appearance: Normal appearance. She is not ill-appearing, toxic-appearing or diaphoretic.   HENT:      Head: Normocephalic.      Nose: Nose normal.   Eyes:      Conjunctiva/sclera: Conjunctivae normal.   Cardiovascular:      Rate and Rhythm: Normal rate and regular rhythm.   Pulmonary:      Effort: Pulmonary effort is normal.   Abdominal:      Palpations: Abdomen is soft.   Musculoskeletal:         General: Normal range of motion.      Cervical back: Normal range of motion.   Skin:     General: Skin is warm.   Neurological:      General: No focal deficit present.      Mental Status: She is alert.   Psychiatric:         Mood and Affect: Mood normal.         Laboratory  CMP was normal    Diagnostic Results:  Possible gall sludge without secondary signs of cholecystitis.       Assessment and Plan   79-year-old female referred to me for vague abdominal symptoms including nausea, diarrhea, and weight loss mainly.  She had incidentally noted sludge in her gallbladder present on a couple of images which were reviewed with the patient.    PLAN:  Discuss that it is unclear if her gallbladder is causing any symptoms.  I tend to think that this is a completely incidental finding.  It does not explain why she would have weight loss were diarrhea.  I doubt her nausea is associated with the sludge seen in her gallbladder.  She may have biliary dyskinesia which has 0 risk of observation.  Patient does not wish to pursue cholecystectomy at this point in time and will call the set up follow up with me as needed.              [1]   Current Outpatient Medications   Medication Sig Dispense Refill    albuterol (PROVENTIL/VENTOLIN HFA) 90 mcg/actuation inhaler Inhale 2 puffs into the lungs every 4 (four) hours as needed for Shortness of Breath.      alendronate (FOSAMAX) 70 MG  tablet Take 1 tablet (70 mg total) by mouth every 7 days. 12 tablet 3    ALPRAZolam (XANAX) 0.25 MG tablet Take 1 tablet (0.25 mg total) by mouth 2 (two) times daily as needed for Anxiety. 60 tablet 5    buPROPion (WELLBUTRIN XL) 300 MG 24 hr tablet Take 1 tablet (300 mg total) by mouth once daily. 90 tablet 1    cholestyramine (QUESTRAN) 4 gram packet Take 4 g by mouth 2 (two) times daily.      clotrimazole-betamethasone 1-0.05% (LOTRISONE) cream Apply topically 2 (two) times daily as needed.      hyoscyamine (ANASPAZ,LEVSIN) 0.125 mg Tab Take 125 mcg by mouth every 4 (four) hours as needed (cramps).      lipase-protease-amylase (CREON) 36,000-114,000- 180,000 unit CpDR Take 3 capsules by mouth 3 (three) times daily with meals. & 2 capsule with snacks 330 capsule 1    loperamide (IMODIUM) 1 mg/5 mL solution Take 2 mg by mouth every 6 (six) hours as needed for Diarrhea.      multivitamin (THERAGRAN) per tablet Take 1 tablet by mouth once daily.      olmesartan (BENICAR) 40 MG tablet Take 1 tablet (40 mg total) by mouth once daily. 90 tablet 1    omeprazole (PRILOSEC) 20 MG capsule TAKE 1 CAPSULE (20 MG TOTAL) BY MOUTH ONCE DAILY. 90 capsule 3    rosuvastatin (CRESTOR) 20 MG tablet Take 1 tablet (20 mg total) by mouth every evening. 90 tablet 3    traZODone (DESYREL) 150 MG tablet Take 1 tablet (150 mg total) by mouth every evening. 30 tablet 11    valACYclovir (VALTREX) 1000 MG tablet Take 2 tablets (2,000 mg total) by mouth every 12 (twelve) hours as needed (fever blisters). X 1 day 30 tablet 2    fluticasone furoate-vilanteroL (BREO) 100-25 mcg/dose diskus inhaler Inhale 1 puff into the lungs once daily. Controller (Patient not taking: Reported on 2/18/2025) 90 each 1    montelukast (SINGULAIR) 10 mg tablet Take 1 tablet (10 mg total) by mouth every evening. (Patient not taking: Reported on 2/18/2025) 90 tablet 3     Current Facility-Administered Medications   Medication Dose Route Frequency Provider Last Rate  Last Admin    influenza (adjuvanted) (Fluad) 45 mcg/0.5 mL IM vaccine (> or = 64 yo) 0.5 mL  0.5 mL Intramuscular 1 time in Clinic/HOD        [2]   Social History  Tobacco Use    Smoking status: Former     Current packs/day: 0.00     Average packs/day: 2.0 packs/day for 36.3 years (72.7 ttl pk-yrs)     Types: Cigarettes     Start date: 1964     Quit date:      Years since quittin.1    Smokeless tobacco: Never   Substance Use Topics    Alcohol use: Yes     Alcohol/week: 7.0 standard drinks of alcohol     Types: 7 Glasses of wine per week    Drug use: Never

## 2025-02-24 ENCOUNTER — PATIENT MESSAGE (OUTPATIENT)
Dept: GASTROENTEROLOGY | Facility: CLINIC | Age: 80
End: 2025-02-24
Payer: MEDICARE

## 2025-03-14 ENCOUNTER — TELEPHONE (OUTPATIENT)
Dept: NEUROLOGY | Facility: CLINIC | Age: 80
End: 2025-03-14
Payer: MEDICARE

## 2025-03-21 ENCOUNTER — TELEPHONE (OUTPATIENT)
Dept: NEUROLOGY | Facility: CLINIC | Age: 80
End: 2025-03-21
Payer: MEDICARE

## 2025-03-21 NOTE — TELEPHONE ENCOUNTER
CHADWICKM for pt to contact our office in regards to getting pt scheduled for a NP appt with FARHAD.

## 2025-03-28 ENCOUNTER — TELEPHONE (OUTPATIENT)
Dept: NEUROLOGY | Facility: CLINIC | Age: 80
End: 2025-03-28
Payer: MEDICARE

## 2025-03-31 ENCOUNTER — PATIENT MESSAGE (OUTPATIENT)
Dept: FAMILY MEDICINE | Facility: CLINIC | Age: 80
End: 2025-03-31
Payer: MEDICARE

## 2025-04-02 ENCOUNTER — TELEPHONE (OUTPATIENT)
Dept: NEUROLOGY | Facility: CLINIC | Age: 80
End: 2025-04-02
Payer: MEDICARE

## 2025-04-02 ENCOUNTER — PATIENT MESSAGE (OUTPATIENT)
Dept: NEUROLOGY | Facility: CLINIC | Age: 80
End: 2025-04-02
Payer: MEDICARE

## 2025-04-15 ENCOUNTER — PATIENT MESSAGE (OUTPATIENT)
Dept: FAMILY MEDICINE | Facility: CLINIC | Age: 80
End: 2025-04-15
Payer: COMMERCIAL

## 2025-04-16 ENCOUNTER — LAB VISIT (OUTPATIENT)
Dept: LAB | Facility: HOSPITAL | Age: 80
End: 2025-04-16
Attending: FAMILY MEDICINE
Payer: MEDICARE

## 2025-04-16 DIAGNOSIS — I10 ESSENTIAL HYPERTENSION: ICD-10-CM

## 2025-04-16 LAB
ANION GAP (OHS): 7 MMOL/L (ref 8–16)
BUN SERPL-MCNC: 14 MG/DL (ref 8–23)
CALCIUM SERPL-MCNC: 9.7 MG/DL (ref 8.7–10.5)
CHLORIDE SERPL-SCNC: 103 MMOL/L (ref 95–110)
CO2 SERPL-SCNC: 28 MMOL/L (ref 23–29)
CREAT SERPL-MCNC: 0.8 MG/DL (ref 0.5–1.4)
GFR SERPLBLD CREATININE-BSD FMLA CKD-EPI: >60 ML/MIN/1.73/M2
GLUCOSE SERPL-MCNC: 90 MG/DL (ref 70–110)
POTASSIUM SERPL-SCNC: 4.3 MMOL/L (ref 3.5–5.1)
SODIUM SERPL-SCNC: 138 MMOL/L (ref 136–145)

## 2025-04-16 PROCEDURE — 80048 BASIC METABOLIC PNL TOTAL CA: CPT

## 2025-04-16 PROCEDURE — 36415 COLL VENOUS BLD VENIPUNCTURE: CPT | Mod: PO

## 2025-04-17 ENCOUNTER — RESULTS FOLLOW-UP (OUTPATIENT)
Dept: FAMILY MEDICINE | Facility: CLINIC | Age: 80
End: 2025-04-17

## 2025-04-21 ENCOUNTER — HOSPITAL ENCOUNTER (OUTPATIENT)
Dept: RADIOLOGY | Facility: HOSPITAL | Age: 80
Discharge: HOME | End: 2025-04-21
Attending: FAMILY MEDICINE
Payer: MEDICARE

## 2025-04-21 ENCOUNTER — RESULTS FOLLOW-UP (OUTPATIENT)
Dept: FAMILY MEDICINE | Facility: CLINIC | Age: 80
End: 2025-04-21

## 2025-04-21 ENCOUNTER — OFFICE VISIT (OUTPATIENT)
Dept: FAMILY MEDICINE | Facility: CLINIC | Age: 80
End: 2025-04-21
Payer: MEDICARE

## 2025-04-21 VITALS
HEART RATE: 79 BPM | BODY MASS INDEX: 20.97 KG/M2 | DIASTOLIC BLOOD PRESSURE: 74 MMHG | WEIGHT: 107.38 LBS | OXYGEN SATURATION: 97 % | SYSTOLIC BLOOD PRESSURE: 138 MMHG

## 2025-04-21 DIAGNOSIS — I10 ESSENTIAL HYPERTENSION: ICD-10-CM

## 2025-04-21 DIAGNOSIS — G35 MS (MULTIPLE SCLEROSIS): ICD-10-CM

## 2025-04-21 DIAGNOSIS — R26.89 BALANCE DISORDER: ICD-10-CM

## 2025-04-21 DIAGNOSIS — R10.9 ACUTE RIGHT FLANK PAIN: Primary | ICD-10-CM

## 2025-04-21 DIAGNOSIS — R10.9 ABDOMINAL PAIN, UNSPECIFIED ABDOMINAL LOCATION: Primary | ICD-10-CM

## 2025-04-21 DIAGNOSIS — R10.9 ACUTE RIGHT FLANK PAIN: ICD-10-CM

## 2025-04-21 PROCEDURE — 99214 OFFICE O/P EST MOD 30 MIN: CPT | Mod: PBBFAC,25,PO | Performed by: FAMILY MEDICINE

## 2025-04-21 PROCEDURE — 99214 OFFICE O/P EST MOD 30 MIN: CPT | Mod: S$PBB,,, | Performed by: FAMILY MEDICINE

## 2025-04-21 PROCEDURE — 99999 PR PBB SHADOW E&M-EST. PATIENT-LVL IV: CPT | Mod: PBBFAC,,, | Performed by: FAMILY MEDICINE

## 2025-04-21 PROCEDURE — 74018 RADEX ABDOMEN 1 VIEW: CPT | Mod: TC,FY,PO

## 2025-04-21 PROCEDURE — 74018 RADEX ABDOMEN 1 VIEW: CPT | Mod: 26,,, | Performed by: STUDENT IN AN ORGANIZED HEALTH CARE EDUCATION/TRAINING PROGRAM

## 2025-04-21 NOTE — PROGRESS NOTES
Assessment:       1. Acute right flank pain    2. Essential hypertension    3. MS (multiple sclerosis)    4. Balance disorder        Plan:       Acute right flank pain:  New problem workup needed  -     Urinalysis, Reflex to Urine Culture; Future; Expected date: 04/21/2025  -     CBC Auto Differential; Future; Expected date: 04/21/2025  -     Comprehensive Metabolic Panel; Future; Expected date: 04/21/2025  -     X-Ray Abdomen AP 1 View; Future; Expected date: 04/21/2025    Essential hypertension: Stable    MS (multiple sclerosis): Stable  -     Ambulatory Referral/Consult to Physical Therapy; Future; Expected date: 04/28/2025    Balance disorder: Worsening  -     Ambulatory Referral/Consult to Physical Therapy; Future; Expected date: 04/28/2025         Recommend urinalysis, blood work and x-rays.  Will send a message when we have the results of the test.  Physical therapy was ordered secondary to multiple sclerosis and balance issues.  The patient will make an appointment to see the neurologist.  Continue with the blood pressure medication as directed.   Patient agreed with assessment and plan. Patient verbalized understanding.     Subjective:       Patient ID: Veronique Johnson is a 79 y.o. female.    Chief Complaint: Flank Pain (Right side pain since Thursday. Pt reports applying pressure can help alleviated pain)    HPI    The patient is here today complaining of pain on the right flank for about 1-2 weeks.  The patient stated when she apply pressure can help alleviate some of the pain.  The patient denies any symptoms of chest pain or worsening symptoms of shortness for breath.  She is having balance problems and she would like a referral for physical therapy, she has MS in could not get to the [facility] for appointment to see the specialist.  The patient is currently taking olmesartan for blood pressure.    Past medical history, past social history was reviewed and discussed with the patient.    Review of  Systems   Genitourinary:  Positive for flank pain.       Objective:      Physical Exam  Constitutional:       General: She is not in acute distress.     Appearance: Normal appearance. She is not toxic-appearing.   HENT:      Head: Normocephalic and atraumatic.   Cardiovascular:      Rate and Rhythm: Normal rate and regular rhythm.   Pulmonary:      Effort: Pulmonary effort is normal. No respiratory distress.      Breath sounds: No wheezing.   Abdominal:      General: Abdomen is flat. Bowel sounds are normal. There is no distension.      Tenderness: There is abdominal tenderness (Right flank area). There is right CVA tenderness.   Musculoskeletal:      Right lower leg: No edema.      Left lower leg: No edema.   Neurological:      Mental Status: She is alert.   Psychiatric:         Mood and Affect: Mood normal.         Behavior: Behavior normal.         Thought Content: Thought content normal.         Judgment: Judgment normal.

## 2025-05-05 ENCOUNTER — HOSPITAL ENCOUNTER (OUTPATIENT)
Dept: RADIOLOGY | Facility: HOSPITAL | Age: 80
Discharge: HOME | End: 2025-05-05
Attending: FAMILY MEDICINE
Payer: MEDICARE

## 2025-05-05 DIAGNOSIS — R10.9 ABDOMINAL PAIN, UNSPECIFIED ABDOMINAL LOCATION: ICD-10-CM

## 2025-05-05 PROCEDURE — 74176 CT ABD & PELVIS W/O CONTRAST: CPT | Mod: 26,,, | Performed by: RADIOLOGY

## 2025-05-05 PROCEDURE — 74176 CT ABD & PELVIS W/O CONTRAST: CPT | Mod: TC,PO

## 2025-05-06 ENCOUNTER — RESULTS FOLLOW-UP (OUTPATIENT)
Dept: FAMILY MEDICINE | Facility: CLINIC | Age: 80
End: 2025-05-06
Payer: MEDICARE

## 2025-05-06 DIAGNOSIS — R10.30 LOWER ABDOMINAL PAIN: Primary | ICD-10-CM

## 2025-05-06 DIAGNOSIS — K38.9 APPENDIX DISEASE: ICD-10-CM

## 2025-05-13 ENCOUNTER — PATIENT MESSAGE (OUTPATIENT)
Dept: PSYCHIATRY | Facility: CLINIC | Age: 80
End: 2025-05-13
Payer: MEDICARE

## 2025-05-28 ENCOUNTER — HOSPITAL ENCOUNTER (OUTPATIENT)
Dept: RADIOLOGY | Facility: HOSPITAL | Age: 80
Discharge: HOME | End: 2025-05-28
Attending: ORTHOPAEDIC SURGERY
Payer: MEDICARE

## 2025-05-28 ENCOUNTER — OFFICE VISIT (OUTPATIENT)
Dept: ORTHOPEDICS | Facility: CLINIC | Age: 80
End: 2025-05-28
Payer: MEDICARE

## 2025-05-28 DIAGNOSIS — M25.512 LEFT SHOULDER PAIN, UNSPECIFIED CHRONICITY: Primary | ICD-10-CM

## 2025-05-28 DIAGNOSIS — M75.100 ROTATOR CUFF SYNDROME, UNSPECIFIED LATERALITY: Primary | ICD-10-CM

## 2025-05-28 DIAGNOSIS — M25.512 LEFT SHOULDER PAIN, UNSPECIFIED CHRONICITY: ICD-10-CM

## 2025-05-28 DIAGNOSIS — Z96.651 S/P RIGHT UNICOMPARTMENTAL KNEE REPLACEMENT: ICD-10-CM

## 2025-05-28 DIAGNOSIS — M71.21 BAKER CYST, RIGHT: ICD-10-CM

## 2025-05-28 PROCEDURE — 20610 DRAIN/INJ JOINT/BURSA W/O US: CPT | Mod: PBBFAC,PO,LT | Performed by: ORTHOPAEDIC SURGERY

## 2025-05-28 PROCEDURE — 99999 PR PBB SHADOW E&M-EST. PATIENT-LVL I: CPT | Mod: PBBFAC,,, | Performed by: ORTHOPAEDIC SURGERY

## 2025-05-28 PROCEDURE — 73030 X-RAY EXAM OF SHOULDER: CPT | Mod: TC,PO,LT

## 2025-05-28 PROCEDURE — 99211 OFF/OP EST MAY X REQ PHY/QHP: CPT | Mod: PBBFAC,25,PO | Performed by: ORTHOPAEDIC SURGERY

## 2025-05-28 PROCEDURE — 99999PBSHW PR PBB SHADOW TECHNICAL ONLY FILED TO HB: Mod: PBBFAC,,,

## 2025-05-28 PROCEDURE — 73030 X-RAY EXAM OF SHOULDER: CPT | Mod: 26,LT,, | Performed by: RADIOLOGY

## 2025-05-28 RX ORDER — TRIAMCINOLONE ACETONIDE 40 MG/ML
40 INJECTION, SUSPENSION INTRA-ARTICULAR; INTRAMUSCULAR
Status: DISCONTINUED | OUTPATIENT
Start: 2025-05-28 | End: 2025-05-28 | Stop reason: HOSPADM

## 2025-05-28 RX ADMIN — TRIAMCINOLONE ACETONIDE 40 MG: 40 INJECTION, SUSPENSION INTRA-ARTICULAR; INTRAMUSCULAR at 09:05

## 2025-05-28 NOTE — PROGRESS NOTES
Past Medical History:   Diagnosis Date    Anemia     Anxiety     Chronic bronchitis with COPD (chronic obstructive pulmonary disease)     Esophageal spasm     Essential tremor     GERD (gastroesophageal reflux disease)     Headache     High cholesterol     Hypertension     Meniere disease     Multiple sclerosis     Muscle spasm     Pancreatic insufficiency        Past Surgical History:   Procedure Laterality Date    ANGIOGRAM, CORONARY, WITH LEFT HEART CATHETERIZATION  2/23/2024    Procedure: Left Heart Cath;  Surgeon: Chau Light MD;  Location: [facility] CATH;  Service: Cardiology;;    CATARACT EXTRACTION, BILATERAL  2006    COLONOSCOPY  09/05/2018    Dr. Leija, in procedures: diverticulosis, 4 colon polyps removed, adenomatous polyps x3 & benign mucosal polyps    COLONOSCOPY N/A 04/22/2021    Procedure: COLONOSCOPY;  Surgeon: Dwayne Santoyo MD;  Location: [facility] ENDO;  Service: Endoscopy;  Laterality: N/A;    CORONARY ANGIOGRAPHY N/A 10/14/2021    Procedure: ANGIOGRAM, CORONARY ARTERY;  Surgeon: Mustapha Manzo MD;  Location: [facility] CATH;  Service: Cardiology;  Laterality: N/A;    CYSTOSCOPY WITH HYDRODISTENSION OF BLADDER N/A 06/05/2019    Procedure: CYSTOSCOPY, WITH BLADDER HYDRODISTENSION;  Surgeon: Marko Burton MD;  Location: [facility] OR;  Service: OB/GYN;  Laterality: N/A;    DILATION AND CURETTAGE OF UTERUS  1966    ENDOSCOPIC ULTRASOUND OF UPPER GASTROINTESTINAL TRACT Left 1/7/2025    Procedure: ULTRASOUND, UPPER GI TRACT, ENDOSCOPIC;  Surgeon: Thom Lobato MD;  Location: [facility];  Service: Endoscopy;  Laterality: Left;    ESOPHAGEAL MANOMETRY WITH MEASUREMENT OF IMPEDANCE N/A 01/06/2023    Procedure: MANOMETRY, ESOPHAGUS, WITH IMPEDANCE MEASUREMENT;  Surgeon: Vitaliy Hatfield MD;  [facility]  Service: Endoscopy;  Laterality: N/A;  instructions sent to [facility]  Precall done. 0900 arrival time confirmed. SG  instr portal -ml    ESOPHAGOGASTRODUODENOSCOPY   09/05/2018    Dr. Leija, in procedures: gastritis; biopsy: duodenum unremarkable, with focal benign gastric metaplasia, stomach- minimal chronic gastritis, negative for h pylori    ESOPHAGOGASTRODUODENOSCOPY N/A 04/22/2021    Procedure: EGD (ESOPHAGOGASTRODUODENOSCOPY);  Surgeon: Dwayne Santoyo MD;  Location: [facility];  Service: Endoscopy;  Laterality: N/A;    ESOPHAGOGASTRODUODENOSCOPY N/A 03/10/2023    Procedure: EGD (ESOPHAGOGASTRODUODENOSCOPY);  Surgeon: Bere Mclean MD;  Location: [facility] (2ND FLR);  Service: Endoscopy;  Laterality: N/A;  Endoflip  2nd floor due to availability  propofol only  instructions sent to [facility]    ESOPHAGOGASTRODUODENOSCOPY N/A 12/26/2024    Procedure: EGD (ESOPHAGOGASTRODUODENOSCOPY);  Surgeon: Maikol Persaud MD;  Location: [facility];  Service: Gastroenterology;  Laterality: N/A;    EYE SURGERY      HEMORRHOID SURGERY  1997    JOINT REPLACEMENT Right     knee    KNEE ARTHROSCOPY W/ MENISCECTOMY Right 08/13/2021    Procedure: ARTHROSCOPY, KNEE, WITH MENISCECTOMY;  Surgeon: Shelton Le MD;  Location: [facility];  Service: Orthopedics;  Laterality: Right;    KNEE ARTHROSCOPY W/ MENISCECTOMY Right 04/08/2022    Procedure: ARTHROSCOPY, KNEE, WITH MENISCECTOMY;  Surgeon: Shelton Le MD;  Location: [facility] OR;  Service: Orthopedics;  Laterality: Right;  medial meniscectomy    LEFT HEART CATHETERIZATION Right 10/14/2021    Procedure: Left heart cath;  Surgeon: Mustapha Manzo MD;  Location: [facility] CATH;  Service: Cardiology;  Laterality: Right;    ROBOTIC ARTHROPLASTY, KNEE, UNICOMPARTMENTAL Right 08/16/2022    Procedure: ROBOTIC ARTHROPLASTY, KNEE, UNICOMPARTMENTAL;  Surgeon: Shelton Le MD;  Location: [facility] OR;  Service: Orthopedics;  Laterality: Right;    TONSILLECTOMY  1954    TUBAL LIGATION  1990       Current Outpatient Medications   Medication Sig    albuterol (PROVENTIL/VENTOLIN HFA) 90 mcg/actuation inhaler Inhale 2 puffs into the  lungs every 4 (four) hours as needed for Shortness of Breath.    alendronate (FOSAMAX) 70 MG tablet Take 1 tablet (70 mg total) by mouth every 7 days.    ALPRAZolam (XANAX) 0.25 MG tablet Take 1 tablet (0.25 mg total) by mouth 2 (two) times daily as needed for Anxiety.    buPROPion (WELLBUTRIN XL) 300 MG 24 hr tablet Take 1 tablet (300 mg total) by mouth once daily.    clotrimazole-betamethasone 1-0.05% (LOTRISONE) cream Apply topically 2 (two) times daily as needed.    fluticasone furoate-vilanteroL (BREO) 100-25 mcg/dose diskus inhaler Inhale 1 puff into the lungs once daily. Controller (Patient not taking: Reported on 2/18/2025)    hydroCHLOROthiazide 12.5 MG Tab Take 1 tablet (12.5 mg total) by mouth once daily.    hyoscyamine (ANASPAZ,LEVSIN) 0.125 mg Tab Take 125 mcg by mouth every 4 (four) hours as needed (cramps).    lipase-protease-amylase (CREON) 36,000-114,000- 180,000 unit CpDR Take 3 capsules by mouth 3 (three) times daily with meals. & 2 capsule with snacks    montelukast (SINGULAIR) 10 mg tablet Take 1 tablet (10 mg total) by mouth every evening.    multivitamin (THERAGRAN) per tablet Take 1 tablet by mouth once daily.    olmesartan (BENICAR) 40 MG tablet TAKE ONE TABLET BY MOUTH EVERY DAY    omeprazole (PRILOSEC) 20 MG capsule TAKE 1 CAPSULE (20 MG TOTAL) BY MOUTH ONCE DAILY.    rosuvastatin (CRESTOR) 20 MG tablet Take 1 tablet (20 mg total) by mouth every evening.    traZODone (DESYREL) 150 MG tablet Take 1 tablet (150 mg total) by mouth every evening.    valACYclovir (VALTREX) 1000 MG tablet Take 2 tablets (2,000 mg total) by mouth every 12 (twelve) hours as needed (fever blisters). X 1 day     Current Facility-Administered Medications   Medication    influenza (adjuvanted) (Fluad) 45 mcg/0.5 mL IM vaccine (> or = 66 yo) 0.5 mL       Review of patient's allergies indicates:   Allergen Reactions    Cephalosporins Anaphylaxis    Iodinated contrast media Hives and Swelling    Penicillins Rash        Family History   Problem Relation Name Age of Onset    Macular degeneration Mother Kalpana     Coronary artery disease Mother Kalpana     Heart disease Mother Kalpana 60        Bypass twice    Miscarriages / Stillbirths Mother Kalpana         Stillbirths    Vision loss Mother Kalpana         Macular Degeration    Heart attack Father Jason     Coronary artery disease Father Jason     Heart disease Father Jason 55        Several Heart Attack    Alcohol abuse Father Jason     Early death Father Jason         Heart Attack 55    Coronary artery disease Sister      Heart disease Sister  65        CAD    Celiac disease Neg Hx      Colon cancer Neg Hx      Crohn's disease Neg Hx      Ulcerative colitis Neg Hx      Amblyopia Neg Hx      Blindness Neg Hx      Cataracts Neg Hx      Retinal detachment Neg Hx      Strabismus Neg Hx      Glaucoma Neg Hx         Social History     Socioeconomic History    Marital status:    Tobacco Use    Smoking status: Former     Current packs/day: 0.00     Average packs/day: 2.0 packs/day for 36.3 years (72.7 ttl pk-yrs)     Types: Cigarettes     Start date: 1964     Quit date:      Years since quittin.4    Smokeless tobacco: Never   Substance and Sexual Activity    Alcohol use: Yes     Alcohol/week: 7.0 standard drinks of alcohol     Types: 7 Glasses of wine per week    Drug use: Never    Sexual activity: Yes     Partners: Male     Birth control/protection: Post-menopausal     Social Drivers of Health     Financial Resource Strain: Low Risk  (2025)    Overall Financial Resource Strain (CARDIA)     Difficulty of Paying Living Expenses: Not hard at all   Food Insecurity: No Food Insecurity (2025)    Hunger Vital Sign     Worried About Running Out of Food in the Last Year: Never true     Ran Out of Food in the Last Year: Never true   Transportation Needs: No Transportation Needs (2024)    PRAPARE - Transportation     Lack of Transportation (Medical):  No     Lack of Transportation (Non-Medical): No   Physical Activity: Inactive (1/22/2025)    Exercise Vital Sign     Days of Exercise per Week: 0 days     Minutes of Exercise per Session: 0 min   Stress: Stress Concern Present (1/22/2025)    [employer] - Occupational Stress Questionnaire     Feeling of Stress : Very much   Housing Stability: Low Risk  (8/24/2023)    Housing Stability Vital Sign     Unable to Pay for Housing in the Last Year: No     Number of Places Lived in the Last Year: 1     Unstable Housing in the Last Year: No       Chief Complaint:   No chief complaint on file.      History of present illness:  This is a 79-year-old female seen for  left shoulder pain.  Started hurting last week after doing a lot of gardening.  Pain in the back of her shoulder and around the shoulder blade.  Sometimes radiates to the elbow.  Constant throbbing pain.  Pain is a 3/10.  Also complaining of some fluid behind her right knee similar to a Baker cyst.  Has never had any treatment for that cyst before.        Physical Examination:    Vital Signs:    There were no vitals filed for this visit.      There is no height or weight on file to calculate BMI.    This a well-developed, well nourished patient in no acute distress.  They are alert and oriented and cooperative to examination.  Pt. walks without an antalgic gait.      Examination of the left shoulder shows no rashes or erythema. There are no masses, ecchymosis, or atrophy. The patient has full range of motion in forward flexion, external rotation, and internal rotation to the mid T-spine. The patient has moderately positive impingement signs. - Oakwood's test. - Speeds test. Nontender to palpation over a.c. joint. Normal stability anteriorly, posteriorly, and negative sulcus sign. Passive range of motion: Forward flexion of 180°, external rotation at 90° of 90°, internal rotation of 50°, and external rotation at 0° of 50°. 2+ radial  pulse. Intact axillary, radial, median and ulnar sensation. 5 out of 5 resisted forward flexion, external rotation, and negative lift off test.    Examination of the right knee shows well-healed surgical scar from prior unicompartmental knee replacement.  Patient has great range of motion from 0-130 degrees.  Do feel a palpable fluid collection in the popliteal fossa consistent with Baker cyst          X-rays:  X-rays left shoulder is ordered and reviewed which show some sclerosis around the greater tuberosity     Assessment::  Left rotator cuff syndrome  Right knee Baker cyst    Plan:  I reviewed the findings with her today.  I agreed to try another subacromial injection in her left shoulder today since it worked well for her 5 years ago.  I will also get her in with Dr. Quintero to aspirate her Baker cyst.    This note was created using [employer] voice recognition software that occasionally misinterpreted phrases or words.    Consult note is delivered via Epic messaging service.

## 2025-05-28 NOTE — PROCEDURES
Large Joint Aspiration/Injection: L subacromial bursa    Date/Time: 5/28/2025 9:00 AM    Performed by: Shelton Le MD  Authorized by: Shelton Le MD    Consent Done?:  Yes (Verbal)  Indications:  Pain  Site marked: the procedure site was marked    Timeout: prior to procedure the correct patient, procedure, and site was verified      Local anesthesia used?: Yes    Local anesthetic: Ropivicaine.  Anesthetic total (ml):  3      Details:  Needle Size:  20 G  Ultrasonic Guidance for needle placement?: No    Approach:  Posterior  Location:  Shoulder  Site:  L subacromial bursa  Medications:  40 mg triamcinolone acetonide 40 mg/mL  Patient tolerance:  Patient tolerated the procedure well with no immediate complications

## 2025-06-19 ENCOUNTER — PATIENT MESSAGE (OUTPATIENT)
Dept: PSYCHIATRY | Facility: CLINIC | Age: 80
End: 2025-06-19
Payer: MEDICARE

## 2025-07-12 DIAGNOSIS — F41.9 ANXIETY: ICD-10-CM

## 2025-07-12 NOTE — TELEPHONE ENCOUNTER
Care Due:                  Date            Visit Type   Department     Provider  --------------------------------------------------------------------------------                                EP -                              [facility]  Last Visit: 04-      CARE ([facility])   ALEXUS Song                              EP -                              PRIMARY      [facility]  Next Visit: 07-      CARE ([facility])   ALEXUS Song                                                            Last  Test          Frequency    Reason                     Performed    Due Date  --------------------------------------------------------------------------------    Mg Level....  12 months..  alendronate..............  Not Found    Overdue    Phosphate...  12 months..  alendronate..............  Not Found    Overdue    [employer] Embedded Care Due Messages. Reference number: [id number].   7/12/2025 8:51:59 AM CDT

## 2025-07-14 RX ORDER — ALPRAZOLAM 0.25 MG/1
0.25 TABLET ORAL 2 TIMES DAILY PRN
Qty: 60 TABLET | Refills: 0 | Status: SHIPPED | OUTPATIENT
Start: 2025-07-16

## 2025-07-29 ENCOUNTER — OFFICE VISIT (OUTPATIENT)
Dept: FAMILY MEDICINE | Facility: CLINIC | Age: 80
End: 2025-07-29
Payer: MEDICARE

## 2025-07-29 DIAGNOSIS — F41.9 ANXIETY: ICD-10-CM

## 2025-07-29 DIAGNOSIS — J44.9 CHRONIC OBSTRUCTIVE PULMONARY DISEASE, UNSPECIFIED COPD TYPE: ICD-10-CM

## 2025-07-29 DIAGNOSIS — I10 ESSENTIAL HYPERTENSION: Primary | ICD-10-CM

## 2025-07-29 DIAGNOSIS — E78.49 OTHER HYPERLIPIDEMIA: ICD-10-CM

## 2025-07-29 PROCEDURE — 99999 PR PBB SHADOW E&M-EST. PATIENT-LVL IV: CPT | Mod: PBBFAC,,, | Performed by: FAMILY MEDICINE

## 2025-07-29 PROCEDURE — G2211 COMPLEX E/M VISIT ADD ON: HCPCS | Mod: ,,, | Performed by: FAMILY MEDICINE

## 2025-07-29 PROCEDURE — 99214 OFFICE O/P EST MOD 30 MIN: CPT | Mod: S$PBB,,, | Performed by: FAMILY MEDICINE

## 2025-07-29 PROCEDURE — 99214 OFFICE O/P EST MOD 30 MIN: CPT | Mod: PBBFAC,PO | Performed by: FAMILY MEDICINE

## 2025-07-29 RX ORDER — FLUTICASONE FUROATE AND VILANTEROL 100; 25 UG/1; UG/1
1 POWDER RESPIRATORY (INHALATION) DAILY
Qty: 90 EACH | Refills: 1 | Status: SHIPPED | OUTPATIENT
Start: 2025-07-29 | End: 2025-08-06

## 2025-07-29 RX ORDER — ALPRAZOLAM 0.25 MG/1
0.25 TABLET ORAL 2 TIMES DAILY PRN
Qty: 60 TABLET | Refills: 5 | Status: SHIPPED | OUTPATIENT
Start: 2025-08-14

## 2025-07-30 ENCOUNTER — OFFICE VISIT (OUTPATIENT)
Dept: FAMILY MEDICINE | Facility: CLINIC | Age: 80
End: 2025-07-30
Payer: MEDICARE

## 2025-07-30 ENCOUNTER — PATIENT MESSAGE (OUTPATIENT)
Dept: PSYCHIATRY | Facility: CLINIC | Age: 80
End: 2025-07-30
Payer: MEDICARE

## 2025-07-30 VITALS
BODY MASS INDEX: 21.14 KG/M2 | WEIGHT: 108.25 LBS | OXYGEN SATURATION: 98 % | SYSTOLIC BLOOD PRESSURE: 164 MMHG | DIASTOLIC BLOOD PRESSURE: 80 MMHG | HEART RATE: 78 BPM
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
## 2025-07-30 DIAGNOSIS — S99.922A INJURY OF LEFT FOOT, INITIAL ENCOUNTER: ICD-10-CM

## 2025-07-30 DIAGNOSIS — R42 DIZZINESS: ICD-10-CM

## 2025-07-30 DIAGNOSIS — S00.83XA CONTUSION OF FACE, INITIAL ENCOUNTER: ICD-10-CM

## 2025-07-30 DIAGNOSIS — S69.92XA INJURY OF LEFT HAND, INITIAL ENCOUNTER: ICD-10-CM

## 2025-07-30 PROCEDURE — 99213 OFFICE O/P EST LOW 20 MIN: CPT | Mod: PBBFAC,PO | Performed by: STUDENT IN AN ORGANIZED HEALTH CARE EDUCATION/TRAINING PROGRAM

## 2025-07-30 PROCEDURE — 99999 PR PBB SHADOW E&M-EST. PATIENT-LVL III: CPT | Mod: PBBFAC,,, | Performed by: STUDENT IN AN ORGANIZED HEALTH CARE EDUCATION/TRAINING PROGRAM

## 2025-07-30 PROCEDURE — 99214 OFFICE O/P EST MOD 30 MIN: CPT | Mod: S$PBB,,, | Performed by: STUDENT IN AN ORGANIZED HEALTH CARE EDUCATION/TRAINING PROGRAM

## 2025-07-30 NOTE — PROGRESS NOTES
Problem visit/UC  Chief Complaint   Patient presents with    Dizziness     Pt states she woke up dizzy   Walking down hallway in her home and fell      Fall     Pt states she got dizzy while walking and fell hit eye and hurt left wrist and fingers  Eye swollen and bruised  Fingers bruised   Wrist swollen        Subjective   Patient ID: Veronique Johnson is a 79 y.o. female.    Fall      Veronique Johnson is a 79 y.o. who presents with fall .   Patient is being seen today for same day sick visit. Patient is new to provider. The patient is accompanied by cousin. PCP is Dr Song.    The patient reports this morning she felt dizzy when she woke up. She then got out of bed to take her dog outside, trapped and fell. She hit her head on the floor. She reports feeling dizzy afterwards and sweating. She was able to get herself up. She denies LOC. She continues to have dizziness with movement. She has bruising to her left orbital area, left hand and left foot. In addition, she reports unable to properly see out of her right eye    Review of Systems   Neurological:  Positive for dizziness.     Objective      Vitals:    07/30/25 1313 07/30/25 1326   BP: (!) 158/80 (!) 164/80   BP Location: Left arm    Patient Position: Sitting    Pulse: 78    SpO2: 98%    Weight: 49.1 kg (108 lb 3.9 oz)        Physical Exam  Vitals reviewed.   Constitutional:       General: She is not in acute distress.     Appearance: Normal appearance. She is not ill-appearing, toxic-appearing or diaphoretic.   HENT:      Head: Normocephalic and atraumatic.   Eyes:      Comments: Extensive left orbital eye hematoma with swelling. Right eye without injury, however patient reports vision not intact   Pulmonary:      Effort: No respiratory distress.   Musculoskeletal:      Comments: Left hand with bruising and swelling, left pinky decreased ROM   Feet:      Comments: Bruising along dorsum of left foot, pain with movement  Neurological:      Mental Status: She is  alert.         Procedures         Assessment & Plan   Fall, initial encounter  The patient fell in her hallway at home around 830am while taking her dog out. She sustained injuries to her face, left hand and left foot. She is noted to have bruising and elevated blood pressure. She also states dizziness and had an episode of sweating initially after she fell  -discussed with patient and her cousin recommendation to be evaluated in the ED given symptoms. She is amenable. [facility] ED contacted and informed about the patient presenting shortly.    Contusion of face, initial encounter  The patient fell, hitting her face around 830am in the hallway. As a result, she has a hematoma to her left orbital area. She is unable to open her left eye and reports decrease vision in her right area    Dizziness  The patient reports that she dizziness with movement. When she woke up this morning she was dizzy prior to the fall and continues to be dizzy with movement.  -she is hypertensive compared to her baseline and her blood pressure from yesterday with her PCP  -discussed with pt and cousin above importance of her being examined in the ED given symptoms    Injury of left hand, initial encounter  As above     Injury of left foot, initial encounter  As above        Patient given return precautions and instructed to FU with PCP regarding symptoms.

## 2025-08-01 ENCOUNTER — PATIENT MESSAGE (OUTPATIENT)
Dept: PSYCHIATRY | Facility: CLINIC | Age: 80
End: 2025-08-01
Payer: MEDICARE

## 2025-08-03 VITALS
HEART RATE: 85 BPM | DIASTOLIC BLOOD PRESSURE: 63 MMHG | WEIGHT: 108.25 LBS | OXYGEN SATURATION: 96 % | HEIGHT: 60 IN | BODY MASS INDEX: 21.25 KG/M2 | SYSTOLIC BLOOD PRESSURE: 136 MMHG

## 2025-08-03 NOTE — PROGRESS NOTES
Assessment:       1. Essential hypertension    2. Anxiety    3. Other hyperlipidemia    4. Chronic obstructive pulmonary disease, unspecified COPD type        Plan:       Essential hypertension: Uncontrolled.  The patient was advised to monitor the blood pressure home more often.  Continue with current treatment.  -     Comprehensive Metabolic Panel; Future; Expected date: 07/29/2025  -     Lipid Panel; Future; Expected date: 07/29/2025    Anxiety: Stable.  [place] prescription monitoring program was checked and okay.  Xanax was prescribed for the patient.  -     ALPRAZolam (XANAX) 0.25 MG tablet; Take 1 tablet (0.25 mg total) by mouth 2 (two) times daily as needed for Anxiety.  Dispense: 60 tablet; Refill: 5  -     TSH; Future; Expected date: 07/29/2025    Other hyperlipidemia: Well-controlled  -     Comprehensive Metabolic Panel; Future; Expected date: 07/29/2025  -     Lipid Panel; Future; Expected date: 07/29/2025    Chronic obstructive pulmonary disease, unspecified COPD type: Stable  -     fluticasone furoate-vilanteroL (BREO) 100-25 mcg/dose diskus inhaler; Inhale 1 puff into the lungs once daily. Controller  Dispense: 90 each; Refill: 1  -     CBC Without Differential; Future; Expected date: 07/29/2025         Recommend to increase physical activity.  Continue with current treatment.  Breo was refill for the patient.   Patient agreed with assessment and plan. Patient verbalized understanding.   Visit today included increased complexity associated with the care of the episodic problem hypertension, hyperlipidemia, COPD addressed and managing the longitudinal care of the patient due to the serious and/or complex managed problem(s) hypertension, hyperlipidemia, COPD.   Subjective:       Patient ID: Veronique Johnson is a 79 y.o. female.    Chief Complaint:  Follow-up and medication refill    HPI    The patient is coming here today for a follow-up visit and medication refill.  The patient has anxiety and  taking Xanax, denies any side effects of the medication.  The patient is under digital hypertension program and he has been checking her blood pressure is been okay.  She is currently taking Benicar in HCTZ.  She also will need refills for Breo for COPD.  The last cholesterol levels were therapeutic.  The patient is currently taking Crestor 20 mg at nighttime.  The patient denies any symptoms of chest pain, worsening shortness for breath, nausea and vomiting.    Past medical history, past social history was reviewed and discussed with the patient.    Review of Systems    Objective:      Physical Exam  Constitutional:       General: She is not in acute distress.     Appearance: Normal appearance. She is normal weight. She is not toxic-appearing.   HENT:      Head: Normocephalic and atraumatic.   Cardiovascular:      Rate and Rhythm: Normal rate and regular rhythm.   Pulmonary:      Effort: Pulmonary effort is normal. No respiratory distress.      Breath sounds: No wheezing.   Musculoskeletal:      Right lower leg: No edema.      Left lower leg: No edema.   Neurological:      Mental Status: She is alert.   Psychiatric:         Mood and Affect: Mood normal.         Behavior: Behavior normal.         Thought Content: Thought content normal.         Judgment: Judgment normal.

## 2025-08-04 ENCOUNTER — PATIENT MESSAGE (OUTPATIENT)
Dept: FAMILY MEDICINE | Facility: CLINIC | Age: 80
End: 2025-08-04
Payer: MEDICARE

## 2025-08-06 ENCOUNTER — OFFICE VISIT (OUTPATIENT)
Dept: FAMILY MEDICINE | Facility: CLINIC | Age: 80
End: 2025-08-06
Payer: MEDICARE

## 2025-08-06 VITALS
HEIGHT: 60 IN | SYSTOLIC BLOOD PRESSURE: 132 MMHG | WEIGHT: 106.69 LBS | BODY MASS INDEX: 20.94 KG/M2 | DIASTOLIC BLOOD PRESSURE: 68 MMHG | HEART RATE: 84 BPM | OXYGEN SATURATION: 98 %
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
## 2025-08-06 DIAGNOSIS — E83.42 HYPOMAGNESEMIA: ICD-10-CM

## 2025-08-06 DIAGNOSIS — I10 ESSENTIAL HYPERTENSION: Primary | ICD-10-CM

## 2025-08-06 DIAGNOSIS — J44.9 CHRONIC OBSTRUCTIVE PULMONARY DISEASE, UNSPECIFIED COPD TYPE: ICD-10-CM

## 2025-08-06 DIAGNOSIS — E87.1 HYPONATREMIA: ICD-10-CM

## 2025-08-06 DIAGNOSIS — K21.9 GASTROESOPHAGEAL REFLUX DISEASE WITHOUT ESOPHAGITIS: ICD-10-CM

## 2025-08-06 DIAGNOSIS — D64.9 ANEMIA, UNSPECIFIED TYPE: ICD-10-CM

## 2025-08-06 PROCEDURE — 99999 PR PBB SHADOW E&M-EST. PATIENT-LVL IV: CPT | Mod: PBBFAC,,, | Performed by: FAMILY MEDICINE

## 2025-08-06 PROCEDURE — 99214 OFFICE O/P EST MOD 30 MIN: CPT | Mod: PBBFAC,PO | Performed by: FAMILY MEDICINE

## 2025-08-06 PROCEDURE — 99214 OFFICE O/P EST MOD 30 MIN: CPT | Mod: S$PBB,,, | Performed by: FAMILY MEDICINE

## 2025-08-06 RX ORDER — FLUTICASONE FUROATE AND VILANTEROL TRIFENATATE 100; 25 UG/1; UG/1
1 POWDER RESPIRATORY (INHALATION) DAILY
Qty: 90 EACH | Refills: 3 | Status: SHIPPED | OUTPATIENT
Start: 2025-08-06

## 2025-08-06 RX ORDER — CALC/MAG/B COMPLEX/D3/HERB 61
15 TABLET ORAL DAILY
Qty: 30 CAPSULE | Refills: 11 | Status: SHIPPED | OUTPATIENT
Start: 2025-08-06

## 2025-08-07 ENCOUNTER — LAB VISIT (OUTPATIENT)
Dept: LAB | Facility: HOSPITAL | Age: 80
End: 2025-08-07
Attending: FAMILY MEDICINE
Payer: MEDICARE

## 2025-08-07 DIAGNOSIS — E87.1 HYPONATREMIA: ICD-10-CM

## 2025-08-07 DIAGNOSIS — D64.9 ANEMIA, UNSPECIFIED TYPE: ICD-10-CM

## 2025-08-07 DIAGNOSIS — E83.42 HYPOMAGNESEMIA: ICD-10-CM

## 2025-08-07 LAB
ALBUMIN SERPL BCP-MCNC: 4.1 G/DL (ref 3.5–5.2)
ALP SERPL-CCNC: 74 UNIT/L (ref 40–150)
ALT SERPL W/O P-5'-P-CCNC: 27 UNIT/L (ref 0–55)
ANION GAP (OHS): 13 MMOL/L (ref 8–16)
AST SERPL-CCNC: 32 UNIT/L (ref 0–50)
BILIRUB SERPL-MCNC: 0.4 MG/DL (ref 0.1–1)
BUN SERPL-MCNC: 18 MG/DL (ref 8–23)
CALCIUM SERPL-MCNC: 9.3 MG/DL (ref 8.7–10.5)
CHLORIDE SERPL-SCNC: 103 MMOL/L (ref 95–110)
CO2 SERPL-SCNC: 25 MMOL/L (ref 23–29)
CREAT SERPL-MCNC: 1.1 MG/DL (ref 0.5–1.4)
ERYTHROCYTE [DISTWIDTH] IN BLOOD BY AUTOMATED COUNT: 13.7 % (ref 11.5–14.5)
FERRITIN SERPL-MCNC: 325 NG/ML (ref 20–300)
GFR SERPLBLD CREATININE-BSD FMLA CKD-EPI: 52 ML/MIN/1.73/M2
GLUCOSE SERPL-MCNC: 84 MG/DL (ref 70–110)
HCT VFR BLD AUTO: 36.6 % (ref 37–48.5)
HGB BLD-MCNC: 11.7 GM/DL (ref 12–16)
IRON SATN MFR SERPL: 17 % (ref 20–50)
IRON SERPL-MCNC: 64 UG/DL (ref 30–160)
MAGNESIUM SERPL-MCNC: 1.8 MG/DL (ref 1.6–2.6)
MCH RBC QN AUTO: 30.2 PG (ref 27–31)
MCHC RBC AUTO-ENTMCNC: 32 G/DL (ref 32–36)
MCV RBC AUTO: 94 FL (ref 82–98)
PLATELET # BLD AUTO: 254 K/UL (ref 150–450)
PMV BLD AUTO: 9.6 FL (ref 9.2–12.9)
POTASSIUM SERPL-SCNC: 4 MMOL/L (ref 3.5–5.1)
PROT SERPL-MCNC: 6.9 GM/DL (ref 6–8.4)
RBC # BLD AUTO: 3.88 M/UL (ref 4–5.4)
SODIUM SERPL-SCNC: 141 MMOL/L (ref 136–145)
TIBC SERPL-MCNC: 370 UG/DL (ref 250–450)
TRANSFERRIN SERPL-MCNC: 250 MG/DL (ref 200–375)
WBC # BLD AUTO: 5.63 K/UL (ref 3.9–12.7)

## 2025-08-07 PROCEDURE — 85027 COMPLETE CBC AUTOMATED: CPT

## 2025-08-07 PROCEDURE — 82728 ASSAY OF FERRITIN: CPT

## 2025-08-07 PROCEDURE — 83540 ASSAY OF IRON: CPT

## 2025-08-07 PROCEDURE — 80053 COMPREHEN METABOLIC PANEL: CPT

## 2025-08-07 PROCEDURE — 36415 COLL VENOUS BLD VENIPUNCTURE: CPT | Mod: PO

## 2025-08-07 PROCEDURE — 83735 ASSAY OF MAGNESIUM: CPT

## 2025-08-10 ENCOUNTER — RESULTS FOLLOW-UP (OUTPATIENT)
Dept: FAMILY MEDICINE | Facility: CLINIC | Age: 80
End: 2025-08-10
Payer: MEDICARE

## 2025-08-10 DIAGNOSIS — R26.89 BALANCE DISORDER: Primary | ICD-10-CM

## 2025-08-13 ENCOUNTER — LAB VISIT (OUTPATIENT)
Dept: LAB | Facility: HOSPITAL | Age: 80
End: 2025-08-13
Attending: FAMILY MEDICINE
Payer: MEDICARE

## 2025-08-13 DIAGNOSIS — R26.89 BALANCE DISORDER: ICD-10-CM

## 2025-08-13 LAB — VIT B12 SERPL-MCNC: 415 PG/ML (ref 210–950)

## 2025-08-13 PROCEDURE — 82607 VITAMIN B-12: CPT

## 2025-08-13 PROCEDURE — 36415 COLL VENOUS BLD VENIPUNCTURE: CPT | Mod: PO

## 2025-08-15 DIAGNOSIS — F41.9 ANXIETY: ICD-10-CM

## 2025-08-15 DIAGNOSIS — F43.10 PTSD (POST-TRAUMATIC STRESS DISORDER): ICD-10-CM

## 2025-08-15 RX ORDER — BUPROPION HYDROCHLORIDE 300 MG/1
300 TABLET ORAL DAILY
Qty: 90 TABLET | Refills: 1 | Status: SHIPPED | OUTPATIENT
Start: 2025-08-15

## 2025-08-21 ENCOUNTER — PATIENT MESSAGE (OUTPATIENT)
Dept: FAMILY MEDICINE | Facility: CLINIC | Age: 80
End: 2025-08-21
Payer: MEDICARE

## 2025-08-21 DIAGNOSIS — I10 ESSENTIAL HYPERTENSION: Primary | ICD-10-CM

## 2025-08-22 RX ORDER — METOPROLOL SUCCINATE 25 MG/1
12.5 TABLET, EXTENDED RELEASE ORAL NIGHTLY
Qty: 45 TABLET | Refills: 3 | Status: SHIPPED | OUTPATIENT
Start: 2025-08-22 | End: 2026-08-22

## 2025-08-30 ENCOUNTER — PATIENT MESSAGE (OUTPATIENT)
Dept: FAMILY MEDICINE | Facility: CLINIC | Age: 80
End: 2025-08-30
Payer: MEDICARE

## 2025-09-04 ENCOUNTER — OFFICE VISIT (OUTPATIENT)
Dept: FAMILY MEDICINE | Facility: CLINIC | Age: 80
End: 2025-09-04
Payer: MEDICARE

## 2025-09-04 VITALS
HEIGHT: 60 IN | WEIGHT: 112 LBS | SYSTOLIC BLOOD PRESSURE: 160 MMHG | HEART RATE: 63 BPM | OXYGEN SATURATION: 98 % | TEMPERATURE: 98 F | BODY MASS INDEX: 21.99 KG/M2 | DIASTOLIC BLOOD PRESSURE: 78 MMHG

## 2025-09-04 DIAGNOSIS — S01.81XD LACERATION OF FOREHEAD, SUBSEQUENT ENCOUNTER: Primary | ICD-10-CM

## 2025-09-04 DIAGNOSIS — Z48.02 ENCOUNTER FOR REMOVAL OF SUTURES: ICD-10-CM

## 2025-09-04 DIAGNOSIS — I10 ESSENTIAL HYPERTENSION: ICD-10-CM

## 2025-09-04 DIAGNOSIS — S00.83XD TRAUMATIC HEMATOMA OF FOREHEAD, SUBSEQUENT ENCOUNTER: ICD-10-CM

## 2025-09-04 DIAGNOSIS — G35 MULTIPLE SCLEROSIS: ICD-10-CM

## 2025-09-04 DIAGNOSIS — I67.1 CEREBRAL ANEURYSM: ICD-10-CM

## 2025-09-04 PROCEDURE — 15853 REMOVAL SUTR/STAPL XREQ ANES: CPT | Mod: PBBFAC,PO | Performed by: NURSE PRACTITIONER

## 2025-09-04 PROCEDURE — 99215 OFFICE O/P EST HI 40 MIN: CPT | Mod: PBBFAC,PO | Performed by: NURSE PRACTITIONER

## 2025-09-04 PROCEDURE — 99999 PR PBB SHADOW E&M-EST. PATIENT-LVL V: CPT | Mod: PBBFAC,,, | Performed by: NURSE PRACTITIONER

## 2025-09-05 DIAGNOSIS — I67.1 CEREBRAL ANEURYSM, NONRUPTURED: Primary | ICD-10-CM

## (undated) DEVICE — TOURNIQUET SB QC DP 30X4IN

## (undated) DEVICE — GOWN SMARTGOWN LVL4 X-LONG XL

## (undated) DEVICE — DRESSING AQUACEL AG FOAM 4X4

## (undated) DEVICE — MANIFOLD 4 PORT

## (undated) DEVICE — BLADE MAKO STANDARD

## (undated) DEVICE — COVER TABLE 44X90 STERILE

## (undated) DEVICE — PACK LOWER EXTREMITY

## (undated) DEVICE — PAD CAST SPECIALIST STRL 6

## (undated) DEVICE — BANDAGE ESMARK 6X12

## (undated) DEVICE — BOWL STERILE LARGE 32OZ

## (undated) DEVICE — BLADE SURG CARBON STEEL SZ11

## (undated) DEVICE — DRAPE ORTH SPLIT 77X108IN

## (undated) DEVICE — NDL 22GA X1 1/2 REG BEVEL

## (undated) DEVICE — SOL IRR NACL .9% 3000ML

## (undated) DEVICE — BONE PINS (3.2MM X 110MM)
Type: IMPLANTABLE DEVICE | Site: KNEE | Status: NON-FUNCTIONAL
Removed: 2022-08-16

## (undated) DEVICE — UNDERGLOVE BIOGEL PI SZ 6.5 LF

## (undated) DEVICE — DRESSING N ADH OIL EMUL 3X3

## (undated) DEVICE — BLADE SURG CARBON STEEL #10

## (undated) DEVICE — SEE MEDLINE ITEM 157216

## (undated) DEVICE — GLOVE SURGICAL LATEX SZ 8

## (undated) DEVICE — SUT 0 36IN COATED VICRYL VI

## (undated) DEVICE — DRAPE STERI INSTRUMENT 1018

## (undated) DEVICE — CATH URETHRAL RED RUBBER 18FR

## (undated) DEVICE — SUT ETHILON 3-0 PS2 18 BLK

## (undated) DEVICE — DRESSING GAUZE OIL EMUL 3X8

## (undated) DEVICE — SLEEVE SCD EXPRESS CALF MEDIUM

## (undated) DEVICE — GLOVE SURG ULTRA TOUCH 7.5

## (undated) DEVICE — CUTTER AGGRESSIVE PLUS 3.5MM

## (undated) DEVICE — DRAPE STERI U-SHAPED 47X51IN

## (undated) DEVICE — MAT SUCTION PUDDLEVAC ORANGE

## (undated) DEVICE — SEE MEDLINE ITEM 146313

## (undated) DEVICE — SUT STRATAFIX PDS 1 CTX 18IN

## (undated) DEVICE — KIT ENDOKIT COMPLIANCE CUSTOM

## (undated) DEVICE — DRAPE PLASTIC U 60X72

## (undated) DEVICE — KIT DRAPE RIO ONE PIECE W/POCK

## (undated) DEVICE — BANDAGE MATRIX HK LOOP 6IN 5YD

## (undated) DEVICE — DRESSING AQUACEL AG ADV 3.5X12

## (undated) DEVICE — Device

## (undated) DEVICE — SUT 2/0 18IN COATED VICRYL

## (undated) DEVICE — BNDG COFLEX FOAM LF2 ST 6X5YD

## (undated) DEVICE — TUBING IRRIGATION RIO

## (undated) DEVICE — PACK CUSTOM UNIV BASIN SLI

## (undated) DEVICE — GAUZE SPONGE 4X4 12PLY

## (undated) DEVICE — GLOVE SURG ULTRA TOUCH 8

## (undated) DEVICE — UNDERGLOVES BIOGEL PI SIZE 7.5

## (undated) DEVICE — DRAPE EXTREMITY W/ABC NON-SLIP

## (undated) DEVICE — BLADE TONGUE DEPRESSOR STRL

## (undated) DEVICE — SEE MEDLINE ITEM 146231

## (undated) DEVICE — MAKO BALL BURR

## (undated) DEVICE — PADDING CAST SPECIALIST 6X4YD

## (undated) DEVICE — INTERPULSE SET

## (undated) DEVICE — MICS IRRIGATION CLIP

## (undated) DEVICE — SET DECANTER MEDICHOICE

## (undated) DEVICE — PADDING WYTEX UNDRCST 6INX4YD

## (undated) DEVICE — NDL SPINAL 18GX3.5 SPINOCAN

## (undated) DEVICE — ADHESIVE DERMABOND ADVANCED

## (undated) DEVICE — TOURNIQUET SB QC DP 34X4IN

## (undated) DEVICE — GLOVE SURG ULTRA TOUCH 6

## (undated) DEVICE — STRAP OR TABLE 5IN X 72IN

## (undated) DEVICE — APPLICATOR CHLORAPREP ORN 26ML

## (undated) DEVICE — PACK BASIC

## (undated) DEVICE — ELECTRODE REM PLYHSV RETURN 9

## (undated) DEVICE — TOGA FLYTE PEEL AWAY XLARGE

## (undated) DEVICE — TUBE SET INFLOW/OUTFLOW

## (undated) DEVICE — KIT CHECKPOINT MAKO

## (undated) DEVICE — SYR 50CC LL

## (undated) DEVICE — PAD ABD 8X10 STERILE

## (undated) DEVICE — DRAPE INCISE IOBAN 2 23X17IN

## (undated) DEVICE — SPONGE LAP 18X18 PREWASHED

## (undated) DEVICE — UNDERGLOVES BIOGEL PI SIZE 8

## (undated) DEVICE — SOL 9P NACL IRR PIC IL

## (undated) DEVICE — SUT STRATAFIX SPRL PS-2 3-0

## (undated) DEVICE — DRESSING N ADH OIL EMUL 3X8 3S

## (undated) DEVICE — KIT VIZADISC KNEE TRACKING

## (undated) DEVICE — SPONGE BULKEE II ABSRB 6X6.75

## (undated) DEVICE — WRAP PROTECTIVE LEG POS STRL

## (undated) DEVICE — DRAPE THREE-QTR REINF 53X77IN

## (undated) DEVICE — BANDAGE ESMARK ELASTIC ST 6X9

## (undated) DEVICE — CUBE COLD THERAPY POLAR CARE

## (undated) DEVICE — SEE MEDLINE ITEM 152530

## (undated) DEVICE — CUTTER MENISCUS AGGRESSIVE 4.0

## (undated) DEVICE — LINER SUCTION 3000CC

## (undated) DEVICE — TUBING SUC UNIV W/CONN 12FT

## (undated) DEVICE — SLEEVE SCD EXPRESS KNEE MEDIUM

## (undated) DEVICE — TOWEL OR DISP STRL BLUE 4/PK

## (undated) DEVICE — BONE PINS (4MM X 140MM)
Type: IMPLANTABLE DEVICE | Site: KNEE | Status: NON-FUNCTIONAL
Removed: 2022-08-16

## (undated) DEVICE — ALCOHOL 70% ISOP RUBBING 4OZ

## (undated) DEVICE — GOWN TOGA SYS PEELWY ZIP 2 XL

## (undated) DEVICE — SUT MONOCRYL 4-0 PS-2